# Patient Record
Sex: MALE | Race: BLACK OR AFRICAN AMERICAN | NOT HISPANIC OR LATINO | Employment: UNEMPLOYED | URBAN - METROPOLITAN AREA
[De-identification: names, ages, dates, MRNs, and addresses within clinical notes are randomized per-mention and may not be internally consistent; named-entity substitution may affect disease eponyms.]

---

## 2023-09-08 ENCOUNTER — HOSPITAL ENCOUNTER (INPATIENT)
Facility: HOSPITAL | Age: 63
LOS: 3 days | Discharge: NURSING, REHAB OR LONG-TERM CARE | DRG: 351 | End: 2023-09-14
Attending: EMERGENCY MEDICINE | Admitting: STUDENT IN AN ORGANIZED HEALTH CARE EDUCATION/TRAINING PROGRAM
Payer: COMMERCIAL

## 2023-09-08 DIAGNOSIS — N17.9 AKI (ACUTE KIDNEY INJURY) (HCC): Primary | ICD-10-CM

## 2023-09-08 DIAGNOSIS — I10 PRIMARY HYPERTENSION: ICD-10-CM

## 2023-09-08 DIAGNOSIS — E87.6 HYPOKALEMIA: ICD-10-CM

## 2023-09-08 DIAGNOSIS — M10.9 GOUT FLARE: ICD-10-CM

## 2023-09-08 LAB
ALBUMIN SERPL BCP-MCNC: 4.5 G/DL (ref 3.5–5)
ALP SERPL-CCNC: 61 U/L (ref 34–104)
ALT SERPL W P-5'-P-CCNC: 9 U/L (ref 7–52)
ANION GAP SERPL CALCULATED.3IONS-SCNC: 11 MMOL/L
AST SERPL W P-5'-P-CCNC: 12 U/L (ref 13–39)
BASOPHILS # BLD AUTO: 0.05 THOUSANDS/ÂΜL (ref 0–0.1)
BASOPHILS NFR BLD AUTO: 0 % (ref 0–1)
BILIRUB SERPL-MCNC: 0.76 MG/DL (ref 0.2–1)
BUN SERPL-MCNC: 23 MG/DL (ref 5–25)
CALCIUM SERPL-MCNC: 9.7 MG/DL (ref 8.4–10.2)
CHLORIDE SERPL-SCNC: 97 MMOL/L (ref 96–108)
CO2 SERPL-SCNC: 27 MMOL/L (ref 21–32)
CREAT SERPL-MCNC: 1.89 MG/DL (ref 0.6–1.3)
EOSINOPHIL # BLD AUTO: 0.05 THOUSAND/ÂΜL (ref 0–0.61)
EOSINOPHIL NFR BLD AUTO: 0 % (ref 0–6)
ERYTHROCYTE [DISTWIDTH] IN BLOOD BY AUTOMATED COUNT: 14.3 % (ref 11.6–15.1)
GFR SERPL CREATININE-BSD FRML MDRD: 37 ML/MIN/1.73SQ M
GLUCOSE SERPL-MCNC: 112 MG/DL (ref 65–140)
HCT VFR BLD AUTO: 42 % (ref 36.5–49.3)
HGB BLD-MCNC: 14.3 G/DL (ref 12–17)
IMM GRANULOCYTES # BLD AUTO: 0.03 THOUSAND/UL (ref 0–0.2)
IMM GRANULOCYTES NFR BLD AUTO: 0 % (ref 0–2)
LYMPHOCYTES # BLD AUTO: 0.73 THOUSANDS/ÂΜL (ref 0.6–4.47)
LYMPHOCYTES NFR BLD AUTO: 6 % (ref 14–44)
MCH RBC QN AUTO: 28.9 PG (ref 26.8–34.3)
MCHC RBC AUTO-ENTMCNC: 34 G/DL (ref 31.4–37.4)
MCV RBC AUTO: 85 FL (ref 82–98)
MONOCYTES # BLD AUTO: 1.21 THOUSAND/ÂΜL (ref 0.17–1.22)
MONOCYTES NFR BLD AUTO: 10 % (ref 4–12)
NEUTROPHILS # BLD AUTO: 10.05 THOUSANDS/ÂΜL (ref 1.85–7.62)
NEUTS SEG NFR BLD AUTO: 84 % (ref 43–75)
NRBC BLD AUTO-RTO: 0 /100 WBCS
PLATELET # BLD AUTO: 280 THOUSANDS/UL (ref 149–390)
PMV BLD AUTO: 10 FL (ref 8.9–12.7)
POTASSIUM SERPL-SCNC: 2.8 MMOL/L (ref 3.5–5.3)
PROT SERPL-MCNC: 8.5 G/DL (ref 6.4–8.4)
RBC # BLD AUTO: 4.95 MILLION/UL (ref 3.88–5.62)
SODIUM SERPL-SCNC: 135 MMOL/L (ref 135–147)
WBC # BLD AUTO: 12.12 THOUSAND/UL (ref 4.31–10.16)

## 2023-09-08 PROCEDURE — 96375 TX/PRO/DX INJ NEW DRUG ADDON: CPT

## 2023-09-08 PROCEDURE — 99283 EMERGENCY DEPT VISIT LOW MDM: CPT

## 2023-09-08 PROCEDURE — 80053 COMPREHEN METABOLIC PANEL: CPT | Performed by: EMERGENCY MEDICINE

## 2023-09-08 PROCEDURE — 99285 EMERGENCY DEPT VISIT HI MDM: CPT | Performed by: EMERGENCY MEDICINE

## 2023-09-08 PROCEDURE — 36415 COLL VENOUS BLD VENIPUNCTURE: CPT | Performed by: EMERGENCY MEDICINE

## 2023-09-08 PROCEDURE — 85025 COMPLETE CBC W/AUTO DIFF WBC: CPT | Performed by: EMERGENCY MEDICINE

## 2023-09-08 RX ORDER — POTASSIUM CHLORIDE 20 MEQ/1
40 TABLET, EXTENDED RELEASE ORAL ONCE
Status: COMPLETED | OUTPATIENT
Start: 2023-09-08 | End: 2023-09-09

## 2023-09-08 RX ORDER — PREDNISONE 20 MG/1
60 TABLET ORAL ONCE
Status: COMPLETED | OUTPATIENT
Start: 2023-09-08 | End: 2023-09-08

## 2023-09-08 RX ORDER — HYDROMORPHONE HCL IN WATER/PF 6 MG/30 ML
0.2 PATIENT CONTROLLED ANALGESIA SYRINGE INTRAVENOUS ONCE
Status: COMPLETED | OUTPATIENT
Start: 2023-09-08 | End: 2023-09-08

## 2023-09-08 RX ADMIN — HYDROMORPHONE HYDROCHLORIDE 0.2 MG: 0.2 INJECTION, SOLUTION INTRAMUSCULAR; INTRAVENOUS; SUBCUTANEOUS at 22:42

## 2023-09-08 RX ADMIN — PREDNISONE 60 MG: 20 TABLET ORAL at 22:27

## 2023-09-09 PROBLEM — E87.6 HYPOKALEMIA: Status: ACTIVE | Noted: 2023-09-09

## 2023-09-09 PROBLEM — N18.9 CKD (CHRONIC KIDNEY DISEASE): Status: ACTIVE | Noted: 2023-09-09

## 2023-09-09 PROBLEM — Z86.73 H/O: CVA (CEREBROVASCULAR ACCIDENT): Status: ACTIVE | Noted: 2023-09-09

## 2023-09-09 PROBLEM — E78.5 HYPERLIPIDEMIA: Status: ACTIVE | Noted: 2023-09-09

## 2023-09-09 PROBLEM — M10.9 GOUT FLARE: Status: ACTIVE | Noted: 2023-09-09

## 2023-09-09 PROBLEM — I10 HYPERTENSION: Status: ACTIVE | Noted: 2023-09-09

## 2023-09-09 LAB
ANION GAP SERPL CALCULATED.3IONS-SCNC: 8 MMOL/L
BUN SERPL-MCNC: 22 MG/DL (ref 5–25)
CALCIUM SERPL-MCNC: 9.4 MG/DL (ref 8.4–10.2)
CHLORIDE SERPL-SCNC: 99 MMOL/L (ref 96–108)
CO2 SERPL-SCNC: 27 MMOL/L (ref 21–32)
CREAT SERPL-MCNC: 1.74 MG/DL (ref 0.6–1.3)
ERYTHROCYTE [DISTWIDTH] IN BLOOD BY AUTOMATED COUNT: 14.3 % (ref 11.6–15.1)
GFR SERPL CREATININE-BSD FRML MDRD: 41 ML/MIN/1.73SQ M
GLUCOSE P FAST SERPL-MCNC: 151 MG/DL (ref 65–99)
GLUCOSE SERPL-MCNC: 151 MG/DL (ref 65–140)
HCT VFR BLD AUTO: 40.3 % (ref 36.5–49.3)
HGB BLD-MCNC: 14.1 G/DL (ref 12–17)
MAGNESIUM SERPL-MCNC: 1.9 MG/DL (ref 1.9–2.7)
MCH RBC QN AUTO: 29.7 PG (ref 26.8–34.3)
MCHC RBC AUTO-ENTMCNC: 35 G/DL (ref 31.4–37.4)
MCV RBC AUTO: 85 FL (ref 82–98)
PLATELET # BLD AUTO: 245 THOUSANDS/UL (ref 149–390)
PMV BLD AUTO: 9.7 FL (ref 8.9–12.7)
POTASSIUM SERPL-SCNC: 3.6 MMOL/L (ref 3.5–5.3)
RBC # BLD AUTO: 4.74 MILLION/UL (ref 3.88–5.62)
SODIUM SERPL-SCNC: 134 MMOL/L (ref 135–147)
WBC # BLD AUTO: 11.66 THOUSAND/UL (ref 4.31–10.16)

## 2023-09-09 PROCEDURE — 85027 COMPLETE CBC AUTOMATED: CPT | Performed by: INTERNAL MEDICINE

## 2023-09-09 PROCEDURE — 96365 THER/PROPH/DIAG IV INF INIT: CPT

## 2023-09-09 PROCEDURE — 97163 PT EVAL HIGH COMPLEX 45 MIN: CPT

## 2023-09-09 PROCEDURE — 36415 COLL VENOUS BLD VENIPUNCTURE: CPT | Performed by: INTERNAL MEDICINE

## 2023-09-09 PROCEDURE — 99223 1ST HOSP IP/OBS HIGH 75: CPT | Performed by: STUDENT IN AN ORGANIZED HEALTH CARE EDUCATION/TRAINING PROGRAM

## 2023-09-09 PROCEDURE — 97167 OT EVAL HIGH COMPLEX 60 MIN: CPT

## 2023-09-09 PROCEDURE — 80048 BASIC METABOLIC PNL TOTAL CA: CPT | Performed by: INTERNAL MEDICINE

## 2023-09-09 PROCEDURE — 83735 ASSAY OF MAGNESIUM: CPT | Performed by: INTERNAL MEDICINE

## 2023-09-09 RX ORDER — MAGNESIUM HYDROXIDE/ALUMINUM HYDROXICE/SIMETHICONE 120; 1200; 1200 MG/30ML; MG/30ML; MG/30ML
30 SUSPENSION ORAL EVERY 4 HOURS PRN
Status: DISCONTINUED | OUTPATIENT
Start: 2023-09-09 | End: 2023-09-14 | Stop reason: HOSPADM

## 2023-09-09 RX ORDER — ATORVASTATIN CALCIUM 40 MG/1
1 TABLET, FILM COATED ORAL EVERY EVENING
COMMUNITY
Start: 2023-08-25 | End: 2023-11-23

## 2023-09-09 RX ORDER — ASPIRIN 81 MG/1
81 TABLET, CHEWABLE ORAL
COMMUNITY

## 2023-09-09 RX ORDER — AMLODIPINE BESYLATE 10 MG/1
10 TABLET ORAL DAILY
COMMUNITY
Start: 2023-05-16 | End: 2023-11-23

## 2023-09-09 RX ORDER — AMLODIPINE BESYLATE 10 MG/1
10 TABLET ORAL DAILY
Status: DISCONTINUED | OUTPATIENT
Start: 2023-09-09 | End: 2023-09-14 | Stop reason: HOSPADM

## 2023-09-09 RX ORDER — METOPROLOL SUCCINATE 100 MG/1
100 TABLET, EXTENDED RELEASE ORAL DAILY
Status: ON HOLD | COMMUNITY
Start: 2023-05-16 | End: 2023-09-14 | Stop reason: SDUPTHER

## 2023-09-09 RX ORDER — ATORVASTATIN CALCIUM 40 MG/1
40 TABLET, FILM COATED ORAL
Status: DISCONTINUED | OUTPATIENT
Start: 2023-09-09 | End: 2023-09-14 | Stop reason: HOSPADM

## 2023-09-09 RX ORDER — METOPROLOL SUCCINATE 100 MG/1
100 TABLET, EXTENDED RELEASE ORAL DAILY
Status: DISCONTINUED | OUTPATIENT
Start: 2023-09-09 | End: 2023-09-13

## 2023-09-09 RX ORDER — ACETAMINOPHEN 325 MG/1
975 TABLET ORAL EVERY 8 HOURS PRN
Status: DISCONTINUED | OUTPATIENT
Start: 2023-09-09 | End: 2023-09-14 | Stop reason: HOSPADM

## 2023-09-09 RX ORDER — LIDOCAINE 50 MG/G
1 PATCH TOPICAL DAILY
Status: DISCONTINUED | OUTPATIENT
Start: 2023-09-09 | End: 2023-09-14 | Stop reason: HOSPADM

## 2023-09-09 RX ORDER — SODIUM CHLORIDE 9 MG/ML
100 INJECTION, SOLUTION INTRAVENOUS CONTINUOUS
Status: DISCONTINUED | OUTPATIENT
Start: 2023-09-09 | End: 2023-09-10

## 2023-09-09 RX ORDER — PANTOPRAZOLE SODIUM 40 MG/1
40 TABLET, DELAYED RELEASE ORAL
Status: DISCONTINUED | OUTPATIENT
Start: 2023-09-09 | End: 2023-09-14 | Stop reason: HOSPADM

## 2023-09-09 RX ORDER — HEPARIN SODIUM 5000 [USP'U]/ML
5000 INJECTION, SOLUTION INTRAVENOUS; SUBCUTANEOUS EVERY 8 HOURS SCHEDULED
Status: DISCONTINUED | OUTPATIENT
Start: 2023-09-09 | End: 2023-09-14 | Stop reason: HOSPADM

## 2023-09-09 RX ORDER — POTASSIUM CHLORIDE 14.9 MG/ML
20 INJECTION INTRAVENOUS ONCE
Status: COMPLETED | OUTPATIENT
Start: 2023-09-09 | End: 2023-09-09

## 2023-09-09 RX ORDER — PREDNISONE 10 MG/1
10 TABLET ORAL DAILY
Status: DISCONTINUED | OUTPATIENT
Start: 2023-09-13 | End: 2023-09-14 | Stop reason: HOSPADM

## 2023-09-09 RX ORDER — OMEPRAZOLE 20 MG/1
20 CAPSULE, DELAYED RELEASE ORAL
COMMUNITY

## 2023-09-09 RX ORDER — PREDNISONE 20 MG/1
20 TABLET ORAL DAILY
Status: COMPLETED | OUTPATIENT
Start: 2023-09-09 | End: 2023-09-12

## 2023-09-09 RX ORDER — HYDROCHLOROTHIAZIDE 25 MG/1
25 TABLET ORAL DAILY
COMMUNITY
Start: 2023-08-25 | End: 2023-09-14

## 2023-09-09 RX ADMIN — HEPARIN SODIUM 5000 UNITS: 5000 INJECTION INTRAVENOUS; SUBCUTANEOUS at 13:28

## 2023-09-09 RX ADMIN — HEPARIN SODIUM 5000 UNITS: 5000 INJECTION INTRAVENOUS; SUBCUTANEOUS at 22:39

## 2023-09-09 RX ADMIN — SODIUM CHLORIDE 100 ML/HR: 0.9 INJECTION, SOLUTION INTRAVENOUS at 13:28

## 2023-09-09 RX ADMIN — SODIUM CHLORIDE 100 ML/HR: 0.9 INJECTION, SOLUTION INTRAVENOUS at 22:44

## 2023-09-09 RX ADMIN — PREDNISONE 20 MG: 20 TABLET ORAL at 09:02

## 2023-09-09 RX ADMIN — ALUMINUM HYDROXIDE, MAGNESIUM HYDROXIDE, AND DIMETHICONE 30 ML: 200; 20; 200 SUSPENSION ORAL at 22:57

## 2023-09-09 RX ADMIN — AMLODIPINE BESYLATE 10 MG: 10 TABLET ORAL at 09:02

## 2023-09-09 RX ADMIN — ALUMINUM HYDROXIDE, MAGNESIUM HYDROXIDE, AND DIMETHICONE 30 ML: 200; 20; 200 SUSPENSION ORAL at 15:49

## 2023-09-09 RX ADMIN — ATORVASTATIN CALCIUM 40 MG: 40 TABLET, FILM COATED ORAL at 15:49

## 2023-09-09 RX ADMIN — LIDOCAINE 5% 1 PATCH: 700 PATCH TOPICAL at 13:28

## 2023-09-09 RX ADMIN — SODIUM CHLORIDE 125 ML/HR: 0.9 INJECTION, SOLUTION INTRAVENOUS at 01:22

## 2023-09-09 RX ADMIN — PANTOPRAZOLE SODIUM 40 MG: 40 TABLET, DELAYED RELEASE ORAL at 05:11

## 2023-09-09 RX ADMIN — ASPIRIN 81 MG: 81 TABLET, COATED ORAL at 09:02

## 2023-09-09 RX ADMIN — POTASSIUM CHLORIDE 40 MEQ: 1500 TABLET, EXTENDED RELEASE ORAL at 00:03

## 2023-09-09 RX ADMIN — METOPROLOL SUCCINATE 100 MG: 100 TABLET, EXTENDED RELEASE ORAL at 09:02

## 2023-09-09 RX ADMIN — SODIUM CHLORIDE, SODIUM LACTATE, POTASSIUM CHLORIDE, AND CALCIUM CHLORIDE 1000 ML: .6; .31; .03; .02 INJECTION, SOLUTION INTRAVENOUS at 00:04

## 2023-09-09 RX ADMIN — POTASSIUM CHLORIDE 20 MEQ: 14.9 INJECTION, SOLUTION INTRAVENOUS at 01:22

## 2023-09-09 RX ADMIN — ACETAMINOPHEN 975 MG: 325 TABLET, FILM COATED ORAL at 02:12

## 2023-09-09 NOTE — ASSESSMENT & PLAN NOTE
· Potassium 2.8 on admission  · Supplementation with 40mEq oral and 20mEq IV potassium  · Recheck with AM labs  · Monitor on telemetry

## 2023-09-09 NOTE — OCCUPATIONAL THERAPY NOTE
Occupational Therapy Evaluation      Claudean Hood    9/9/2023    Patient Active Problem List   Diagnosis    Hypertension    CKD (chronic kidney disease)    Hypokalemia    Gout flare    Hyperlipidemia    H/O: CVA (cerebrovascular accident)       Past Medical History:   Diagnosis Date    High cholesterol     Hypertension     Renal disorder     Stroke ([address])           09/09/23 1232   OT Last Visit   OT Visit Date 09/09/23   Note Type   Note type Evaluation   Additional Comments Pt agreeable to OT eval. Upon arrival pt supine in bed with HOB elevated. Pain Assessment   Pain Assessment Tool 0-10   Pain Score 3  (0/10 at rest; 3/10 c mobility)   Pain Location/Orientation Orientation: Right;Location: Knee   Pain Onset/Description Onset: Ongoing;Frequency: Constant/Continuous   Hospital Pain Intervention(s) Ambulation/increased activity;Repositioned;Medication (See MAR)   Restrictions/Precautions   Weight Bearing Precautions Per Order No   Other Precautions Multiple lines;Telemetry; Fall Risk;Pain   Home Living   Type of Home Apartment  (2nd floor apt)   Home Layout One level;Performs ADLs on one level; Able to live on main level with bedroom/bathroom;Stairs to enter with rails  ([address])   Bathroom Shower/Tub Tub/shower unit   Bathroom Toilet Standard   Bathroom Equipment   (no DME reported)   [address]  (no AD used at baseline- "only use it when I am in pain")   Prior Function   Level of [place] Independent with ADLs; Independent with functional mobility; Needs assistance with IADLS   Lives With Dana Blanchard Help From Family   IADLs Family/Friend/Other provides transportation; Independent with medication management; Independent with meal prep   Falls in the last 6 months 0   Vocational Retired   Lifestyle   Autonomy Patient reporting being independent with ADLs, ambulatory with no AD, and lives with son   Reciprocal Relationships Son   Service to Others Retired   ADL Eating Assistance 6  Modified independent   Grooming Assistance 6  Modified Independent   UB Bathing Assistance 4  Minimal Assistance   LB Bathing Assistance 3  Moderate Assistance   UB Dressing Assistance 4  Minimal Assistance   LB Dressing Assistance 3  Moderate Assistance   Toileting Assistance  3  Moderate Assistance   Additional Comments ADL levels based on functional performance during OT eval.   Bed Mobility   Supine to Sit 5  Supervision   Additional items Assist x 1;HOB elevated; Bedrails; Increased time required;Verbal cues   Sit to Supine   (DNT: pt seated at EOB at end of session)   Additional Comments Pt denied lightheaded/dizziness with transitional movements   Transfers   Sit to Stand 4  Minimal assistance   Additional items Assist x 2; Increased time required;Verbal cues   Stand to Sit 4  Minimal assistance   Additional items Assist x 2; Increased time required;Verbal cues   Functional Mobility   Functional Mobility 4  Minimal assistance  (Assist of 2)   Additional Comments Pt ambulated side-steps towards HOB. Pt unsteady and limited by pain. Additional items Rolling walker   Balance   Static Sitting Fair +   Dynamic Sitting Fair -   Static Standing Poor +   Dynamic Standing Poor   Activity Tolerance   Activity Tolerance Patient limited by pain   Medical Staff Made Aware Pt seen as a co-eval with PT due to the patient's co-morbidities and clinically unstable presentation indicated by chart review. RUE Assessment   RUE Assessment WFL  (full AROM, grossly 4-/5 MMT)   LUE Assessment   LUE Assessment X  (limited d/t L hemiplegia 2/2 hx of CVA)   Hand Function   Gross Motor Coordination Impaired  (LUE)   Fine Motor Coordination Impaired  (LUE)   Sensation   Light Touch No apparent deficits   Vision-Basic Assessment   Current Vision Wears glasses all the time   Cognition   Overall Cognitive Status [facility]   Arousal/Participation Alert; Responsive; Cooperative   Attention Within functional limits   Orientation Level Oriented X4   Memory Within functional limits   Following Commands Follows all commands and directions without difficulty   Assessment   Limitation Decreased ADL status; Decreased UE strength;Decreased endurance;Decreased UE ROM; Decreased self-care trans;Decreased fine motor control;Decreased high-level ADLs   Prognosis Good   Assessment Patient is a 66 y.o. male seen for OT evaluation s/p admit to [facility]  on 9/8/2023 w/Hypokalemia. Commorbidities affecting patient's functional performance at time of assessment include: HTN, CKD, gout flare, and hx of CVA. Orders placed for OT evaluation and treatment and up and OOB as tolerated . Performed at least two patient identifiers during session including name and wristband. Prior to admission, Patient reporting being independent with ADLs, ambulatory with no AD, and lives with son. Personal factors affecting patient at time of initial evaluation include: steps to enter, difficulty performing ADLs and difficulty performing IADLs. Upon evaluation, patient requires minimal  assist for UB ADLs, moderate assist for LB ADLs, transfers and functional ambulation in room and bathroom with minimal  assist of 2, with the use of Rolling Walker. Patient is oriented x 4. Occupational performance is affected by the following deficits: limited active ROM, decreased muscle strength, impaired gross motor coordination, impaired fine motor coordination, dynamic sit/ stand balance deficit with poor standing tolerance time for self care and functional mobility, decreased activity tolerance, increased pain and delayed righting and equilibrium reactions. Patient to benefit from continued Occupational Therapy treatment while in the hospital to address deficits as defined above and maximize level of functional independence with ADLs and functional mobility.  Occupational Performance areas to address include: grooming , bathing/ shower, dressing, toilet hygiene, transfer to all surfaces, functional ambulation, medication routine/ management, IADLS: Household maintenance, IADLs: safety procedures and IADLs: meal prep/ clean up. From OT standpoint, recommendation at time of d/c would be Post acute rehabilitation services. Goals   Patient Goals to go to rehab   Plan   Treatment Interventions ADL retraining;Functional transfer training;UE strengthening/ROM; Endurance training;Patient/family training;Neuromuscular reeducation; Fine motor coordination activities; Compensatory technique education; Activityengagement   Goal Expiration Date 09/24/23   OT Treatment Day 0   OT Frequency 3-5x/wk   Recommendation   OT Discharge Recommendation Post acute rehabilitation services   AM-PAC Daily Activity Inpatient   Lower Body Dressing 2   Bathing 2   Toileting 2   Upper Body Dressing 3   Grooming 3   Eating 4   Daily Activity Raw Score 16   Daily Activity Standardized Score (Calc for Raw Score >=11) 35.96   AM-PAC Applied Cognition Inpatient   Following a Speech/Presentation 4   Understanding Ordinary Conversation 4   Taking Medications 3   Remembering Where Things Are Placed or Put Away 3   Remembering List of 4-5 Errands 3   Taking Care of Complicated Tasks 3   Applied Cognition Raw Score 20   Applied Cognition Standardized Score 41.76   Modified Ashby Scale   Modified [place]by Scale 4   End of Consult   Patient Position at End of Consult All needs within reach; Seated edge of bed   Nurse Communication Nurse aware of consult     GOALS:    *ADL transfers with (I) for inc'd independence with ADLs/purposeful tasks    *UB ADL with (I) for inc'd independence with self cares    *LB ADL with (S) using AE prn for inc'd independence with self cares    *Toileting with (S) for clothing management and hygiene for return to PLOF with personal care    *Increase stand tolerance x 5  m for inc'd tolerance with standing purposeful tasks    *Participate in 10m UE therex to increase overall stamina/activity tolerance for purposeful tasks    *Bed mobility- (I) for inc'd independence to manage own comfort and initiate EOB & OOB purposeful tasks    *Patient will verbalize 3 safety awareness/ principles to prevent falls in the home setting. *Patient will increase OOB/sitting tolerance to 2-4 hours per day to increase participation in self-care and leisure tasks with no s/s of exertion.      Reji Rodriguez, OTD, OTR/L

## 2023-09-09 NOTE — H&P
[address]  H&P  Name: Unique Dale 66 y.o. male I MRN: [id number]  Unit/Bed#: ED 29 I Date of Admission: 9/8/2023   Date of Service: 9/9/2023 I Hospital Day: 0      Assessment/Plan   * Hypokalemia  Assessment & Plan  · Potassium 2.8 on admission  · Supplementation with 40mEq oral and 20mEq IV potassium  · Recheck with AM labs  · Monitor on telemetry    CKD (chronic kidney disease)  Assessment & Plan  Lab Results   Component Value Date    EGFR 37 09/08/2023    CREATININE 1.89 (H) 09/08/2023     · Creatinine 1.89 on admission  · Unclear renal function baseline. ED requesting admission for LEFTY; however, when reviewing chart it appears creatinine has ranged from 1.3-2.1 over the last year. Patient was encouraged to follow with a Nephrologist, but has not yet done so. Strongly encouraged patient to see specialist after discharge. · Will start on IVF overnight  · Monitor I&O  · Recheck AM BMP    Gout flare  Assessment & Plan  · H/o of frequent gout flares  · Presenting with swollen right ankle. Reports having gout in this area in the past  · Will start on oral prednisone taper as prescribed at previous hospitalization (20-20-10-10)   · Follows with Ortho outpatient regularly    H/O: CVA (cerebrovascular accident)  Assessment & Plan  · Continue asa and statin    Hypertension  Assessment & Plan  · BP stable on admission  · Continue home amlodipine and metoprolol  · Monitor vitals per routine       VTE Pharmacologic Prophylaxis: VTE Score: 3 Moderate Risk (Score 3-4) - Pharmacological DVT Prophylaxis Ordered: heparin. Code Status: Level 1 - Full Code   Discussion with family: Update in the AM    Anticipated Length of Stay: Patient will be admitted on an observation basis with an anticipated length of stay of less than 2 midnights secondary to Hypokalemia, LEFTY.       Chief Complaint: Gout    History of Present Illness:  Unique Dale is a 66 y.o. male with a PMH of hypertension, history of CVA, CKD. Patient presents to the ED for evaluation of gout flare. Patient has a history of multiple gout flares in the past in which she follows with Ortho as an outpatient. On initial labs patient found to have hypokalemia and questionable kidney injury. Patient will require medical admission for electrolyte supplementation and renal function monitoring. All patient questions answered to the best of my ability. Review of Systems:  Review of Systems   Constitutional: Negative for chills and fever. HENT: Negative for ear pain and sore throat. Eyes: Negative for pain and visual disturbance. Respiratory: Negative for cough and shortness of breath. Cardiovascular: Negative for chest pain and palpitations. Gastrointestinal: Negative for abdominal pain and vomiting. Genitourinary: Negative for dysuria and hematuria. Musculoskeletal: Negative for arthralgias and back pain. Gout flair   Skin: Negative for color change and rash. Neurological: Negative for seizures and syncope. All other systems reviewed and are negative. Past Medical and Surgical History:   Past Medical History:   Diagnosis Date   • High cholesterol    • Hypertension    • Renal disorder    • Stroke [facility])        History reviewed. No pertinent surgical history. Meds/Allergies:  Prior to Admission medications    Not on File     I have reviewed home medications using recent Epic encounter. Allergies: No Known Allergies    Social History:  Marital Status:    Occupation: NA  Patient Pre-hospital Living Situation: Home  Patient Pre-hospital Level of Mobility: walks  Patient Pre-hospital Diet Restrictions: None  Substance Use History:   Social History     Substance and Sexual Activity   Alcohol Use Never     Social History     Tobacco Use   Smoking Status Never   Smokeless Tobacco Never     Social History     Substance and Sexual Activity   Drug Use Never       Family History:  History reviewed.  No pertinent family history. Physical Exam:     Vitals:   Blood Pressure: 142/91 (09/08/23 2204)  Pulse: 89 (09/08/23 2204)  Temperature: 97.9 °F (36.6 °C) (09/08/23 2204)  Temp Source: Temporal (09/08/23 2204)  Respirations: 18 (09/08/23 2204)  SpO2: 96 % (09/08/23 2204)    Physical Exam  Vitals and nursing note reviewed. Constitutional:       General: He is not in acute distress. Appearance: He is well-developed. HENT:      Head: Normocephalic and atraumatic. Eyes:      Conjunctiva/sclera: Conjunctivae normal.   Cardiovascular:      Rate and Rhythm: Normal rate and regular rhythm. Heart sounds: No murmur heard. Pulmonary:      Effort: Pulmonary effort is normal. No respiratory distress. Breath sounds: Normal breath sounds. Abdominal:      Palpations: Abdomen is soft. Tenderness: There is no abdominal tenderness. Musculoskeletal:         General: No swelling. Cervical back: Neck supple. Comments: Swollen right ankle. Mildly tender to the touch. Skin:     General: Skin is warm and dry. Capillary Refill: Capillary refill takes less than 2 seconds. Neurological:      Mental Status: He is alert and oriented to person, place, and time.    Psychiatric:         Mood and Affect: Mood normal.          Additional Data:     Lab Results:  Results from last 7 days   Lab Units 09/08/23  2233   WBC Thousand/uL 12.12*   HEMOGLOBIN g/dL 14.3   HEMATOCRIT % 42.0   PLATELETS Thousands/uL 280   NEUTROS PCT % 84*   LYMPHS PCT % 6*   MONOS PCT % 10   EOS PCT % 0     Results from last 7 days   Lab Units 09/08/23  2233   SODIUM mmol/L 135   POTASSIUM mmol/L 2.8*   CHLORIDE mmol/L 97   CO2 mmol/L 27   BUN mg/dL 23   CREATININE mg/dL 1.89*   ANION GAP mmol/L 11   CALCIUM mg/dL 9.7   ALBUMIN g/dL 4.5   TOTAL BILIRUBIN mg/dL 0.76   ALK PHOS U/L 61   ALT U/L 9   AST U/L 12*   GLUCOSE RANDOM mg/dL 112                       Lines/Drains:  Invasive Devices     Peripheral Intravenous Line  Duration Peripheral IV 09/08/23 Right Antecubital <1 day                    Imaging: No pertinent imaging reviewed. No orders to display       EKG and Other Studies Reviewed on Admission:   · EKG: No EKG obtained. ** Please Note: This note has been constructed using a voice recognition system.  **

## 2023-09-09 NOTE — ASSESSMENT & PLAN NOTE
· H/o of frequent gout flares  · Presenting with swollen right ankle.  Reports having gout in this area in the past  · Will start on oral prednisone taper as prescribed at previous hospitalization (20-20-10-10)   · Follows with Ortho outpatient regularly

## 2023-09-09 NOTE — PLAN OF CARE
Problem: MOBILITY - ADULT  Goal: Maintain or return to baseline ADL function  Description: INTERVENTIONS:  -  Assess patient's ability to carry out ADLs; assess patient's baseline for ADL function and identify physical deficits which impact ability to perform ADLs (bathing, care of mouth/teeth, toileting, grooming, dressing, etc.)  - Assess/evaluate cause of self-care deficits   - Assess range of motion  - Assess patient's mobility; develop plan if impaired  - Assess patient's need for assistive devices and provide as appropriate  - Encourage maximum independence but intervene and supervise when necessary  - Involve family in performance of ADLs  - Assess for home care needs following discharge   - Consider OT consult to assist with ADL evaluation and planning for discharge  - Provide patient education as appropriate  9/9/2023 1756 by Medardo Medina RN  Outcome: Progressing  9/9/2023 1756 by Medardo Medina RN  Outcome: Progressing  Goal: Maintains/Returns to pre admission functional level  Description: INTERVENTIONS:  - Perform BMAT or MOVE assessment daily.   - Set and communicate daily mobility goal to care team and patient/family/caregiver. - Collaborate with rehabilitation services on mobility goals if consulted  - Perform Range of Motion  times a day. - Reposition patient every  hours.   - Dangle patient  times a day  - Stand patient  times a day  - Ambulate patient  times a day  - Out of bed to chair  times a day   - Out of bed for meals  times a day  - Out of bed for toileting  - Record patient progress and toleration of activity level   9/9/2023 1756 by Medardo Medina RN  Outcome: Progressing  9/9/2023 1756 by Medardo Medina RN  Outcome: Progressing     Problem: PAIN - ADULT  Goal: Verbalizes/displays adequate comfort level or baseline comfort level  Description: Interventions:  - Encourage patient to monitor pain and request assistance  - Assess pain using appropriate pain scale  - Administer analgesics based on type and severity of pain and evaluate response  - Implement non-pharmacological measures as appropriate and evaluate response  - Consider cultural and social influences on pain and pain management  - Notify physician/advanced practitioner if interventions unsuccessful or patient reports new pain  Outcome: Progressing     Problem: INFECTION - ADULT  Goal: Absence or prevention of progression during hospitalization  Description: INTERVENTIONS:  - Assess and monitor for signs and symptoms of infection  - Monitor lab/diagnostic results  - Monitor all insertion sites, i.e. indwelling lines, tubes, and drains  - Monitor endotracheal if appropriate and nasal secretions for changes in amount and color  - [place] appropriate cooling/warming therapies per order  - Administer medications as ordered  - Instruct and encourage patient and family to use good hand hygiene technique  - Identify and instruct in appropriate isolation precautions for identified infection/condition  Outcome: Progressing  Goal: Absence of fever/infection during neutropenic period  Description: INTERVENTIONS:  - Monitor WBC    Outcome: Progressing     Problem: SAFETY ADULT  Goal: Maintain or return to baseline ADL function  Description: INTERVENTIONS:  -  Assess patient's ability to carry out ADLs; assess patient's baseline for ADL function and identify physical deficits which impact ability to perform ADLs (bathing, care of mouth/teeth, toileting, grooming, dressing, etc.)  - Assess/evaluate cause of self-care deficits   - Assess range of motion  - Assess patient's mobility; develop plan if impaired  - Assess patient's need for assistive devices and provide as appropriate  - Encourage maximum independence but intervene and supervise when necessary  - Involve family in performance of ADLs  - Assess for home care needs following discharge   - Consider OT consult to assist with ADL evaluation and planning for discharge  - Provide patient education as appropriate  9/9/2023 1756 by Bradley Marks RN  Outcome: Progressing  9/9/2023 1756 by Bradley Marks RN  Outcome: Progressing  Goal: Maintains/Returns to pre admission functional level  Description: INTERVENTIONS:  - Perform BMAT or MOVE assessment daily.   - Set and communicate daily mobility goal to care team and patient/family/caregiver. - Collaborate with rehabilitation services on mobility goals if consulted  - Perform Range of Motion  times a day. - Reposition patient every  hours.   - Dangle patient  times a day  - Stand patient  times a day  - Ambulate patient  times a day  - Out of bed to chair  times a day   - Out of bed for meals  times a day  - Out of bed for toileting  - Record patient progress and toleration of activity level   9/9/2023 1756 by Bradley Marks RN  Outcome: Progressing  9/9/2023 1756 by Bradley Marks RN  Outcome: Progressing  Goal: Patient will remain free of falls  Description: INTERVENTIONS:  - Educate patient/family on patient safety including physical limitations  - Instruct patient to call for assistance with activity   - Consult OT/PT to assist with strengthening/mobility   - Keep Call bell within reach  - Keep bed low and locked with side rails adjusted as appropriate  - Keep care items and personal belongings within reach  - Initiate and maintain comfort rounds  - Make Fall Risk Sign visible to staff  - Offer Toileting every  Hours, in advance of need  - Initiate/Maintain alarm  - Obtain necessary fall risk management equipment:  - Apply yellow socks and bracelet for high fall risk patients  - Consider moving patient to room near nurses station  Outcome: Progressing     Problem: DISCHARGE PLANNING  Goal: Discharge to home or other facility with appropriate resources  Description: INTERVENTIONS:  - Identify barriers to discharge w/patient and caregiver  - Arrange for needed discharge resources and transportation as appropriate  - Identify discharge learning needs (meds, wound care, etc.)  - Arrange for interpretive services to assist at discharge as needed  - Refer to Case Management Department for coordinating discharge planning if the patient needs post-hospital services based on physician/advanced practitioner order or complex needs related to functional status, cognitive ability, or social support system  Outcome: Progressing     Problem: Knowledge Deficit  Goal: Patient/family/caregiver demonstrates understanding of disease process, treatment plan, medications, and discharge instructions  Description: Complete learning assessment and assess knowledge base.   Interventions:  - Provide teaching at level of understanding  - Provide teaching via preferred learning methods  Outcome: Progressing

## 2023-09-09 NOTE — ED PROVIDER NOTES
Pt Name: Brock Dueñas  MRN: [id number]  [address] 1960  Age/Sex: 66 y.o. male  Date of evaluation: 9/8/2023  PCP: No primary care provider on file. CHIEF COMPLAINT    Chief Complaint   Patient presents with   • Gout Pain     Pt arrives via hospital wheelchair with a c/o a gout flare up to his right leg and foot since this morning. HPI    66 y.o. male presenting with right ankle pain. Patient states that he was in his normal state of health and began having pain redness and swelling to his right ankle today. This pain is dull, burning, severe, in the right ankle, radiating to the foot as well as partially up the leg, worse with pressing on the area or with light touch or with bearing any weight and better at rest.  Patient states this is similar to a prior episode of gout that he has had in the past.  He states the last time he was seen for gout he had abnormal blood work as well and was admitted to the hospital, is requesting repeat blood work at this time. Patient notes difficulty walking due to the pain at this time. He denies fever, trauma, chest pain, shortness of breath, numbness, weakness, other symptoms. HPI      Past Medical and Surgical History    Past Medical History:   Diagnosis Date   • High cholesterol    • Hypertension    • Renal disorder    • Stroke [facility])        History reviewed. No pertinent surgical history. History reviewed. No pertinent family history. Social History     Tobacco Use   • Smoking status: Never   • Smokeless tobacco: Never   Vaping Use   • Vaping Use: Never used   Substance Use Topics   • Alcohol use: Never   • Drug use: Never           Allergies    No Known Allergies    Home Medications    Prior to Admission medications    Not on File           Review of Systems    Review of Systems   Constitutional: Negative for appetite change, chills and diaphoresis. HENT: Negative for drooling, facial swelling, trouble swallowing and voice change. Respiratory: Negative for apnea, shortness of breath and wheezing. Cardiovascular: Negative for chest pain and leg swelling. Gastrointestinal: Negative for abdominal distention, abdominal pain, diarrhea, nausea and vomiting. Genitourinary: Negative for dysuria and urgency. Musculoskeletal: Positive for arthralgias, gait problem and joint swelling. Negative for back pain and neck pain. Skin: Negative for color change, rash and wound. Neurological: Negative for seizures, speech difficulty, weakness and headaches. Psychiatric/Behavioral: Negative for agitation, behavioral problems and dysphoric mood. The patient is not nervous/anxious. All other systems reviewed and negative. Physical Exam      ED Triage Vitals   Temperature Pulse Respirations Blood Pressure SpO2   09/08/23 2204 09/08/23 2204 09/08/23 2204 09/08/23 2204 09/08/23 2204   97.9 °F (36.6 °C) 89 18 142/91 96 %      Temp Source Heart Rate Source Patient Position - Orthostatic VS BP Location FiO2 (%)   09/08/23 2204 09/08/23 2204 09/08/23 2204 09/08/23 2204 --   Temporal Monitor Sitting Left arm       Pain Score       09/08/23 2242       8               Physical Exam  Vitals and nursing note reviewed. Constitutional:       General: He is not in acute distress. Appearance: He is well-developed. He is not ill-appearing, toxic-appearing or diaphoretic. HENT:      Head: Normocephalic and atraumatic. Right Ear: External ear normal.      Left Ear: External ear normal.      Nose: Nose normal. No congestion or rhinorrhea. Mouth/Throat:      Mouth: Mucous membranes are moist.      Pharynx: Oropharynx is clear. No oropharyngeal exudate or posterior oropharyngeal erythema. Eyes:      Conjunctiva/sclera: Conjunctivae normal.      Pupils: Pupils are equal, round, and reactive to light. Neck:      Trachea: No tracheal deviation. Cardiovascular:      Rate and Rhythm: Normal rate and regular rhythm.       Pulses: Normal pulses. Heart sounds: Normal heart sounds. No murmur heard. Pulmonary:      Effort: Pulmonary effort is normal. No respiratory distress. Breath sounds: Normal breath sounds. No stridor. No wheezing or rales. Abdominal:      General: There is no distension. Palpations: Abdomen is soft. Tenderness: There is no abdominal tenderness. There is no guarding or rebound. Musculoskeletal:         General: Swelling and tenderness present. No deformity. Normal range of motion. Cervical back: Normal range of motion and neck supple. Comments: Swelling tenderness and erythema of the right ankle, pain with light touch or ranging the ankle. Strength and station pulse and cap refill intact distal.  No pain with axial loading. Right lower leg and knee normal in appearance without erythema or tenderness, no tenderness overlying the deep venous system. Skin:     General: Skin is warm and dry. Capillary Refill: Capillary refill takes less than 2 seconds. Neurological:      Mental Status: He is alert and oriented to person, place, and time. Psychiatric:         Behavior: Behavior normal.         Thought Content:  Thought content normal.         Judgment: Judgment normal.              Diagnostic Results      Labs:    Results Reviewed     Procedure Component Value Units Date/Time    Basic metabolic panel [[id number]]     Lab Status: No result Specimen: Blood     Magnesium [[id number]]     Lab Status: No result Specimen: Blood     CBC (With Platelets) [[id number]]     Lab Status: No result Specimen: Blood     Comprehensive metabolic panel [[id number]]  (Abnormal) Collected: 09/08/23 2233    Lab Status: Final result Specimen: Blood from Arm, Right Updated: 09/08/23 2306     Sodium 135 mmol/L      Potassium 2.8 mmol/L      Chloride 97 mmol/L      CO2 27 mmol/L      ANION GAP 11 mmol/L      BUN 23 mg/dL      Creatinine 1.89 mg/dL      Glucose 112 mg/dL      Calcium 9.7 mg/dL      AST 12 U/L      ALT 9 U/L      Alkaline Phosphatase 61 U/L      Total Protein 8.5 g/dL      Albumin 4.5 g/dL      Total Bilirubin 0.76 mg/dL      eGFR 37 ml/min/1.73sq m     Narrative:      National Kidney Disease Foundation guidelines for Chronic Kidney Disease (CKD):   •  Stage 1 with normal or high GFR (GFR > 90 mL/min/1.73 square meters)  •  Stage 2 Mild CKD (GFR = 60-89 mL/min/1.73 square meters)  •  Stage 3A Moderate CKD (GFR = 45-59 mL/min/1.73 square meters)  •  Stage 3B Moderate CKD (GFR = 30-44 mL/min/1.73 square meters)  •  Stage 4 Severe CKD (GFR = 15-29 mL/min/1.73 square meters)  •  Stage 5 End Stage CKD (GFR <15 mL/min/1.73 square meters)  Note: GFR calculation is accurate only with a steady state creatinine    CBC and differential [[id number]]  (Abnormal) Collected: 09/08/23 2233    Lab Status: Final result Specimen: Blood from Arm, Right Updated: 09/08/23 2254     WBC 12.12 Thousand/uL      RBC 4.95 Million/uL      Hemoglobin 14.3 g/dL      Hematocrit 42.0 %      MCV 85 fL      MCH 28.9 pg      MCHC 34.0 g/dL      RDW 14.3 %      MPV 10.0 fL      Platelets 734 Thousands/uL      nRBC 0 /100 WBCs      Neutrophils Relative 84 %      Immat GRANS % 0 %      Lymphocytes Relative 6 %      Monocytes Relative 10 %      Eosinophils Relative 0 %      Basophils Relative 0 %      Neutrophils Absolute 10.05 Thousands/µL      Immature Grans Absolute 0.03 Thousand/uL      Lymphocytes Absolute 0.73 Thousands/µL      Monocytes Absolute 1.21 Thousand/µL      Eosinophils Absolute 0.05 Thousand/µL      Basophils Absolute 0.05 Thousands/µL           All labs reviewed and utilized in the medical decision making process    Radiology:    No orders to display       All radiology studies independently viewed by me and interpreted by the radiologist.    Procedure    Procedures        ED Course of Care and Re-Assessments      Pain controlled with hydromorphone, also started on prednisone for treatment of gout after discussion of risks and benefits as well as other alternative agents. Based on history of renal disease, prednisone felt to have best risk-benefit profile. Reviewed EMR, patient's recent baseline creatinine 1.35, elevated 1.89 today, also hypokalemic. Admitted to internal medicine on observation basis. Medications   sodium chloride 0.9 % infusion (100 mL/hr Intravenous Rate/Dose Change 9/9/23 0123)   heparin (porcine) subcutaneous injection 5,000 Units (5,000 Units Subcutaneous Not Given 9/9/23 0122)   aspirin (ECOTRIN LOW STRENGTH) EC tablet 81 mg (has no administration in time range)   atorvastatin (LIPITOR) tablet 40 mg (has no administration in time range)   pantoprazole (PROTONIX) EC tablet 40 mg (has no administration in time range)   amLODIPine (NORVASC) tablet 10 mg (has no administration in time range)   metoprolol succinate (TOPROL-XL) 24 hr tablet 100 mg (has no administration in time range)   predniSONE tablet 20 mg (has no administration in time range)     Followed by   predniSONE tablet 10 mg (has no administration in time range)   acetaminophen (TYLENOL) tablet 975 mg (975 mg Oral Given 9/9/23 2240)   Diclofenac Sodium (VOLTAREN) 1 % topical gel 2 g (has no administration in time range)   predniSONE tablet 60 mg (60 mg Oral Given 9/8/23 2227)   HYDROmorphone HCl (DILAUDID) injection 0.2 mg (0.2 mg Intravenous Given 9/8/23 2242)   potassium chloride (K-DUR,KLOR-CON) CR tablet 40 mEq (40 mEq Oral Given 9/9/23 0003)   lactated ringers bolus 1,000 mL (0 mL Intravenous Stopped 9/9/23 0122)   potassium chloride 20 mEq IVPB (premix) (0 mEq Intravenous Stopped 9/9/23 0342)           FINAL IMPRESSION    Final diagnoses:   Gout flare   LEFTY (acute kidney injury) ([address])   Hypokalemia         DISPOSITION/PLAN    Presentation as above with right ankle pain felt most consistent with gout flare in the setting of acute on chronic kidney failure as well as hypokalemia. Vital signs reassuring, examination as above.   At this time, do not suspect unstable fracture dislocation, compartment syndrome, septic arthritis, critical neurovascular disruption, cellulitis, NSTI, other acute life-threatening alternate pathology. Treated for gout, repletion of electrolytes and resuscitation begun in emergency department, admitted to internal medicine, hemodynamically stable and comfortable at that time. Time reflects when diagnosis was documented in both MDM as applicable and the Disposition within this note     Time User Action Codes Description Comment    9/9/2023 12:22 AM Aleda Bosworth T Add [M10.9] Gout flare     9/9/2023 12:22 AM Aleda Bosworth T Add [N17.9] LEFTY (acute kidney injury) ([address])     9/9/2023 12:22 AM Aleda Bosworth T Add [E87.6] Hypokalemia     9/9/2023 12:22 AM Aleda Bosworth T Modify [M10.9] Gout flare     9/9/2023 12:22 AM Aleda Bosworth T Modify [N17.9] LEFTY (acute kidney injury) [facility])       ED Disposition     ED Disposition   Admit    Condition   Stable    Date/Time   Sat Sep 9, 2023 12:22 AM    Comment   Case was discussed with MAYO and the patient's admission status was agreed to be Admission Status: observation status to the service of Dr. Katlyn Freire . Follow-up Information    None           PATIENT REFERRED TO:    No follow-up provider specified. DISCHARGE MEDICATIONS:    Patient's Medications    No medications on file       No discharge procedures on file. Krystyna Chi MD    Portions of the record may have been created with voice recognition software. Occasional wrong word or "sound alike" substitutions may have occurred due to the inherent limitations of voice recognition software.   Please read the chart carefully and recognize, using context, where substitutions have occurred     Krystyna Chi MD  09/09/23 0402

## 2023-09-09 NOTE — PHYSICAL THERAPY NOTE
PT Evaluation (13min)  (12:32-12:45)    Past Medical History:   Diagnosis Date    High cholesterol     Hypertension     Renal disorder     Stroke [facility])          09/09/23 1245   PT Last Visit   PT Visit Date 09/09/23   Note Type   Note type Evaluation   Pain Assessment   Pain Assessment Tool 0-10   Pain Score 3   Pain Location/Orientation Orientation: Right;Location: Knee   Hospital Pain Intervention(s) Ambulation/increased activity;Repositioned   Restrictions/Precautions   Weight Bearing Precautions Per Order No   Braces or Orthoses Other (Comment)  (L LE AFO; pt dons for ambulation)   Other Precautions Chair Alarm; Bed Alarm;Multiple lines;Telemetry; Fall Risk;Pain   Home Living   Type of Home Apartment  (2nd floor)   Home Layout One level;Performs ADLs on one level; Able to live on main level with bedroom/bathroom;Stairs to enter with rails  ([address])   Bathroom Shower/Tub Tub/shower unit   [address]   Prior Function   Level of [place] Independent with ADLs; Independent with functional mobility; Needs assistance with IADLS  (ambulates c RW "when I'm in pain")   Lives With Betzy Dealyle Help From Family   IADLs Family/Friend/Other provides transportation; Independent with medication management; Independent with meal prep   Falls in the last 6 months 0   Vocational Retired   General   Additional Pertinent History pt presents to [facility] c gout. PT consulted for mobility + d/c planning.    Family/Caregiver Present No   Cognition   Orientation Level Oriented X4   Subjective   Subjective "I need help to sit up and eat my lunch"   RUE Assessment   RUE Assessment WFL   LUE Assessment   LUE Assessment X  (weakness s/p old CVA)   RLE Assessment   RLE Assessment WFL  (4-/5)   LLE Assessment   LLE Assessment WFL  (hip + knee 3-/5; ankle 2-/5)   Vision-Basic Assessment   Current Vision Wears glasses all the time   Coordination   Sensation WFL   Bed Mobility   Supine to Sit 5 Supervision   Additional items HOB elevated; Increased time required;Verbal cues   Transfers   Sit to Stand 4  Minimal assistance   Additional items Assist x 2;Verbal cues; Increased time required   Stand to Sit 4  Minimal assistance   Additional items Assist x 2;Verbal cues; Increased time required   Ambulation/Elevation   Gait pattern Antalgic;Narrow CRESCENCIO; Forward Flexion;Decreased foot clearance; Improper Weight shift;L Hemiparesis; Short stride; Excessively slow   Gait Assistance 4  Minimal assist   Additional items Assist x 2;Verbal cues   Assistive Device Other (Comment)  (pt held onto back of chair for support)   Distance 3' laterally at bedside  (limited 2* pain)   Stair Management Assistance Not tested   Balance   Static Sitting Fair +   Dynamic Sitting Fair   Static Standing Poor +   Dynamic Standing Poor   Ambulatory Poor   Endurance Deficit   Endurance Deficit Yes   Activity Tolerance   Activity Tolerance Patient limited by fatigue;Patient limited by pain   Medical Staff Made Aware OT-Jeane (co-eval 2* pt's co-morbidities + requiring (A)x2 to complete mobility tasks). Nurse Made Aware RN aware   Assessment   Prognosis Good   Problem List Decreased strength;Decreased range of motion;Decreased endurance; Impaired balance;Decreased mobility;Pain   Assessment pt is a 65y/o m who presents to [facility] c gout. PMH significant for hyperlipidemia, HTN, CVA, + renal disorder. at baseline, pt mod (I) c functional mobility tasks c RW prn. resides c family in 2nd floor apt [address]. currently requires min (A)x2 to complete OOB mobility tasks 2* significant deficits in strength, ROM, balance, gait quality, pain, + activity tolerance noted in PT exam above. Barthel Index 50/100, Modified [place] 4. able to initiate ambulation at bedside 3' c RW c min verbal cues for technique c UE support on back of chair. OOB mobility limited 2* pain. would benefit from skilled PT to maximize functional mobility + improve quality of life.  AM-PAC raw score 13 indicating pt would benefit from inpt skilled PT, however please refer to PT d/c recommendation for safe d/c planning. PT eval of high complexity 2* unstable med status c pt requiring ongoing medical management 2* gout. pt c multiple co-morbidities + mobility deficits noted above including old CVA c residual L hemiparesis. resides in 2nd floor apt [address]. Barriers to Discharge Inaccessible home environment   Goals   Patient Goals "to get stronger". STG Expiration Date 09/19/23   Short Term Goal #1 1. increase strength 1/2 grade to improve overall functional mobility, 2. perform bed mobility mod (I) to decrease caregiver burden, 3. perform transfers mod (I) to safely perform ADLs, 4. ambulate 50' c RW c (S) to safely navigate home environment, 5. negotiate 15 steps c (S) to safely enter home   Plan   Treatment/Interventions Functional transfer training;Elevations;LE strengthening/ROM; Therapeutic exercise; Endurance training;Patient/family training;Equipment eval/education; Bed mobility;Gait training;Spoke to nursing;OT   PT Frequency 3-5x/wk   Recommendation   PT Discharge Recommendation Post acute rehabilitation services   AM-PAC Basic Mobility Inpatient   Turning in Flat Bed Without Bedrails 3   Lying on Back to Sitting on Edge of Flat Bed Without Bedrails 3   Moving Bed to Chair 2   Standing Up From Chair Using Arms 2   Walk in Room 2   Climb 3-5 Stairs With Railing 1   Basic Mobility Inpatient Raw Score 13   Basic Mobility Standardized Score 33.99   Highest Level Of Mobility   -[facility] Goal 4: Move to chair/commode   -[facility] Achieved 4: Move to chair/commode   Modified [place] Scale   Modified [place] Scale 4   Barthel Index   Feeding 10   Bathing 0   Grooming Score 5   Dressing Score 5   Bladder Score 10   Bowels Score 10   Toilet Use Score 5   Transfers (Bed/Chair) Score 5   Mobility (Level Surface) Score 0   Stairs Score 0   Barthel Index Score 50   End of Consult   Patient Position at End of Consult Seated edge of bed; All needs within reach     Salvatore Swan DPT

## 2023-09-09 NOTE — PLAN OF CARE
Problem: PHYSICAL THERAPY ADULT  Goal: Performs mobility at highest level of function for planned discharge setting. See evaluation for individualized goals. Description: Treatment/Interventions: Functional transfer training, Elevations, LE strengthening/ROM, Therapeutic exercise, Endurance training, Patient/family training, Equipment eval/education, Bed mobility, Gait training, Spoke to nursing, OT          See flowsheet documentation for full assessment, interventions and recommendations. Note: Prognosis: Good  Problem List: Decreased strength, Decreased range of motion, Decreased endurance, Impaired balance, Decreased mobility, Pain  Assessment: pt is a 65y/o m who presents to [facility] c gout. PMH significant for hyperlipidemia, HTN, CVA, + renal disorder. at baseline, pt mod (I) c functional mobility tasks c RW prn. resides c family in 2nd floor apt [address]. currently requires min (A)x2 to complete OOB mobility tasks 2* significant deficits in strength, ROM, balance, gait quality, pain, + activity tolerance noted in PT exam above. Barthel Index 50/100, Modified [place] 4. able to initiate ambulation at bedside 3' c RW c min verbal cues for technique c UE support on back of chair. OOB mobility limited 2* pain. would benefit from skilled PT to maximize functional mobility + improve quality of life. AM-PAC raw score 13 indicating pt would benefit from inpt skilled PT, however please refer to PT d/c recommendation for safe d/c planning. PT eval of high complexity 2* unstable med status c pt requiring ongoing medical management 2* gout. pt c multiple co-morbidities + mobility deficits noted above including old CVA c residual L hemiparesis. resides in 2nd floor apt [address]. Barriers to Discharge: Inaccessible home environment     PT Discharge Recommendation: Post acute rehabilitation services    See flowsheet documentation for full assessment.

## 2023-09-09 NOTE — PLAN OF CARE
Problem: OCCUPATIONAL THERAPY ADULT  Goal: Performs self-care activities at highest level of function for planned discharge setting. See evaluation for individualized goals. Description: Treatment Interventions: ADL retraining, Functional transfer training, UE strengthening/ROM, Endurance training, Patient/family training, Neuromuscular reeducation, Fine motor coordination activities, Compensatory technique education, Activityengagement          See flowsheet documentation for full assessment, interventions and recommendations. Note: Limitation: Decreased ADL status, Decreased UE strength, Decreased endurance, Decreased UE ROM, Decreased self-care trans, Decreased fine motor control, Decreased high-level ADLs  Prognosis: Good  Assessment: Patient is a 66 y.o. male seen for OT evaluation s/p admit to [address] [place] [address]  on 9/8/2023 w/Hypokalemia. Commorbidities affecting patient's functional performance at time of assessment include: HTN, CKD, gout flare, and hx of CVA. Orders placed for OT evaluation and treatment and up and OOB as tolerated . Performed at least two patient identifiers during session including name and wristband. Prior to admission, Patient reporting being independent with ADLs, ambulatory with no AD, and lives with son. Personal factors affecting patient at time of initial evaluation include: steps to enter, difficulty performing ADLs and difficulty performing IADLs. Upon evaluation, patient requires minimal  assist for UB ADLs, moderate assist for LB ADLs, transfers and functional ambulation in room and bathroom with minimal  assist of 2, with the use of Rolling Walker. Patient is oriented x 4.   Occupational performance is affected by the following deficits: limited active ROM, decreased muscle strength, impaired gross motor coordination, impaired fine motor coordination, dynamic sit/ stand balance deficit with poor standing tolerance time for self care and functional mobility, decreased activity tolerance, increased pain and delayed righting and equilibrium reactions. Patient to benefit from continued Occupational Therapy treatment while in the hospital to address deficits as defined above and maximize level of functional independence with ADLs and functional mobility. Occupational Performance areas to address include: grooming , bathing/ shower, dressing, toilet hygiene, transfer to all surfaces, functional ambulation, medication routine/ management, IADLS: Household maintenance, IADLs: safety procedures and IADLs: meal prep/ clean up. From OT standpoint, recommendation at time of d/c would be Post acute rehabilitation services.      OT Discharge Recommendation: Post acute rehabilitation services        Dior Suarez OTD, OTR/L

## 2023-09-09 NOTE — ASSESSMENT & PLAN NOTE
Lab Results   Component Value Date    EGFR 37 09/08/2023    CREATININE 1.89 (H) 09/08/2023     · Creatinine 1.89 on admission  · Unclear renal function baseline. ED requesting admission for LEFTY; however, when reviewing chart it appears creatinine has ranged from 1.3-2.1 over the last year. Patient was encouraged to follow with a Nephrologist, but has not yet done so. Strongly encouraged patient to see specialist after discharge.   · Will start on IVF overnight  · Monitor I&O  · Recheck AM BMP

## 2023-09-10 LAB
ANION GAP SERPL CALCULATED.3IONS-SCNC: 10 MMOL/L
BASOPHILS # BLD AUTO: 0.02 THOUSANDS/ÂΜL (ref 0–0.1)
BASOPHILS NFR BLD AUTO: 0 % (ref 0–1)
BUN SERPL-MCNC: 29 MG/DL (ref 5–25)
CALCIUM SERPL-MCNC: 9.2 MG/DL (ref 8.4–10.2)
CHLORIDE SERPL-SCNC: 103 MMOL/L (ref 96–108)
CO2 SERPL-SCNC: 25 MMOL/L (ref 21–32)
CREAT SERPL-MCNC: 1.71 MG/DL (ref 0.6–1.3)
EOSINOPHIL # BLD AUTO: 0 THOUSAND/ÂΜL (ref 0–0.61)
EOSINOPHIL NFR BLD AUTO: 0 % (ref 0–6)
ERYTHROCYTE [DISTWIDTH] IN BLOOD BY AUTOMATED COUNT: 14.5 % (ref 11.6–15.1)
GFR SERPL CREATININE-BSD FRML MDRD: 42 ML/MIN/1.73SQ M
GLUCOSE P FAST SERPL-MCNC: 108 MG/DL (ref 65–99)
GLUCOSE SERPL-MCNC: 108 MG/DL (ref 65–140)
HCT VFR BLD AUTO: 41 % (ref 36.5–49.3)
HGB BLD-MCNC: 13.9 G/DL (ref 12–17)
IMM GRANULOCYTES # BLD AUTO: 0.07 THOUSAND/UL (ref 0–0.2)
IMM GRANULOCYTES NFR BLD AUTO: 1 % (ref 0–2)
LYMPHOCYTES # BLD AUTO: 1.02 THOUSANDS/ÂΜL (ref 0.6–4.47)
LYMPHOCYTES NFR BLD AUTO: 7 % (ref 14–44)
MCH RBC QN AUTO: 29.1 PG (ref 26.8–34.3)
MCHC RBC AUTO-ENTMCNC: 33.9 G/DL (ref 31.4–37.4)
MCV RBC AUTO: 86 FL (ref 82–98)
MONOCYTES # BLD AUTO: 1.51 THOUSAND/ÂΜL (ref 0.17–1.22)
MONOCYTES NFR BLD AUTO: 10 % (ref 4–12)
NEUTROPHILS # BLD AUTO: 12.26 THOUSANDS/ÂΜL (ref 1.85–7.62)
NEUTS SEG NFR BLD AUTO: 82 % (ref 43–75)
NRBC BLD AUTO-RTO: 0 /100 WBCS
PLATELET # BLD AUTO: 283 THOUSANDS/UL (ref 149–390)
PMV BLD AUTO: 10.2 FL (ref 8.9–12.7)
POTASSIUM SERPL-SCNC: 3.5 MMOL/L (ref 3.5–5.3)
RBC # BLD AUTO: 4.78 MILLION/UL (ref 3.88–5.62)
SODIUM SERPL-SCNC: 138 MMOL/L (ref 135–147)
WBC # BLD AUTO: 14.88 THOUSAND/UL (ref 4.31–10.16)

## 2023-09-10 PROCEDURE — 99232 SBSQ HOSP IP/OBS MODERATE 35: CPT | Performed by: STUDENT IN AN ORGANIZED HEALTH CARE EDUCATION/TRAINING PROGRAM

## 2023-09-10 PROCEDURE — 85025 COMPLETE CBC W/AUTO DIFF WBC: CPT | Performed by: STUDENT IN AN ORGANIZED HEALTH CARE EDUCATION/TRAINING PROGRAM

## 2023-09-10 PROCEDURE — 80048 BASIC METABOLIC PNL TOTAL CA: CPT | Performed by: STUDENT IN AN ORGANIZED HEALTH CARE EDUCATION/TRAINING PROGRAM

## 2023-09-10 RX ADMIN — HEPARIN SODIUM 5000 UNITS: 5000 INJECTION INTRAVENOUS; SUBCUTANEOUS at 14:59

## 2023-09-10 RX ADMIN — HEPARIN SODIUM 5000 UNITS: 5000 INJECTION INTRAVENOUS; SUBCUTANEOUS at 21:27

## 2023-09-10 RX ADMIN — LIDOCAINE 5% 1 PATCH: 700 PATCH TOPICAL at 08:12

## 2023-09-10 RX ADMIN — PREDNISONE 20 MG: 20 TABLET ORAL at 08:12

## 2023-09-10 RX ADMIN — HEPARIN SODIUM 5000 UNITS: 5000 INJECTION INTRAVENOUS; SUBCUTANEOUS at 07:02

## 2023-09-10 RX ADMIN — AMLODIPINE BESYLATE 10 MG: 10 TABLET ORAL at 08:12

## 2023-09-10 RX ADMIN — PANTOPRAZOLE SODIUM 40 MG: 40 TABLET, DELAYED RELEASE ORAL at 07:02

## 2023-09-10 RX ADMIN — ASPIRIN 81 MG: 81 TABLET, COATED ORAL at 08:12

## 2023-09-10 RX ADMIN — METOPROLOL SUCCINATE 100 MG: 100 TABLET, EXTENDED RELEASE ORAL at 08:12

## 2023-09-10 RX ADMIN — ALUMINUM HYDROXIDE, MAGNESIUM HYDROXIDE, AND DIMETHICONE 30 ML: 200; 20; 200 SUSPENSION ORAL at 16:27

## 2023-09-10 RX ADMIN — ATORVASTATIN CALCIUM 40 MG: 40 TABLET, FILM COATED ORAL at 16:26

## 2023-09-10 NOTE — ASSESSMENT & PLAN NOTE
· H/o of frequent gout flares  · Presenting with swollen right ankle. Reports having gout in this area in the past  · Will start on oral prednisone taper as prescribed at previous hospitalization (20-20-10-10)   · Patient has a history of previous stroke with left-sided residual deficits.   Now with right ankle and knee pain he is having issues ambulating  · PT evaluated and recommended subacute rehab  · Was given SNF last  · Will evaluate patient tomorrow and if he is still having trouble ambulating plan for SNF discharge on 9/11

## 2023-09-10 NOTE — PLAN OF CARE
Problem: MOBILITY - ADULT  Goal: Maintain or return to baseline ADL function  Description: INTERVENTIONS:  -  Assess patient's ability to carry out ADLs; assess patient's baseline for ADL function and identify physical deficits which impact ability to perform ADLs (bathing, care of mouth/teeth, toileting, grooming, dressing, etc.)  - Assess/evaluate cause of self-care deficits   - Assess range of motion  - Assess patient's mobility; develop plan if impaired  - Assess patient's need for assistive devices and provide as appropriate  - Encourage maximum independence but intervene and supervise when necessary  - Involve family in performance of ADLs  - Assess for home care needs following discharge   - Consider OT consult to assist with ADL evaluation and planning for discharge  - Provide patient education as appropriate  9/10/2023 0910 by Catalino Sim RN  Outcome: Progressing  9/10/2023 0910 by Catalino Sim RN  Outcome: Progressing  Goal: Maintains/Returns to pre admission functional level  Description: INTERVENTIONS:  - Perform BMAT or MOVE assessment daily.   - Set and communicate daily mobility goal to care team and patient/family/caregiver. - Collaborate with rehabilitation services on mobility goals if consulted  - Perform Range of Motion 3 times a day. - Reposition patient every 3 hours.   - Dangle patient 3 times a day  - Stand patient 3 times a day  - Ambulate patient 3 times a day  - Out of bed to chair 3 times a day   - Out of bed for meals 3 times a day  - Out of bed for toileting  - Record patient progress and toleration of activity level   9/10/2023 0910 by Catalino Sim RN  Outcome: Progressing  9/10/2023 0910 by Catalino Sim RN  Outcome: Progressing     Problem: PAIN - ADULT  Goal: Verbalizes/displays adequate comfort level or baseline comfort level  Description: Interventions:  - Encourage patient to monitor pain and request assistance  - Assess pain using appropriate pain scale  - Administer analgesics based on type and severity of pain and evaluate response  - Implement non-pharmacological measures as appropriate and evaluate response  - Consider cultural and social influences on pain and pain management  - Notify physician/advanced practitioner if interventions unsuccessful or patient reports new pain  9/10/2023 0910 by Aristides Young RN  Outcome: Progressing  9/10/2023 0910 by Aristides Young RN  Outcome: Progressing     Problem: INFECTION - ADULT  Goal: Absence or prevention of progression during hospitalization  Description: INTERVENTIONS:  - Assess and monitor for signs and symptoms of infection  - Monitor lab/diagnostic results  - Monitor all insertion sites, i.e. indwelling lines, tubes, and drains  - Monitor endotracheal if appropriate and nasal secretions for changes in amount and color  - [place] appropriate cooling/warming therapies per order  - Administer medications as ordered  - Instruct and encourage patient and family to use good hand hygiene technique  - Identify and instruct in appropriate isolation precautions for identified infection/condition  9/10/2023 0910 by Aristides Young RN  Outcome: Progressing  9/10/2023 0910 by Aristides Young RN  Outcome: Progressing  Goal: Absence of fever/infection during neutropenic period  Description: INTERVENTIONS:  - Monitor WBC    9/10/2023 0910 by Aristides Young RN  Outcome: Progressing  9/10/2023 0910 by Aristides Young RN  Outcome: Progressing     Problem: SAFETY ADULT  Goal: Maintain or return to baseline ADL function  Description: INTERVENTIONS:  -  Assess patient's ability to carry out ADLs; assess patient's baseline for ADL function and identify physical deficits which impact ability to perform ADLs (bathing, care of mouth/teeth, toileting, grooming, dressing, etc.)  - Assess/evaluate cause of self-care deficits   - Assess range of motion  - Assess patient's mobility; develop plan if impaired  - Assess patient's need for assistive devices and provide as appropriate  - Encourage maximum independence but intervene and supervise when necessary  - Involve family in performance of ADLs  - Assess for home care needs following discharge   - Consider OT consult to assist with ADL evaluation and planning for discharge  - Provide patient education as appropriate  9/10/2023 0910 by Yesenia Pitt RN  Outcome: Progressing  9/10/2023 0910 by Yesenia Pitt RN  Outcome: Progressing  Goal: Maintains/Returns to pre admission functional level  Description: INTERVENTIONS:  - Perform BMAT or MOVE assessment daily.   - Set and communicate daily mobility goal to care team and patient/family/caregiver. - Collaborate with rehabilitation services on mobility goals if consulted  - Perform Range of Motion 3 times a day. - Reposition patient every 3 hours.   - Dangle patient 3 times a day  - Stand patient 3 times a day  - Ambulate patient 3 times a day  - Out of bed to chair 3 times a day   - Out of bed for meals 3 times a day  - Out of bed for toileting  - Record patient progress and toleration of activity level   9/10/2023 0910 by Yesenia Pitt RN  Outcome: Progressing  9/10/2023 0910 by Yesenia Pitt RN  Outcome: Progressing  Goal: Patient will remain free of falls  Description: INTERVENTIONS:  - Educate patient/family on patient safety including physical limitations  - Instruct patient to call for assistance with activity   - Consult OT/PT to assist with strengthening/mobility   - Keep Call bell within reach  - Keep bed low and locked with side rails adjusted as appropriate  - Keep care items and personal belongings within reach  - Initiate and maintain comfort rounds  - Make Fall Risk Sign visible to staff  - Offer Toileting every 2 Hours, in advance of need  - Initiate/Maintain bed alarm  - Obtain necessary fall risk management equipment: shahida alangela  - Apply yellow socks and bracelet for high fall risk patients  - Consider moving patient to room near nurses station  9/10/2023 0910 by Paulie Ford RN  Outcome: Progressing  9/10/2023 0910 by Paulie Ford RN  Outcome: Progressing     Problem: DISCHARGE PLANNING  Goal: Discharge to home or other facility with appropriate resources  Description: INTERVENTIONS:  - Identify barriers to discharge w/patient and caregiver  - Arrange for needed discharge resources and transportation as appropriate  - Identify discharge learning needs (meds, wound care, etc.)  - Arrange for interpretive services to assist at discharge as needed  - Refer to Case Management Department for coordinating discharge planning if the patient needs post-hospital services based on physician/advanced practitioner order or complex needs related to functional status, cognitive ability, or social support system  9/10/2023 0910 by Paulie Ford RN  Outcome: Progressing  9/10/2023 0910 by Paulie Ford RN  Outcome: Progressing     Problem: Knowledge Deficit  Goal: Patient/family/caregiver demonstrates understanding of disease process, treatment plan, medications, and discharge instructions  Description: Complete learning assessment and assess knowledge base.   Interventions:  - Provide teaching at level of understanding  - Provide teaching via preferred learning methods  9/10/2023 0910 by Paulie Ford RN  Outcome: Progressing  9/10/2023 0910 by Paulie Ford RN  Outcome: Progressing

## 2023-09-10 NOTE — PROGRESS NOTES
[address]  Progress Note  Name: Bernice Troy  MRN: [id number]  Unit/Bed#: -01 I Date of Admission: 9/8/2023   Date of Service: 9/10/2023 I Hospital Day: 0    Assessment/Plan   * Gout flare  Assessment & Plan  · H/o of frequent gout flares  · Presenting with swollen right ankle. Reports having gout in this area in the past  · Will start on oral prednisone taper as prescribed at previous hospitalization (20-20-10-10)   · Patient has a history of previous stroke with left-sided residual deficits. Now with right ankle and knee pain he is having issues ambulating  · PT evaluated and recommended subacute rehab  · Was given SNF last  · Will evaluate patient tomorrow and if he is still having trouble ambulating plan for SNF discharge on 9/11    CKD (chronic kidney disease)  Assessment & Plan  Lab Results   Component Value Date    EGFR 42 09/10/2023    EGFR 41 09/09/2023    EGFR 37 09/08/2023    CREATININE 1.71 (H) 09/10/2023    CREATININE 1.74 (H) 09/09/2023    CREATININE 1.89 (H) 09/08/2023     · Creatinine 1.8>1.7 this is likely his baseline   · Unclear renal function baseline. His most recent outpatient creatinine was 1.8 last month  · Monitor I&O  · Recheck AM BMP    H/O: CVA (cerebrovascular accident)  Assessment & Plan  · Continue asa and statin  · Residual left-sided deficits    Hypokalemia  Assessment & Plan  · 3.5  · Recheck in a.m. Hypertension  Assessment & Plan  · BP stable on admission  · Continue home amlodipine and metoprolol  · Monitor vitals per routine  · Blood pressures are well controlled           VTE Pharmacologic Prophylaxis: VTE Score: 3 Moderate Risk (Score 3-4) - Pharmacological DVT Prophylaxis Ordered: heparin. Patient Centered Rounds: I performed bedside rounds with nursing staff today. Discussions with Specialists or Other Care Team Provider: none    Education and Discussions with Family / Patient: Patient declined call to .      Total Time Spent on Date of Encounter in care of patient: 35 minutes This time was spent on one or more of the following: performing physical exam; counseling and coordination of care; obtaining or reviewing history; documenting in the medical record; reviewing/ordering tests, medications or procedures; communicating with other healthcare professionals and discussing with patient's family/caregivers. Current Length of Stay: 0 day(s)  Current Patient Status: Observation   Certification Statement: The patient will continue to require additional inpatient hospital stay due to snf placement  Discharge Plan: Anticipate discharge tomorrow to rehab facility. Code Status: Level 1 - Full Code    Subjective:   Still cannot walk due to pain from gout. No chest pain, sob, fever chills. Objective:     Vitals:   Temp (24hrs), Av.1 °F (36.7 °C), Min:97.7 °F (36.5 °C), Max:98.4 °F (36.9 °C)    Temp:  [97.7 °F (36.5 °C)-98.4 °F (36.9 °C)] 97.7 °F (36.5 °C)  HR:  [61-63] 63  Resp:  [16-18] 16  BP: (125-142)/(89-94) 125/90  SpO2:  [97 %] 97 %  Body mass index is 28.51 kg/m². Input and Output Summary (last 24 hours):      Intake/Output Summary (Last 24 hours) at 9/10/2023 1427  Last data filed at 9/10/2023 0901  Gross per 24 hour   Intake 2600 ml   Output 1000 ml   Net 1600 ml       Physical Exam:   Physical Exam   General: No acute distress   HEENT: Normal conjunctiva, EOMI  Heart: Regular rate and rhythm, no murmurs  Lungs: Clear lungs, nonlabored respirations, no wheezing  Abdomen: Nontender, nondistended  Extremities: No increased swelling or warmth of right knee and right ankle joint  Neuro: Alert and oriented x3, residual left-sided weakness  Psych: Cooperative/calm      Additional Data:     Labs:  Results from last 7 days   Lab Units 09/10/23  0453   WBC Thousand/uL 14.88*   HEMOGLOBIN g/dL 13.9   HEMATOCRIT % 41.0   PLATELETS Thousands/uL 283   NEUTROS PCT % 82*   LYMPHS PCT % 7*   MONOS PCT % 10   EOS PCT % 0 Results from last 7 days   Lab Units 09/10/23  0453 09/09/23  0512 09/08/23  2233   SODIUM mmol/L 138   < > 135   POTASSIUM mmol/L 3.5   < > 2.8*   CHLORIDE mmol/L 103   < > 97   CO2 mmol/L 25   < > 27   BUN mg/dL 29*   < > 23   CREATININE mg/dL 1.71*   < > 1.89*   ANION GAP mmol/L 10   < > 11   CALCIUM mg/dL 9.2   < > 9.7   ALBUMIN g/dL  --   --  4.5   TOTAL BILIRUBIN mg/dL  --   --  0.76   ALK PHOS U/L  --   --  61   ALT U/L  --   --  9   AST U/L  --   --  12*   GLUCOSE RANDOM mg/dL 108   < > 112    < > = values in this interval not displayed. Lines/Drains:  Invasive Devices     Peripheral Intravenous Line  Duration           Peripheral IV 09/09/23 Right;Ventral (anterior) Forearm 1 day                      Imaging: Reviewed radiology reports from this admission including: chest xray    Recent Cultures (last 7 days):         Last 24 Hours Medication List:   Current Facility-Administered Medications   Medication Dose Route Frequency Provider Last Rate   • acetaminophen  975 mg Oral Q8H PRN Oneyda Brambila PA-C     • aluminum-magnesium hydroxide-simethicone  30 mL Oral Q4H PRN Danelle Mohan MD     • amLODIPine  10 mg Oral Daily Lakeisha Wick PA-C     • aspirin  81 mg Oral Daily Lakeisha Wick PA-C     • atorvastatin  40 mg Oral Daily With [employer]     • Diclofenac Sodium  2 g Topical 4x Daily PRN Oneyda Brambila PA-C     • heparin (porcine)  5,000 Units Subcutaneous Q8H [address] Lakeisha Wick PA-C     • lidocaine  1 patch Topical Daily Danelle Mohan MD     • metoprolol succinate  100 mg Oral Daily Lakeisha Wick PA-C     • pantoprazole  40 mg Oral Early Morning Lakeisha Wick PA-C     • predniSONE  20 mg Oral Daily Lakeisha Wick PA-C      Followed by   • [START ON 9/13/2023] predniSONE  10 mg Oral Daily Brenda Mathur PA-C          Today, Patient Was Seen By: Danelle Mohan MD    **Please Note: This note may have been constructed using a voice recognition system. **

## 2023-09-10 NOTE — UTILIZATION REVIEW
Initial Clinical Review    Admission: Date/Time/Statement:   Admission Orders (From admission, onward)     Ordered        09/09/23 0054  Place in Observation  Once                      Orders Placed This Encounter   Procedures   • Place in Observation     Standing Status:   Standing     Number of Occurrences:   1     Order Specific Question:   Level of Care     Answer:   Med Surg [16]     ED Arrival Information     Expected   -    Arrival   9/8/2023 21:58    Acuity   Urgent            Means of arrival   Wheelchair    Escorted by   Family Member    Service   Hospitalist    Admission type   Emergency            Arrival complaint   Right Ankle & Leg Pain           Chief Complaint   Patient presents with   • Gout Pain     Pt arrives via hospital wheelchair with a c/o a gout flare up to his right leg and foot since this morning. Initial Presentation: 66 y.o. male to ED presents for evaluation of gout flare; swollen right ankle. History of multiple gout flares in the past in which she follows with Ortho as an outpatient. On initial labs patient found to have hypokalemia and questionable kidney injury. PMH for HTN, CVA, CKD. Admit Observation level of care for Hypokalemia. K 2.8, replete with 40mEq oral and 20mEq IV potassium. Recheck with AM labs. Creat 1.89, unclear baseline, however, it appears creatinine has ranged from 1.3-2.1 over the last year. Start IVFs. Recheck BMP in am. Start po prednisone taper. 9/10  Progress notes; Pt still with issues ambulating plan for SNF.     ED Triage Vitals   Temperature Pulse Respirations Blood Pressure SpO2   09/08/23 2204 09/08/23 2204 09/08/23 2204 09/08/23 2204 09/08/23 2204   97.9 °F (36.6 °C) 89 18 142/91 96 %      Temp Source Heart Rate Source Patient Position - Orthostatic VS BP Location FiO2 (%)   09/08/23 2204 09/08/23 2204 09/08/23 2204 09/08/23 2204 --   Temporal Monitor Sitting Left arm       Pain Score       09/08/23 2242       8          Wt Readings from Last 1 Encounters:   09/09/23 82.6 kg (182 lb)     Additional Vital Signs:   09/10/23 07:05:57 97.7 °F (36.5 °C) 63 16 125/90 102 97 % -- --   09/09/23 22:49:26 98.4 °F (36.9 °C) -- 18 129/89 102 -- -- --   09/09/23 2030 -- -- -- -- -- 97 % None (Room air) --   09/09/23 15:21:48 98.3 °F (36.8 °C) 61 17 142/94 110 97 % -- --   09/09/23 13:18:46 97.6 °F (36.4 °C) 72 16 155/95 115 95 % -- --   09/09/23 1000 -- 63 16 123/79 97 96 % -- --   09/09/23 0902 -- 64 -- 139/81 -- -- --      Pertinent Labs/Diagnostic Test Results:   No orders to display         Results from last 7 days   Lab Units 09/10/23  0453 09/09/23  0512 09/08/23  2233   WBC Thousand/uL 14.88* 11.66* 12.12*   HEMOGLOBIN g/dL 13.9 14.1 14.3   HEMATOCRIT % 41.0 40.3 42.0   PLATELETS Thousands/uL 283 245 280   NEUTROS ABS Thousands/µL 12.26*  --  10.05*         Results from last 7 days   Lab Units 09/10/23  0453 09/09/23  0512 09/08/23  2233   SODIUM mmol/L 138 134* 135   POTASSIUM mmol/L 3.5 3.6 2.8*   CHLORIDE mmol/L 103 99 97   CO2 mmol/L 25 27 27   ANION GAP mmol/L 10 8 11   BUN mg/dL 29* 22 23   CREATININE mg/dL 1.71* 1.74* 1.89*   EGFR ml/min/1.73sq m 42 41 37   CALCIUM mg/dL 9.2 9.4 9.7   MAGNESIUM mg/dL  --  1.9  --      Results from last 7 days   Lab Units 09/08/23  2233   AST U/L 12*   ALT U/L 9   ALK PHOS U/L 61   TOTAL PROTEIN g/dL 8.5*   ALBUMIN g/dL 4.5   TOTAL BILIRUBIN mg/dL 0.76         Results from last 7 days   Lab Units 09/10/23  0453 09/09/23  0512 09/08/23  2233   GLUCOSE RANDOM mg/dL 108 151* 112       ED Treatment:   Medication Administration from 09/08/2023 2158 to 09/09/2023 1312       Date/Time Order Dose Route Action     09/08/2023 2227 EDT predniSONE tablet 60 mg 60 mg Oral Given     09/08/2023 2242 EDT HYDROmorphone HCl (DILAUDID) injection 0.2 mg 0.2 mg Intravenous Given     09/09/2023 0003 EDT potassium chloride (K-DUR,KLOR-CON) CR tablet 40 mEq 40 mEq Oral Given     09/09/2023 0004 EDT lactated ringers bolus 1,000 mL 1,000 mL Intravenous New Bag     09/09/2023 0122 EDT potassium chloride 20 mEq IVPB (premix) 20 mEq Intravenous New Bag     09/09/2023 0123 EDT sodium chloride 0.9 % infusion 100 mL/hr Intravenous Rate/Dose Change     09/09/2023 0122 EDT sodium chloride 0.9 % infusion 125 mL/hr Intravenous New Bag     09/09/2023 0902 EDT aspirin (ECOTRIN LOW STRENGTH) EC tablet 81 mg 81 mg Oral Given     09/09/2023 0511 EDT pantoprazole (PROTONIX) EC tablet 40 mg 40 mg Oral Given     09/09/2023 0902 EDT amLODIPine (NORVASC) tablet 10 mg 10 mg Oral Given     09/09/2023 0902 EDT metoprolol succinate (TOPROL-XL) 24 hr tablet 100 mg 100 mg Oral Given     09/09/2023 0902 EDT predniSONE tablet 20 mg 20 mg Oral Given     09/09/2023 0212 EDT acetaminophen (TYLENOL) tablet 975 mg 975 mg Oral Given        Past Medical History:   Diagnosis Date   • High cholesterol    • Hypertension    • Renal disorder    • Stroke [facility])      Present on Admission:  **None**      Admitting Diagnosis: Hypokalemia [E87.6]  Gout [M10.9]  Gout flare [M10.9]  LEFTY (acute kidney injury) ([address]) [N17.9]  Age/Sex: 66 y.o. male     Admission Orders:  Scheduled Medications:  amLODIPine, 10 mg, Oral, Daily  aspirin, 81 mg, Oral, Daily  atorvastatin, 40 mg, Oral, Daily With Dinner  heparin (porcine), 5,000 Units, Subcutaneous, Q8H TRAVIS  lidocaine, 1 patch, Topical, Daily  metoprolol succinate, 100 mg, Oral, Daily  pantoprazole, 40 mg, Oral, Early Morning  predniSONE, 20 mg, Oral, Daily   Followed by  Iesha Barney ON 9/13/2023] predniSONE, 10 mg, Oral, Daily      Continuous IV Infusions:  sodium chloride, 100 mL/hr, Intravenous, Continuous      PRN Meds:  acetaminophen, 975 mg, Oral, Q8H PRN  aluminum-magnesium hydroxide-simethicone, 30 mL, Oral, Q4H PRN  Diclofenac Sodium, 2 g, Topical, 4x Daily PRN        None    Network Utilization Review Department  ATTENTION: Please call with any questions or concerns to [phone] and carefully listen to the prompts so that you are directed to the right person. All voicemails are confidential.  Stuart Tejeda all requests for admission clinical reviews, approved or denied determinations and any other requests to dedicated fax number below belonging to the campus where the patient is receiving treatment.  List of dedicated fax numbers for the Facilities:  [place] DENIALS (Administrative/Medical Necessity) [phone] [facility] [phone]   [address] [place] [address] [phone]   [facility] [address] [facility] [phone]   [address] [place] [address] [phone]   [address] [address] [phone]

## 2023-09-10 NOTE — ASSESSMENT & PLAN NOTE
Lab Results   Component Value Date    EGFR 42 09/10/2023    EGFR 41 09/09/2023    EGFR 37 09/08/2023    CREATININE 1.71 (H) 09/10/2023    CREATININE 1.74 (H) 09/09/2023    CREATININE 1.89 (H) 09/08/2023     · Creatinine 1.8>1.7 this is likely his baseline   · Unclear renal function baseline.   His most recent outpatient creatinine was 1.8 last month  · Monitor I&O  · Recheck AM BMP

## 2023-09-10 NOTE — ASSESSMENT & PLAN NOTE
· BP stable on admission  · Continue home amlodipine and metoprolol  · Monitor vitals per routine  · Blood pressures are well controlled

## 2023-09-10 NOTE — PLAN OF CARE
Problem: MOBILITY - ADULT  Goal: Maintain or return to baseline ADL function  Description: INTERVENTIONS:  -  Assess patient's ability to carry out ADLs; assess patient's baseline for ADL function and identify physical deficits which impact ability to perform ADLs (bathing, care of mouth/teeth, toileting, grooming, dressing, etc.)  - Assess/evaluate cause of self-care deficits   - Assess range of motion  - Assess patient's mobility; develop plan if impaired  - Assess patient's need for assistive devices and provide as appropriate  - Encourage maximum independence but intervene and supervise when necessary  - Involve family in performance of ADLs  - Assess for home care needs following discharge   - Consider OT consult to assist with ADL evaluation and planning for discharge  - Provide patient education as appropriate  Outcome: Progressing     Problem: PAIN - ADULT  Goal: Verbalizes/displays adequate comfort level or baseline comfort level  Description: Interventions:  - Encourage patient to monitor pain and request assistance  - Assess pain using appropriate pain scale  - Administer analgesics based on type and severity of pain and evaluate response  - Implement non-pharmacological measures as appropriate and evaluate response  - Consider cultural and social influences on pain and pain management  - Notify physician/advanced practitioner if interventions unsuccessful or patient reports new pain  Outcome: Progressing     Problem: INFECTION - ADULT  Goal: Absence or prevention of progression during hospitalization  Description: INTERVENTIONS:  - Assess and monitor for signs and symptoms of infection  - Monitor lab/diagnostic results  - Monitor all insertion sites, i.e. indwelling lines, tubes, and drains  - Monitor endotracheal if appropriate and nasal secretions for changes in amount and color  - [place] appropriate cooling/warming therapies per order  - Administer medications as ordered  - Instruct and encourage patient and family to use good hand hygiene technique  - Identify and instruct in appropriate isolation precautions for identified infection/condition  Outcome: Progressing     Problem: SAFETY ADULT  Goal: Patient will remain free of falls  Description: INTERVENTIONS:  -  Assess patient's ability to carry out ADLs; assess patient's baseline for ADL function and identify physical deficits which impact ability to perform ADLs (bathing, care of mouth/teeth, toileting, grooming, dressing, etc.)  - Assess/evaluate cause of self-care deficits   - Assess range of motion  - Assess patient's mobility; develop plan if impaired  - Assess patient's need for assistive devices and provide as appropriate  - Encourage maximum independence but intervene and supervise when necessary  - Involve family in performance of ADLs  - Assess for home care needs following discharge   - Consider OT consult to assist with ADL evaluation and planning for discharge  - Provide patient education as appropriate  Outcome: Progressing

## 2023-09-10 NOTE — CASE MANAGEMENT
Case Management Assessment & Discharge Planning Note    Patient name Hung Scottr  Location /-92 MRN [id number]  : 1960 Date 9/10/2023       Current Admission Date: 2023  Current Admission Diagnosis:Hypokalemia   Patient Active Problem List    Diagnosis Date Noted   • Hypertension 2023   • CKD (chronic kidney disease) 2023   • Hypokalemia 2023   • Gout flare 2023   • Hyperlipidemia 2023   • H/O: CVA (cerebrovascular accident) 2023      LOS (days): 0  Geometric Mean LOS (GMLOS) (days):   Days to GMLOS:     OBJECTIVE:              Current admission status: Observation       Preferred Pharmacy:   UNKNOWN - FOLLOW UP PRIOR TO DISCHARGE TO E-PRESCRIBE  No address on file      Primary Care Provider: No primary care provider on file. Primary Insurance: Alonso EDEN  Secondary Insurance:     ASSESSMENT:  Active Health Care Proxies    There are no active Health Care Proxies on file. Advance Directives  Does patient have a [address] [place] [address] Avenue?: No  Was patient offered paperwork?: Yes (declined)  Does patient currently have a Health Care decision maker?: Yes, please see Health Care Proxy section  Does patient have Advance Directives?: No  Was patient offered paperwork?: Yes (declined)  Primary Contact: jonh [place]    Obs Notice Signed:  (not on workqueue)    Readmission Root Cause  30 Day Readmission: No    Patient Information  Admitted from[de-identified] Home  Mental Status: Alert  During Assessment patient was accompanied by: Not accompanied during assessment  Assessment information provided by[de-identified] Patient  Primary Caregiver: Self  Support Systems: Kristan Molina of Residence: [address] [place] [address] do you live in?: [address] [place] [address] entry access options.  Select all that apply.: Stairs  Number of steps to enter home.: One Flight  Do the steps have railings?: Yes  Type of Current Residence: Apartment  Floor Level: 2  Upon entering residence, is there a bedroom on the main floor (no further steps)?: Yes  Upon entering residence, is there a bathroom on the main floor (no further steps)?: Yes  In the last 12 months, was there a time when you were not able to pay the mortgage or rent on time?: No  In the last 12 months, how many places have you lived?: 2 (Pt has only lived with his son for 4 months.   Prior to that he was living in [place] until he became homeless)  In the last 12 months, was there a time when you did not have a steady place to sleep or slept in a shelter (including now)?: No (Pt moved in with his son when he became homeless)  Homeless/housing insecurity resource given?: Refused  Living Arrangements: Lives w/ Son  Is patient a ?: No    Activities of Daily Living Prior to Admission  Functional Status: Independent  Completes ADLs independently?: Yes  Ambulates independently?: Yes  Does patient use assisted devices?: Yes  Assisted Devices (DME) used: [employer] Marking  Does patient currently own DME?: Yes  What DME does the patient currently own?: Darroll Marking  Does patient have a history of Outpatient Therapy (PT/OT)?: Yes  Does the patient have a history of Short-Term Rehab?: No  Does patient have a history of HHC?: No  Does patient currently have [facility]?: No         Patient Information Continued  Income Source: Unemployed  Does patient have prescription coverage?: Yes  Within the past 12 months, you worried that your food would run out before you got the money to buy more.: Never true  Food insecurity resource given?: No  Does patient receive dialysis treatments?: No  Does patient have a history of substance abuse?: No  Does patient have a history of Mental Health Diagnosis?: No    PHQ 2/9 Screening   Reviewed PHQ 2/9 Depression Screening Score?: No    Means of Transportation  Means of Transport to Appts[de-identified] Family transport  In the past 12 months, has lack of transportation kept you from medical appointments or from getting medications?: No  In the past 12 months, has lack of transportation kept you from meetings, work, or from getting things needed for daily living?: No  Was application for public transport provided?: No        DISCHARGE DETAILS:    Discharge planning discussed with[de-identified] patient  Freedom of Choice: Yes  Comments - Freedom of Choice: CM discussed PT's recommendation of STR and pt is agreeable. He has no preference for a facility.   Will put in referrals and review acceptances with pt  CM contacted family/caregiver?: No- see comments (pt will update his son himself)  Were Treatment Team discharge recommendations reviewed with patient/caregiver?: Yes  Did patient/caregiver verbalize understanding of patient care needs?: Yes  Were patient/caregiver advised of the risks associated with not following Treatment Team discharge recommendations?: Yes    Contacts  Patient Contacts: Blaine Gonzalez  Relationship to Patient[de-identified] 1 [facility]          Is the patient interested in [address] at discharge?: No    DME Referral Provided  Referral made for DME?: No    Other Referral/Resources/Interventions Provided:  Interventions: Short Term Rehab  Referral Comments: STR pended    Would you like to participate in our [address] service program?  : No - Declined    Treatment Team Recommendation: Short Term Rehab  Discharge Destination Plan[de-identified] Short Term Rehab  Transport at Discharge : Wheelchair Lori

## 2023-09-11 ENCOUNTER — APPOINTMENT (OUTPATIENT)
Dept: VASCULAR ULTRASOUND | Facility: HOSPITAL | Age: 63
DRG: 351 | End: 2023-09-11
Payer: COMMERCIAL

## 2023-09-11 LAB
ANION GAP SERPL CALCULATED.3IONS-SCNC: 6 MMOL/L
BASOPHILS # BLD AUTO: 0.03 THOUSANDS/ÂΜL (ref 0–0.1)
BASOPHILS NFR BLD AUTO: 0 % (ref 0–1)
BUN SERPL-MCNC: 26 MG/DL (ref 5–25)
CALCIUM SERPL-MCNC: 8.9 MG/DL (ref 8.4–10.2)
CHLORIDE SERPL-SCNC: 104 MMOL/L (ref 96–108)
CO2 SERPL-SCNC: 29 MMOL/L (ref 21–32)
CREAT SERPL-MCNC: 1.57 MG/DL (ref 0.6–1.3)
EOSINOPHIL # BLD AUTO: 0.01 THOUSAND/ÂΜL (ref 0–0.61)
EOSINOPHIL NFR BLD AUTO: 0 % (ref 0–6)
ERYTHROCYTE [DISTWIDTH] IN BLOOD BY AUTOMATED COUNT: 14.6 % (ref 11.6–15.1)
GFR SERPL CREATININE-BSD FRML MDRD: 46 ML/MIN/1.73SQ M
GLUCOSE P FAST SERPL-MCNC: 97 MG/DL (ref 65–99)
GLUCOSE SERPL-MCNC: 97 MG/DL (ref 65–140)
HCT VFR BLD AUTO: 40.6 % (ref 36.5–49.3)
HGB BLD-MCNC: 13.8 G/DL (ref 12–17)
IMM GRANULOCYTES # BLD AUTO: 0.03 THOUSAND/UL (ref 0–0.2)
IMM GRANULOCYTES NFR BLD AUTO: 0 % (ref 0–2)
LYMPHOCYTES # BLD AUTO: 1.32 THOUSANDS/ÂΜL (ref 0.6–4.47)
LYMPHOCYTES NFR BLD AUTO: 13 % (ref 14–44)
MAGNESIUM SERPL-MCNC: 2 MG/DL (ref 1.9–2.7)
MCH RBC QN AUTO: 29.4 PG (ref 26.8–34.3)
MCHC RBC AUTO-ENTMCNC: 34 G/DL (ref 31.4–37.4)
MCV RBC AUTO: 86 FL (ref 82–98)
MONOCYTES # BLD AUTO: 0.92 THOUSAND/ÂΜL (ref 0.17–1.22)
MONOCYTES NFR BLD AUTO: 9 % (ref 4–12)
NEUTROPHILS # BLD AUTO: 7.54 THOUSANDS/ÂΜL (ref 1.85–7.62)
NEUTS SEG NFR BLD AUTO: 78 % (ref 43–75)
NRBC BLD AUTO-RTO: 0 /100 WBCS
PLATELET # BLD AUTO: 270 THOUSANDS/UL (ref 149–390)
PMV BLD AUTO: 10.1 FL (ref 8.9–12.7)
POTASSIUM SERPL-SCNC: 3.3 MMOL/L (ref 3.5–5.3)
RBC # BLD AUTO: 4.7 MILLION/UL (ref 3.88–5.62)
SODIUM SERPL-SCNC: 139 MMOL/L (ref 135–147)
WBC # BLD AUTO: 9.85 THOUSAND/UL (ref 4.31–10.16)

## 2023-09-11 PROCEDURE — 97116 GAIT TRAINING THERAPY: CPT

## 2023-09-11 PROCEDURE — 80048 BASIC METABOLIC PNL TOTAL CA: CPT | Performed by: STUDENT IN AN ORGANIZED HEALTH CARE EDUCATION/TRAINING PROGRAM

## 2023-09-11 PROCEDURE — 85025 COMPLETE CBC W/AUTO DIFF WBC: CPT | Performed by: STUDENT IN AN ORGANIZED HEALTH CARE EDUCATION/TRAINING PROGRAM

## 2023-09-11 PROCEDURE — 83735 ASSAY OF MAGNESIUM: CPT | Performed by: STUDENT IN AN ORGANIZED HEALTH CARE EDUCATION/TRAINING PROGRAM

## 2023-09-11 PROCEDURE — 93971 EXTREMITY STUDY: CPT

## 2023-09-11 PROCEDURE — 99232 SBSQ HOSP IP/OBS MODERATE 35: CPT | Performed by: STUDENT IN AN ORGANIZED HEALTH CARE EDUCATION/TRAINING PROGRAM

## 2023-09-11 PROCEDURE — 93971 EXTREMITY STUDY: CPT | Performed by: SURGERY

## 2023-09-11 PROCEDURE — 97530 THERAPEUTIC ACTIVITIES: CPT

## 2023-09-11 RX ORDER — POTASSIUM CHLORIDE 20 MEQ/1
40 TABLET, EXTENDED RELEASE ORAL ONCE
Status: COMPLETED | OUTPATIENT
Start: 2023-09-11 | End: 2023-09-11

## 2023-09-11 RX ADMIN — ASPIRIN 81 MG: 81 TABLET, COATED ORAL at 08:49

## 2023-09-11 RX ADMIN — HEPARIN SODIUM 5000 UNITS: 5000 INJECTION INTRAVENOUS; SUBCUTANEOUS at 14:20

## 2023-09-11 RX ADMIN — AMLODIPINE BESYLATE 10 MG: 10 TABLET ORAL at 08:49

## 2023-09-11 RX ADMIN — PREDNISONE 20 MG: 20 TABLET ORAL at 08:49

## 2023-09-11 RX ADMIN — ATORVASTATIN CALCIUM 40 MG: 40 TABLET, FILM COATED ORAL at 16:55

## 2023-09-11 RX ADMIN — ACETAMINOPHEN 975 MG: 325 TABLET, FILM COATED ORAL at 05:54

## 2023-09-11 RX ADMIN — POTASSIUM CHLORIDE 40 MEQ: 1500 TABLET, EXTENDED RELEASE ORAL at 08:50

## 2023-09-11 RX ADMIN — METOPROLOL SUCCINATE 100 MG: 100 TABLET, EXTENDED RELEASE ORAL at 08:49

## 2023-09-11 RX ADMIN — LIDOCAINE 5% 1 PATCH: 700 PATCH TOPICAL at 08:50

## 2023-09-11 RX ADMIN — PANTOPRAZOLE SODIUM 40 MG: 40 TABLET, DELAYED RELEASE ORAL at 05:49

## 2023-09-11 RX ADMIN — HEPARIN SODIUM 5000 UNITS: 5000 INJECTION INTRAVENOUS; SUBCUTANEOUS at 05:49

## 2023-09-11 RX ADMIN — HEPARIN SODIUM 5000 UNITS: 5000 INJECTION INTRAVENOUS; SUBCUTANEOUS at 22:18

## 2023-09-11 NOTE — PHYSICAL THERAPY NOTE
PHYSICAL THERAPY TREATMENT  NAME:  Daquan Martínez  DATE: 09/11/23    AGE:   66 y.o. Mrn:   [id number]  ADMIT DX:  Hypokalemia [E87.6]  Gout [M10.9]  Gout flare [M10.9]  LEFTY (acute kidney injury) ([address]) [N17.9]  Problem List:   Patient Active Problem List   Diagnosis    Hypertension    CKD (chronic kidney disease)    Hypokalemia    Gout flare    Hyperlipidemia    H/O: CVA (cerebrovascular accident)       Past Medical History  Past Medical History:   Diagnosis Date    High cholesterol     Hypertension     Renal disorder     Stroke [facility])        Past Surgical History  History reviewed. No pertinent surgical history. Length Of Stay: 0  Performed at least 2 patient identifiers during session: Name and Birthday       09/11/23 0528   PT Last Visit   PT Visit Date 09/11/23   Note Type   Note Type Treatment   Pain Assessment   Pain Assessment Tool 0-10   Pain Score 4   Pain Location/Orientation Orientation: Right;Location: Leg  ("from the knee down")   Pain Onset/Description Onset: Ongoing;Frequency: Constant/Continuous  (worse with WB)   Patient's Stated Pain Goal No pain   Hospital Pain Intervention(s) Repositioned   Restrictions/Precautions   Weight Bearing Precautions Per Order No   Braces or Orthoses   (L AFO during ambulation - donned prior to mobility. PT able to don/doff Ivan)   Other Precautions Chair Alarm; Bed Alarm; Fall Risk;Pain   General   Chart Reviewed Yes   Response to Previous Treatment Patient with no complaints from previous session. Family/Caregiver Present No   Cognition   Overall Cognitive Status WFL   Arousal/Participation Alert; Responsive; Cooperative   Attention Within functional limits   Orientation Level Oriented X4   Memory Within functional limits   Following Commands Follows all commands and directions without difficulty   Comments Pt agreeable to PT session   Subjective   Subjective "it hurts the most when I go to put weight on it"   Bed Mobility   Supine to Sit 5  Supervision   Additional items HOB elevated; Increased time required;Verbal cues; Bedrails   Additional Comments BP at start of session 138/80; pt without complaints of lightheadedness, SOB, chest pain, dizziness   Transfers   Sit to Stand 4  Minimal assistance   Additional items Assist x 1; Increased time required;Verbal cues   Stand to Sit 4  Minimal assistance   Additional items Assist x 1; Increased time required;Verbal cues   Stand pivot 4  Minimal assistance   Additional items Assist x 1; Increased time required;Verbal cues   Additional Comments RW used during transfers   Ambulation/Elevation   Gait pattern Antalgic;Decreased R stance;Decreased foot clearance; Step to;Short stride; Foward flexed;Decreased heel strike   Gait Assistance 4  Minimal assist   Additional items Assist x 1;Verbal cues   Assistive Device Rolling walker   Distance 18'   Stair Management Assistance Not tested   Ambulation/Elevation Additional Comments Pt electing to complete ambulation with RLE forefoot weight bearing in presence of pain; step to pattern with decreased stance time on RLE   Balance   Static Sitting Fair +   Dynamic Sitting Fair   Static Standing Fair -   Dynamic Standing Poor +   Ambulatory Poor +   Endurance Deficit   Endurance Deficit Yes   Endurance Deficit Description decreased activity tolerance   Activity Tolerance   Activity Tolerance Patient limited by pain   Nurse Made Aware GRACIE Underwood   Exercises   Hip Flexion 5 reps;Bilateral;AROM; Sitting;Right   Knee AROM Long Arc Quad 5 reps;Bilateral;AROM; Sitting   Ankle Pumps 5 reps;Bilateral;AROM; Sitting;Right   Assessment   Prognosis Good   Problem List Decreased strength;Decreased endurance; Impaired balance;Decreased mobility;Pain;Decreased safety awareness   Assessment Pt seen for PT treatment session this date, consisting of ther act focused on transfer training and gt training on level surfaces to improve pt safety in household environment.  Since previous session, pt has made good progress in terms of progression to assist of one for mobility and progressed to forward ambulation with rolling walker. This date, patient greeted supine in bed and agreeable to PT session reporting 4/10 pain at rest and 6/10 pain during standing/ambulation with pain at R knee. Pt completed sup > sit supervision and donned L AFO Ivan sitting EOB. Pt progressed to STS from EOB to RW Candice and ambulated 20' with RW demonstrating antalgic pattern with self selected forefoot weight bearing to RLE and decreased stance time on R in presence of pain. Patient demonstrates impaired range of motion at R knee as well as decreased muscle endurance limited by pain. Pertinent barriers during this session include pain. Current goals and POC remain appropriate, pt continues to have rehab potential and is making good progress towards STGs. Pt prognosis for achieving goals is good, pending pt progress with hospitalization/medical status improvements, and indicated by orientation, ability to follow directions, motivation, previous response to intervention and responsive to cues/strategies. Pt limited d/t the presence of intractable pain, fear of pain provocation and avoidance behaviors. PT recommends post acute rehabilitation services upon discharge. Pt continues to be functioning below baseline level, and remains limited 2* factors listed above. PT will continue to see pt during current hospitalization in order to address the deficits listed above and provide interventions consistent w/ POC in effort to achieve STGs. Barriers to Discharge Inaccessible home environment;Decreased caregiver support   Barriers to Discharge Comments 15 PATRIZIA. Pt reports he lives with his son; however, there are times when his son is not home   Goals   Patient Goals to have less pain and go to rehab   STG Expiration Date 09/19/23   PT Treatment Day 1   Plan   Treatment/Interventions Functional transfer training; Therapeutic exercise; Endurance training;Patient/family training;Bed mobility;Gait training;Spoke to nursing;Continued evaluation   Progress Progressing toward goals   PT Frequency 3-5x/wk   Recommendation   PT Discharge Recommendation Post acute rehabilitation services   Equipment Recommended [facility] Recommended Wheeled walker   AM-PAC Basic Mobility Inpatient   Turning in Flat Bed Without Bedrails 3   Lying on Back to Sitting on Edge of Flat Bed Without Bedrails 3   Moving Bed to Chair 3   Standing Up From Chair Using Arms 3   Walk in Room 3   Climb 3-5 Stairs With Railing 1   Basic Mobility Inpatient Raw Score 16   Basic Mobility Standardized Score 38.32   Highest Level Of Mobility   -HLM Goal 5: Stand one or more mins   -HLM Achieved 6: Walk 10 steps or more   Education   Education Provided Mobility training;Assistive device   Patient Reinforcement needed   End of Consult   Patient Position at End of Consult All needs within reach; Seated edge of bed       Time In: 1338  Time Out: 1401  Total Treatment Minutes: [address] [place], [place]

## 2023-09-11 NOTE — ASSESSMENT & PLAN NOTE
· H/o of frequent gout flares  · Presenting with swollen right ankle. Reports having gout in this area in the past  · Will start on oral prednisone taper as prescribed at previous hospitalization (20-20-10-10)   · Patient has a history of previous stroke with left-sided residual deficits.   Now with right ankle and knee pain he is having issues ambulating  · PT evaluated and recommended subacute rehab  · Reported some R calf pain also -- negative doppler for DVT 9/11   · Still having trouble ambulating after his gout flare-planning for SNF underway

## 2023-09-11 NOTE — PROGRESS NOTES
[address]  Progress Note  Name: Jalen York  MRN: [id number]  Unit/Bed#: -01 I Date of Admission: 9/8/2023   Date of Service: 9/11/2023 I Hospital Day: 0    Assessment/Plan   * Gout flare  Assessment & Plan  · H/o of frequent gout flares  · Presenting with swollen right ankle. Reports having gout in this area in the past  · Will start on oral prednisone taper as prescribed at previous hospitalization (20-20-10-10)   · Patient has a history of previous stroke with left-sided residual deficits. Now with right ankle and knee pain he is having issues ambulating  · PT evaluated and recommended subacute rehab  · Reported some R calf pain also -- negative doppler for DVT 9/11   · Still having trouble ambulating after his gout flare-planning for SNF underway    CKD (chronic kidney disease)  Assessment & Plan  Lab Results   Component Value Date    EGFR 46 09/11/2023    EGFR 42 09/10/2023    EGFR 41 09/09/2023    CREATININE 1.57 (H) 09/11/2023    CREATININE 1.71 (H) 09/10/2023    CREATININE 1.74 (H) 09/09/2023     · Creatinine 1.8>1.7 this is likely his baseline   · Unclear renal function baseline. His most recent outpatient creatinine was 1.8 last month  · Monitor I&O  · Recheck AM BMP    H/O: CVA (cerebrovascular accident)  Assessment & Plan  · Continue asa and statin  · Residual left-sided deficits    Hypokalemia  Assessment & Plan  · 3.3  · Klor 40     Hypertension  Assessment & Plan  · BP stable on admission  · Continue home amlodipine and metoprolol  · Monitor vitals per routine  · Blood pressures are well controlled               VTE Pharmacologic Prophylaxis: VTE Score: 3 Moderate Risk (Score 3-4) - Pharmacological DVT Prophylaxis Ordered: heparin. Patient Centered Rounds: I performed bedside rounds with nursing staff today.   Discussions with Specialists or Other Care Team Provider: none    Education and Discussions with Family / Patient: Patient declined call to contact person. Total Time Spent on Date of Encounter in care of patient: 35 minutes This time was spent on one or more of the following: performing physical exam; counseling and coordination of care; obtaining or reviewing history; documenting in the medical record; reviewing/ordering tests, medications or procedures; communicating with other healthcare professionals and discussing with patient's family/caregivers. Current Length of Stay: 0 day(s)  Current Patient Status: Inpatient   Certification Statement: The patient will continue to require additional inpatient hospital stay due to SNF placement  Discharge Plan: Anticipate discharge tomorrow to rehab facility. Code Status: Level 1 - Full Code    Subjective:   Having worse RLE calf pain today. Still cannot walk due to pain. Objective:     Vitals:   Temp (24hrs), Av.4 °F (36.9 °C), Min:98.4 °F (36.9 °C), Max:98.4 °F (36.9 °C)    Temp:  [98.4 °F (36.9 °C)] 98.4 °F (36.9 °C)  HR:  [51-57] 57  Resp:  [16-17] 17  BP: (123-138)/(80-82) 138/80  SpO2:  [98 %] 98 %  Body mass index is 28.51 kg/m². Input and Output Summary (last 24 hours):      Intake/Output Summary (Last 24 hours) at 2023 1740  Last data filed at 2023 0801  Gross per 24 hour   Intake 600 ml   Output 1275 ml   Net -675 ml       Physical Exam:   Physical Exam   General:Does not appear in any acute distress  HEENT: Normal conjunctiva, EOMI  Heart: Regular rate and rhythm, no murmurs  Lungs: Clear lungs, nonlabored respirations, no wheezing  Abdomen: Nontender, nondistended  Extremities: No increased warmth on R knee or ankle joint compared to L side  Neuro: Alert and oriented x3, Residual L sided deficits   Psych: Cooperative/calm      Additional Data:     Labs:  Results from last 7 days   Lab Units 23  0454   WBC Thousand/uL 9.85   HEMOGLOBIN g/dL 13.8   HEMATOCRIT % 40.6   PLATELETS Thousands/uL 270   NEUTROS PCT % 78*   LYMPHS PCT % 13*   MONOS PCT % 9   EOS PCT % 0 Results from last 7 days   Lab Units 09/11/23  0454 09/09/23  0512 09/08/23  2233   SODIUM mmol/L 139   < > 135   POTASSIUM mmol/L 3.3*   < > 2.8*   CHLORIDE mmol/L 104   < > 97   CO2 mmol/L 29   < > 27   BUN mg/dL 26*   < > 23   CREATININE mg/dL 1.57*   < > 1.89*   ANION GAP mmol/L 6   < > 11   CALCIUM mg/dL 8.9   < > 9.7   ALBUMIN g/dL  --   --  4.5   TOTAL BILIRUBIN mg/dL  --   --  0.76   ALK PHOS U/L  --   --  61   ALT U/L  --   --  9   AST U/L  --   --  12*   GLUCOSE RANDOM mg/dL 97   < > 112    < > = values in this interval not displayed. Lines/Drains:  Invasive Devices     Peripheral Intravenous Line  Duration           Peripheral IV 09/09/23 Right;Ventral (anterior) Forearm 2 days                      Imaging: Reviewed radiology reports from this admission including: none and No pertinent imaging reviewed.     Recent Cultures (last 7 days):         Last 24 Hours Medication List:   Current Facility-Administered Medications   Medication Dose Route Frequency Provider Last Rate   • acetaminophen  975 mg Oral Q8H PRN Oneyda Brambila PA-C     • aluminum-magnesium hydroxide-simethicone  30 mL Oral Q4H PRN Danelle Mohan MD     • amLODIPine  10 mg Oral Daily Lakeisha Wick PA-C     • aspirin  81 mg Oral Daily Lakeisha Wick PA-C     • atorvastatin  40 mg Oral Daily With [employer]     • Diclofenac Sodium  2 g Topical 4x Daily PRN Oneyda Brambila PA-C     • heparin (porcine)  5,000 Units Subcutaneous Q8H [address] Lakeisha Wick PA-C     • lidocaine  1 patch Topical Daily Danelel Mohan MD     • metoprolol succinate  100 mg Oral Daily Lakeisha Wick PA-C     • pantoprazole  40 mg Oral Early Morning Lakeisha Wick PA-C     • predniSONE  20 mg Oral Daily Lakeisha Wick PA-C      Followed by   • [START ON 9/13/2023] predniSONE  10 mg Oral Daily Brenda Mathur PA-C          Today, Patient Was Seen By: Danelle Mohan MD    **Please Note: This note may have been constructed using a voice recognition system. **

## 2023-09-11 NOTE — PLAN OF CARE
Problem: PHYSICAL THERAPY ADULT  Goal: Performs mobility at highest level of function for planned discharge setting. See evaluation for individualized goals. Description: Treatment/Interventions: Functional transfer training, Therapeutic exercise, Endurance training, Patient/family training, Bed mobility, Gait training, Spoke to nursing, Continued evaluation  Equipment Recommended: Maryann West       See flowsheet documentation for full assessment, interventions and recommendations. Outcome: Progressing  Note: Prognosis: Good  Problem List: Decreased strength, Decreased endurance, Impaired balance, Decreased mobility, Pain, Decreased safety awareness  Assessment: Pt seen for PT treatment session this date, consisting of ther act focused on transfer training and gt training on level surfaces to improve pt safety in household environment. Since previous session, pt has made good progress in terms of progression to assist of one for mobility and progressed to forward ambulation with rolling walker. This date, patient greeted supine in bed and agreeable to PT session reporting 4/10 pain at rest and 6/10 pain during standing/ambulation with pain at R knee. Pt completed sup > sit supervision and donned L AFO Ivan sitting EOB. Pt progressed to STS from EOB to RW Candice and ambulated 20' with RW demonstrating antalgic pattern with self selected forefoot weight bearing to RLE and decreased stance time on R in presence of pain. Patient demonstrates impaired range of motion at R knee as well as decreased muscle endurance limited by pain. Pertinent barriers during this session include pain. Current goals and POC remain appropriate, pt continues to have rehab potential and is making good progress towards STGs.  Pt prognosis for achieving goals is good, pending pt progress with hospitalization/medical status improvements, and indicated by orientation, ability to follow directions, motivation, previous response to intervention and responsive to cues/strategies. Pt limited d/t the presence of intractable pain, fear of pain provocation and avoidance behaviors. PT recommends post acute rehabilitation services upon discharge. Pt continues to be functioning below baseline level, and remains limited 2* factors listed above. PT will continue to see pt during current hospitalization in order to address the deficits listed above and provide interventions consistent w/ POC in effort to achieve STGs. Barriers to Discharge: Inaccessible home environment, Decreased caregiver support  Barriers to Discharge Comments: 15 PATRIZIA. Pt reports he lives with his son; however, there are times when his son is not home  PT Discharge Recommendation: Post acute rehabilitation services    See flowsheet documentation for full assessment.       Kelley Beverly; PT, DPT

## 2023-09-11 NOTE — PLAN OF CARE
Problem: MOBILITY - ADULT  Goal: Maintain or return to baseline ADL function  Description: INTERVENTIONS:  -  Assess patient's ability to carry out ADLs; assess patient's baseline for ADL function and identify physical deficits which impact ability to perform ADLs (bathing, care of mouth/teeth, toileting, grooming, dressing, etc.)  - Assess/evaluate cause of self-care deficits   - Assess range of motion  - Assess patient's mobility; develop plan if impaired  - Assess patient's need for assistive devices and provide as appropriate  - Encourage maximum independence but intervene and supervise when necessary  - Involve family in performance of ADLs  - Assess for home care needs following discharge   - Consider OT consult to assist with ADL evaluation and planning for discharge  - Provide patient education as appropriate  Outcome: Progressing     Problem: PAIN - ADULT  Goal: Verbalizes/displays adequate comfort level or baseline comfort level  Description: Interventions:  - Encourage patient to monitor pain and request assistance  - Assess pain using appropriate pain scale  - Administer analgesics based on type and severity of pain and evaluate response  - Implement non-pharmacological measures as appropriate and evaluate response  - Consider cultural and social influences on pain and pain management  - Notify physician/advanced practitioner if interventions unsuccessful or patient reports new pain  Outcome: Progressing     Problem: INFECTION - ADULT  Goal: Absence or prevention of progression during hospitalization  Description: INTERVENTIONS:  - Assess and monitor for signs and symptoms of infection  - Monitor lab/diagnostic results  - Monitor all insertion sites, i.e. indwelling lines, tubes, and drains  - Monitor endotracheal if appropriate and nasal secretions for changes in amount and color  - [place] appropriate cooling/warming therapies per order  - Administer medications as ordered  - Instruct and encourage patient and family to use good hand hygiene technique  - Identify and instruct in appropriate isolation precautions for identified infection/condition  Outcome: Progressing

## 2023-09-11 NOTE — ASSESSMENT & PLAN NOTE
Lab Results   Component Value Date    EGFR 46 09/11/2023    EGFR 42 09/10/2023    EGFR 41 09/09/2023    CREATININE 1.57 (H) 09/11/2023    CREATININE 1.71 (H) 09/10/2023    CREATININE 1.74 (H) 09/09/2023     · Creatinine 1.8>1.7 this is likely his baseline   · Unclear renal function baseline.   His most recent outpatient creatinine was 1.8 last month  · Monitor I&O  · Recheck AM BMP

## 2023-09-12 LAB
ANION GAP SERPL CALCULATED.3IONS-SCNC: 5 MMOL/L
BASOPHILS # BLD AUTO: 0.02 THOUSANDS/ÂΜL (ref 0–0.1)
BASOPHILS NFR BLD AUTO: 0 % (ref 0–1)
BUN SERPL-MCNC: 26 MG/DL (ref 5–25)
CALCIUM SERPL-MCNC: 9.3 MG/DL (ref 8.4–10.2)
CHLORIDE SERPL-SCNC: 104 MMOL/L (ref 96–108)
CO2 SERPL-SCNC: 30 MMOL/L (ref 21–32)
CREAT SERPL-MCNC: 1.65 MG/DL (ref 0.6–1.3)
EOSINOPHIL # BLD AUTO: 0.02 THOUSAND/ÂΜL (ref 0–0.61)
EOSINOPHIL NFR BLD AUTO: 0 % (ref 0–6)
ERYTHROCYTE [DISTWIDTH] IN BLOOD BY AUTOMATED COUNT: 14.4 % (ref 11.6–15.1)
GFR SERPL CREATININE-BSD FRML MDRD: 44 ML/MIN/1.73SQ M
GLUCOSE SERPL-MCNC: 95 MG/DL (ref 65–140)
HCT VFR BLD AUTO: 43.9 % (ref 36.5–49.3)
HGB BLD-MCNC: 15 G/DL (ref 12–17)
IMM GRANULOCYTES # BLD AUTO: 0.04 THOUSAND/UL (ref 0–0.2)
IMM GRANULOCYTES NFR BLD AUTO: 1 % (ref 0–2)
LYMPHOCYTES # BLD AUTO: 1.45 THOUSANDS/ÂΜL (ref 0.6–4.47)
LYMPHOCYTES NFR BLD AUTO: 16 % (ref 14–44)
MAGNESIUM SERPL-MCNC: 2 MG/DL (ref 1.9–2.7)
MCH RBC QN AUTO: 29.5 PG (ref 26.8–34.3)
MCHC RBC AUTO-ENTMCNC: 34.2 G/DL (ref 31.4–37.4)
MCV RBC AUTO: 86 FL (ref 82–98)
MONOCYTES # BLD AUTO: 0.89 THOUSAND/ÂΜL (ref 0.17–1.22)
MONOCYTES NFR BLD AUTO: 10 % (ref 4–12)
NEUTROPHILS # BLD AUTO: 6.4 THOUSANDS/ÂΜL (ref 1.85–7.62)
NEUTS SEG NFR BLD AUTO: 73 % (ref 43–75)
NRBC BLD AUTO-RTO: 0 /100 WBCS
PLATELET # BLD AUTO: 280 THOUSANDS/UL (ref 149–390)
PMV BLD AUTO: 10.1 FL (ref 8.9–12.7)
POTASSIUM SERPL-SCNC: 3.6 MMOL/L (ref 3.5–5.3)
RBC # BLD AUTO: 5.09 MILLION/UL (ref 3.88–5.62)
SODIUM SERPL-SCNC: 139 MMOL/L (ref 135–147)
WBC # BLD AUTO: 8.82 THOUSAND/UL (ref 4.31–10.16)

## 2023-09-12 PROCEDURE — 99233 SBSQ HOSP IP/OBS HIGH 50: CPT | Performed by: INTERNAL MEDICINE

## 2023-09-12 PROCEDURE — 80048 BASIC METABOLIC PNL TOTAL CA: CPT | Performed by: STUDENT IN AN ORGANIZED HEALTH CARE EDUCATION/TRAINING PROGRAM

## 2023-09-12 PROCEDURE — 83735 ASSAY OF MAGNESIUM: CPT | Performed by: STUDENT IN AN ORGANIZED HEALTH CARE EDUCATION/TRAINING PROGRAM

## 2023-09-12 PROCEDURE — 97530 THERAPEUTIC ACTIVITIES: CPT

## 2023-09-12 PROCEDURE — 97535 SELF CARE MNGMENT TRAINING: CPT

## 2023-09-12 PROCEDURE — 85025 COMPLETE CBC W/AUTO DIFF WBC: CPT | Performed by: STUDENT IN AN ORGANIZED HEALTH CARE EDUCATION/TRAINING PROGRAM

## 2023-09-12 RX ORDER — METHOCARBAMOL 500 MG/1
500 TABLET, FILM COATED ORAL EVERY 8 HOURS SCHEDULED
Status: COMPLETED | OUTPATIENT
Start: 2023-09-12 | End: 2023-09-14

## 2023-09-12 RX ADMIN — PANTOPRAZOLE SODIUM 40 MG: 40 TABLET, DELAYED RELEASE ORAL at 06:40

## 2023-09-12 RX ADMIN — ACETAMINOPHEN 975 MG: 325 TABLET, FILM COATED ORAL at 08:36

## 2023-09-12 RX ADMIN — LIDOCAINE 5% 1 PATCH: 700 PATCH TOPICAL at 08:22

## 2023-09-12 RX ADMIN — AMLODIPINE BESYLATE 10 MG: 10 TABLET ORAL at 08:22

## 2023-09-12 RX ADMIN — ASPIRIN 81 MG: 81 TABLET, COATED ORAL at 08:22

## 2023-09-12 RX ADMIN — METHOCARBAMOL 500 MG: 500 TABLET ORAL at 22:08

## 2023-09-12 RX ADMIN — ATORVASTATIN CALCIUM 40 MG: 40 TABLET, FILM COATED ORAL at 19:04

## 2023-09-12 RX ADMIN — METHOCARBAMOL 500 MG: 500 TABLET ORAL at 14:28

## 2023-09-12 RX ADMIN — HEPARIN SODIUM 5000 UNITS: 5000 INJECTION INTRAVENOUS; SUBCUTANEOUS at 06:40

## 2023-09-12 RX ADMIN — HEPARIN SODIUM 5000 UNITS: 5000 INJECTION INTRAVENOUS; SUBCUTANEOUS at 14:28

## 2023-09-12 RX ADMIN — PREDNISONE 20 MG: 20 TABLET ORAL at 08:23

## 2023-09-12 RX ADMIN — METOPROLOL SUCCINATE 100 MG: 100 TABLET, EXTENDED RELEASE ORAL at 08:22

## 2023-09-12 RX ADMIN — HEPARIN SODIUM 5000 UNITS: 5000 INJECTION INTRAVENOUS; SUBCUTANEOUS at 22:08

## 2023-09-12 NOTE — OCCUPATIONAL THERAPY NOTE
Occupational Therapy Treatment Note        Patient Name: Jessica DUBOSE'O Date: 9/12/2023 09/12/23 1131   OT Last Visit   OT Visit Date 09/12/23   Note Type   Note Type Treatment for insurance authorization   Pain Assessment   Pain Assessment Tool 0-10   Pain Score 4   Pain Location/Orientation Orientation: Right;Location: Knee   Hospital Pain Intervention(s) Ambulation/increased activity; Emotional support;Repositioned   Restrictions/Precautions   Weight Bearing Precautions Per Order No   Braces or Orthoses Other (Comment)  (Left AFO- donned prior to mobility)   Other Precautions Fall Risk;Pain;Limb alert   ADL   Where Assessed Sitting at sink   Eating Assistance 5  Supervision/Setup   Eating Deficit Setup;Supervision/safety; Increased time to complete   Grooming Assistance 5  Supervision/Setup  (standing at sink)   Grooming Deficit Setup;Steadying   UB Bathing Assistance 4  Minimal Assistance   UB Bathing Deficit Setup; Increased time to complete   LB Bathing Assistance 3  Moderate Assistance   LB Bathing Deficit Setup;Supervision/safety; Increased time to complete   UB Dressing Assistance 4  Minimal Assistance   UB Dressing Deficit Setup; Increased time to complete   LB Dressing Assistance 2  Maximal Assistance   LB Dressing Deficit Setup; Increased time to complete;Tie shoes; Don/doff R sock; Don/doff L sock; Don/doff R shoe;Don/doff L shoe   Toileting Assistance  4  Minimal Assistance   Toileting Deficit Increased time to complete   Functional Standing Tolerance   Time ~ 2-3 minutes with unilateral UE support   Activity standing at sink using LUE for support   Bed Mobility   Supine to Sit 5  Supervision   Additional items Assist x 1;HOB elevated; Increased time required;Verbal cues   Transfers   Sit to Stand 4  Minimal assistance   Additional items Assist x 1; Increased time required;Verbal cues   Stand to Sit 4  Minimal assistance   Additional items Assist x 1; Increased time required;Verbal cues Toilet transfer 4  Minimal assistance   Additional items Assist x 1; Increased time required;Verbal cues;Standard toilet  (grab bar use)   Functional Mobility   Functional Mobility 4  Minimal assistance   Additional Comments assist of 1 with RW/ AFO in place   Additional items Rolling walker   Toilet Transfers   Toilet Transfer From Bed   Toilet Transfer Type To and from   Toilet Transfer to Standard toilet   Toilet Transfer Technique Ambulating   Toilet Transfers Minimal assistance   Neuromuscular Education   Weight Bearing Technique Yes   LUE Weight Bearing Extended arm standing   Response to Weight Bearing Technique maintained UE support during grooming and tasks   Coordination   Fine Motor demosntarated ability to tie pants using left hand pincer . Cognition   Overall Cognitive Status WFL   Arousal/Participation Alert; Responsive; Cooperative   Attention Within functional limits   Orientation Level Oriented X4   Memory Within functional limits   Following Commands Follows all commands and directions without difficulty   Activity Tolerance   Activity Tolerance Patient tolerated treatment well   Assessment   Assessment Patient participated in Skilled OT session this date with interventions consisting of ADL re training with the use of correct body mechnaics, Energy Conservation techniques, Work simplification skills , deep breathing technique, safety awareness and fall prevention techniques, one handed dressing technique,  therapeutic activities to: increase activity tolerance, increase standing tolerance time with unilateral UE support to complete sink level ADLs, increase cardiovascular endurance , elicit righting and equilibrium reactions for improved postural control and alignment during transitional movements, increase dynamic sit/ stand balance during functional activity  and increase OOB/ sitting tolerance .  Patient agreeable to OT treatment session, upon arrival patient was found supine in bed, alert, responsive  and in no apparent distress. In comparison to previous session, patient with improvements in functional use of LUE, requires min assist for UB ADLs, max assist for LB ADLs, please refer to flow sheet for detailed performance. Patient requiring verbal cues for safety, verbal cues for correct technique and verbal cues for pacing thru activity steps. Patient continues to be functioning below baseline level, occupational performance remains limited secondary to factors listed above and increased risk for falls and injury. From OT standpoint, recommendation at time of d/c would be Short Term Rehab. Patient to benefit from continued Occupational Therapy treatment while in the hospital to address deficits as defined above and maximize level of functional independence with ADLs and functional mobility. Plan   Treatment Interventions ADL retraining;Functional transfer training;UE strengthening/ROM; Endurance training;Patient/family training;Equipment evaluation/education; Compensatory technique education;Continued evaluation; Energy conservation; Activityengagement   Goal Expiration Date 09/24/23   OT Treatment Day 1   OT Frequency 3-5x/wk   AM-PAC Daily Activity Inpatient   Lower Body Dressing 2   Bathing 2   Toileting 2   Upper Body Dressing 3   Grooming 3   Eating 4   Daily Activity Raw Score 16   Daily Activity Standardized Score (Calc for Raw Score >=11) 35.96   AM-PAC Applied Cognition Inpatient   Following a Speech/Presentation 4   Understanding Ordinary Conversation 4   Taking Medications 4   Remembering Where Things Are Placed or Put Away 4   Remembering List of 4-5 Errands 4   Taking Care of Complicated Tasks 3   Applied Cognition Raw Score 23   Applied Cognition Standardized Score 53.08   Barthel Index   Feeding 10   Bathing 0   Grooming Score 0   Dressing Score 5   Bladder Score 10   Bowels Score 10   Toilet Use Score 5   Transfers (Bed/Chair) Score 10   Mobility (Level Surface) Score 0   Stairs Score 0   Barthel Index Score 50

## 2023-09-12 NOTE — PLAN OF CARE
Problem: MOBILITY - ADULT  Goal: Maintain or return to baseline ADL function  Description: INTERVENTIONS:  -  Assess patient's ability to carry out ADLs; assess patient's baseline for ADL function and identify physical deficits which impact ability to perform ADLs (bathing, care of mouth/teeth, toileting, grooming, dressing, etc.)  - Assess/evaluate cause of self-care deficits   - Assess range of motion  - Assess patient's mobility; develop plan if impaired  - Assess patient's need for assistive devices and provide as appropriate  - Encourage maximum independence but intervene and supervise when necessary  - Involve family in performance of ADLs  - Assess for home care needs following discharge   - Consider OT consult to assist with ADL evaluation and planning for discharge  - Provide patient education as appropriate  Outcome: Progressing     Problem: PAIN - ADULT  Goal: Verbalizes/displays adequate comfort level or baseline comfort level  Description: Interventions:  - Encourage patient to monitor pain and request assistance  - Assess pain using appropriate pain scale  - Administer analgesics based on type and severity of pain and evaluate response  - Implement non-pharmacological measures as appropriate and evaluate response  - Consider cultural and social influences on pain and pain management  - Notify physician/advanced practitioner if interventions unsuccessful or patient reports new pain  Outcome: Progressing     Problem: INFECTION - ADULT  Goal: Absence or prevention of progression during hospitalization  Description: INTERVENTIONS:  - Assess and monitor for signs and symptoms of infection  - Monitor lab/diagnostic results  - Monitor all insertion sites, i.e. indwelling lines, tubes, and drains  - Monitor endotracheal if appropriate and nasal secretions for changes in amount and color  - [place] appropriate cooling/warming therapies per order  - Administer medications as ordered  - Instruct and encourage patient and family to use good hand hygiene technique  - Identify and instruct in appropriate isolation precautions for identified infection/condition  Outcome: Progressing

## 2023-09-12 NOTE — ASSESSMENT & PLAN NOTE
· H/o of frequent gout flares  · Presenting with swollen right ankle. Reports having gout in this area in the past  · Will start on oral prednisone taper as prescribed at previous hospitalization  · Patient has a history of previous stroke with left-sided residual deficits.   Now with right ankle and knee pain he is having issues ambulating  · PT evaluated and recommended subacute rehab  · Reported some R calf pain also -- negative doppler for DVT 9/11   · Still having trouble ambulating after his gout flare-planning for SNF underway, CM on board

## 2023-09-12 NOTE — UTILIZATION REVIEW
OBSERVATION  [email or web address] CONVERTED TO INPATIENT 9/11/23 @1600 DUE TO GOUT FLARE, CONTINUED PAIN, DIFFICULTY AMBULATING  09/11/23 1600  Inpatient Admission  Once        Transfer Service: Hospitalist   Question Answer Comment   Level of Care Med Surg    Estimated length of stay More than 2 Midnights    Certification I certify that inpatient services are medically necessary for this patient for a duration of greater than two midnights. See H&P and MD Progress Notes for additional information about the patient's course of treatment. 09/11/23 160       Continued Stay Review    Date: 9/11                          Current Patient Class: OBS to INpatient  Current Level of Care: Med/surg    HPI:63 y.o. male initially admitted on 9/11     Assessment/Plan:   Right ankle, knee pain, difficulty ambulating. Right calf pain. Doppler neg for DVT. PT recommending post acute rehab. Renal function appears to be baseline. 9/12 UPdate: Still having trouble ambulating. Plan for REhab placement. Case management working on safe discharge plan. Vital Signs:   te/Time Temp Pulse Resp BP MAP (mmHg) SpO2 O2 Device   09/12/23 08:21:56 -- 62 -- -- -- 96 % --   09/12/23 07:18:31 97.9 °F (36.6 °C) 48 Abnormal  19 148/91 110 96 % --   09/11/23 2112 -- -- -- -- -- 96 % None (Room air)   09/11/23 2110 -- -- -- -- -- -- None (Room air)   09/11/23 13:43:28 -- -- -- 138/80 99 -- --   09/11/23 0845 -- -- -- -- -- -- None (Room a       Pertinent Labs/Diagnostic Results:   9/11 VAS lower limb venous duplex:   Impression:     RIGHT LOWER LIMB  No evidence of acute or chronic deep vein thrombosis . No evidence of superficial thrombophlebitis noted. Doppler evaluation shows a normal response to augmentation maneuvers. Popliteal, posterior tibial and anterior tibial arterial Doppler waveform's are  triphasic. There is a well defined hypoechoic non-vascularized cystic-type structure noted  in the popliteal fossa.      LEFT LOWER LIMB LIMITED  Evaluation shows no evidence of thrombus in the common femoral vein. Doppler evaluation shows a normal response to augmentation maneuvers.       Results from last 7 days   Lab Units 09/12/23  0444 09/11/23  0454 09/10/23  0453 09/09/23  0512 09/08/23  2233   WBC Thousand/uL 8.82 9.85 14.88* 11.66* 12.12*   HEMOGLOBIN g/dL 15.0 13.8 13.9 14.1 14.3   HEMATOCRIT % 43.9 40.6 41.0 40.3 42.0   PLATELETS Thousands/uL 280 270 283 245 280   NEUTROS ABS Thousands/µL 6.40 7.54 12.26*  --  10.05*         Results from last 7 days   Lab Units 09/12/23  0444 09/11/23  0454 09/10/23  0453 09/09/23  0512 09/08/23  2233   SODIUM mmol/L 139 139 138 134* 135   POTASSIUM mmol/L 3.6 3.3* 3.5 3.6 2.8*   CHLORIDE mmol/L 104 104 103 99 97   CO2 mmol/L 30 29 25 27 27   ANION GAP mmol/L 5 6 10 8 11   BUN mg/dL 26* 26* 29* 22 23   CREATININE mg/dL 1.65* 1.57* 1.71* 1.74* 1.89*   EGFR ml/min/1.73sq m 44 46 42 41 37   CALCIUM mg/dL 9.3 8.9 9.2 9.4 9.7   MAGNESIUM mg/dL 2.0 2.0  --  1.9  --      Results from last 7 days   Lab Units 09/08/23 2233   AST U/L 12*   ALT U/L 9   ALK PHOS U/L 61   TOTAL PROTEIN g/dL 8.5*   ALBUMIN g/dL 4.5   TOTAL BILIRUBIN mg/dL 0.76         Results from last 7 days   Lab Units 09/12/23  0444 09/11/23  0454 09/10/23  0453 09/09/23  0512 09/08/23  2233   GLUCOSE RANDOM mg/dL 95 97 108 151* 112             Medications:   Scheduled Medications:  amLODIPine, 10 mg, Oral, Daily  aspirin, 81 mg, Oral, Daily  atorvastatin, 40 mg, Oral, Daily With Dinner  heparin (porcine), 5,000 Units, Subcutaneous, Q8H TRAVIS  lidocaine, 1 patch, Topical, Daily  metoprolol succinate, 100 mg, Oral, Daily  pantoprazole, 40 mg, Oral, Early Morning  [START ON 9/13/2023] predniSONE, 10 mg, Oral, Daily      Continuous IV Infusions:     PRN Meds:  acetaminophen, 975 mg, Oral, Q8H PRN  aluminum-magnesium hydroxide-simethicone, 30 mL, Oral, Q4H PRN  Diclofenac Sodium, 2 g, Topical, 4x Daily PRN        Discharge Plan: TBD    Network Utilization Review Department  ATTENTION: Please call with any questions or concerns to [phone] and carefully listen to the prompts so that you are directed to the right person. All voicemails are confidential.  [facility] all requests for admission clinical reviews, approved or denied determinations and any other requests to dedicated fax number below belonging to the campus where the patient is receiving treatment.  List of dedicated fax numbers for the Facilities:  [place] DENIALS (Administrative/Medical Necessity) [phone] 230[address] [facility], [place] (Maternity/NICU/Pediatrics) [phone]   [address] [place] [address] [phone]   [facility] [address] [facility] [phone]   [address] [facility] [address] [facility] [address] [place] [address] [id number] [place] [address] [phone]   [address] [facility] [address] [facility]  [address] [phone]

## 2023-09-12 NOTE — PROGRESS NOTES
[address]  Progress Note  Name: Isha Vidal  MRN: [id number]  Unit/Bed#: -01 I Date of Admission: 9/8/2023   Date of Service: 9/12/2023 I Hospital Day: 1    Assessment/Plan   * Gout flare  Assessment & Plan  · H/o of frequent gout flares  · Presenting with swollen right ankle. Reports having gout in this area in the past  · Will start on oral prednisone taper as prescribed at previous hospitalization  · Patient has a history of previous stroke with left-sided residual deficits. Now with right ankle and knee pain he is having issues ambulating  · PT evaluated and recommended subacute rehab  · Reported some R calf pain also -- negative doppler for DVT 9/11   · Still having trouble ambulating after his gout flare-planning for SNF underway, CM on board    H/O: CVA (cerebrovascular accident)  Assessment & Plan  · Continue asa and statin  · Residual left-sided deficits    CKD (chronic kidney disease)  Assessment & Plan  Lab Results   Component Value Date    EGFR 44 09/12/2023    EGFR 46 09/11/2023    EGFR 42 09/10/2023    CREATININE 1.65 (H) 09/12/2023    CREATININE 1.57 (H) 09/11/2023    CREATININE 1.71 (H) 09/10/2023     · Creatinine 1.8>1.7 this is likely his baseline   · Unclear renal function baseline. His most recent outpatient creatinine was 1.8 last month  · Monitor I&O  · Recheck AM BMP    Primary hypertension  Assessment & Plan  · BP stable on admission  · Continue home amlodipine and metoprolol  · Monitor vitals per routine  · Blood pressures are well controlled            VTE Pharmacologic Prophylaxis: VTE Score: 3 Moderate Risk (Score 3-4) - Pharmacological DVT Prophylaxis Ordered: heparin. Patient Centered Rounds: I performed bedside rounds with nursing staff today. Discussions with Specialists or Other Care Team Provider: Case management    Education and Discussions with Family / Patient: Discussed with the patient.      Total Time Spent on Date of Encounter in care of patient: 50 mins. This time was spent on one or more of the following: performing physical exam; counseling and coordination of care; obtaining or reviewing history; documenting in the medical record; reviewing/ordering tests, medications or procedures; communicating with other healthcare professionals and discussing with patient's family/caregivers. Current Length of Stay: 1 day(s)  Current Patient Status: Inpatient   Certification Statement: The patient will continue to require additional inpatient hospital stay due to Pending placement  Discharge Plan: Anticipate discharge in 24-48 hrs to rehab facility. Code Status: Level 1 - Full Code    Subjective:   Reports still with knee pain, also still with difficulty ambulating. Seen and examined this morning, no other complaints endorsed. No other significant events reported overnight per staff. Objective:     Vitals:   Temp (24hrs), Av.9 °F (36.6 °C), Min:97.9 °F (36.6 °C), Max:97.9 °F (36.6 °C)    Temp:  [97.9 °F (36.6 °C)] 97.9 °F (36.6 °C)  HR:  [48-62] 62  Resp:  [19] 19  BP: (138-148)/(80-91) 148/91  SpO2:  [96 %] 96 %  Body mass index is 28.51 kg/m². Input and Output Summary (last 24 hours): Intake/Output Summary (Last 24 hours) at 2023 1138  Last data filed at 2023 0836  Gross per 24 hour   Intake 80 ml   Output 1700 ml   Net -1620 ml       Physical Exam:   Physical Exam  Vitals and nursing note reviewed. Constitutional:       General: He is not in acute distress. Appearance: He is well-developed. He is not toxic-appearing. HENT:      Head: Normocephalic and atraumatic. Nose: Nose normal.      Mouth/Throat:      Mouth: Mucous membranes are moist.      Pharynx: Oropharynx is clear. Eyes:      Pupils: Pupils are equal, round, and reactive to light. Cardiovascular:      Rate and Rhythm: Normal rate. Pulses: Normal pulses. Pulmonary:      Effort: Pulmonary effort is normal. No respiratory distress. Breath sounds: Normal breath sounds. No wheezing. Abdominal:      Palpations: Abdomen is soft. Tenderness: There is no guarding. Musculoskeletal:         General: No swelling or tenderness. Cervical back: Normal range of motion. Comments: Limited ROM R knee due to pain   Skin:     General: Skin is warm and dry. Capillary Refill: Capillary refill takes less than 2 seconds. Neurological:      Mental Status: He is alert and oriented to person, place, and time. Mental status is at baseline. Psychiatric:         Mood and Affect: Mood normal.         Thought Content: Thought content normal.         Additional Data:     Labs:  Results from last 7 days   Lab Units 09/12/23  0444   WBC Thousand/uL 8.82   HEMOGLOBIN g/dL 15.0   HEMATOCRIT % 43.9   PLATELETS Thousands/uL 280   NEUTROS PCT % 73   LYMPHS PCT % 16   MONOS PCT % 10   EOS PCT % 0     Results from last 7 days   Lab Units 09/12/23  0444 09/09/23  0512 09/08/23  2233   SODIUM mmol/L 139   < > 135   POTASSIUM mmol/L 3.6   < > 2.8*   CHLORIDE mmol/L 104   < > 97   CO2 mmol/L 30   < > 27   BUN mg/dL 26*   < > 23   CREATININE mg/dL 1.65*   < > 1.89*   ANION GAP mmol/L 5   < > 11   CALCIUM mg/dL 9.3   < > 9.7   ALBUMIN g/dL  --   --  4.5   TOTAL BILIRUBIN mg/dL  --   --  0.76   ALK PHOS U/L  --   --  61   ALT U/L  --   --  9   AST U/L  --   --  12*   GLUCOSE RANDOM mg/dL 95   < > 112    < > = values in this interval not displayed.                        Lines/Drains:  Invasive Devices     Peripheral Intravenous Line  Duration           Peripheral IV 09/09/23 Right;Ventral (anterior) Forearm 2 days                      Imaging: Reviewed radiology reports from this admission including: ultrasound(s)    Recent Cultures (last 7 days):         Last 24 Hours Medication List:   Current Facility-Administered Medications   Medication Dose Route Frequency Provider Last Rate   • acetaminophen  975 mg Oral Q8H PRN David Monterroso PA-C     • aluminum-magnesium hydroxide-simethicone  30 mL Oral Q4H PRN Patricio Lyons MD     • amLODIPine  10 mg Oral Daily Lakeisha Wick PA-C     • aspirin  81 mg Oral Daily Lakeisha Wick PA-C     • atorvastatin  40 mg Oral Daily With [place]i [employer]ashion[employer]oJohnny Campbell PA-C     • Diclofenac Sodium  2 g Topical 4x Daily PRN Jimi Robertson PA-C     • heparin (porcine)  5,000 Units Subcutaneous Q8H [address] Lakeisha Wick PA-C     • lidocaine  1 patch Topical Daily Patricio Lyons MD     • methocarbamol  500 mg Oral Q8H [facility] Socrates Lynn MD     • metoprolol succinate  100 mg Oral Daily Lakeisha Wick PA-C     • pantoprazole  40 mg Oral Early Morning Lakeisha Wick PA-C     • [START ON 9/13/2023] predniSONE  10 mg Oral Daily Isak Lerner PA-C          Today, Patient Was Seen By: Marry Peterson MD    **Please Note: This note may have been constructed using a voice recognition system. **

## 2023-09-12 NOTE — ASSESSMENT & PLAN NOTE
Lab Results   Component Value Date    EGFR 44 09/12/2023    EGFR 46 09/11/2023    EGFR 42 09/10/2023    CREATININE 1.65 (H) 09/12/2023    CREATININE 1.57 (H) 09/11/2023    CREATININE 1.71 (H) 09/10/2023     · Creatinine 1.8>1.7 this is likely his baseline   · Unclear renal function baseline.   His most recent outpatient creatinine was 1.8 last month  · Monitor I&O  · Recheck AM BMP

## 2023-09-12 NOTE — PLAN OF CARE
Problem: OCCUPATIONAL THERAPY ADULT  Goal: Performs self-care activities at highest level of function for planned discharge setting. See evaluation for individualized goals. Description: Treatment Interventions: ADL retraining, Functional transfer training, UE strengthening/ROM, Endurance training, Patient/family training, Equipment evaluation/education, Compensatory technique education, Continued evaluation, Energy conservation, Activityengagement          See flowsheet documentation for full assessment, interventions and recommendations. Note: Limitation: Decreased ADL status, Decreased UE strength, Decreased endurance, Decreased UE ROM, Decreased self-care trans, Decreased fine motor control, Decreased high-level ADLs  Prognosis: Good  Assessment: Patient participated in Skilled OT session this date with interventions consisting of ADL re training with the use of correct body mechnaics, Energy Conservation techniques, Work simplification skills , deep breathing technique, safety awareness and fall prevention techniques, one handed dressing technique,  therapeutic activities to: increase activity tolerance, increase standing tolerance time with unilateral UE support to complete sink level ADLs, increase cardiovascular endurance , elicit righting and equilibrium reactions for improved postural control and alignment during transitional movements, increase dynamic sit/ stand balance during functional activity  and increase OOB/ sitting tolerance . Patient agreeable to OT treatment session, upon arrival patient was found supine in bed, alert, responsive  and in no apparent distress. In comparison to previous session, patient with improvements in functional use of LUE, requires min assist for UB ADLs, max assist for LB ADLs, please refer to flow sheet for detailed performance. Patient requiring verbal cues for safety, verbal cues for correct technique and verbal cues for pacing thru activity steps.  Patient continues to be functioning below baseline level, occupational performance remains limited secondary to factors listed above and increased risk for falls and injury. From OT standpoint, recommendation at time of d/c would be Short Term Rehab. Patient to benefit from continued Occupational Therapy treatment while in the hospital to address deficits as defined above and maximize level of functional independence with ADLs and functional mobility.      OT Discharge Recommendation: Post acute rehabilitation services

## 2023-09-13 PROCEDURE — 99233 SBSQ HOSP IP/OBS HIGH 50: CPT | Performed by: INTERNAL MEDICINE

## 2023-09-13 PROCEDURE — 97110 THERAPEUTIC EXERCISES: CPT

## 2023-09-13 PROCEDURE — 97116 GAIT TRAINING THERAPY: CPT

## 2023-09-13 RX ORDER — METOPROLOL SUCCINATE 50 MG/1
50 TABLET, EXTENDED RELEASE ORAL DAILY
Status: DISCONTINUED | OUTPATIENT
Start: 2023-09-14 | End: 2023-09-14 | Stop reason: HOSPADM

## 2023-09-13 RX ADMIN — ASPIRIN 81 MG: 81 TABLET, COATED ORAL at 08:26

## 2023-09-13 RX ADMIN — PREDNISONE 10 MG: 10 TABLET ORAL at 08:26

## 2023-09-13 RX ADMIN — PANTOPRAZOLE SODIUM 40 MG: 40 TABLET, DELAYED RELEASE ORAL at 06:01

## 2023-09-13 RX ADMIN — METHOCARBAMOL 500 MG: 500 TABLET ORAL at 06:01

## 2023-09-13 RX ADMIN — HEPARIN SODIUM 5000 UNITS: 5000 INJECTION INTRAVENOUS; SUBCUTANEOUS at 06:02

## 2023-09-13 RX ADMIN — METHOCARBAMOL 500 MG: 500 TABLET ORAL at 13:53

## 2023-09-13 RX ADMIN — HEPARIN SODIUM 5000 UNITS: 5000 INJECTION INTRAVENOUS; SUBCUTANEOUS at 13:53

## 2023-09-13 RX ADMIN — METHOCARBAMOL 500 MG: 500 TABLET ORAL at 21:24

## 2023-09-13 RX ADMIN — ATORVASTATIN CALCIUM 40 MG: 40 TABLET, FILM COATED ORAL at 16:17

## 2023-09-13 RX ADMIN — HEPARIN SODIUM 5000 UNITS: 5000 INJECTION INTRAVENOUS; SUBCUTANEOUS at 21:24

## 2023-09-13 RX ADMIN — METOPROLOL SUCCINATE 100 MG: 100 TABLET, EXTENDED RELEASE ORAL at 08:26

## 2023-09-13 RX ADMIN — AMLODIPINE BESYLATE 10 MG: 10 TABLET ORAL at 08:26

## 2023-09-13 NOTE — PHYSICAL THERAPY NOTE
Physical Therapy Treatment    Patient's Name: Aminah Willard    Admitting Diagnosis  Hypokalemia [E87.6]  Gout [M10.9]  Gout flare [M10.9]  LEFTY (acute kidney injury) ([address]) [N17.9]    Problem List  Patient Active Problem List   Diagnosis    Primary hypertension    CKD (chronic kidney disease)    Hypokalemia    Gout flare    Hyperlipidemia    H/O: CVA (cerebrovascular accident)       Past Medical History  Past Medical History:   Diagnosis Date    High cholesterol     Hypertension     Renal disorder     Stroke [facility])        Past Surgical History  History reviewed. No pertinent surgical history. 09/13/23 1237   PT Last Visit   PT Visit Date 09/13/23   Note Type   Note Type Treatment   Pain Assessment   Pain Assessment Tool 0-10   Pain Score 2   Restrictions/Precautions   Weight Bearing Precautions Per Order No   Braces or Orthoses Other (Comment)  (L LE AFO; pt dons for ambulation)   Other Precautions Fall Risk;Pain;Telemetry;Multiple lines   General   Chart Reviewed Yes   Response to Previous Treatment Patient with no complaints from previous session. Family/Caregiver Present No   Cognition   Overall Cognitive Status WFL   Arousal/Participation Alert   Attention Within functional limits   Orientation Level Oriented X4   Following Commands Follows all commands and directions without difficulty   Subjective   Subjective It is feeling better today in my knee. About a 2/10   Bed Mobility   Supine to Sit 5  Supervision   Additional items Assist x 1;HOB elevated; Bedrails; Increased time required;Verbal cues   Transfers   Sit to Stand 4  Minimal assistance   Additional items Assist x 1; Increased time required;Verbal cues   Stand to Sit 4  Minimal assistance   Additional items Assist x 1; Increased time required;Verbal cues   Additional Comments RW utilized during transfers and ambulation. Pt. with ability to walk increased distances but does report his goal is to return to no AD and improve his walking.  He does have antalgic gait pattern with decreased SLS noted. Seated rest break. Ambulation/Elevation   Gait pattern Antalgic;Narrow CRESCENCIO; Decreased foot clearance; Forward Flexion;Decreased heel strike; Improper Weight shift;Decreased R stance; Excessively slow  (LAFO d/t foot drop)   Gait Assistance 4  Minimal assist   Additional items Assist x 1;Verbal cues; Tactile cues   Assistive Device Rolling walker   Distance 60', 60'   Ambulation/Elevation Additional Comments Improved WB   Balance   Static Sitting Fair +   Dynamic Sitting Fair   Static Standing Fair -   Dynamic Standing Poor +   Ambulatory Poor +   Endurance Deficit   Endurance Deficit Yes   Endurance Deficit Description decreased activity tolerance   Activity Tolerance   Activity Tolerance Patient limited by pain   Nurse Made Aware RN ok tos ee   Exercises   Hip Flexion Sitting;15 reps;Bilateral  (L left)   Knee AROM Long Arc Quad Sitting;15 reps  (10 L)   Ankle Pumps Sitting;15 reps  (R LE heel raise)   Assessment   Prognosis Good   Problem List Decreased strength;Decreased range of motion;Decreased endurance; Impaired balance;Decreased mobility;Pain   Assessment Pt seen for PT treatment session this date with interventions consisting of gait training w/ emphasis on improving pt's ability to ambulate level surfaces x 60', 60 with min A provided by therapist with RW, Therapeutic exercise consisting of: AROM 15 reps R LE, 10 reps LLE B LE in sitting position and therapeutic activity consisting of training: supine<>sit transfers and sit<>stand transfers. Pt agreeable to PT treatment session upon arrival, pt found supine in bed w/ HOB elevated, in no apparent distress. In comparison to previous session, pt with improvements in pain level, activity tolerance, functional mobility with decreased assist. Post session: pt returned BTB, all needs in reach and RN notified of session findings/recommendations.  Continue to recommend post acute rehabilitation services at time of d/c in order to maximize pt's functional independence and safety w/ mobility. Pt continues to be functioning below baseline level, and remains limited 2* factors listed above and including strength, balance, mobility deficits, gait deficits reduced activity tolerance. PT will continue to see pt during current hospitalization in order to address the deficits listed above and provide interventions consistent w/ POC in effort to achieve STGs. Barriers to Discharge Inaccessible home environment   Barriers to Discharge Comments 15 PATRIZIA. Pt reports he lives with his son; however, there are times when his son is not home   Goals   Patient Goals to have less pain   STG Expiration Date 09/19/23   Short Term Goal #1 1. increase strength 1/2 grade to improve overall functional mobility, 2. perform bed mobility mod (I) to decrease caregiver burden, 3. perform transfers mod (I) to safely perform ADLs, 4. ambulate 50' c RW c (S) to safely navigate home environment, 5. negotiate 15 steps c (S) to safely enter home   PT Treatment Day 2   Plan   Treatment/Interventions Functional transfer training;Elevations;LE strengthening/ROM; Therapeutic exercise; Endurance training;Bed mobility;Gait training;Equipment eval/education;Patient/family training;Spoke to nursing;OT   Progress Progressing toward goals   PT Frequency 3-5x/wk   Recommendation   PT Discharge Recommendation Post acute rehabilitation services   Equipment Recommended [facility] Recommended Wheeled walker   AM-PAC Basic Mobility Inpatient   Turning in Flat Bed Without Bedrails 4   Lying on Back to Sitting on Edge of Flat Bed Without Bedrails 4   Moving Bed to Chair 3   Standing Up From Chair Using Arms 3   Walk in Room 3   Climb 3-5 Stairs With Railing 2   Basic Mobility Inpatient Raw Score 19   Basic Mobility Standardized Score 42.48   Highest Level Of Mobility   JH-HLM Goal 6: Walk 10 steps or more   JH-HLM Achieved 7: Walk 25 feet or more   Education   Education Provided Mobility training;Assistive device   Patient Reinforcement needed   End of Consult   Patient Position at End of Consult All needs within reach         Zoila Man, PT

## 2023-09-13 NOTE — ASSESSMENT & PLAN NOTE
Lab Results   Component Value Date    EGFR 44 09/12/2023    EGFR 46 09/11/2023    EGFR 42 09/10/2023    CREATININE 1.65 (H) 09/12/2023    CREATININE 1.57 (H) 09/11/2023    CREATININE 1.71 (H) 09/10/2023     · Creatinine 1.8>1.7 this is likely his baseline   · Unclear renal function baseline.   His most recent outpatient creatinine was 1.8 last month  · Monitor I&O  · Monitor BMP

## 2023-09-13 NOTE — PROGRESS NOTES
[address]  Progress Note  Name: Gregory Maxwell  MRN: [id number]  Unit/Bed#: -01 I Date of Admission: 9/8/2023   Date of Service: 9/13/2023 I Hospital Day: 2    Assessment/Plan   * Gout flare  Assessment & Plan  · H/o of frequent gout flares  · Presenting with swollen right ankle. Reports having gout in this area in the past  · Will start on oral prednisone taper as prescribed at previous hospitalization  · Patient has a history of previous stroke with left-sided residual deficits. Now with right ankle and knee pain he is having issues ambulating  · PT evaluated and recommended subacute rehab  · Reported some R calf pain also -- negative doppler for DVT 9/11   · Still having trouble ambulating after his gout flare-planning for SNF underway, CM on board    H/O: CVA (cerebrovascular accident)  Assessment & Plan  · Continue asa and statin  · Residual left-sided deficits    CKD (chronic kidney disease)  Assessment & Plan  Lab Results   Component Value Date    EGFR 44 09/12/2023    EGFR 46 09/11/2023    EGFR 42 09/10/2023    CREATININE 1.65 (H) 09/12/2023    CREATININE 1.57 (H) 09/11/2023    CREATININE 1.71 (H) 09/10/2023     · Creatinine 1.8>1.7 this is likely his baseline   · Unclear renal function baseline. His most recent outpatient creatinine was 1.8 last month  · Monitor I&O  · Monitor BMP    Primary hypertension  Assessment & Plan  · BP stable on admission  · Continue home amlodipine and metoprolol, holding HCTZ  · Monitor vitals per routine  · Blood pressures are well controlled            VTE Pharmacologic Prophylaxis: VTE Score: 3 Moderate Risk (Score 3-4) - Pharmacological DVT Prophylaxis Ordered: heparin. Patient Centered Rounds: I performed bedside rounds with nursing staff today. Discussions with Specialists or Other Care Team Provider: case management    Education and Discussions with Family / Patient: discussed with the patient.      Total Time Spent on Date of Encounter in care of patient: 45 mins. This time was spent on one or more of the following: performing physical exam; counseling and coordination of care; obtaining or reviewing history; documenting in the medical record; reviewing/ordering tests, medications or procedures; communicating with other healthcare professionals and discussing with patient's family/caregivers. Current Length of Stay: 2 day(s)  Current Patient Status: Inpatient   Certification Statement: The patient will continue to require additional inpatient hospital stay due to pending placement  Discharge Plan: Anticipate discharge in 24-48 hrs to rehab facility. Code Status: Level 1 - Full Code    Subjective:   Patient seen and examined today. No significant events reported overnight. No complaints of significant pain from staff last night. Objective:     Vitals:   Temp (24hrs), Av.8 °F (36.6 °C), Min:97.5 °F (36.4 °C), Max:98.1 °F (36.7 °C)    Temp:  [97.5 °F (36.4 °C)-98.1 °F (36.7 °C)] 97.8 °F (36.6 °C)  HR:  [49-63] 59  Resp:  [16] 16  BP: (109-138)/(70-91) 134/90  SpO2:  [95 %-96 %] 95 %  Body mass index is 28.51 kg/m². Input and Output Summary (last 24 hours): Intake/Output Summary (Last 24 hours) at 2023 0931  Last data filed at 2023 5155  Gross per 24 hour   Intake 680 ml   Output 600 ml   Net 80 ml       Physical Exam:   Physical Exam  Vitals and nursing note reviewed. Constitutional:       General: He is not in acute distress. Appearance: He is well-developed. He is not toxic-appearing. HENT:      Head: Normocephalic and atraumatic. Nose: Nose normal.      Mouth/Throat:      Mouth: Mucous membranes are moist.      Pharynx: Oropharynx is clear. Eyes:      Pupils: Pupils are equal, round, and reactive to light. Cardiovascular:      Rate and Rhythm: Normal rate. Pulses: Normal pulses. Pulmonary:      Effort: Pulmonary effort is normal. No respiratory distress.       Breath sounds: Normal breath sounds. No wheezing. Abdominal:      Palpations: Abdomen is soft. Tenderness: There is no guarding. Musculoskeletal:         General: No swelling or tenderness. Cervical back: Normal range of motion. Skin:     General: Skin is warm and dry. Capillary Refill: Capillary refill takes less than 2 seconds. Neurological:      Mental Status: He is alert and oriented to person, place, and time. Mental status is at baseline. Psychiatric:         Mood and Affect: Mood normal.         Thought Content: Thought content normal.         Additional Data:     Labs:  Results from last 7 days   Lab Units 09/12/23  0444   WBC Thousand/uL 8.82   HEMOGLOBIN g/dL 15.0   HEMATOCRIT % 43.9   PLATELETS Thousands/uL 280   NEUTROS PCT % 73   LYMPHS PCT % 16   MONOS PCT % 10   EOS PCT % 0     Results from last 7 days   Lab Units 09/12/23  0444 09/09/23  0512 09/08/23  2233   SODIUM mmol/L 139   < > 135   POTASSIUM mmol/L 3.6   < > 2.8*   CHLORIDE mmol/L 104   < > 97   CO2 mmol/L 30   < > 27   BUN mg/dL 26*   < > 23   CREATININE mg/dL 1.65*   < > 1.89*   ANION GAP mmol/L 5   < > 11   CALCIUM mg/dL 9.3   < > 9.7   ALBUMIN g/dL  --   --  4.5   TOTAL BILIRUBIN mg/dL  --   --  0.76   ALK PHOS U/L  --   --  61   ALT U/L  --   --  9   AST U/L  --   --  12*   GLUCOSE RANDOM mg/dL 95   < > 112    < > = values in this interval not displayed. Lines/Drains:  Invasive Devices     Peripheral Intravenous Line  Duration           Peripheral IV 09/13/23 Dorsal (posterior); Right Hand <1 day                      Imaging: No pertinent imaging reviewed.     Recent Cultures (last 7 days):         Last 24 Hours Medication List:   Current Facility-Administered Medications   Medication Dose Route Frequency Provider Last Rate   • acetaminophen  975 mg Oral Q8H PRN Maikel Herron PA-C     • aluminum-magnesium hydroxide-simethicone  30 mL Oral Q4H PRN Trey Whittington MD     • amLODIPine  10 mg Oral Daily Kinjal Ansari PA-C • aspirin  81 mg Oral Daily Lakeisha Wick PA-C     • atorvastatin  40 mg Oral Daily With [employer] MARTINE Campbell     • Diclofenac Sodium  2 g Topical 4x Daily PRN Estefany Lux PA-C     • heparin (porcine)  5,000 Units Subcutaneous Q8H [address] Lakeisha Wick PA-C     • lidocaine  1 patch Topical Daily Danni Rodriguez MD     • methocarbamol  500 mg Oral [facility] Bryce Morales MD     • metoprolol succinate  100 mg Oral Daily Lakeisha Wick PA-C     • pantoprazole  40 mg Oral Early Morning Lakeisha Wick PA-C     • predniSONE  10 mg Oral Daily Nanci Pichardo PA-C          Today, Patient Was Seen By: Bryce Morales MD    **Please Note: This note may have been constructed using a voice recognition system. **

## 2023-09-13 NOTE — CASE MANAGEMENT
[facility] received request for authorization from Care Manager.   Authorization request for: SNF  Facility Name: [address] [place] [address] [facility]  NPI: [id number]  Facility MD:  Dr. Dutton Dotter: [id number]  Authorization initiated by contacting insurance: Olga Wilson Via: Kaila Ortiz  Pending Reference #: [id number]   Clinicals submitted via: Kaila Ortiz

## 2023-09-13 NOTE — PLAN OF CARE
Problem: PHYSICAL THERAPY ADULT  Goal: Performs mobility at highest level of function for planned discharge setting. See evaluation for individualized goals. Description: Treatment/Interventions: Functional transfer training, Elevations, LE strengthening/ROM, Therapeutic exercise, Endurance training, Bed mobility, Gait training, Equipment eval/education, Patient/family training, Spoke to nursing, OT  Equipment Recommended: Avelina Ortiz       See flowsheet documentation for full assessment, interventions and recommendations. Note: Prognosis: Good  Problem List: Decreased strength, Decreased range of motion, Decreased endurance, Impaired balance, Decreased mobility, Pain  Assessment: Pt seen for PT treatment session this date with interventions consisting of gait training w/ emphasis on improving pt's ability to ambulate level surfaces x 60', 60 with min A provided by therapist with RW, Therapeutic exercise consisting of: AROM 15 reps R LE, 10 reps LLE B LE in sitting position and therapeutic activity consisting of training: supine<>sit transfers and sit<>stand transfers. Pt agreeable to PT treatment session upon arrival, pt found supine in bed w/ HOB elevated, in no apparent distress. In comparison to previous session, pt with improvements in pain level, activity tolerance, functional mobility with decreased assist. Post session: pt returned BTB, all needs in reach and RN notified of session findings/recommendations. Continue to recommend post acute rehabilitation services at time of d/c in order to maximize pt's functional independence and safety w/ mobility. Pt continues to be functioning below baseline level, and remains limited 2* factors listed above and including strength, balance, mobility deficits, gait deficits reduced activity tolerance.  PT will continue to see pt during current hospitalization in order to address the deficits listed above and provide interventions consistent w/ POC in effort to achieve STGs.  Barriers to Discharge: Inaccessible home environment  Barriers to Discharge Comments: 15 PATRIZIA. Pt reports he lives with his son; however, there are times when his son is not home  PT Discharge Recommendation: Post acute rehabilitation services    See flowsheet documentation for full assessment.

## 2023-09-13 NOTE — ASSESSMENT & PLAN NOTE
· BP stable on admission  · Continue home amlodipine and metoprolol, holding HCTZ  · Monitor vitals per routine  · Blood pressures are well controlled

## 2023-09-13 NOTE — PLAN OF CARE
Problem: MOBILITY - ADULT  Goal: Maintain or return to baseline ADL function  Description: INTERVENTIONS:  -  Assess patient's ability to carry out ADLs; assess patient's baseline for ADL function and identify physical deficits which impact ability to perform ADLs (bathing, care of mouth/teeth, toileting, grooming, dressing, etc.)  - Assess/evaluate cause of self-care deficits   - Assess range of motion  - Assess patient's mobility; develop plan if impaired  - Assess patient's need for assistive devices and provide as appropriate  - Encourage maximum independence but intervene and supervise when necessary  - Involve family in performance of ADLs  - Assess for home care needs following discharge   - Consider OT consult to assist with ADL evaluation and planning for discharge  - Provide patient education as appropriate  Outcome: Progressing  Goal: Maintains/Returns to pre admission functional level  Description: INTERVENTIONS:  - Perform BMAT or MOVE assessment daily.   - Set and communicate daily mobility goal to care team and patient/family/caregiver. - Collaborate with rehabilitation services on mobility goals if consulted  - Perform Range of Motion 3 times a day. - Reposition patient every 2 hours.   - Dangle patient 3 times a day  - Stand patient 3 times a day  - Ambulate patient 3 times a day  - Out of bed to chair 3 times a day   - Out of bed for meals 3 times a day  - Out of bed for toileting  - Record patient progress and toleration of activity level   Outcome: Progressing     Problem: PAIN - ADULT  Goal: Verbalizes/displays adequate comfort level or baseline comfort level  Description: Interventions:  - Encourage patient to monitor pain and request assistance  - Assess pain using appropriate pain scale  - Administer analgesics based on type and severity of pain and evaluate response  - Implement non-pharmacological measures as appropriate and evaluate response  - Consider cultural and social influences on pain and pain management  - Notify physician/advanced practitioner if interventions unsuccessful or patient reports new pain  Outcome: Progressing     Problem: INFECTION - ADULT  Goal: Absence or prevention of progression during hospitalization  Description: INTERVENTIONS:  - Assess and monitor for signs and symptoms of infection  - Monitor lab/diagnostic results  - Monitor all insertion sites, i.e. indwelling lines, tubes, and drains  - Monitor endotracheal if appropriate and nasal secretions for changes in amount and color  - [place] appropriate cooling/warming therapies per order  - Administer medications as ordered  - Instruct and encourage patient and family to use good hand hygiene technique  - Identify and instruct in appropriate isolation precautions for identified infection/condition  Outcome: Progressing  Goal: Absence of fever/infection during neutropenic period  Description: INTERVENTIONS:  - Monitor WBC    Outcome: Progressing     Problem: SAFETY ADULT  Goal: Maintain or return to baseline ADL function  Description: INTERVENTIONS:  -  Assess patient's ability to carry out ADLs; assess patient's baseline for ADL function and identify physical deficits which impact ability to perform ADLs (bathing, care of mouth/teeth, toileting, grooming, dressing, etc.)  - Assess/evaluate cause of self-care deficits   - Assess range of motion  - Assess patient's mobility; develop plan if impaired  - Assess patient's need for assistive devices and provide as appropriate  - Encourage maximum independence but intervene and supervise when necessary  - Involve family in performance of ADLs  - Assess for home care needs following discharge   - Consider OT consult to assist with ADL evaluation and planning for discharge  - Provide patient education as appropriate  Outcome: Progressing  Goal: Maintains/Returns to pre admission functional level  Description: INTERVENTIONS:  - Perform BMAT or MOVE assessment daily.   - Set and communicate daily mobility goal to care team and patient/family/caregiver. - Collaborate with rehabilitation services on mobility goals if consulted  - Perform Range of Motion 3 times a day. - Reposition patient every 2 hours.   - Dangle patient 3 times a day  - Stand patient 3 times a day  - Ambulate patient 3 times a day  - Out of bed to chair 3 times a day   - Out of bed for meals 3 times a day  - Out of bed for toileting  - Record patient progress and toleration of activity level   Outcome: Progressing  Goal: Patient will remain free of falls  Description: INTERVENTIONS:  - Educate patient/family on patient safety including physical limitations  - Instruct patient to call for assistance with activity   - Consult OT/PT to assist with strengthening/mobility   - Keep Call bell within reach  - Keep bed low and locked with side rails adjusted as appropriate  - Keep care items and personal belongings within reach  - Initiate and maintain comfort rounds  - Make Fall Risk Sign visible to staff  - Offer Toileting every 2 Hours, in advance of need  - Initiate/Maintain bed alarm  - Obtain necessary fall risk management equipment  - Apply yellow socks and bracelet for high fall risk patients  - Consider moving patient to room near nurses station  Outcome: Progressing     Problem: DISCHARGE PLANNING  Goal: Discharge to home or other facility with appropriate resources  Description: INTERVENTIONS:  - Identify barriers to discharge w/patient and caregiver  - Arrange for needed discharge resources and transportation as appropriate  - Identify discharge learning needs (meds, wound care, etc.)  - Arrange for interpretive services to assist at discharge as needed  - Refer to Case Management Department for coordinating discharge planning if the patient needs post-hospital services based on physician/advanced practitioner order or complex needs related to functional status, cognitive ability, or social support system  Outcome: Progressing     Problem: Knowledge Deficit  Goal: Patient/family/caregiver demonstrates understanding of disease process, treatment plan, medications, and discharge instructions  Description: Complete learning assessment and assess knowledge base.   Interventions:  - Provide teaching at level of understanding  - Provide teaching via preferred learning methods  Outcome: Progressing

## 2023-09-14 VITALS
SYSTOLIC BLOOD PRESSURE: 145 MMHG | OXYGEN SATURATION: 92 % | RESPIRATION RATE: 16 BRPM | WEIGHT: 182 LBS | HEIGHT: 67 IN | DIASTOLIC BLOOD PRESSURE: 94 MMHG | HEART RATE: 61 BPM | BODY MASS INDEX: 28.56 KG/M2 | TEMPERATURE: 97.5 F

## 2023-09-14 PROCEDURE — 99239 HOSP IP/OBS DSCHRG MGMT >30: CPT | Performed by: INTERNAL MEDICINE

## 2023-09-14 RX ORDER — PREDNISONE 10 MG/1
10 TABLET ORAL DAILY
Qty: 2 TABLET | Refills: 0
Start: 2023-09-15 | End: 2023-09-17

## 2023-09-14 RX ORDER — METHOCARBAMOL 500 MG/1
500 TABLET, FILM COATED ORAL EVERY 8 HOURS SCHEDULED
Status: DISCONTINUED | OUTPATIENT
Start: 2023-09-14 | End: 2023-09-14 | Stop reason: HOSPADM

## 2023-09-14 RX ORDER — LIDOCAINE 50 MG/G
1 PATCH TOPICAL DAILY
Refills: 0
Start: 2023-09-15

## 2023-09-14 RX ORDER — ACETAMINOPHEN 325 MG/1
650 TABLET ORAL EVERY 6 HOURS PRN
Refills: 0
Start: 2023-09-14

## 2023-09-14 RX ORDER — OXYCODONE HYDROCHLORIDE 5 MG/1
5 TABLET ORAL EVERY 6 HOURS PRN
Qty: 10 TABLET | Refills: 0 | Status: SHIPPED | OUTPATIENT
Start: 2023-09-14

## 2023-09-14 RX ORDER — METHOCARBAMOL 500 MG/1
500 TABLET, FILM COATED ORAL EVERY 6 HOURS PRN
Refills: 0
Start: 2023-09-14

## 2023-09-14 RX ORDER — METOPROLOL SUCCINATE 100 MG/1
50 TABLET, EXTENDED RELEASE ORAL DAILY
Qty: 30 TABLET | Refills: 0
Start: 2023-09-14 | End: 2024-03-23

## 2023-09-14 RX ADMIN — PANTOPRAZOLE SODIUM 40 MG: 40 TABLET, DELAYED RELEASE ORAL at 05:46

## 2023-09-14 RX ADMIN — PREDNISONE 10 MG: 10 TABLET ORAL at 08:31

## 2023-09-14 RX ADMIN — ASPIRIN 81 MG: 81 TABLET, COATED ORAL at 08:31

## 2023-09-14 RX ADMIN — METOPROLOL SUCCINATE 50 MG: 50 TABLET, EXTENDED RELEASE ORAL at 08:31

## 2023-09-14 RX ADMIN — AMLODIPINE BESYLATE 10 MG: 10 TABLET ORAL at 08:30

## 2023-09-14 RX ADMIN — ACETAMINOPHEN 975 MG: 325 TABLET, FILM COATED ORAL at 08:35

## 2023-09-14 RX ADMIN — METHOCARBAMOL 500 MG: 500 TABLET ORAL at 13:59

## 2023-09-14 RX ADMIN — METHOCARBAMOL 500 MG: 500 TABLET ORAL at 05:46

## 2023-09-14 RX ADMIN — HEPARIN SODIUM 5000 UNITS: 5000 INJECTION INTRAVENOUS; SUBCUTANEOUS at 05:46

## 2023-09-14 NOTE — NURSING NOTE
This RN attempted to call for report to rehab facility twice, once being told to call again. AVS and prescriptions faxed to facility. IV taken out and hospital equipment removed.

## 2023-09-14 NOTE — PLAN OF CARE
Problem: MOBILITY - ADULT  Goal: Maintain or return to baseline ADL function  Description: INTERVENTIONS:  -  Assess patient's ability to carry out ADLs; assess patient's baseline for ADL function and identify physical deficits which impact ability to perform ADLs (bathing, care of mouth/teeth, toileting, grooming, dressing, etc.)  - Assess/evaluate cause of self-care deficits   - Assess range of motion  - Assess patient's mobility; develop plan if impaired  - Assess patient's need for assistive devices and provide as appropriate  - Encourage maximum independence but intervene and supervise when necessary  - Involve family in performance of ADLs  - Assess for home care needs following discharge   - Consider OT consult to assist with ADL evaluation and planning for discharge  - Provide patient education as appropriate  Outcome: Progressing  Goal: Maintains/Returns to pre admission functional level  Description: INTERVENTIONS:  - Perform BMAT or MOVE assessment daily.   - Set and communicate daily mobility goal to care team and patient/family/caregiver. - Collaborate with rehabilitation services on mobility goals if consulted  - Perform Range of Motion 3 times a day. - Reposition patient every 2 hours.   - Dangle patient 3 times a day  - Stand patient 3 times a day  - Ambulate patient 3 times a day  - Out of bed to chair 3 times a day   - Out of bed for meals 3 times a day  - Out of bed for toileting  - Record patient progress and toleration of activity level   Outcome: Progressing     Problem: PAIN - ADULT  Goal: Verbalizes/displays adequate comfort level or baseline comfort level  Description: Interventions:  - Encourage patient to monitor pain and request assistance  - Assess pain using appropriate pain scale  - Administer analgesics based on type and severity of pain and evaluate response  - Implement non-pharmacological measures as appropriate and evaluate response  - Consider cultural and social influences on pain and pain management  - Notify physician/advanced practitioner if interventions unsuccessful or patient reports new pain  Outcome: Progressing     Problem: INFECTION - ADULT  Goal: Absence or prevention of progression during hospitalization  Description: INTERVENTIONS:  - Assess and monitor for signs and symptoms of infection  - Monitor lab/diagnostic results  - Monitor all insertion sites, i.e. indwelling lines, tubes, and drains  - Monitor endotracheal if appropriate and nasal secretions for changes in amount and color  - [place] appropriate cooling/warming therapies per order  - Administer medications as ordered  - Instruct and encourage patient and family to use good hand hygiene technique  - Identify and instruct in appropriate isolation precautions for identified infection/condition  Outcome: Progressing  Goal: Absence of fever/infection during neutropenic period  Description: INTERVENTIONS:  - Monitor WBC    Outcome: Progressing     Problem: SAFETY ADULT  Goal: Maintain or return to baseline ADL function  Description: INTERVENTIONS:  -  Assess patient's ability to carry out ADLs; assess patient's baseline for ADL function and identify physical deficits which impact ability to perform ADLs (bathing, care of mouth/teeth, toileting, grooming, dressing, etc.)  - Assess/evaluate cause of self-care deficits   - Assess range of motion  - Assess patient's mobility; develop plan if impaired  - Assess patient's need for assistive devices and provide as appropriate  - Encourage maximum independence but intervene and supervise when necessary  - Involve family in performance of ADLs  - Assess for home care needs following discharge   - Consider OT consult to assist with ADL evaluation and planning for discharge  - Provide patient education as appropriate  Outcome: Progressing  Goal: Maintains/Returns to pre admission functional level  Description: INTERVENTIONS:  - Perform BMAT or MOVE assessment daily.   - Set and communicate daily mobility goal to care team and patient/family/caregiver. - Collaborate with rehabilitation services on mobility goals if consulted  - Perform Range of Motion 3 times a day. - Reposition patient every 2 hours.   - Dangle patient 3 times a day  - Stand patient 3 times a day  - Ambulate patient 3 times a day  - Out of bed to chair 3 times a day   - Out of bed for meals 3 times a day  - Out of bed for toileting  - Record patient progress and toleration of activity level   Outcome: Progressing  Goal: Patient will remain free of falls  Description: INTERVENTIONS:  - Educate patient/family on patient safety including physical limitations  - Instruct patient to call for assistance with activity   - Consult OT/PT to assist with strengthening/mobility   - Keep Call bell within reach  - Keep bed low and locked with side rails adjusted as appropriate  - Keep care items and personal belongings within reach  - Initiate and maintain comfort rounds  - Make Fall Risk Sign visible to staff  - Offer Toileting every 2 Hours, in advance of need  - Initiate/Maintain bed alarm  - Obtain necessary fall risk management equipment  - Apply yellow socks and bracelet for high fall risk patients  - Consider moving patient to room near nurses station  Outcome: Progressing     Problem: DISCHARGE PLANNING  Goal: Discharge to home or other facility with appropriate resources  Description: INTERVENTIONS:  - Identify barriers to discharge w/patient and caregiver  - Arrange for needed discharge resources and transportation as appropriate  - Identify discharge learning needs (meds, wound care, etc.)  - Arrange for interpretive services to assist at discharge as needed  - Refer to Case Management Department for coordinating discharge planning if the patient needs post-hospital services based on physician/advanced practitioner order or complex needs related to functional status, cognitive ability, or social support system  Outcome: Progressing     Problem: Knowledge Deficit  Goal: Patient/family/caregiver demonstrates understanding of disease process, treatment plan, medications, and discharge instructions  Description: Complete learning assessment and assess knowledge base.   Interventions:  - Provide teaching at level of understanding  - Provide teaching via preferred learning methods  Outcome: Progressing

## 2023-09-14 NOTE — PLAN OF CARE
Problem: MOBILITY - ADULT  Goal: Maintain or return to baseline ADL function  Description: INTERVENTIONS:  -  Assess patient's ability to carry out ADLs; assess patient's baseline for ADL function and identify physical deficits which impact ability to perform ADLs (bathing, care of mouth/teeth, toileting, grooming, dressing, etc.)  - Assess/evaluate cause of self-care deficits   - Assess range of motion  - Assess patient's mobility; develop plan if impaired  - Assess patient's need for assistive devices and provide as appropriate  - Encourage maximum independence but intervene and supervise when necessary  - Involve family in performance of ADLs  - Assess for home care needs following discharge   - Consider OT consult to assist with ADL evaluation and planning for discharge  - Provide patient education as appropriate  Outcome: Progressing  Goal: Maintains/Returns to pre admission functional level  Description: INTERVENTIONS:  - Perform BMAT or MOVE assessment daily.   - Set and communicate daily mobility goal to care team and patient/family/caregiver. - Collaborate with rehabilitation services on mobility goals if consulted  - Perform Range of Motion 2 times a day. - Reposition patient every 2 hours.   - Dangle patient 2 times a day  - Stand patient 2 times a day  - Ambulate patient 2 times a day  - Out of bed to chair 2 times a day   - Out of bed for meals 2 times a day  - Out of bed for toileting  - Record patient progress and toleration of activity level   Outcome: Progressing     Problem: PAIN - ADULT  Goal: Verbalizes/displays adequate comfort level or baseline comfort level  Description: Interventions:  - Encourage patient to monitor pain and request assistance  - Assess pain using appropriate pain scale  - Administer analgesics based on type and severity of pain and evaluate response  - Implement non-pharmacological measures as appropriate and evaluate response  - Consider cultural and social influences on pain and pain management  - Notify physician/advanced practitioner if interventions unsuccessful or patient reports new pain  Outcome: Progressing     Problem: INFECTION - ADULT  Goal: Absence or prevention of progression during hospitalization  Description: INTERVENTIONS:  - Assess and monitor for signs and symptoms of infection  - Monitor lab/diagnostic results  - Monitor all insertion sites, i.e. indwelling lines, tubes, and drains  - Monitor endotracheal if appropriate and nasal secretions for changes in amount and color  - [place] appropriate cooling/warming therapies per order  - Administer medications as ordered  - Instruct and encourage patient and family to use good hand hygiene technique  - Identify and instruct in appropriate isolation precautions for identified infection/condition  Outcome: Progressing  Goal: Absence of fever/infection during neutropenic period  Description: INTERVENTIONS:  - Monitor WBC    Outcome: Progressing     Problem: SAFETY ADULT  Goal: Maintain or return to baseline ADL function  Description: INTERVENTIONS:  -  Assess patient's ability to carry out ADLs; assess patient's baseline for ADL function and identify physical deficits which impact ability to perform ADLs (bathing, care of mouth/teeth, toileting, grooming, dressing, etc.)  - Assess/evaluate cause of self-care deficits   - Assess range of motion  - Assess patient's mobility; develop plan if impaired  - Assess patient's need for assistive devices and provide as appropriate  - Encourage maximum independence but intervene and supervise when necessary  - Involve family in performance of ADLs  - Assess for home care needs following discharge   - Consider OT consult to assist with ADL evaluation and planning for discharge  - Provide patient education as appropriate  Outcome: Progressing  Goal: Maintains/Returns to pre admission functional level  Description: INTERVENTIONS:  - Perform BMAT or MOVE assessment daily.   - Set and communicate daily mobility goal to care team and patient/family/caregiver. - Collaborate with rehabilitation services on mobility goals if consulted  - Perform Range of Motion 2 times a day. - Reposition patient every 2 hours.   - Dangle patient 2 times a day  - Stand patient 2 times a day  - Ambulate patient 2 times a day  - Out of bed to chair 2 times a day   - Out of bed for meals 2 times a day  - Out of bed for toileting  - Record patient progress and toleration of activity level   Outcome: Progressing  Goal: Patient will remain free of falls  Description: INTERVENTIONS:  - Educate patient/family on patient safety including physical limitations  - Instruct patient to call for assistance with activity   - Consult OT/PT to assist with strengthening/mobility   - Keep Call bell within reach  - Keep bed low and locked with side rails adjusted as appropriate  - Keep care items and personal belongings within reach  - Initiate and maintain comfort rounds  - Make Fall Risk Sign visible to staff  - Offer Toileting every 2 Hours, in advance of need  - Initiate/Maintain bed alarm  - Obtain necessary fall risk management equipment  - Apply yellow socks and bracelet for high fall risk patients  - Consider moving patient to room near nurses station  Outcome: Progressing     Problem: DISCHARGE PLANNING  Goal: Discharge to home or other facility with appropriate resources  Description: INTERVENTIONS:  - Identify barriers to discharge w/patient and caregiver  - Arrange for needed discharge resources and transportation as appropriate  - Identify discharge learning needs (meds, wound care, etc.)  - Arrange for interpretive services to assist at discharge as needed  - Refer to Case Management Department for coordinating discharge planning if the patient needs post-hospital services based on physician/advanced practitioner order or complex needs related to functional status, cognitive ability, or social support system  Outcome: Progressing     Problem: Knowledge Deficit  Goal: Patient/family/caregiver demonstrates understanding of disease process, treatment plan, medications, and discharge instructions  Description: Complete learning assessment and assess knowledge base.   Interventions:  - Provide teaching at level of understanding  - Provide teaching via preferred learning methods  Outcome: Progressing

## 2023-09-14 NOTE — CASE MANAGEMENT
Case Management Discharge Planning Note    Patient name Sonia Carreon  Location /-18 MRN [id number]  : 1960 Date 2023       Current Admission Date: 2023  Current Admission Diagnosis:Gout flare   Patient Active Problem List    Diagnosis Date Noted   • Primary hypertension 2023   • CKD (chronic kidney disease) 2023   • Hypokalemia 2023   • Gout flare 2023   • Hyperlipidemia 2023   • H/O: CVA (cerebrovascular accident) 2023      LOS (days): 3  Geometric Mean LOS (GMLOS) (days):   Days to GMLOS:     OBJECTIVE:  Risk of Unplanned Readmission Score: 10.6         Current admission status: Inpatient   Preferred Pharmacy:   [address] [place] [address] Avenue [facility]CR[facility]  No address on file      Primary Care Provider: No primary care provider on file. Primary Insurance: Alonso EDEN  Secondary Insurance:     DISCHARGE DETAILS:  Pt is being discharge today [facility]. Auth was obtained and provided to facility. [facility] transportation arranged for a 3pm. SLIM, bedside RN made aware.

## 2023-09-14 NOTE — CASE MANAGEMENT
[facility] has received approved authorization from insurance: [employer] in by Kierra Reilly P#  [phone]  Authorization received for: [facility]  Facility:  @ [address] [place] [address] Drive #: [id number]  Start of Care: 9/14  Next Review Date: 9/20  Continued Stay Care Coordinator:  Mariella Reilly P#: [phone]  Submit next review to: [phone]   Care Manager notified: Darin Olivera

## 2023-09-14 NOTE — CASE MANAGEMENT
Case Management Discharge Planning Note    Patient name Lydia Lester  Location /-39 MRN [id number]  : 1960 Date 2023       Current Admission Date: 2023  Current Admission Diagnosis:Gout flare   Patient Active Problem List    Diagnosis Date Noted   • Primary hypertension 2023   • CKD (chronic kidney disease) 2023   • Hypokalemia 2023   • Gout flare 2023   • Hyperlipidemia 2023   • H/O: CVA (cerebrovascular accident) 2023      LOS (days): 3  Geometric Mean LOS (GMLOS) (days):   Days to GMLOS:     OBJECTIVE:  Risk of Unplanned Readmission Score: 10.46         Current admission status: Inpatient   Preferred Pharmacy:   [address] [place] [address] TO E-PRESCRIBE  No address on file      Primary Care Provider: No primary care provider on file.     Primary Insurance: Alonso EDEN  Secondary Insurance:     DISCHARGE DETAILS:                                                                                                               Facility Insurance Auth Number: [id number]

## 2023-09-14 NOTE — PROGRESS NOTES
Pt reporting pain and swelling in right knee. This RN notifies slight swelling comparing to left knee. SLIM made aware.

## 2023-09-15 NOTE — DISCHARGE SUMMARY
[address]  Discharge- Del Carolina 1960, 66 y.o. male MRN: [id number]  Unit/Bed#: -01 Encounter: [id number]  Primary Care Provider: No primary care provider on file. Date and time admitted to hospital: 9/8/2023 10:07 PM    * Gout flare  Assessment & Plan  · H/o of frequent gout flares  · Presenting with swollen right ankle. Reports having gout in this area in the past  · Will start on oral prednisone taper as prescribed at previous hospitalization  · Patient has a history of previous stroke with left-sided residual deficits. Now with right ankle and knee pain he is having issues ambulating  · PT evaluated and recommended subacute rehab  · Reported some R calf pain also -- negative doppler for DVT 9/11   · Stable for discharge to rehab    H/O: CVA (cerebrovascular accident)  Assessment & Plan  · Continue asa and statin  · Residual left-sided deficits    CKD (chronic kidney disease)  Assessment & Plan  Lab Results   Component Value Date    EGFR 44 09/12/2023    EGFR 46 09/11/2023    EGFR 42 09/10/2023    CREATININE 1.65 (H) 09/12/2023    CREATININE 1.57 (H) 09/11/2023    CREATININE 1.71 (H) 09/10/2023     · Creatinine 1.8>1.7 this is likely his baseline   · Unclear renal function baseline. His most recent outpatient creatinine was 1.8 last month  · Monitor I&O  · Monitor BMP    Primary hypertension  Assessment & Plan  · BP stable on admission  · Continue home amlodipine and metoprolol, holding HCTZ  · Monitor vitals per routine  · Blood pressures are well controlled      Medical Problems     Resolved Problems  Date Reviewed: 9/15/2023   None       Discharging Physician / Practitioner: Fatimah Tolentino MD  PCP: No primary care provider on file.   Admission Date:   Admission Orders (From admission, onward)     Ordered        09/11/23 1600  Inpatient Admission  Once            09/09/23 0054  Place in Observation  Once                      Discharge Date: 09/14/23    Consultations During Hospital Stay:  · NA    Procedures Performed:   · NA      Incidental Findings:   · NA       Test Results Pending at Discharge (will require follow up):   · NA     Outpatient Tests Requested:  · Follow up with PCP for further outpatient testing, recommend CBC and BMP    Complications:  NA    Reason for Admission: gout flare    Hospital Course: Domitila Penaloza is a 66 y.o. male patient who originally presented to the hospital on 9/8/2023 due to gout flare. He was started on steroid course. Given pain of RLE, dopplers were also obtained which was negative for DVT. Patient was also managed with multi modal pain regimen. Patient was seen and evaluated by PT/OT with recommendation of acute rehab. Course was also significant for episodes of bradycardia, for which patient's metoprolol was adjusted. BP remained stable. Stable for discharge to rehab today. Please see above list of diagnoses and related plan for additional information. Condition at Discharge: stable    Discharge Day Visit / Exam:   Subjective:  Still with intermittent right knee pain. No other complaints endorsed at time of evaluation. Vitals: Blood Pressure: 145/94 (09/14/23 1632)  Pulse: 61 (09/14/23 1632)  Temperature: 97.5 °F (36.4 °C) (09/14/23 1632)  Temp Source: Temporal (09/08/23 2204)  Respirations: 16 (09/14/23 1632)  Height: 5' 7" (170.2 cm) (09/09/23 0129)  Weight - Scale: 82.6 kg (182 lb) (09/09/23 0129)  SpO2: 92 % (09/14/23 1632)  Exam:   Physical Exam  Vitals and nursing note reviewed. Constitutional:       General: He is not in acute distress. Appearance: He is well-developed. He is not toxic-appearing. HENT:      Head: Normocephalic and atraumatic. Nose: Nose normal.      Mouth/Throat:      Mouth: Mucous membranes are moist.      Pharynx: Oropharynx is clear. Eyes:      Pupils: Pupils are equal, round, and reactive to light. Cardiovascular:      Rate and Rhythm: Normal rate. Pulses: Normal pulses. Pulmonary:      Effort: Pulmonary effort is normal. No respiratory distress. Breath sounds: Normal breath sounds. No wheezing. Abdominal:      Palpations: Abdomen is soft. Tenderness: There is no abdominal tenderness. There is no guarding. Musculoskeletal:         General: No swelling or tenderness. Cervical back: Normal range of motion. Skin:     General: Skin is warm and dry. Capillary Refill: Capillary refill takes less than 2 seconds. Neurological:      Mental Status: He is alert and oriented to person, place, and time. Mental status is at baseline. Psychiatric:         Mood and Affect: Mood normal.         Thought Content: Thought content normal.         Discussion with Family: discussed with the patient. Discharge instructions/Information to patient and family:   See after visit summary for information provided to patient and family. Provisions for Follow-Up Care:  See after visit summary for information related to follow-up care and any pertinent home health orders. Disposition:   Acute Rehab at [facility] Readmission: NA     Discharge Statement:  I spent 40 minutes discharging the patient. This time was spent on the day of discharge. I had direct contact with the patient on the day of discharge. Greater than 50% of the total time was spent examining patient, answering all patient questions, arranging and discussing plan of care with patient as well as directly providing post-discharge instructions. Additional time then spent on discharge activities. Discharge Medications:  See after visit summary for reconciled discharge medications provided to patient and/or family.       **Please Note: This note may have been constructed using a voice recognition system**

## 2023-09-15 NOTE — ASSESSMENT & PLAN NOTE
· H/o of frequent gout flares  · Presenting with swollen right ankle. Reports having gout in this area in the past  · Will start on oral prednisone taper as prescribed at previous hospitalization  · Patient has a history of previous stroke with left-sided residual deficits.   Now with right ankle and knee pain he is having issues ambulating  · PT evaluated and recommended subacute rehab  · Reported some R calf pain also -- negative doppler for DVT 9/11   · Stable for discharge to rehab

## 2023-09-19 NOTE — UTILIZATION REVIEW
NOTIFICATION OF ADMISSION DISCHARGE   This is a Notification of Discharge from [address] [place] [address]. Please be advised that this patient has been discharge from our facility. Below you will find the admission and discharge date and time including the patient’s disposition. UTILIZATION REVIEW CONTACT:  Mariya Schneider  Utilization   Network Utilization Review Department  Phone: [phone] x carefully listen to the prompts. All voicemails are confidential.  Email: [email or web address]     ADMISSION INFORMATION  PRESENTATION DATE: 9/8/2023 10:07 PM  OBERVATION ADMISSION DATE: 9/9/23  INPATIENT ADMISSION DATE: 9/11/23  4:00 PM   DISCHARGE DATE: 9/14/2023  6:16 PM   DISPOSITION:Non [facility] SNF/TCU/SNU    IMPORTANT INFORMATION:  Send all requests for admission clinical reviews, approved or denied determinations and any other requests to dedicated fax number below belonging to the campus where the patient is receiving treatment.  List of dedicated fax numbers:  [place] DENIALS (Administrative/Medical Necessity) [phone] [address] [facility] (Maternity/NICU/Pediatrics) [phone]   [facility] [phone]   [facility] [phone] [address] [facility] [phone]   [address] [place] [address] [phone]   [facility] [phone]   [address] [place] [address] [address] [facility] [phone]   [address] [place] [address] [phone]   [address] [facility] [phone]   [address] [place] [address] [phone]

## 2023-10-20 ENCOUNTER — TELEPHONE (OUTPATIENT)
Age: 63
End: 2023-10-20

## 2023-10-20 ENCOUNTER — APPOINTMENT (OUTPATIENT)
Dept: RADIOLOGY | Facility: HOSPITAL | Age: 63
End: 2023-10-20
Payer: COMMERCIAL

## 2023-10-20 ENCOUNTER — OFFICE VISIT (OUTPATIENT)
Dept: OBGYN CLINIC | Facility: CLINIC | Age: 63
End: 2023-10-20
Payer: COMMERCIAL

## 2023-10-20 ENCOUNTER — APPOINTMENT (OUTPATIENT)
Dept: RADIOLOGY | Facility: CLINIC | Age: 63
End: 2023-10-20
Payer: COMMERCIAL

## 2023-10-20 VITALS
SYSTOLIC BLOOD PRESSURE: 131 MMHG | HEART RATE: 69 BPM | HEIGHT: 67 IN | DIASTOLIC BLOOD PRESSURE: 88 MMHG | BODY MASS INDEX: 28.51 KG/M2

## 2023-10-20 DIAGNOSIS — M25.561 ACUTE PAIN OF RIGHT KNEE: ICD-10-CM

## 2023-10-20 DIAGNOSIS — I82.4Z2 ACUTE DEEP VEIN THROMBOSIS (DVT) OF DISTAL END OF LEFT LOWER EXTREMITY (HCC): ICD-10-CM

## 2023-10-20 DIAGNOSIS — M10.062 ACUTE IDIOPATHIC GOUT OF LEFT KNEE: ICD-10-CM

## 2023-10-20 DIAGNOSIS — M10.062 ACUTE IDIOPATHIC GOUT OF LEFT KNEE: Primary | ICD-10-CM

## 2023-10-20 PROCEDURE — 99213 OFFICE O/P EST LOW 20 MIN: CPT | Performed by: ORTHOPAEDIC SURGERY

## 2023-10-20 PROCEDURE — 73562 X-RAY EXAM OF KNEE 3: CPT

## 2023-10-20 PROCEDURE — 73564 X-RAY EXAM KNEE 4 OR MORE: CPT

## 2023-10-20 NOTE — TELEPHONE ENCOUNTER
Caller: Navjot Ely/Asha    Doctor: Poncho Stahl    Reason for call: Returned Toya's call.  Doppler was b/l legs    Call back#: [phone]

## 2023-10-20 NOTE — PROGRESS NOTES
Ortho Sports Medicine New Patient Visit     Assesment:   66 y.o. male with suspected left knee gout attack versus mild knee osteoarthritis    Plan:    Given the patient's acute onset calf pain and swelling over the past week and since his negative RLE U/S Doppler on 9/11/2023, I recommended a STAT U/S Doppler to assess for acute DVT of the left lower extremity. Per the nursing facility, this can be completed at the facility. We discussed that if there is a blood clot present, he will be placed on a blood thinner and he can plan to follow up with his PCP for appropriate management. Regarding the patient's left knee pain, he has a minimal joint effusion and no signs that are concerning for septic joint at this time. We discussed that he does have very mild arthritic changes in the knee and a history of gout such that it is difficult to discern what the exact cause of his pain is as he does report symptomatic improvement today. I recommended the patient follow up with his PCP or a Rheumatologist regarding appropriate management of gout given his history of multiple attacks over the past 2 years. We discussed that the patient can be weight bearing as tolerated and perform activities as tolerated, using pain as a guide. I also recommended the patient continue his efforts with PT for his knees at the rehab facility. Regarding his low back pain, we discussed that we typically recommend formal PT for initial treatment of this as symptoms often improve following PT. The patient can also use Tylenol, ibuprofen, and ice or heat as needed for pain. He can also use these for his knee pain as well. Update as of 4:30 pm 10/20/2023: the patient's rehab facility was contacted throughout the day and states they most recently completed a bilateral lower extremity U/S Doppler on 10/6/2023, which was negative for DVT.  The patient's symptoms have been continuously monitored and remain unchanged since 10/6 to today, such that they do not feel a repeat study is necessary at this time. The facility states that they have the capacity to perform additional testing if needed. We will plan to follow up with the patient next week to further evaluate his left knee or he can plan to follow up as needed. In the meantime, the patient can continue Tylenol, ibuprofen, and ice/heat as needed for pain. I also recommend he continue PT at the facility for his knee. Chief Complaint   Patient presents with    Right Knee - Pain       History of Present Illness: The patient is a 66 y.o. male with PMH significant for stroke in 2014 associated with left-sided residual deficits, presenting for initial evaluation of diffuse left knee pain for nearly 1 week. The patient notes pain has improved over the past 3-4 days such that he is able to tolerate direct palpation and has improved motion with less pain today. Bri Darnell states that pain initially began in his right knee last week, then improved and started in his left knee. He notes swelling, but reports this has also gradually improved. Additionally, the patient does note acute swelling and pain in the calf. This was not present when the patient had a U/S Doppler to assess for acute right lower extremity DVT on 9/11/23 which was negative. The patient denies new injury or trauma, redness or warmth of the joint, instability, locking, and numbness/tingling. He also denies current chest pain, shortness of breath, or dyspnea. The patient notes a history of 8 gouty attacks over the past 2 years, occurring in his bilateral knees and ankle joints. He states these attacks are typically treated with Prednisone and he is not on any preventative medications at baseline. He does have an established PCP, but has not discussed further management with her. The patient denies ever undergoing an aspiration that confirmed crystals in the affected joint, but did have an elevated uric acid in June 2023.  Bri Darnell reports being placed in a rehab facility following his hospital admission at [address] [facility] on 9/8/2023 given his history of stroke and gout flares. The patient did have a He reports being transported and spending a lot of time in a wheelchair since being at the rehab facility, and using a walker to assist with ambulation. He has used a walker since his stroke given his associated drop foot. Of note, the patient does also have acute low back pain not associated with sciatica. He has been working with PT for this at the rehab facility. The patient is not using any pain medications currently, but is applying heat as needed. Knee Surgical History:  None    Past Medical, Social and Family History:  Past Medical History:   Diagnosis Date    High cholesterol     Hypertension     Renal disorder     Stroke [facility])      History reviewed. No pertinent surgical history.   No Known Allergies  Current Outpatient Medications on File Prior to Visit   Medication Sig Dispense Refill    acetaminophen (TYLENOL) 325 mg tablet Take 2 tablets (650 mg total) by mouth every 6 (six) hours as needed for mild pain, headaches or fever  0    amLODIPine (NORVASC) 10 mg tablet Take 10 mg by mouth daily      aspirin 81 mg chewable tablet Chew 81 mg      atorvastatin (LIPITOR) 40 mg tablet Take 1 tablet by mouth every evening      Diclofenac Sodium (VOLTAREN) 1 % Apply 2 g topically 4 (four) times a day as needed (arthralgias)  0    lidocaine (LIDODERM) 5 % Apply 1 patch topically over 12 hours daily Remove & Discard patch within 12 hours or as directed by MD Do not start before September 15, 2023.  0    methocarbamol (ROBAXIN) 500 mg tablet Take 1 tablet (500 mg total) by mouth every 6 (six) hours as needed for muscle spasms for up to 6 doses  0    metoprolol succinate (TOPROL-XL) 100 mg 24 hr tablet Take 0.5 tablets (50 mg total) by mouth daily 30 tablet 0    omeprazole (PriLOSEC) 20 mg delayed release capsule Take 20 mg by mouth      oxyCODONE (Roxicodone) 5 immediate release tablet Take 1 tablet (5 mg total) by mouth every 6 (six) hours as needed for moderate pain or severe pain Max Daily Amount: 20 mg 10 tablet 0     No current facility-administered medications on file prior to visit. Social History     Socioeconomic History    Marital status:      Spouse name: Not on file    Number of children: Not on file    Years of education: Not on file    Highest education level: Not on file   Occupational History    Not on file   Tobacco Use    Smoking status: Never    Smokeless tobacco: Never   Vaping Use    Vaping Use: Never used   Substance and Sexual Activity    Alcohol use: Never    Drug use: Never    Sexual activity: Not on file   Other Topics Concern    Not on file   Social History Narrative    Not on file     Social Determinants of Health     Financial Resource Strain: Not on file   Food Insecurity: Unknown (9/10/2023)    Hunger Vital Sign     Worried About Running Out of Food in the Last Year: Never true     Ran Out of Food in the Last Year: Not on file   Transportation Needs: No Transportation Needs (9/10/2023)    PRAPARE - Transportation     Lack of Transportation (Medical): No     Lack of Transportation (Non-Medical): No   Physical Activity: Not on file   Stress: Not on file   Social Connections: Not on file   Intimate Partner Violence: Not on file   Housing Stability: Low Risk  (9/10/2023)    Housing Stability Vital Sign     Unable to Pay for Housing in the Last Year: No     Number of Places Lived in the Last Year: 2     Unstable Housing in the Last Year: No         I have reviewed the past medical, surgical, social and family history, medications and allergies as documented in the EMR. Review of systems: ROS is negative other than that noted in the HPI. Constitutional: Negative for fatigue and fever. HENT: Negative for sore throat. Respiratory: Negative for shortness of breath. Cardiovascular: Negative for chest pain. Gastrointestinal: Negative for abdominal pain. Endocrine: Negative for cold intolerance and heat intolerance. Genitourinary: Negative for flank pain. Musculoskeletal: Negative for back pain. Skin: Negative for rash. Allergic/Immunologic: Negative for immunocompromised state. Neurological: Negative for dizziness. Psychiatric/Behavioral: Negative for agitation. Physical Exam:    Blood pressure 131/88, pulse 69, height 5' 7" (1.702 m). General/Constitutional: NAD, well developed, well nourished  HENT: Normocephalic, atraumatic  CV: Intact distal pulses, regular rate  Resp: No respiratory distress or labored breathing  Abdomen: soft, nondistended   Lymphatic: No lymphadenopathy palpated  Neuro: Alert and Oriented x 3, no focal deficits  Psych: Normal mood, normal affect  Skin: Warm, dry, no rashes, no erythema      Knee Exam:   No significant skin lesions or deformity  No erythema, warmth, or tenderness  Minimal joint effusion  Diffuse non-pitting LLE edema extending from the calf to the ankle  Range of motion from 0 to 90. No micro motion tenderness  Mild medial and lateral joint line tenderness   Moderate calf tenderness  Knee is stable to varus stress, valgus stress, Lachman, and posterior drawer. Neurovascularly intact distally    Knee Imaging    X-rays of the bilateral knees reviewed and interpreted today. These show no acute osseous abnormalities bilaterally. Right medial compartment degenerative changes. No significant degenerative changes of the left knee. The patella is well-centered on the trochlea bilaterally.

## 2023-10-20 NOTE — TELEPHONE ENCOUNTER
Called and spoke with pt facility and they are leaving a msg to [address] [place] [address] asking if the doppler on 10/6/23 was left or right leg

## 2023-10-20 NOTE — TELEPHONE ENCOUNTER
Called and spoke with Shira Peraza the nurse. .. Nurse states todays visit in [facility] looks like a DVT treatment. evaluation is appt should have been for the bakers cyst and knee pain and the DVT has been addressed by the facility per pt nurse    Doppler: Pt nurse from facility states a new doppler was performed 10/6/23 and leg swelling has improves from 10/6/23 and nurse stated swelling is getting better daily. Nurse sent over doppler results today. Pt nurse would like to cancel today appt at [address] [facility] for doppler if he would still need it , they can do it in house. Bakers cyst. Pt is having knee pain and has at it for 3 weeks, he goes to physical therapy and is unable to participate due to knee pain, he was then sent to in house pain management who readjusted pain medication that still not affective with his knee pain. Xray and dopplers were completed and nothing is helping. Per Dominique the nurse. Pt nurse from facility would like David Brunson or Dr Elan Velasco to give her a call to go over patient information. Nurse will be faxing labs, xrays, pain management consult notes for Dr Elan Velasco to review since some of this info is missing from the chart because facility does not upload or use EPIC and fax all this info.  Fax will be going to [address] office    Pt is in patient at [facility] and all contact for pt should go to facility number below and ask to speak to Shira Peraza    [phone] is the unit manager and to ask for her regarding this pt care

## 2023-10-20 NOTE — TELEPHONE ENCOUNTER
Caller: Gokul Ely    Doctor: Nikkie Manuel    Reason for call: Vaughnerasmo Michelle called stating the facility did a doppler test on 10/6 for a DVT which was negative. She is stating the facility can do the duplex test at the facility.   She did send the report  They asking why the pt needs another one done and has the baker's cyst been evaluated on his right knee    Call back#: [phone]

## 2023-10-20 NOTE — TELEPHONE ENCOUNTER
Called and spoke with Dave Daniels the nurse from the facility, and relayed Dr Yazmin Ceballos and Dave Daniels made me aware the doppler was negative on with 10/6/23 but his D-Dimer was elevated on 10/6/23, can the facility fun a doppler in house? Number for [facility] office was given to Dave Daniels to call directly if she has further questions or concerns she would like to address.      Thank you

## 2023-10-23 NOTE — TELEPHONE ENCOUNTER
Called and spoke with Leopoldo Fickle as Feliz Montano was busy, I asked if she could ask Feliz Montano is she spoke with [place] and are going to go ahead with the doppler or wait at this time. Leopoldo Fickle will give me a call back once she gets an answer.

## 2023-10-23 NOTE — TELEPHONE ENCOUNTER
Caller: Jann     Doctor: Terry Davenport     Reason for call: Call regarding the doppler , they will do the doppler there     Call back#: [phone]

## 2023-10-23 NOTE — TELEPHONE ENCOUNTER
Called and spoke with J Carlos Johnson, pt nurse and let her know I had spoke back and forth with Yip Moreno Friday, who is not here today, regarding a doppler that was ordered for new onset calf pain and swelling per pt statement, however pt received a dopple 10/6/23 and a new one was ordered Friday. Started to relay BLANQUITA Gonzalez when nurse stated "So what would you want me to do about it, what would you like me to do." I stated per our physician recommendations you can disregard the ultrasound if the left lower leg swelling has been unchanged since 10/6/23 per facility discretion. The nurse then stated she would talk to [place] the NP who has been following this pt when she comes in to discuss the doppler. Nurse then proceeded to hang up before reading the rest of pt msg.

## 2023-11-08 ENCOUNTER — TELEPHONE (OUTPATIENT)
Age: 63
End: 2023-11-08

## 2023-11-08 NOTE — TELEPHONE ENCOUNTER
Nursing home called to verify if patient has ever had a colonoscopy with us. Advised we dont have any report at this time. thakk you

## 2023-11-09 ENCOUNTER — APPOINTMENT (OUTPATIENT)
Dept: RADIOLOGY | Facility: CLINIC | Age: 63
End: 2023-11-09
Payer: COMMERCIAL

## 2023-11-09 ENCOUNTER — OFFICE VISIT (OUTPATIENT)
Dept: OBGYN CLINIC | Facility: CLINIC | Age: 63
End: 2023-11-09
Payer: COMMERCIAL

## 2023-11-09 VITALS — HEART RATE: 80 BPM | SYSTOLIC BLOOD PRESSURE: 130 MMHG | DIASTOLIC BLOOD PRESSURE: 80 MMHG

## 2023-11-09 DIAGNOSIS — M25.511 CHRONIC RIGHT SHOULDER PAIN: ICD-10-CM

## 2023-11-09 DIAGNOSIS — M19.011 OSTEOARTHRITIS OF GLENOHUMERAL JOINT, RIGHT: ICD-10-CM

## 2023-11-09 DIAGNOSIS — M24.811 INTERNAL DERANGEMENT OF RIGHT SHOULDER: Primary | ICD-10-CM

## 2023-11-09 DIAGNOSIS — G89.29 CHRONIC RIGHT SHOULDER PAIN: ICD-10-CM

## 2023-11-09 PROCEDURE — 73030 X-RAY EXAM OF SHOULDER: CPT

## 2023-11-09 PROCEDURE — 99214 OFFICE O/P EST MOD 30 MIN: CPT | Performed by: ORTHOPAEDIC SURGERY

## 2023-11-09 NOTE — PROGRESS NOTES
Assessment/Plan:  1. Internal derangement of right shoulder  XR shoulder 2+ vw right    MRI shoulder right wo contrast      2. Osteoarthritis of glenohumeral joint, right            Sia Lee has right-sided shoulder pain and likely has a chronic rotator cuff injury. He has a high riding humeral head on x-ray suggestive of chronic supraspinatus tear. He has moderate degenerative changes in the shoulder at the glenohumeral and AC joint. I discussed with him that with his failed improvement with conservative measures that we could obtain an MRI of the right shoulder to assess for chronic rotator cuff tear or new rotator cuff injury. He does have some weakness on examination today. I also discussed possibility of ultrasound-guided glenohumeral injection if necessary in the future. He will follow-up with me after his MRI of the shoulder is complete. Subjective: Misty Fierro is a 66 y.o. male who presents to the office for evaluation for right-sided shoulder pain. He states he has a long history of right shoulder issues and 20 to 30 years ago he had a rotator cuff tear in the right shoulder that was not repaired surgically. He thinks it was a partial tear at that time. He states the shoulder is always giving him issues and recently the pain has significantly increased. He had a recent stroke causing left-sided paralysis and weakness. He is currently in a care center. He states that he has been doing physical therapy and he recently exacerbated his right shoulder pain trying to  an object in throat. He had severe discomfort and weakness in the right shoulder and was referred to see us today. He states he is continue with physical therapy for the last 2 months and this has not helped with his pain and discomfort in the right shoulder. He has trouble reaching up overhead or lifting any objects. He denies any numbness or tingling down the arm.       Review of Systems   Constitutional:  Negative for chills, fever and unexpected weight change. HENT:  Negative for hearing loss, nosebleeds and sore throat. Eyes:  Negative for pain, redness and visual disturbance. Respiratory:  Negative for cough, shortness of breath and wheezing. Cardiovascular:  Negative for chest pain, palpitations and leg swelling. Gastrointestinal:  Negative for abdominal pain, nausea and vomiting. Endocrine: Negative for polyphagia and polyuria. Genitourinary:  Negative for dysuria and hematuria. Musculoskeletal:         See HPI   Skin:  Negative for rash and wound. Neurological:  Negative for dizziness, numbness and headaches. Psychiatric/Behavioral:  Negative for decreased concentration and suicidal ideas. The patient is not nervous/anxious. Past Medical History:   Diagnosis Date    High cholesterol     Hypertension     Renal disorder     Stroke [facility])        History reviewed. No pertinent surgical history. History reviewed. No pertinent family history.     Social History     Occupational History    Not on file   Tobacco Use    Smoking status: Never    Smokeless tobacco: Never   Vaping Use    Vaping Use: Never used   Substance and Sexual Activity    Alcohol use: Never    Drug use: Never    Sexual activity: Not on file         Current Outpatient Medications:     acetaminophen (TYLENOL) 325 mg tablet, Take 2 tablets (650 mg total) by mouth every 6 (six) hours as needed for mild pain, headaches or fever, Disp: , Rfl: 0    amLODIPine (NORVASC) 10 mg tablet, Take 10 mg by mouth daily, Disp: , Rfl:     aspirin 81 mg chewable tablet, Chew 81 mg, Disp: , Rfl:     atorvastatin (LIPITOR) 40 mg tablet, Take 1 tablet by mouth every evening, Disp: , Rfl:     Diclofenac Sodium (VOLTAREN) 1 %, Apply 2 g topically 4 (four) times a day as needed (arthralgias), Disp: , Rfl: 0    lidocaine (LIDODERM) 5 %, Apply 1 patch topically over 12 hours daily Remove & Discard patch within 12 hours or as directed by MD Do not start before September 15, 2023., Disp: , Rfl: 0    methocarbamol (ROBAXIN) 500 mg tablet, Take 1 tablet (500 mg total) by mouth every 6 (six) hours as needed for muscle spasms for up to 6 doses, Disp: , Rfl: 0    metoprolol succinate (TOPROL-XL) 100 mg 24 hr tablet, Take 0.5 tablets (50 mg total) by mouth daily, Disp: 30 tablet, Rfl: 0    omeprazole (PriLOSEC) 20 mg delayed release capsule, Take 20 mg by mouth, Disp: , Rfl:     oxyCODONE (Roxicodone) 5 immediate release tablet, Take 1 tablet (5 mg total) by mouth every 6 (six) hours as needed for moderate pain or severe pain Max Daily Amount: 20 mg, Disp: 10 tablet, Rfl: 0    No Known Allergies    Objective:  Vitals:    11/09/23 1013   BP: 130/80   Pulse: 80     Pain Score:   5      Right Shoulder Exam     Tenderness   Right shoulder tenderness location: Tenderness palpation over anterior and posterior glenohumeral joint as well as supraspinatus region. Range of Motion   Active abduction:  150 abnormal   Passive abduction:  normal   Extension:  normal   External rotation:  normal   Forward flexion:  150 abnormal   Internal rotation 0 degrees:  Lumbar abnormal     Muscle Strength   Abduction: 4/5   Internal rotation: 5/5   External rotation: 5/5   Supraspinatus: 4/5   Subscapularis: 5/5   Biceps: 5/5     Tests   Murillo test: positive  Impingement: positive  Drop arm: negative    Other   Erythema: absent  Sensation: normal  Pulse: present            Physical Exam  Vitals reviewed. Constitutional:       Appearance: He is well-developed. HENT:      Head: Normocephalic and atraumatic. Eyes:      Conjunctiva/sclera: Conjunctivae normal.      Pupils: Pupils are equal, round, and reactive to light. Cardiovascular:      Rate and Rhythm: Normal rate. Pulses: Normal pulses. Pulmonary:      Effort: Pulmonary effort is normal. No respiratory distress. Musculoskeletal:      Cervical back: Normal range of motion and neck supple.       Comments: As noted in HPI Skin:     General: Skin is warm and dry. Neurological:      General: No focal deficit present. Mental Status: He is alert and oriented to person, place, and time. Psychiatric:         Mood and Affect: Mood normal.         Behavior: Behavior normal.         I have personally reviewed pertinent films in PACS and my interpretation is as follows:  X-rays of the right shoulder demonstrate mild to moderate glenohumeral osteoarthritis. High riding humeral head suggestive of chronic rotator cuff arthropathy. This document was created using speech voice recognition software. Grammatical errors, random word insertions, pronoun errors, and incomplete sentences are an occasional consequence of this system due to software limitations, ambient noise, and hardware issues. Any formal questions or concerns about content, text, or information contained within the body of this dictation should be directly addressed to the provider for clarification.

## 2023-12-04 ENCOUNTER — HOSPITAL ENCOUNTER (OUTPATIENT)
Dept: RADIOLOGY | Facility: HOSPITAL | Age: 63
Discharge: HOME | End: 2023-12-04
Payer: COMMERCIAL

## 2023-12-04 DIAGNOSIS — R79.89 POSITIVE D DIMER: ICD-10-CM

## 2023-12-04 DIAGNOSIS — R79.1 ABNORMAL INR: ICD-10-CM

## 2023-12-04 PROCEDURE — 78582 LUNG VENTILAT&PERFUS IMAGING: CPT

## 2023-12-04 PROCEDURE — A9540 TC99M MAA: HCPCS

## 2023-12-04 PROCEDURE — A9539 TC99M PENTETATE: HCPCS

## 2023-12-04 PROCEDURE — 71046 X-RAY EXAM CHEST 2 VIEWS: CPT

## 2023-12-05 ENCOUNTER — HOSPITAL ENCOUNTER (OUTPATIENT)
Dept: RADIOLOGY | Facility: HOSPITAL | Age: 63
Discharge: HOME | End: 2023-12-05
Attending: ORTHOPAEDIC SURGERY
Payer: COMMERCIAL

## 2023-12-05 DIAGNOSIS — M24.811 INTERNAL DERANGEMENT OF RIGHT SHOULDER: ICD-10-CM

## 2023-12-05 PROCEDURE — G1004 CDSM NDSC: HCPCS

## 2023-12-05 PROCEDURE — 73221 MRI JOINT UPR EXTREM W/O DYE: CPT

## 2023-12-11 ENCOUNTER — TELEPHONE (OUTPATIENT)
Age: 63
End: 2023-12-11

## 2023-12-11 NOTE — TELEPHONE ENCOUNTER
Caller: [facility] [place]    Doctor: Janet Charles     Reason for call: Patient has appt 12/12 but tested positive for Covid 12/10. They are asking if the appt can be virtual or needs to be rescheduled.  Please advise    Call back#: [phone]

## 2023-12-11 NOTE — TELEPHONE ENCOUNTER
[facility] staff and advised, When he is feeling better he will get scheduled with Dr. Daphnie Bauer or Dr. Milton Argueta

## 2024-01-05 ENCOUNTER — OFFICE VISIT (OUTPATIENT)
Dept: OBGYN CLINIC | Facility: CLINIC | Age: 64
End: 2024-01-05
Payer: COMMERCIAL

## 2024-01-05 VITALS
HEART RATE: 72 BPM | SYSTOLIC BLOOD PRESSURE: 118 MMHG | DIASTOLIC BLOOD PRESSURE: 80 MMHG | WEIGHT: 189 LBS | HEIGHT: 67 IN | BODY MASS INDEX: 29.66 KG/M2

## 2024-01-05 DIAGNOSIS — M25.461 EFFUSION OF RIGHT KNEE: ICD-10-CM

## 2024-01-05 DIAGNOSIS — G89.29 CHRONIC PAIN OF RIGHT KNEE: ICD-10-CM

## 2024-01-05 DIAGNOSIS — M25.561 CHRONIC PAIN OF RIGHT KNEE: ICD-10-CM

## 2024-01-05 DIAGNOSIS — M17.11 PRIMARY OSTEOARTHRITIS OF RIGHT KNEE: Primary | ICD-10-CM

## 2024-01-05 PROCEDURE — 99213 OFFICE O/P EST LOW 20 MIN: CPT | Performed by: ORTHOPAEDIC SURGERY

## 2024-01-05 PROCEDURE — 20610 DRAIN/INJ JOINT/BURSA W/O US: CPT | Performed by: ORTHOPAEDIC SURGERY

## 2024-01-05 RX ORDER — TRIAMCINOLONE ACETONIDE 40 MG/ML
40 INJECTION, SUSPENSION INTRA-ARTICULAR; INTRAMUSCULAR
Status: COMPLETED | OUTPATIENT
Start: 2024-01-05 | End: 2024-01-05

## 2024-01-05 RX ORDER — BUPIVACAINE HYDROCHLORIDE 2.5 MG/ML
4 INJECTION, SOLUTION INFILTRATION; PERINEURAL
Status: COMPLETED | OUTPATIENT
Start: 2024-01-05 | End: 2024-01-05

## 2024-01-05 RX ADMIN — BUPIVACAINE HYDROCHLORIDE 4 ML: 2.5 INJECTION, SOLUTION INFILTRATION; PERINEURAL at 07:30

## 2024-01-05 RX ADMIN — TRIAMCINOLONE ACETONIDE 40 MG: 40 INJECTION, SUSPENSION INTRA-ARTICULAR; INTRAMUSCULAR at 07:30

## 2024-01-05 NOTE — PROGRESS NOTES
"Assessment/Plan:  1. Primary osteoarthritis of right knee    2. Chronic pain of right knee    3. Effusion of right knee      Orders Placed This Encounter   Procedures    Large joint arthrocentesis: R knee    Body fluid culture and Gram stain    Synovial fluid, crystal    Lyme disease, PCR    Synovial fluid white cell count w/ diff       Demetrio is 63 year-old male who presents for evaluation of the right knee. After obtaining a thorough history and orthopedic exam, I believe his symptoms are consistent with osteoarthritis of the right knee. I offered him an aspiration and injection of the right knee, which he was amenable to. He tolerated the procedure well, as documented below.  I was not able to aspirate any significant fluid so I did not send it for testing today.  I did administer a steroid injection which will hopefully provide relief of his pain.  He does state that he has continued gout flares.  I recommend he follow-up with his PCP or rheumatologist for his gout flare-ups. I also recommend he attend physical therapy at the facility he is at to help with his strength and mobility of the lower extremities. He may follow-up as needed for the right knee.    Large joint arthrocentesis: R knee  Universal Protocol:  Consent: Verbal consent obtained.  Risks and benefits: risks, benefits and alternatives were discussed  Consent given by: patient  Time out: Immediately prior to procedure a \"time out\" was called to verify the correct patient, procedure, equipment, support staff and site/side marked as required.  Patient understanding: patient states understanding of the procedure being performed  Site marked: the operative site was marked  Patient identity confirmed: verbally with patient  Supporting Documentation  Indications: pain and joint swelling   Procedure Details  Location: knee - R knee  Preparation: Patient was prepped and draped in the usual sterile fashion  Needle size: 22 G  Medications administered: 4 mL " bupivacaine 0.25 %; 40 mg triamcinolone acetonide 40 mg/mL    Aspirate amount (ml): scant.  Patient tolerance: patient tolerated the procedure well with no immediate complications  Dressing:  Sterile dressing applied        Return if symptoms worsen or fail to improve.      Subjective   Chief Complaint:   Chief Complaint   Patient presents with    Right Knee - Pain       Demetrio Villalobos is a 63 y.o. male who presents for follow up for right knee pain. He presents to the office in a wheelchair due to his knee pain and inability to bear weight. He reports his pain has been intermittent recently. He states the pain has been manageable for the last 3 days and started to flare up again last night. He indicates his pain is located medially. He describes the pain as dull and achy. He rates his pain 4/10 at rest and a 7/10 when he tries to bear weight on it. He feels the knee is slightly swollen, and it is preventing him from completely extending the knee.    Review of Systems  ROS:    See HPI for musculoskeletal review.   All other systems reviewed are negative     History:  Past Medical History:   Diagnosis Date    High cholesterol     Hypertension     Renal disorder     Stroke (HCC)      History reviewed. No pertinent surgical history.  Social History   Social History     Substance and Sexual Activity   Alcohol Use Never     Social History     Substance and Sexual Activity   Drug Use Never     Social History     Tobacco Use   Smoking Status Never   Smokeless Tobacco Never     Family History: History reviewed. No pertinent family history.    Meds/Allergies   Current Outpatient Medications   Medication Instructions    acetaminophen (TYLENOL) 650 mg, Oral, Every 6 hours PRN    aspirin 81 mg, Oral    Diclofenac Sodium (VOLTAREN) 2 g, Topical, 4 times daily PRN    lidocaine (LIDODERM) 5 % 1 patch, Topical, Daily, Remove & Discard patch within 12 hours or as directed by MD    methocarbamol (ROBAXIN) 500 mg, Oral, Every 6 hours  "PRN    metoprolol succinate (TOPROL-XL) 50 mg, Oral, Daily    omeprazole (PRILOSEC) 20 mg, Oral    oxyCODONE (ROXICODONE) 5 mg, Oral, Every 6 hours PRN       No Known Allergies       Objective     /80   Pulse 72   Ht 5' 7\" (1.702 m) Comment: verbal  Wt 85.7 kg (189 lb) Comment: verbal  BMI 29.60 kg/m²      PE:  AAOx 3  Well nourished   no audible wheezing  no abdominal distension  LE compartments soft, skin intact    Knee Exam:   No significant skin lesions or deformity  + effusion  Range of motion from 20° short of full extension to 100°  Medial joint line tenderness, anterior tenderness   Knee is stable to varus stress, valgus stress, Lachman, and posterior drawer.    Neurovascularly intact distally      Imaging Studies:     X-rays of the right knee obtained on 10/20/2023 were reviewed and interpreted. These show mild osteoarthritis of the medial compartment of the right knee.      Scribe Attestation      I,:  Valerie Troncoso am acting as a scribe while in the presence of the attending physician.:       I,:  Ye Avila MD personally performed the services described in this documentation    as scribed in my presence.:                 "

## 2024-01-10 ENCOUNTER — OFFICE VISIT (OUTPATIENT)
Dept: OBGYN CLINIC | Facility: CLINIC | Age: 64
End: 2024-01-10
Payer: COMMERCIAL

## 2024-01-10 VITALS
DIASTOLIC BLOOD PRESSURE: 89 MMHG | BODY MASS INDEX: 29.66 KG/M2 | SYSTOLIC BLOOD PRESSURE: 127 MMHG | HEIGHT: 67 IN | HEART RATE: 68 BPM | WEIGHT: 189 LBS

## 2024-01-10 DIAGNOSIS — M75.121 COMPLETE TEAR OF RIGHT ROTATOR CUFF, UNSPECIFIED WHETHER TRAUMATIC: Primary | ICD-10-CM

## 2024-01-10 DIAGNOSIS — S46.211A RUPTURE OF RIGHT PROXIMAL BICEPS TENDON, INITIAL ENCOUNTER: ICD-10-CM

## 2024-01-10 PROCEDURE — 20610 DRAIN/INJ JOINT/BURSA W/O US: CPT | Performed by: ORTHOPAEDIC SURGERY

## 2024-01-10 PROCEDURE — 99214 OFFICE O/P EST MOD 30 MIN: CPT | Performed by: ORTHOPAEDIC SURGERY

## 2024-01-10 RX ORDER — LIDOCAINE HYDROCHLORIDE 10 MG/ML
4 INJECTION, SOLUTION INFILTRATION; PERINEURAL
Status: COMPLETED | OUTPATIENT
Start: 2024-01-10 | End: 2024-01-10

## 2024-01-10 RX ORDER — TRIAMCINOLONE ACETONIDE 40 MG/ML
40 INJECTION, SUSPENSION INTRA-ARTICULAR; INTRAMUSCULAR
Status: COMPLETED | OUTPATIENT
Start: 2024-01-10 | End: 2024-01-10

## 2024-01-10 RX ADMIN — LIDOCAINE HYDROCHLORIDE 4 ML: 10 INJECTION, SOLUTION INFILTRATION; PERINEURAL at 11:30

## 2024-01-10 RX ADMIN — TRIAMCINOLONE ACETONIDE 40 MG: 40 INJECTION, SUSPENSION INTRA-ARTICULAR; INTRAMUSCULAR at 11:30
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
## 2024-01-18 ENCOUNTER — TELEPHONE (OUTPATIENT)
Age: 64
End: 2024-01-18
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
## 2024-04-10 ENCOUNTER — OFFICE VISIT (OUTPATIENT)
Dept: OBGYN CLINIC | Facility: CLINIC | Age: 64
End: 2024-04-10
Payer: COMMERCIAL

## 2024-04-10 VITALS
WEIGHT: 189 LBS | DIASTOLIC BLOOD PRESSURE: 93 MMHG | HEIGHT: 67 IN | BODY MASS INDEX: 29.66 KG/M2 | SYSTOLIC BLOOD PRESSURE: 143 MMHG | HEART RATE: 72 BPM

## 2024-04-10 DIAGNOSIS — G89.29 CHRONIC RIGHT SHOULDER PAIN: ICD-10-CM

## 2024-04-10 DIAGNOSIS — M25.511 CHRONIC RIGHT SHOULDER PAIN: ICD-10-CM

## 2024-04-10 DIAGNOSIS — M75.121 COMPLETE TEAR OF RIGHT ROTATOR CUFF, UNSPECIFIED WHETHER TRAUMATIC: Primary | ICD-10-CM

## 2024-04-10 PROCEDURE — 99213 OFFICE O/P EST LOW 20 MIN: CPT | Performed by: ORTHOPAEDIC SURGERY

## 2024-04-10 NOTE — PROGRESS NOTES
Assessment/Plan:  1. Complete tear of right rotator cuff, unspecified whether traumatic        2. Chronic right shoulder pain  Diclofenac Sodium (VOLTAREN) 1 %        The patient has chronic retracted RTC tear and ongoing pain and weakness about the shoulder. We again discussed possible reverse total shoulder arthroplasty versus arthroscopy and debridement/SCR/rotator cuff repair, however he has only done 2 weeks of PT so far and thus I would advise a full 6-8 weeks of PT to see if we can improve his function and pain prior to considering surgical intervention. I also prescribed him topical voltaren and tylenol. He will FU in 2 months for repeat evaluation.     Subjective:   Demetrio Villalobos is a 64 y.o. male who presents today for follow-up of his right shoulder. I have been treating him conservatively for rotator cuff arthropathy of the shoulder. His steroid injection given last visit seemed to make the shoulder worse. He did about 2 weeks of PT which did not seem to help. He notes ongoing pain about the shoulder diffusely as well as weakness of the shoulder. He denies any paresthesias of the upper extremity. He presents today in a wheelchair as he can not walk/stand for prolonged periods of time since stoke that left some residual left lower extremity weakness.      Review of Systems   Constitutional: Negative.  Negative for chills and fever.   HENT: Negative.  Negative for ear pain and sore throat.    Eyes: Negative.  Negative for pain and redness.   Respiratory: Negative.  Negative for shortness of breath and wheezing.    Cardiovascular:  Negative for chest pain and palpitations.   Gastrointestinal: Negative.  Negative for abdominal pain and blood in stool.   Endocrine: Negative.  Negative for polydipsia and polyuria.   Genitourinary: Negative.  Negative for difficulty urinating and dysuria.   Musculoskeletal:         As noted in HPI   Skin: Negative.  Negative for pallor and rash.   Neurological: Negative.   Negative for dizziness and numbness.   Hematological: Negative.  Negative for adenopathy. Does not bruise/bleed easily.   Psychiatric/Behavioral: Negative.  Negative for confusion and suicidal ideas.          Past Medical History:   Diagnosis Date   • High cholesterol    • Hypertension    • Renal disorder    • Stroke (HCC)        History reviewed. No pertinent surgical history.    History reviewed. No pertinent family history.    Social History     Occupational History   • Not on file   Tobacco Use   • Smoking status: Never   • Smokeless tobacco: Never   Vaping Use   • Vaping status: Never Used   Substance and Sexual Activity   • Alcohol use: Never   • Drug use: Never   • Sexual activity: Not on file         Current Outpatient Medications:   •  acetaminophen (TYLENOL) 325 mg tablet, Take 2 tablets (650 mg total) by mouth every 6 (six) hours as needed for mild pain, headaches or fever, Disp: , Rfl: 0  •  aspirin 81 mg chewable tablet, Chew 81 mg, Disp: , Rfl:   •  Diclofenac Sodium (VOLTAREN) 1 %, Apply 2 g topically 4 (four) times a day as needed (arthralgias), Disp: , Rfl:   •  lidocaine (LIDODERM) 5 %, Apply 1 patch topically over 12 hours daily Remove & Discard patch within 12 hours or as directed by MD Do not start before September 15, 2023., Disp: , Rfl: 0  •  methocarbamol (ROBAXIN) 500 mg tablet, Take 1 tablet (500 mg total) by mouth every 6 (six) hours as needed for muscle spasms for up to 6 doses, Disp: , Rfl: 0  •  omeprazole (PriLOSEC) 20 mg delayed release capsule, Take 20 mg by mouth, Disp: , Rfl:   •  metoprolol succinate (TOPROL-XL) 100 mg 24 hr tablet, Take 0.5 tablets (50 mg total) by mouth daily, Disp: 30 tablet, Rfl: 0  •  oxyCODONE (Roxicodone) 5 immediate release tablet, Take 1 tablet (5 mg total) by mouth every 6 (six) hours as needed for moderate pain or severe pain Max Daily Amount: 20 mg (Patient not taking: Reported on 1/10/2024), Disp: 10 tablet, Rfl: 0    No Known  Allergies    Objective:  Vitals:    04/10/24 0953   BP: 143/93   Pulse: 72     Pain Score:   8      Right Shoulder Exam     Tenderness   The patient is experiencing no tenderness.    Range of Motion   External rotation:  60   Forward flexion:  90 (170 passive)     Muscle Strength   Right shoulder normal muscle strength: 4+/5 strength internal rotation.  Abduction: 4/5   External rotation: 4/5     Tests   Drop arm: positive    Other   Erythema: absent  Sensation: normal  Pulse: present            Physical Exam  Constitutional:       General: He is not in acute distress.     Appearance: He is well-developed.   HENT:      Head: Normocephalic and atraumatic.   Eyes:      General: No scleral icterus.     Conjunctiva/sclera: Conjunctivae normal.   Neck:      Vascular: No JVD.   Cardiovascular:      Rate and Rhythm: Normal rate.   Pulmonary:      Effort: Pulmonary effort is normal. No respiratory distress.   Musculoskeletal:      Comments: As per HPI   Skin:     General: Skin is warm.   Neurological:      Mental Status: He is alert and oriented to person, place, and time.      Coordination: Coordination normal.             This document was created using speech voice recognition software.   Grammatical errors, random word insertions, pronoun errors, and incomplete sentences are an occasional consequence of this system due to software limitations, ambient noise, and hardware issues.   Any formal questions or concerns about content, text, or information contained within the body of this dictation should be directly addressed to the provider for clarification.

## 2024-06-11 ENCOUNTER — OFFICE VISIT (OUTPATIENT)
Dept: OBGYN CLINIC | Facility: CLINIC | Age: 64
End: 2024-06-11
Payer: COMMERCIAL

## 2024-06-11 VITALS
HEIGHT: 67 IN | DIASTOLIC BLOOD PRESSURE: 89 MMHG | WEIGHT: 189 LBS | HEART RATE: 76 BPM | SYSTOLIC BLOOD PRESSURE: 137 MMHG | BODY MASS INDEX: 29.66 KG/M2

## 2024-06-11 DIAGNOSIS — M75.121 COMPLETE TEAR OF RIGHT ROTATOR CUFF, UNSPECIFIED WHETHER TRAUMATIC: Primary | ICD-10-CM

## 2024-06-11 PROCEDURE — 99214 OFFICE O/P EST MOD 30 MIN: CPT | Performed by: ORTHOPAEDIC SURGERY

## 2024-06-11 NOTE — PROGRESS NOTES
Assessment/Plan:  1. Complete tear of right rotator cuff, unspecified whether traumatic  Case request operating room: REPAIR ROTATOR CUFF  ARTHROSCOPIC    Ambulatory referral to Internal Medicine    Basic metabolic panel    CBC and Platelet    HEMOGLOBIN A1C W/ EAG ESTIMATION    EKG 12 lead    Basic metabolic panel    CBC and Platelet    HEMOGLOBIN A1C W/ EAG ESTIMATION    Case request operating room: REPAIR ROTATOR CUFF  ARTHROSCOPIC    Sling        Demetrio is a 64 year old male who presents for FU of his retracted RTC tears. He has failed conservative treatment including PT and injections and has ongoing pain and weakness about the shoulder. We discussed continued conservative treatment with more PT, activity modification, and OTC medications. We also discussed possible surgical intervention. The patient would like to proceed with surgery as he would like to regain more function with this arm. We had a lengthy discussion regarding arthroscopic RTC repair vs reverse total shoulder arthroplasty. Given his other medical issues, his age, and still having minimal atrophy of supraspinatus muscle, I think it is best to attempt RTC rather than a shoulder replacement at this point.  We discussed high risk of complication with shoulder replacement.  We discussed his good chance of supraspinatus repair which would help him with overhead activity.  We discussed that there may be lower chance of subscapularis repair however he does have good strength with internal rotation today on exam.  We also discussed he does have some arthritis in his shoulder which may cause him some pain however I think he is more symptomatic from his rotator cuff tear than the arthritis.  The patient was agreeable to this. We discussed the procedure and post op recovery at length. He is scheduled to see his nephrologist soon and will also need PCP clearance, labs, and ECG pre-operatively. He was fit for a post op sling today which he will bring the  day of surgery.       Given that the patient has failed conservative treatment and continues to have severe pain and/or dysfunction that limits their activities of daily living, they would like to proceed with operative intervention.  We discussed risks, benefits and alternatives to surgery today in the clinic. We discussed with the patient the risks of no treatment, non-operative treatment, and operative treatment. The risks of operative intervention were discussed and include but are not limited to: Infection, bleeding, stiffness, loss of range of motion, blood clot, failure of surgery, fracture, delayed union, nonunion, malunion, risk of potential future arthritis, continued problems with swelling, injury to surrounding structures/nerve/artery/vein, recurrence of cysts, failure of hardware, retained hardware and/or foreign body, symptomatic hardware, and continued instability, pain, dysfunction, or disability despite repair.         Rotator Cuff Repair and Biceps Tenodesis:  Risk of failure of repair or retear, biceps failure with kalee deformity, cramping, weakness, Arthrofibrosis and loss of motion needing further treatment, Continued pain/CPRS      Subjective:   Demetrio Villalobos is a 64 y.o. male who presents today for follow-up of his right shoulder. He had prior MRI which showed full thickness retracted tears of supraspinatus and subscapularis tendons. He has been doing PT at his long term care facility, but unfortunately notes no improvement. Prior corticosteroid injection also did not help him. He continues to complain of pain diffusely about the shoulder as well as limitations in ROM and weakness of the shoulder. He notes good sensation of the right upper extremity.       Review of Systems   Constitutional: Negative.  Negative for chills and fever.   HENT: Negative.  Negative for ear pain and sore throat.    Eyes: Negative.  Negative for pain and redness.   Respiratory: Negative.  Negative for shortness  of breath and wheezing.    Cardiovascular:  Negative for chest pain and palpitations.   Gastrointestinal: Negative.  Negative for abdominal pain and blood in stool.   Endocrine: Negative.  Negative for polydipsia and polyuria.   Genitourinary: Negative.  Negative for difficulty urinating and dysuria.   Musculoskeletal:         As noted in HPI   Skin: Negative.  Negative for pallor and rash.   Neurological: Negative.  Negative for dizziness and numbness.   Hematological: Negative.  Negative for adenopathy. Does not bruise/bleed easily.   Psychiatric/Behavioral: Negative.  Negative for confusion and suicidal ideas.          Past Medical History:   Diagnosis Date   • High cholesterol    • Hypertension    • Renal disorder    • Stroke (HCC)        No past surgical history on file.    No family history on file.    Social History     Occupational History   • Not on file   Tobacco Use   • Smoking status: Never   • Smokeless tobacco: Never   Vaping Use   • Vaping status: Never Used   Substance and Sexual Activity   • Alcohol use: Never   • Drug use: Never   • Sexual activity: Not on file         Current Outpatient Medications:   •  acetaminophen (TYLENOL) 325 mg tablet, Take 2 tablets (650 mg total) by mouth every 6 (six) hours as needed for mild pain, headaches or fever, Disp: , Rfl: 0  •  aspirin 81 mg chewable tablet, Chew 81 mg, Disp: , Rfl:   •  Diclofenac Sodium (VOLTAREN) 1 %, Apply 2 g topically 4 (four) times a day as needed (arthralgias), Disp: , Rfl:   •  lidocaine (LIDODERM) 5 %, Apply 1 patch topically over 12 hours daily Remove & Discard patch within 12 hours or as directed by MD Do not start before September 15, 2023., Disp: , Rfl: 0  •  methocarbamol (ROBAXIN) 500 mg tablet, Take 1 tablet (500 mg total) by mouth every 6 (six) hours as needed for muscle spasms for up to 6 doses, Disp: , Rfl: 0  •  metoprolol succinate (TOPROL-XL) 100 mg 24 hr tablet, Take 0.5 tablets (50 mg total) by mouth daily, Disp: 30  tablet, Rfl: 0  •  omeprazole (PriLOSEC) 20 mg delayed release capsule, Take 20 mg by mouth, Disp: , Rfl:   •  oxyCODONE (Roxicodone) 5 immediate release tablet, Take 1 tablet (5 mg total) by mouth every 6 (six) hours as needed for moderate pain or severe pain Max Daily Amount: 20 mg (Patient not taking: Reported on 1/10/2024), Disp: 10 tablet, Rfl: 0    No Known Allergies    Objective:  Vitals:    06/11/24 0924   BP: 137/89   Pulse: 76     Pain Score:   8      Ortho Exam  Right Shoulder Exam      Tenderness   The patient is experiencing no tenderness.     Range of Motion   External rotation:  60   Forward flexion:  90 (160 passive)      Muscle Strength   Right shoulder muscle strength:   4+/5 strength internal rotation.  Abduction: 3+/5   External rotation: 4/5      Tests   Drop arm: positive     Other   Erythema: absent  Sensation: normal  Pulse: present    Physical Exam  Constitutional:       General: He is not in acute distress.     Appearance: He is well-developed.   HENT:      Head: Normocephalic and atraumatic.   Eyes:      General: No scleral icterus.     Conjunctiva/sclera: Conjunctivae normal.   Neck:      Vascular: No JVD.   Cardiovascular:      Rate and Rhythm: Normal rate.   Pulmonary:      Effort: Pulmonary effort is normal. No respiratory distress.   Musculoskeletal:      Comments: As per HPI   Skin:     General: Skin is warm.   Neurological:      Mental Status: He is alert and oriented to person, place, and time.      Coordination: Coordination normal.       Previous right shoulder MRI from 12/5/2023 shows a supraspinatus tear with no muscle atrophy, subscapularis tear in the superior bundle with retraction and moderate atrophy.  Long head of the biceps tear, mild osteoarthritis and degenerative type fraying of the labrum.      This document was created using speech voice recognition software.   Grammatical errors, random word insertions, pronoun errors, and incomplete sentences are an occasional  consequence of this system due to software limitations, ambient noise, and hardware issues.   Any formal questions or concerns about content, text, or information contained within the body of this dictation should be directly addressed to the provider for clarification.

## 2024-06-25 RX ORDER — POTASSIUM CHLORIDE 750 MG/1
1 TABLET, FILM COATED, EXTENDED RELEASE ORAL 3 TIMES WEEKLY
COMMUNITY
Start: 2023-12-01

## 2024-06-25 RX ORDER — ALLOPURINOL 100 MG/1
1 TABLET ORAL EVERY 24 HOURS
COMMUNITY
Start: 2023-12-29

## 2024-06-25 RX ORDER — VITAMIN B COMPLEX/FOLIC ACID 0.4 MG
1 TABLET ORAL WEEKLY
COMMUNITY
Start: 2024-02-15

## 2024-06-26 ENCOUNTER — OFFICE VISIT (OUTPATIENT)
Dept: CARDIOLOGY CLINIC | Facility: CLINIC | Age: 64
End: 2024-06-26
Payer: COMMERCIAL

## 2024-06-26 VITALS
DIASTOLIC BLOOD PRESSURE: 90 MMHG | BODY MASS INDEX: 29.6 KG/M2 | OXYGEN SATURATION: 95 % | HEART RATE: 61 BPM | SYSTOLIC BLOOD PRESSURE: 112 MMHG | HEIGHT: 67 IN

## 2024-06-26 DIAGNOSIS — I10 PRIMARY HYPERTENSION: ICD-10-CM

## 2024-06-26 DIAGNOSIS — Z01.810 PRE-OPERATIVE CARDIOVASCULAR EXAMINATION: Primary | ICD-10-CM

## 2024-06-26 DIAGNOSIS — E78.5 HYPERLIPIDEMIA, UNSPECIFIED HYPERLIPIDEMIA TYPE: ICD-10-CM

## 2024-06-26 PROCEDURE — 93000 ELECTROCARDIOGRAM COMPLETE: CPT | Performed by: INTERNAL MEDICINE

## 2024-06-26 PROCEDURE — 99244 OFF/OP CNSLTJ NEW/EST MOD 40: CPT | Performed by: INTERNAL MEDICINE

## 2024-06-26 RX ORDER — ATORVASTATIN CALCIUM 40 MG/1
40 TABLET, FILM COATED ORAL DAILY
COMMUNITY

## 2024-06-26 RX ORDER — AMLODIPINE BESYLATE 10 MG/1
10 TABLET ORAL DAILY
COMMUNITY

## 2024-06-26 RX ORDER — ESOMEPRAZOLE MAGNESIUM 40 MG/1
40 CAPSULE, DELAYED RELEASE ORAL DAILY
COMMUNITY

## 2024-06-26 NOTE — PROGRESS NOTES
" Subjective:     Demetrio Villalobos is a 64 y.o. male  who presents to the office today for a preoperative consultation at the request of surgeon Dr. Randle who plans on performing rotator cuff repair on July 15.   Planned anesthesia is IV sedation. The patient has no known anesthesia issues. Most recent surgery was Appendectomy.   He is limited in mobility due to back pain.  Denies any chest pain or shortness of breath.  He had a previous CVA he awoke with weakness and speech abnormality.  Workup at that time was negative.  He was previously living in [place].  Now residing at a nursing home.      The following portions of the patient's history were reviewed and updated as appropriate: allergies, current medications, past family history, past medical history, past social history, past surgical history, and problem list.    Review of Systems  Review of Systems   Constitutional:  Positive for fatigue.   Respiratory:  Negative for shortness of breath.    Cardiovascular:  Negative for chest pain, palpitations and leg swelling.   Musculoskeletal:  Positive for arthralgias, back pain and myalgias.   Neurological:  Positive for speech difficulty and weakness.   All other systems reviewed and are negative.         Objective:      Physical Exam  /90 (BP Location: Right arm, Patient Position: Sitting, Cuff Size: Large)   Pulse 61   Ht 5' 7\" (1.702 m)   SpO2 95%   BMI 29.60 kg/m²    Physical Exam   Constitutional: He appears healthy. No distress.   Eyes: Pupils are equal, round, and reactive to light. Conjunctivae are normal.   Neck: No JVD present.   Cardiovascular: Normal rate, regular rhythm and normal heart sounds. Exam reveals no gallop and no friction rub.   No murmur heard.  Pulmonary/Chest: Effort normal and breath sounds normal. He has no wheezes. He has no rales.   Musculoskeletal:         General: No tenderness, deformity or edema.      Cervical back: Normal range of motion and neck supple.   Neurological: " "He is alert and oriented to person, place, and time.   Skin: Skin is warm and dry.        Cardiographics  ECG: normal sinus rhythm, no blocks or conduction defects, no ischemic changes  Echocardiogram: not done       Lab Review   Lab Results   Component Value Date    K 3.6 09/12/2023     09/12/2023    CO2 30 09/12/2023    BUN 26 (H) 09/12/2023    CREATININE 1.65 (H) 09/12/2023    CALCIUM 9.3 09/12/2023     Lab Results   Component Value Date    WBC 8.82 09/12/2023    HGB 15.0 09/12/2023    HCT 43.9 09/12/2023    MCV 86 09/12/2023     09/12/2023     No results found for: \"CHOL\", \"TRIG\", \"HDL\", \"LDLDIRECT\"       Assessment:     1. Pre-operative cardiovascular examination    2. Primary hypertension    3. Hyperlipidemia, unspecified hyperlipidemia type           64 y.o. male  with planned surgery as above.    Known risk factors for perioperative complications: None        Cardiac Risk Estimation: per the Revised Cardiac Risk Index, the patient has a score of {0 placing him at low risk of major cardiac event (3.9%),  and may proceed to OR as planned.  No further cardiac workup is indicated at this time.           Plan:      1. Preoperative workup as follows echocardiography to exclude impaired ventricular function.  He has limited functional capacity.  2. Change in medication regimen before surgery: none, continue medication regimen including morning of surgery, with sip of water.  3. Prophylaxis for cardiac events with perioperative beta-blockers: Patient is currently on beta blocker therapy, which he should continue on morning of procedure.  4. Invasive hemodynamic monitoring perioperatively: at the discretion of anesthesiologist.  5. Deep vein thrombosis prophylaxis postoperatively:regimen to be chosen by surgical team.  6. Other measures: Follow up as needed.    "

## 2024-07-10 ENCOUNTER — TELEPHONE (OUTPATIENT)
Age: 64
End: 2024-07-10

## 2024-07-10 NOTE — TELEPHONE ENCOUNTER
Received a call from Ruth at [facility]  at [facility].    Patient was scheduled for an echo tomorrow 7/11 and Ruth at the facility was informed that the echo has been denied.    The echo was ordered by Dr Roblero at an office visit for cardiac risk assessment prior to rotator cuff surgery scheduled for 7/15.     Ruth asked if patient will be able to proceed with the surgery without the echo being done.

## 2024-07-11 ENCOUNTER — OFFICE VISIT (OUTPATIENT)
Dept: NEPHROLOGY | Facility: CLINIC | Age: 64
End: 2024-07-11

## 2024-07-11 VITALS — SYSTOLIC BLOOD PRESSURE: 120 MMHG | DIASTOLIC BLOOD PRESSURE: 80 MMHG | HEIGHT: 67 IN | BODY MASS INDEX: 34.46 KG/M2

## 2024-07-11 DIAGNOSIS — N18.30 BENIGN HYPERTENSION WITH CHRONIC KIDNEY DISEASE, STAGE III (HCC): ICD-10-CM

## 2024-07-11 DIAGNOSIS — I12.9 BENIGN HYPERTENSION WITH CHRONIC KIDNEY DISEASE, STAGE III (HCC): ICD-10-CM

## 2024-07-11 DIAGNOSIS — E87.3 METABOLIC ALKALOSIS: ICD-10-CM

## 2024-07-11 DIAGNOSIS — E66.09 CLASS 1 OBESITY DUE TO EXCESS CALORIES WITH SERIOUS COMORBIDITY AND BODY MASS INDEX (BMI) OF 34.0 TO 34.9 IN ADULT: ICD-10-CM

## 2024-07-11 DIAGNOSIS — N18.31 STAGE 3A CHRONIC KIDNEY DISEASE (HCC): Primary | ICD-10-CM

## 2024-07-11 PROCEDURE — PBNCHG PB NO CHARGE PLACEHOLDER: Performed by: STUDENT IN AN ORGANIZED HEALTH CARE EDUCATION/TRAINING PROGRAM

## 2024-07-11 RX ORDER — METOPROLOL SUCCINATE 50 MG/1
50 TABLET, EXTENDED RELEASE ORAL
COMMUNITY

## 2024-07-11 RX ORDER — POLYETHYLENE GLYCOL 3350 17 G/17G
17 POWDER, FOR SOLUTION ORAL DAILY PRN
COMMUNITY

## 2024-07-11 NOTE — PROGRESS NOTES
NEPHROLOGY OUTPATIENT CONSULTATION   Demetrio Villalobos 64 y.o. male MRN: [id number]  Date: 7/11/2024  Reason for consultation: No chief complaint on file.      ASSESSMENT AND PLAN:  64 y.o. man with PMH of HTN, CKD (baseline creatinine 1.7-1.9 ) , gout,  CVA, hyperlipidemia who presents for initial consultation for CKD     PLAN:    #CKD G3a  Baseline creatinine: 1.7 to 1.9 mg/dL.  2020  Current creatinine: 1.65 mg/dL, baseline   Etiology:  likely secondary to nephrosclerosis, evidence of hyperglycemia    Plan: Will check UA, BMP   kidney bladder ultrasound ordered  No indication of urgent dialysis at this time  Avoid nephrotoxic agents such as NSAIDs    #Volume/hypertension:  Volume:Euvolemic on exam  Blood pressure: Normotensive, /80, goal less than 140/90  Low sodium diet   amlodipine 10 mg   Metoprolol 50 mg  Advised to maintain a good BP control to prevent progression of CKD     #Acid base disorder  serum HCO3 30mmol/L  metabolic alkalosis likely secondary to poor fluid intake     #CKD-MBD  Calcium 9.3mg/dL  At goal     #Secondary Hyperparathyroidism   Will check iPTH  guidelines levels KDIGO 2017      #Anemia:  Current hemoglobin:15mg/dL  At goal       #Obesity   BMI 34.36  Recommend weight loss , heathy diet  Lifestyle modification        HISTORY OF PRESENT ILLNESS:  Demetrio Villalobos is a 64 y.o. male with medical issues of HTN, CKD (baseline creatinine 1.7-1.9 ) , gout,  CVA, hyperlipidemia who presents for initial consultation for CKD     REVIEW OF SYSTEMS:    More than 10 point review of systems were obtained and discussed in length with the patient. Complete review of systems were negative / unremarkable except mentioned in the HPI section.    Review of Systems - Psychological ROS: negative  Ophthalmic ROS: negative  ENT ROS: negative  Hematological and Lymphatic ROS: negative  Endocrine ROS: negative  Respiratory ROS: no cough, shortness of breath, or wheezing  Cardiovascular ROS: no chest pain or  dyspnea on exertion  Gastrointestinal ROS: no abdominal pain, change in bowel habits, or black or bloody stools  Genito-Urinary ROS: no dysuria, trouble voiding, or hematuria  Musculoskeletal ROS: negative  Neurological ROS: no TIA or stroke symptoms  Dermatological ROS: negative     PHYSICAL EXAM:  There were no vitals filed for this visit.  There is no height or weight on file to calculate BMI.    Physical Exam  General:  no acute distress at this time  Skin:  No acute rash  Eyes:  No scleral icterus and noninjected  ENT:  mucous membranes moist  Neck:  no carotid bruits  Chest:  Clear to auscultation percussion, good respiratory effort, no use of accessory respiratory muscles  CVS:  Regular rate and rhythm without rub   Abdomen:  soft and nontender   Extremities: no significant lower extremity edema  Neuro:  No gross focality  Psych:  Alert , cooperative       PAST MEDICAL HISTORY:  Past Medical History:   Diagnosis Date    Chronic kidney disease     Gout     hospitalized    High cholesterol     Hypertension     Renal disorder     Stroke (HCC) 2014       PAST SURGICAL HISTORY:  Past Surgical History:   Procedure Laterality Date    APPENDECTOMY         ALLERGIES:  No Known Allergies    SOCIAL HISTORY:  Social History     Substance and Sexual Activity   Alcohol Use Never     Social History     Substance and Sexual Activity   Drug Use Never     Social History     Tobacco Use   Smoking Status Former    Types: Cigarettes    Passive exposure: Never   Smokeless Tobacco Never   Tobacco Comments    Smoker 30 yrs       FAMILY HISTORY:  No family history on file.    MEDICATIONS:    Current Outpatient Medications:     acetaminophen (TYLENOL) 325 mg tablet, Take 2 tablets (650 mg total) by mouth every 6 (six) hours as needed for mild pain, headaches or fever, Disp: , Rfl: 0    allopurinol (ZYLOPRIM) 100 mg tablet, Take 1 tablet by mouth every 24 hours, Disp: , Rfl:     amLODIPine (NORVASC) 10 mg tablet, Take 10 mg by mouth  daily, Disp: , Rfl:     aspirin 81 mg chewable tablet, Chew 81 mg, Disp: , Rfl:     atorvastatin (LIPITOR) 40 mg tablet, Take 40 mg by mouth daily, Disp: , Rfl:     Diclofenac Sodium (VOLTAREN) 1 %, Apply 2 g topically 4 (four) times a day as needed (arthralgias), Disp: , Rfl:     esomeprazole (NexIUM) 40 MG capsule, Take 40 mg by mouth in the morning, Disp: , Rfl:     lidocaine (LIDODERM) 5 %, Apply 1 patch topically over 12 hours daily Remove & Discard patch within 12 hours or as directed by MD Do not start before September 15, 2023., Disp: , Rfl: 0    methocarbamol (ROBAXIN) 500 mg tablet, Take 1 tablet (500 mg total) by mouth every 6 (six) hours as needed for muscle spasms for up to 6 doses, Disp: , Rfl: 0    Multiple Vitamins-Minerals (Multi-Vitamin Monocaps) TABS, Take 1 tablet by mouth once a week, Disp: , Rfl:     omeprazole (PriLOSEC) 20 mg delayed release capsule, Take 20 mg by mouth, Disp: , Rfl:     oxyCODONE (Roxicodone) 5 immediate release tablet, Take 1 tablet (5 mg total) by mouth every 6 (six) hours as needed for moderate pain or severe pain Max Daily Amount: 20 mg, Disp: 10 tablet, Rfl: 0    potassium chloride (Klor-Con) 10 mEq tablet, Take 1 tablet by mouth 3 (three) times a week, Disp: , Rfl:     Lab Results:   Results for orders placed or performed during the hospital encounter of 09/08/23   CBC and differential   Result Value Ref Range    WBC 12.12 (H) 4.31 - 10.16 Thousand/uL    RBC 4.95 3.88 - 5.62 Million/uL    Hemoglobin 14.3 12.0 - 17.0 g/dL    Hematocrit 42.0 36.5 - 49.3 %    MCV 85 82 - 98 fL    MCH 28.9 26.8 - 34.3 pg    MCHC 34.0 31.4 - 37.4 g/dL    RDW 14.3 11.6 - 15.1 %    MPV 10.0 8.9 - 12.7 fL    Platelets 280 149 - 390 Thousands/uL    nRBC 0 /100 WBCs    Segmented % 84 (H) 43 - 75 %    Immature Grans % 0 0 - 2 %    Lymphocytes % 6 (L) 14 - 44 %    Monocytes % 10 4 - 12 %    Eosinophils Relative 0 0 - 6 %    Basophils Relative 0 0 - 1 %    Absolute Neutrophils 10.05 (H) 1.85 - 7.62  Thousands/µL    Absolute Immature Grans 0.03 0.00 - 0.20 Thousand/uL    Absolute Lymphocytes 0.73 0.60 - 4.47 Thousands/µL    Absolute Monocytes 1.21 0.17 - 1.22 Thousand/µL    Eosinophils Absolute 0.05 0.00 - 0.61 Thousand/µL    Basophils Absolute 0.05 0.00 - 0.10 Thousands/µL   Comprehensive metabolic panel   Result Value Ref Range    Sodium 135 135 - 147 mmol/L    Potassium 2.8 (L) 3.5 - 5.3 mmol/L    Chloride 97 96 - 108 mmol/L    CO2 27 21 - 32 mmol/L    ANION GAP 11 mmol/L    BUN 23 5 - 25 mg/dL    Creatinine 1.89 (H) 0.60 - 1.30 mg/dL    Glucose 112 65 - 140 mg/dL    Calcium 9.7 8.4 - 10.2 mg/dL    AST 12 (L) 13 - 39 U/L    ALT 9 7 - 52 U/L    Alkaline Phosphatase 61 34 - 104 U/L    Total Protein 8.5 (H) 6.4 - 8.4 g/dL    Albumin 4.5 3.5 - 5.0 g/dL    Total Bilirubin 0.76 0.20 - 1.00 mg/dL    eGFR 37 ml/min/1.73sq m   Basic metabolic panel   Result Value Ref Range    Sodium 134 (L) 135 - 147 mmol/L    Potassium 3.6 3.5 - 5.3 mmol/L    Chloride 99 96 - 108 mmol/L    CO2 27 21 - 32 mmol/L    ANION GAP 8 mmol/L    BUN 22 5 - 25 mg/dL    Creatinine 1.74 (H) 0.60 - 1.30 mg/dL    Glucose 151 (H) 65 - 140 mg/dL    Glucose, Fasting 151 (H) 65 - 99 mg/dL    Calcium 9.4 8.4 - 10.2 mg/dL    eGFR 41 ml/min/1.73sq m   Magnesium   Result Value Ref Range    Magnesium 1.9 1.9 - 2.7 mg/dL   CBC (With Platelets)   Result Value Ref Range    WBC 11.66 (H) 4.31 - 10.16 Thousand/uL    RBC 4.74 3.88 - 5.62 Million/uL    Hemoglobin 14.1 12.0 - 17.0 g/dL    Hematocrit 40.3 36.5 - 49.3 %    MCV 85 82 - 98 fL    MCH 29.7 26.8 - 34.3 pg    MCHC 35.0 31.4 - 37.4 g/dL    RDW 14.3 11.6 - 15.1 %    Platelets 245 149 - 390 Thousands/uL    MPV 9.7 8.9 - 12.7 fL   Basic metabolic panel   Result Value Ref Range    Sodium 138 135 - 147 mmol/L    Potassium 3.5 3.5 - 5.3 mmol/L    Chloride 103 96 - 108 mmol/L    CO2 25 21 - 32 mmol/L    ANION GAP 10 mmol/L    BUN 29 (H) 5 - 25 mg/dL    Creatinine 1.71 (H) 0.60 - 1.30 mg/dL    Glucose 108 65 - 140  mg/dL    Glucose, Fasting 108 (H) 65 - 99 mg/dL    Calcium 9.2 8.4 - 10.2 mg/dL    eGFR 42 ml/min/1.73sq m   CBC and differential   Result Value Ref Range    WBC 14.88 (H) 4.31 - 10.16 Thousand/uL    RBC 4.78 3.88 - 5.62 Million/uL    Hemoglobin 13.9 12.0 - 17.0 g/dL    Hematocrit 41.0 36.5 - 49.3 %    MCV 86 82 - 98 fL    MCH 29.1 26.8 - 34.3 pg    MCHC 33.9 31.4 - 37.4 g/dL    RDW 14.5 11.6 - 15.1 %    MPV 10.2 8.9 - 12.7 fL    Platelets 283 149 - 390 Thousands/uL    nRBC 0 /100 WBCs    Segmented % 82 (H) 43 - 75 %    Immature Grans % 1 0 - 2 %    Lymphocytes % 7 (L) 14 - 44 %    Monocytes % 10 4 - 12 %    Eosinophils Relative 0 0 - 6 %    Basophils Relative 0 0 - 1 %    Absolute Neutrophils 12.26 (H) 1.85 - 7.62 Thousands/µL    Absolute Immature Grans 0.07 0.00 - 0.20 Thousand/uL    Absolute Lymphocytes 1.02 0.60 - 4.47 Thousands/µL    Absolute Monocytes 1.51 (H) 0.17 - 1.22 Thousand/µL    Eosinophils Absolute 0.00 0.00 - 0.61 Thousand/µL    Basophils Absolute 0.02 0.00 - 0.10 Thousands/µL   CBC and differential   Result Value Ref Range    WBC 9.85 4.31 - 10.16 Thousand/uL    RBC 4.70 3.88 - 5.62 Million/uL    Hemoglobin 13.8 12.0 - 17.0 g/dL    Hematocrit 40.6 36.5 - 49.3 %    MCV 86 82 - 98 fL    MCH 29.4 26.8 - 34.3 pg    MCHC 34.0 31.4 - 37.4 g/dL    RDW 14.6 11.6 - 15.1 %    MPV 10.1 8.9 - 12.7 fL    Platelets 270 149 - 390 Thousands/uL    nRBC 0 /100 WBCs    Segmented % 78 (H) 43 - 75 %    Immature Grans % 0 0 - 2 %    Lymphocytes % 13 (L) 14 - 44 %    Monocytes % 9 4 - 12 %    Eosinophils Relative 0 0 - 6 %    Basophils Relative 0 0 - 1 %    Absolute Neutrophils 7.54 1.85 - 7.62 Thousands/µL    Absolute Immature Grans 0.03 0.00 - 0.20 Thousand/uL    Absolute Lymphocytes 1.32 0.60 - 4.47 Thousands/µL    Absolute Monocytes 0.92 0.17 - 1.22 Thousand/µL    Eosinophils Absolute 0.01 0.00 - 0.61 Thousand/µL    Basophils Absolute 0.03 0.00 - 0.10 Thousands/µL   Basic metabolic panel   Result Value Ref Range     "Sodium 139 135 - 147 mmol/L    Potassium 3.3 (L) 3.5 - 5.3 mmol/L    Chloride 104 96 - 108 mmol/L    CO2 29 21 - 32 mmol/L    ANION GAP 6 mmol/L    BUN 26 (H) 5 - 25 mg/dL    Creatinine 1.57 (H) 0.60 - 1.30 mg/dL    Glucose 97 65 - 140 mg/dL    Glucose, Fasting 97 65 - 99 mg/dL    Calcium 8.9 8.4 - 10.2 mg/dL    eGFR 46 ml/min/1.73sq m   Magnesium   Result Value Ref Range    Magnesium 2.0 1.9 - 2.7 mg/dL   Basic metabolic panel   Result Value Ref Range    Sodium 139 135 - 147 mmol/L    Potassium 3.6 3.5 - 5.3 mmol/L    Chloride 104 96 - 108 mmol/L    CO2 30 21 - 32 mmol/L    ANION GAP 5 mmol/L    BUN 26 (H) 5 - 25 mg/dL    Creatinine 1.65 (H) 0.60 - 1.30 mg/dL    Glucose 95 65 - 140 mg/dL    Calcium 9.3 8.4 - 10.2 mg/dL    eGFR 44 ml/min/1.73sq m   CBC and differential   Result Value Ref Range    WBC 8.82 4.31 - 10.16 Thousand/uL    RBC 5.09 3.88 - 5.62 Million/uL    Hemoglobin 15.0 12.0 - 17.0 g/dL    Hematocrit 43.9 36.5 - 49.3 %    MCV 86 82 - 98 fL    MCH 29.5 26.8 - 34.3 pg    MCHC 34.2 31.4 - 37.4 g/dL    RDW 14.4 11.6 - 15.1 %    MPV 10.1 8.9 - 12.7 fL    Platelets 280 149 - 390 Thousands/uL    nRBC 0 /100 WBCs    Segmented % 73 43 - 75 %    Immature Grans % 1 0 - 2 %    Lymphocytes % 16 14 - 44 %    Monocytes % 10 4 - 12 %    Eosinophils Relative 0 0 - 6 %    Basophils Relative 0 0 - 1 %    Absolute Neutrophils 6.40 1.85 - 7.62 Thousands/µL    Absolute Immature Grans 0.04 0.00 - 0.20 Thousand/uL    Absolute Lymphocytes 1.45 0.60 - 4.47 Thousands/µL    Absolute Monocytes 0.89 0.17 - 1.22 Thousand/µL    Eosinophils Absolute 0.02 0.00 - 0.61 Thousand/µL    Basophils Absolute 0.02 0.00 - 0.10 Thousands/µL   Magnesium   Result Value Ref Range    Magnesium 2.0 1.9 - 2.7 mg/dL       Portions of the record may have been created with voice recognition software. Occasional wrong word or \"sound a like\" substitutions may have occurred due to the inherent limitations of voice recognition software. Read the chart " carefully and recognize, using context, where substitutions have occurred.

## 2024-07-11 NOTE — PATIENT INSTRUCTIONS
Thank you for coming to your visit today. As we discussed you kidney function is abnormal at least since 2022, your creatinine is 1.6 mg/dL.  Your electrolytes are normal. Please follow the recommendations below       Recommend low sodium (salt) food    Avoid nonsteroidal anti-inflammatory drugs such as Naprosyn, ibuprofen, Aleve, Advil, Celebrex, Meloxicam (Mobic) etc.  You can use Tylenol as needed if you do not have any liver condition to be concerned about    Try to avoid medications such as pantoprazole or  Protonix/Nexium or Esomeprazole)/Prilosec or omeprazole/Prevacid or lansoprazole/AcipHex or Rabeprazole.  If you are able to, use Pepcid as this is safer for your kidneys.    Try to exercise at least 30 minutes 3 days a week to begin with with an ultimate goal of 5 days a week for at least 30 minutes    Try to lose 5-10 lb by your next visit      Next Visit in 4 months with results   If you need to see us earlier we can change the appointment for you      .  Joselyn Reyes Bahamonde, MD  Nephrology Attending       
no

## 2024-07-15 ENCOUNTER — TELEPHONE (OUTPATIENT)
Age: 64
End: 2024-07-15

## 2024-07-15 PROBLEM — E66.09 CLASS 1 OBESITY DUE TO EXCESS CALORIES WITH SERIOUS COMORBIDITY AND BODY MASS INDEX (BMI) OF 34.0 TO 34.9 IN ADULT: Status: ACTIVE | Noted: 2024-07-15

## 2024-07-15 PROBLEM — N18.30 BENIGN HYPERTENSION WITH CHRONIC KIDNEY DISEASE, STAGE III (HCC): Status: ACTIVE | Noted: 2023-09-09

## 2024-07-15 PROBLEM — E66.811 CLASS 1 OBESITY DUE TO EXCESS CALORIES WITH SERIOUS COMORBIDITY AND BODY MASS INDEX (BMI) OF 34.0 TO 34.9 IN ADULT: Status: ACTIVE | Noted: 2024-07-15

## 2024-07-15 PROBLEM — I12.9 BENIGN HYPERTENSION WITH CHRONIC KIDNEY DISEASE, STAGE III (HCC): Status: ACTIVE | Noted: 2023-09-09

## 2024-07-15 PROBLEM — E87.3 METABOLIC ALKALOSIS: Status: ACTIVE | Noted: 2024-07-15

## 2024-07-15 NOTE — TELEPHONE ENCOUNTER
Caller: Patient    Doctor: Jer    Reason for call:     Patient is scheduled for right shoulder surgery this morning at 11:00 am, he would like to cancel his surgery.  He is asking for a call back to confirm his cancelation.      Call back#: [phone]

## 2024-07-15 NOTE — TELEPHONE ENCOUNTER
Returned patient call.  Confirmed cancellation, patient does not wish to reschedule at this time.

## 2024-07-16 ENCOUNTER — TELEPHONE (OUTPATIENT)
Age: 64
End: 2024-07-16

## 2024-07-16 NOTE — TELEPHONE ENCOUNTER
Ruth from [facility] at \[facility] [place] [facility]\"" asks if the 7/11 office note can please be faxed to them at [phone].

## 2024-07-26 ENCOUNTER — OFFICE VISIT (OUTPATIENT)
Dept: VASCULAR SURGERY | Facility: CLINIC | Age: 64
End: 2024-07-26
Payer: COMMERCIAL

## 2024-07-26 VITALS
RESPIRATION RATE: 18 BRPM | SYSTOLIC BLOOD PRESSURE: 132 MMHG | BODY MASS INDEX: 34.46 KG/M2 | HEART RATE: 65 BPM | HEIGHT: 67 IN | DIASTOLIC BLOOD PRESSURE: 84 MMHG | OXYGEN SATURATION: 98 %

## 2024-07-26 DIAGNOSIS — E66.09 CLASS 1 OBESITY DUE TO EXCESS CALORIES WITH SERIOUS COMORBIDITY AND BODY MASS INDEX (BMI) OF 34.0 TO 34.9 IN ADULT: ICD-10-CM

## 2024-07-26 DIAGNOSIS — E78.2 MODERATE MIXED HYPERLIPIDEMIA NOT REQUIRING STATIN THERAPY: ICD-10-CM

## 2024-07-26 DIAGNOSIS — N18.30 BENIGN HYPERTENSION WITH CHRONIC KIDNEY DISEASE, STAGE III (HCC): ICD-10-CM

## 2024-07-26 DIAGNOSIS — Z86.73 H/O: CVA (CEREBROVASCULAR ACCIDENT): ICD-10-CM

## 2024-07-26 DIAGNOSIS — R60.0 BILATERAL LOWER EXTREMITY EDEMA: ICD-10-CM

## 2024-07-26 DIAGNOSIS — I10 PRIMARY HYPERTENSION: ICD-10-CM

## 2024-07-26 DIAGNOSIS — I73.9 PERIPHERAL ARTERIAL DISEASE (HCC): Primary | ICD-10-CM

## 2024-07-26 DIAGNOSIS — I12.9 BENIGN HYPERTENSION WITH CHRONIC KIDNEY DISEASE, STAGE III (HCC): ICD-10-CM

## 2024-07-26 PROCEDURE — 99244 OFF/OP CNSLTJ NEW/EST MOD 40: CPT | Performed by: PHYSICIAN ASSISTANT

## 2024-07-26 NOTE — PATIENT INSTRUCTIONS
Atherosclerosis lower extremities  -PAD by screening lower extremity arterial study at NH  -RF: History of heavy smoking (quit 10 years ago), hypertension, hypercholesterolemia, obesity, functionally limited after CVA    Plan:  -No unstable vascular symptoms; no ischemic rest pain or tissue loss  -Vascular pulse exam reveals a femoral, DP and PT pulses  -Already on aspirin and atorvastatin 40  -Reviewed outside report only vascular study with mild to moderate PAD  -No further vascular testing or intervention is required prior to orthopedic surgery  -Recommend routine duplex surveillance and clinical monitoring  -Plan for [facility] in 1 year with office visit  -Patient education regarding PAD provided; we discussed symptoms concerning for which she should be seen sooner such as foot pain, coldness, discoloration or new/nonhealing wounds        Lower extremity edema  -Lower extremity edema likely due to history of stroke and dependency  -Mild bilateral lower extremity edema left greater than right; no evidence of chronic venous stasis changes  -Avoid legs hanging down and elevate legs periodically during the day, continue with compression stockings, good skin care and monitoring condition
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
## 2024-07-26 NOTE — ASSESSMENT & PLAN NOTE
Atherosclerosis lower extremities  -Mild to moderate PAD without occlusions by outside lower extremity arterial study done at NH  -RF: History of heavy smoking (quit 10 years ago), hypertension, hypercholesterolemia, obesity, functionally limited after CVA    Plan:  -64 yoM with moderate bilateral peripheral arterial disease  -He is asymptomatic, with the caveat that he is also functionally limited.   -There are no unstable vascular symptoms. He has no ischemic rest pain or tissue loss.  -Vascular pulse exam reveals femoral, DP and PT pulses intact  -Already on aspirin and atorvastatin 40  -Reviewed outside vascular study report with mild to moderate PAD outlined below  -No further vascular testing or intervention is required prior to orthopedic surgery  -Discussed the importance of daily preventative footcare and monitoring  -Recommend routine duplex surveillance and clinical monitoring of PAD  -Patient education regarding PAD provided; we discussed symptoms concerning for which he should be seen sooner such as foot pain, coldness, discoloration or new/nonhealing wounds  -Plan for [facility] in 1 year with office visit

## 2024-07-26 NOTE — ASSESSMENT & PLAN NOTE
-Lower extremity edema likely due to history of stroke and dependency  -Mild bilateral lower extremity edema left greater than right; no evidence of chronic venous stasis changes  -Avoid legs hanging down and elevate legs periodically during the day, continue with compression stockings, good skin care and monitoring condition

## 2024-08-06 ENCOUNTER — APPOINTMENT (OUTPATIENT)
Dept: RADIOLOGY | Facility: CLINIC | Age: 64
End: 2024-08-06
Payer: COMMERCIAL

## 2024-08-06 ENCOUNTER — OFFICE VISIT (OUTPATIENT)
Dept: OBGYN CLINIC | Facility: CLINIC | Age: 64
End: 2024-08-06
Payer: COMMERCIAL

## 2024-08-06 VITALS
WEIGHT: 220 LBS | HEART RATE: 64 BPM | HEIGHT: 67 IN | SYSTOLIC BLOOD PRESSURE: 144 MMHG | BODY MASS INDEX: 34.53 KG/M2 | DIASTOLIC BLOOD PRESSURE: 90 MMHG

## 2024-08-06 DIAGNOSIS — M75.121 COMPLETE TEAR OF RIGHT ROTATOR CUFF, UNSPECIFIED WHETHER TRAUMATIC: Primary | ICD-10-CM

## 2024-08-06 DIAGNOSIS — M25.511 CHRONIC RIGHT SHOULDER PAIN: ICD-10-CM

## 2024-08-06 DIAGNOSIS — G89.29 CHRONIC RIGHT SHOULDER PAIN: ICD-10-CM

## 2024-08-06 DIAGNOSIS — M19.011 OSTEOARTHRITIS OF RIGHT GLENOHUMERAL JOINT: ICD-10-CM

## 2024-08-06 PROCEDURE — 73030 X-RAY EXAM OF SHOULDER: CPT

## 2024-08-06 PROCEDURE — 99214 OFFICE O/P EST MOD 30 MIN: CPT | Performed by: ORTHOPAEDIC SURGERY

## 2024-08-06 RX ORDER — CHLORHEXIDINE GLUCONATE ORAL RINSE 1.2 MG/ML
15 SOLUTION DENTAL ONCE
OUTPATIENT
Start: 2024-08-06 | End: 2024-08-06

## 2024-08-06 NOTE — PROGRESS NOTES
Assessment/Plan:  1. Complete tear of right rotator cuff, unspecified whether traumatic  CT shoulder right blue print    Case request operating room: ARTHROPLASTY SHOULDER REVERSE    MRSA culture    Type and screen    MRSA culture    Type and screen    Case request operating room: ARTHROPLASTY SHOULDER REVERSE      2. Osteoarthritis of right glenohumeral joint  CT shoulder right blue print    Case request operating room: ARTHROPLASTY SHOULDER REVERSE    MRSA culture    Type and screen    MRSA culture    Type and screen    Case request operating room: ARTHROPLASTY SHOULDER REVERSE      3. Chronic right shoulder pain  XR shoulder 2+ vw right    CT shoulder right blue print        Scribe Attestation    I,:  Valerie Troncoso am acting as a scribe while in the presence of the attending physician.:       I,:  Sina Randle MD personally performed the services described in this documentation    as scribed in my presence.:           Demetrio is a pleasant 64 y.o. male who presents for follow-up of his right shoulder. After obtaining new X-rays in the office today, his glenohumeral osteoarthritis has worsened.  We again discussed options previously discussed reverse total shoulder arthroplasty versus rotator cuff repair.  Given his new clicking inside the joint which is getting more painful and worse as well as his worsening arthritis on x-ray.  Given this with the subscapularis atrophy, we have discussed options again including continued nonoperative treatment versus operative treatment with arthroscopic surgery versus shoulder replacement.  Although we have previously been worried about the risk of surgery I think now is benefits of having a partial shoulder arthroplasty with a the risk given his worsening arthritis, atrophy on MRI which is now 8 months old and worsening clicking inside of his joint and glenohumeral crepitus.  We discussed that with rotator cuff repair he still have the crepitus in his glenohumeral joint  with increased pain. A CT blueprint of the right shoulder was ordered for pre operative planning.  He will meet with the surgical scheduler today to set up a date for surgery, as well as preoperative testing. He will begin physical therapy 7-10 days following surgery. He will follow-up 2 weeks after surgery. All of his questions and concerns were addressed today.      Given that the patient has failed conservative treatment and continues to have severe pain and/or dysfunction that limits their activities of daily living, they would like to proceed with operative intervention.  We discussed risks, benefits and alternatives to surgery today in the clinic. We discussed with the patient the risks of no treatment, non-operative treatment, and operative treatment. The risks of operative intervention were discussed and include but are not limited to: Infection, bleeding, stiffness, loss of range of motion, blood clot, failure of surgery, fracture, delayed union, nonunion, malunion, risk of potential future arthritis, continued problems with swelling, injury to surrounding structures/nerve/artery/vein, recurrence of cysts, failure of hardware, retained hardware and/or foreign body, symptomatic hardware, and continued instability, pain, dysfunction, or disability despite repair.     Specifically, for     Shoulder Arthroplasty: Infection needing multiple staged surgery for treatment, Revision surgery, failure of rotator cuff repair, loss of motion and strength, activity limitations, nerve palsies, acromial stress fractures, coracoid fractures, glenoid/humeral fractures, periprosthetic fractures         Subjective:   Demetrio Villalobos is a 64 y.o. male who presents for follow-up evaluation of his right shoulder. He was previously scheduled for a right shoulder arthroscopy with rotator cuff repair for 7/15/2024 but decided to cancel the procedure and wanted to think about his options.. Today, he reports the right shoulder  continues to be bothersome and painful. He indicates his right shoulder pain is located anteriorly and laterally on the shoulder. He describes the pain as sharp. He has difficulty raising his arm. He has difficulty sleeping at night due to his pain. He denies taking any over the counter pain medications at this time. He has completed physical therapy for the right shoulder with minimal relief.  He also has worsening clicking inside of his joint.  He had a right subacromial bursa injection on 1/10/2024 with minimal relief. He is interested in rescheduling his surgery.  He was previously cleared by cardiology.      Review of Systems   Constitutional:  Positive for activity change. Negative for chills and fever.   HENT:  Negative for ear pain and sore throat.    Eyes:  Negative for pain and visual disturbance.   Respiratory:  Negative for cough and shortness of breath.    Cardiovascular:  Negative for chest pain and palpitations.   Gastrointestinal:  Negative for abdominal pain and vomiting.   Genitourinary:  Negative for dysuria and hematuria.   Musculoskeletal:  Positive for myalgias. Negative for arthralgias and back pain.   Skin:  Negative for color change and rash.   Neurological:  Negative for seizures and syncope.   All other systems reviewed and are negative.        Past Medical History:   Diagnosis Date   • Chronic kidney disease    • Edema    • GERD (gastroesophageal reflux disease)    • Gout     hospitalized   • High cholesterol    • Hypertension    • PVD (peripheral vascular disease) ([facility])    • Renal disorder    • Rotator cuff tear     right   • Shortness of breath    • Stroke ([facility]) 2014   • Ventral hernia without obstruction or gangrene        Past Surgical History:   Procedure Laterality Date   • APPENDECTOMY         History reviewed. No pertinent family history.    Social History     Occupational History   • Not on file   Tobacco Use   • Smoking status: Former     Types: Cigarettes     Passive exposure:  Never   • Smokeless tobacco: Never   • Tobacco comments:     Smoker 30 yrs   Vaping Use   • Vaping status: Never Used   Substance and Sexual Activity   • Alcohol use: Never   • Drug use: Never   • Sexual activity: Not on file         Current Outpatient Medications:   •  acetaminophen (TYLENOL) 325 mg tablet, Take 2 tablets (650 mg total) by mouth every 6 (six) hours as needed for mild pain, headaches or fever, Disp: , Rfl: 0  •  allopurinol (ZYLOPRIM) 100 mg tablet, Take 1 tablet by mouth every morning, Disp: , Rfl:   •  amLODIPine (NORVASC) 10 mg tablet, Take 10 mg by mouth every morning, Disp: , Rfl:   •  aspirin 81 mg chewable tablet, Chew 81 mg every morning, Disp: , Rfl:   •  atorvastatin (LIPITOR) 40 mg tablet, Take 40 mg by mouth daily with dinner, Disp: , Rfl:   •  esomeprazole (NexIUM) 40 MG capsule, Take 40 mg by mouth in the morning, Disp: , Rfl:   •  metoprolol succinate (TOPROL-XL) 50 mg 24 hr tablet, Take 50 mg by mouth daily with dinner, Disp: , Rfl:   •  polyethylene glycol (MIRALAX) 17 g packet, Take 17 g by mouth daily as needed, Disp: , Rfl:   •  potassium chloride (Klor-Con) 10 mEq tablet, Take 1 tablet by mouth 3 (three) times a week In the AM, Disp: , Rfl:   •  Diclofenac Sodium (VOLTAREN) 1 %, Apply 2 g topically 4 (four) times a day as needed (arthralgias) (Patient not taking: Reported on 8/6/2024), Disp: , Rfl:   •  lidocaine (LIDODERM) 5 %, Apply 1 patch topically over 12 hours daily Remove & Discard patch within 12 hours or as directed by MD Do not start before September 15, 2023. (Patient not taking: Reported on 7/26/2024), Disp: , Rfl: 0  •  methocarbamol (ROBAXIN) 500 mg tablet, Take 500 mg by mouth every 6 (six) hours as needed for muscle spasms (Patient not taking: Reported on 7/26/2024), Disp: , Rfl:   •  Multiple Vitamin (multivitamin) tablet, Take 1 tablet by mouth daily (Patient not taking: Reported on 7/26/2024), Disp: , Rfl:   •  omeprazole (PriLOSEC) 20 mg delayed release  capsule, Take 20 mg by mouth daily (Patient not taking: Reported on 7/26/2024), Disp: , Rfl:     No Known Allergies    Objective:  Vitals:    08/06/24 0958   BP: 144/90   Pulse: 64       Right Shoulder Exam     Tenderness   The patient is experiencing no tenderness.    Range of Motion   External rotation:  60   Forward flexion:  90 (150 passive)     Muscle Strength   Right shoulder normal muscle strength: 4+/5 strength internal rotation.  Internal rotation: 5/5   External rotation: 4/5   Supraspinatus: 3/5     Tests   Drop arm: positive    Other   Erythema: absent  Sensation: normal  Pulse: present            Physical Exam  Vitals and nursing note reviewed.   Constitutional:       Appearance: Normal appearance.   HENT:      Head: Normocephalic and atraumatic.      Right Ear: External ear normal.      Left Ear: External ear normal.      Nose: Nose normal.   Eyes:      General: No scleral icterus.     Extraocular Movements: Extraocular movements intact.      Conjunctiva/sclera: Conjunctivae normal.   Cardiovascular:      Rate and Rhythm: Normal rate.   Pulmonary:      Effort: Pulmonary effort is normal. No respiratory distress.   Musculoskeletal:         General: Tenderness present.      Cervical back: Normal range of motion and neck supple.      Comments: See ortho exam   Skin:     General: Skin is warm and dry.   Neurological:      Mental Status: He is alert and oriented to person, place, and time.   Psychiatric:         Mood and Affect: Mood normal.         Behavior: Behavior normal.         I have personally reviewed pertinent films in PACS and my interpretation is as follows:    X-rays of the right shoulder obtained in the office today demonstrate moderate glenohumeral osteoarthritis. No acute fractures or dislocations.    MRI of the right shoulder obtained on 12/5/2023 demonstrates Complete full-thickness insertional tear of the supraspinatus tendon, which is retracted to the level of the humeral head apex and  extends into the anterior fibers of the infraspinatus tendon. No muscle atrophy. Chronic full-thickness articular-surface insertional tear of the superior bundle of the subscapularis tendon with associated moderate fatty atrophy of the muscle. Non visualized long head biceps tendon, consistent with a complete tear.    This document was created using speech voice recognition software.   Grammatical errors, random word insertions, pronoun errors, and incomplete sentences are an occasional consequence of this system due to software limitations, ambient noise, and hardware issues.   Any formal questions or concerns about content, text, or information contained within the body of this dictation should be directly addressed to the provider for clarification.

## 2024-08-06 NOTE — H&P (VIEW-ONLY)
Assessment/Plan:  1. Complete tear of right rotator cuff, unspecified whether traumatic  CT shoulder right blue print    Case request operating room: ARTHROPLASTY SHOULDER REVERSE    MRSA culture    Type and screen    MRSA culture    Type and screen    Case request operating room: ARTHROPLASTY SHOULDER REVERSE      2. Osteoarthritis of right glenohumeral joint  CT shoulder right blue print    Case request operating room: ARTHROPLASTY SHOULDER REVERSE    MRSA culture    Type and screen    MRSA culture    Type and screen    Case request operating room: ARTHROPLASTY SHOULDER REVERSE      3. Chronic right shoulder pain  XR shoulder 2+ vw right    CT shoulder right blue print        Scribe Attestation    I,:  Valerie Troncoso am acting as a scribe while in the presence of the attending physician.:       I,:  Sina Randle MD personally performed the services described in this documentation    as scribed in my presence.:           Demetrio is a pleasant 64 y.o. male who presents for follow-up of his right shoulder. After obtaining new X-rays in the office today, his glenohumeral osteoarthritis has worsened.  We again discussed options previously discussed reverse total shoulder arthroplasty versus rotator cuff repair.  Given his new clicking inside the joint which is getting more painful and worse as well as his worsening arthritis on x-ray.  Given this with the subscapularis atrophy, we have discussed options again including continued nonoperative treatment versus operative treatment with arthroscopic surgery versus shoulder replacement.  Although we have previously been worried about the risk of surgery I think now is benefits of having a partial shoulder arthroplasty with a the risk given his worsening arthritis, atrophy on MRI which is now 8 months old and worsening clicking inside of his joint and glenohumeral crepitus.  We discussed that with rotator cuff repair he still have the crepitus in his glenohumeral joint  with increased pain. A CT blueprint of the right shoulder was ordered for pre operative planning.  He will meet with the surgical scheduler today to set up a date for surgery, as well as preoperative testing. He will begin physical therapy 7-10 days following surgery. He will follow-up 2 weeks after surgery. All of his questions and concerns were addressed today.      Given that the patient has failed conservative treatment and continues to have severe pain and/or dysfunction that limits their activities of daily living, they would like to proceed with operative intervention.  We discussed risks, benefits and alternatives to surgery today in the clinic. We discussed with the patient the risks of no treatment, non-operative treatment, and operative treatment. The risks of operative intervention were discussed and include but are not limited to: Infection, bleeding, stiffness, loss of range of motion, blood clot, failure of surgery, fracture, delayed union, nonunion, malunion, risk of potential future arthritis, continued problems with swelling, injury to surrounding structures/nerve/artery/vein, recurrence of cysts, failure of hardware, retained hardware and/or foreign body, symptomatic hardware, and continued instability, pain, dysfunction, or disability despite repair.     Specifically, for     Shoulder Arthroplasty: Infection needing multiple staged surgery for treatment, Revision surgery, failure of rotator cuff repair, loss of motion and strength, activity limitations, nerve palsies, acromial stress fractures, coracoid fractures, glenoid/humeral fractures, periprosthetic fractures         Subjective:   Demetrio Villalobos is a 64 y.o. male who presents for follow-up evaluation of his right shoulder. He was previously scheduled for a right shoulder arthroscopy with rotator cuff repair for 7/15/2024 but decided to cancel the procedure and wanted to think about his options.. Today, he reports the right shoulder  continues to be bothersome and painful. He indicates his right shoulder pain is located anteriorly and laterally on the shoulder. He describes the pain as sharp. He has difficulty raising his arm. He has difficulty sleeping at night due to his pain. He denies taking any over the counter pain medications at this time. He has completed physical therapy for the right shoulder with minimal relief.  He also has worsening clicking inside of his joint.  He had a right subacromial bursa injection on 1/10/2024 with minimal relief. He is interested in rescheduling his surgery.  He was previously cleared by cardiology.      Review of Systems   Constitutional:  Positive for activity change. Negative for chills and fever.   HENT:  Negative for ear pain and sore throat.    Eyes:  Negative for pain and visual disturbance.   Respiratory:  Negative for cough and shortness of breath.    Cardiovascular:  Negative for chest pain and palpitations.   Gastrointestinal:  Negative for abdominal pain and vomiting.   Genitourinary:  Negative for dysuria and hematuria.   Musculoskeletal:  Positive for myalgias. Negative for arthralgias and back pain.   Skin:  Negative for color change and rash.   Neurological:  Negative for seizures and syncope.   All other systems reviewed and are negative.        Past Medical History:   Diagnosis Date   • Chronic kidney disease    • Edema    • GERD (gastroesophageal reflux disease)    • Gout     hospitalized   • High cholesterol    • Hypertension    • PVD (peripheral vascular disease) ([facility])    • Renal disorder    • Rotator cuff tear     right   • Shortness of breath    • Stroke ([facility]) 2014   • Ventral hernia without obstruction or gangrene        Past Surgical History:   Procedure Laterality Date   • APPENDECTOMY         History reviewed. No pertinent family history.    Social History     Occupational History   • Not on file   Tobacco Use   • Smoking status: Former     Types: Cigarettes     Passive exposure:  Never   • Smokeless tobacco: Never   • Tobacco comments:     Smoker 30 yrs   Vaping Use   • Vaping status: Never Used   Substance and Sexual Activity   • Alcohol use: Never   • Drug use: Never   • Sexual activity: Not on file         Current Outpatient Medications:   •  acetaminophen (TYLENOL) 325 mg tablet, Take 2 tablets (650 mg total) by mouth every 6 (six) hours as needed for mild pain, headaches or fever, Disp: , Rfl: 0  •  allopurinol (ZYLOPRIM) 100 mg tablet, Take 1 tablet by mouth every morning, Disp: , Rfl:   •  amLODIPine (NORVASC) 10 mg tablet, Take 10 mg by mouth every morning, Disp: , Rfl:   •  aspirin 81 mg chewable tablet, Chew 81 mg every morning, Disp: , Rfl:   •  atorvastatin (LIPITOR) 40 mg tablet, Take 40 mg by mouth daily with dinner, Disp: , Rfl:   •  esomeprazole (NexIUM) 40 MG capsule, Take 40 mg by mouth in the morning, Disp: , Rfl:   •  metoprolol succinate (TOPROL-XL) 50 mg 24 hr tablet, Take 50 mg by mouth daily with dinner, Disp: , Rfl:   •  polyethylene glycol (MIRALAX) 17 g packet, Take 17 g by mouth daily as needed, Disp: , Rfl:   •  potassium chloride (Klor-Con) 10 mEq tablet, Take 1 tablet by mouth 3 (three) times a week In the AM, Disp: , Rfl:   •  Diclofenac Sodium (VOLTAREN) 1 %, Apply 2 g topically 4 (four) times a day as needed (arthralgias) (Patient not taking: Reported on 8/6/2024), Disp: , Rfl:   •  lidocaine (LIDODERM) 5 %, Apply 1 patch topically over 12 hours daily Remove & Discard patch within 12 hours or as directed by MD Do not start before September 15, 2023. (Patient not taking: Reported on 7/26/2024), Disp: , Rfl: 0  •  methocarbamol (ROBAXIN) 500 mg tablet, Take 500 mg by mouth every 6 (six) hours as needed for muscle spasms (Patient not taking: Reported on 7/26/2024), Disp: , Rfl:   •  Multiple Vitamin (multivitamin) tablet, Take 1 tablet by mouth daily (Patient not taking: Reported on 7/26/2024), Disp: , Rfl:   •  omeprazole (PriLOSEC) 20 mg delayed release  capsule, Take 20 mg by mouth daily (Patient not taking: Reported on 7/26/2024), Disp: , Rfl:     No Known Allergies    Objective:  Vitals:    08/06/24 0958   BP: 144/90   Pulse: 64       Right Shoulder Exam     Tenderness   The patient is experiencing no tenderness.    Range of Motion   External rotation:  60   Forward flexion:  90 (150 passive)     Muscle Strength   Right shoulder normal muscle strength: 4+/5 strength internal rotation.  Internal rotation: 5/5   External rotation: 4/5   Supraspinatus: 3/5     Tests   Drop arm: positive    Other   Erythema: absent  Sensation: normal  Pulse: present            Physical Exam  Vitals and nursing note reviewed.   Constitutional:       Appearance: Normal appearance.   HENT:      Head: Normocephalic and atraumatic.      Right Ear: External ear normal.      Left Ear: External ear normal.      Nose: Nose normal.   Eyes:      General: No scleral icterus.     Extraocular Movements: Extraocular movements intact.      Conjunctiva/sclera: Conjunctivae normal.   Cardiovascular:      Rate and Rhythm: Normal rate.   Pulmonary:      Effort: Pulmonary effort is normal. No respiratory distress.   Musculoskeletal:         General: Tenderness present.      Cervical back: Normal range of motion and neck supple.      Comments: See ortho exam   Skin:     General: Skin is warm and dry.   Neurological:      Mental Status: He is alert and oriented to person, place, and time.   Psychiatric:         Mood and Affect: Mood normal.         Behavior: Behavior normal.         I have personally reviewed pertinent films in PACS and my interpretation is as follows:    X-rays of the right shoulder obtained in the office today demonstrate moderate glenohumeral osteoarthritis. No acute fractures or dislocations.    MRI of the right shoulder obtained on 12/5/2023 demonstrates Complete full-thickness insertional tear of the supraspinatus tendon, which is retracted to the level of the humeral head apex and  extends into the anterior fibers of the infraspinatus tendon. No muscle atrophy. Chronic full-thickness articular-surface insertional tear of the superior bundle of the subscapularis tendon with associated moderate fatty atrophy of the muscle. Non visualized long head biceps tendon, consistent with a complete tear.    This document was created using speech voice recognition software.   Grammatical errors, random word insertions, pronoun errors, and incomplete sentences are an occasional consequence of this system due to software limitations, ambient noise, and hardware issues.   Any formal questions or concerns about content, text, or information contained within the body of this dictation should be directly addressed to the provider for clarification.

## 2024-08-13 ENCOUNTER — HOSPITAL ENCOUNTER (OUTPATIENT)
Dept: RADIOLOGY | Facility: HOSPITAL | Age: 64
Discharge: HOME | End: 2024-08-13
Attending: ORTHOPAEDIC SURGERY
Payer: COMMERCIAL

## 2024-08-13 DIAGNOSIS — G89.29 CHRONIC RIGHT SHOULDER PAIN: ICD-10-CM

## 2024-08-13 DIAGNOSIS — M75.121 COMPLETE TEAR OF RIGHT ROTATOR CUFF, UNSPECIFIED WHETHER TRAUMATIC: ICD-10-CM

## 2024-08-13 DIAGNOSIS — M19.011 OSTEOARTHRITIS OF RIGHT GLENOHUMERAL JOINT: ICD-10-CM

## 2024-08-13 DIAGNOSIS — M25.511 CHRONIC RIGHT SHOULDER PAIN: ICD-10-CM

## 2024-08-13 PROCEDURE — 73200 CT UPPER EXTREMITY W/O DYE: CPT

## 2024-08-15 ENCOUNTER — ANESTHESIA EVENT (OUTPATIENT)
Dept: PERIOP | Facility: HOSPITAL | Age: 64
End: 2024-08-15
Payer: COMMERCIAL

## 2024-08-19 NOTE — PRE-PROCEDURE INSTRUCTIONS
Pre-Surgery Instructions:   Medication Instructions    acetaminophen (TYLENOL) 325 mg tablet Hold day of surgery.    allopurinol (ZYLOPRIM) 100 mg tablet Take day of surgery.    amLODIPine (NORVASC) 10 mg tablet Take day of surgery.    aspirin 81 mg chewable tablet Stop taking 5 days prior to surgery.    atorvastatin (LIPITOR) 40 mg tablet Take night before surgery    esomeprazole (NexIUM) 40 MG capsule Take day of surgery.    metoprolol succinate (TOPROL-XL) 50 mg 24 hr tablet Take night before surgery    polyethylene glycol (MIRALAX) 17 g packet Hold day of surgery.    potassium chloride (Klor-Con) 10 mEq tablet Hold day of surgery.    Medication instructions for day surgery reviewed. Please use only a sip of water to take your instructed medications. Avoid all over the counter vitamins, supplements and NSAIDS for one week prior to surgery per anesthesia guidelines. Tylenol is ok to take as needed.     You will receive a call one business day prior to surgery with an arrival time and hospital directions. If your surgery is scheduled on a Monday, the hospital will be calling you on the Friday prior to your surgery. If you have not heard from anyone by 8pm, please call the hospital supervisor through the hospital  at [phone]. ([place] [phone] or [place] [phone]).    Do not eat or drink anything after midnight the night before your surgery, including candy, mints, lifesavers, or chewing gum. Do not drink alcohol 24hrs before your surgery. Try not to smoke at least 24hrs before your surgery.       Follow the pre surgery showering instructions as listed in the “My Surgical Experience Booklet” or otherwise provided by your surgeon's office. Do not use a blade to shave the surgical area 1 week before surgery. It is okay to use a clean electric clippers up to 24 hours before surgery. Do not apply any lotions, creams, including makeup, cologne, deodorant, or perfumes after showering on the day of  your surgery. Do not use dry shampoo, hair spray, hair gel, or any type of hair products.     No contact lenses, eye make-up, or artificial eyelashes. Remove nail polish, including gel polish, and any artificial, gel, or acrylic nails if possible. Remove all jewelry including rings and body piercing jewelry.     Wear causal clothing that is easy to take on and off. Consider your type of surgery.    Keep any valuables, jewelry, piercings at home. Please bring any specially ordered equipment (sling, braces) if indicated.    Arrange for a responsible person to drive you to and from the hospital on the day of your surgery. Please confirm the visitor policy for the day of your procedure when you receive your phone call with an arrival time.     Call the surgeon's office with any new illnesses, exposures, or additional questions prior to surgery.    Please reference your “My Surgical Experience Booklet” for additional information to prepare for your upcoming surgery.

## 2024-08-22 ENCOUNTER — TELEPHONE (OUTPATIENT)
Age: 64
End: 2024-08-22

## 2024-08-22 NOTE — TELEPHONE ENCOUNTER
Caller: Danny     Doctor: Jer    Reason for call: she is calling trying to find out a time for the patient's surgery on Monday 8/26. She has to schedule transportation    Call back#: [phone]

## 2024-08-23 NOTE — TELEPHONE ENCOUNTER
Left message advising that the OR will be calling today between 10 and 2 to provide arrival times for Mondays surgery.

## 2024-08-26 ENCOUNTER — APPOINTMENT (OUTPATIENT)
Dept: RADIOLOGY | Facility: HOSPITAL | Age: 64
End: 2024-08-26
Payer: COMMERCIAL

## 2024-08-26 ENCOUNTER — HOSPITAL ENCOUNTER (OUTPATIENT)
Facility: HOSPITAL | Age: 64
Setting detail: OUTPATIENT SURGERY
Discharge: HOME | End: 2024-08-27
Attending: ORTHOPAEDIC SURGERY | Admitting: ORTHOPAEDIC SURGERY
Payer: COMMERCIAL

## 2024-08-26 ENCOUNTER — ANESTHESIA (OUTPATIENT)
Dept: PERIOP | Facility: HOSPITAL | Age: 64
End: 2024-08-26
Payer: COMMERCIAL

## 2024-08-26 DIAGNOSIS — M75.121 COMPLETE TEAR OF RIGHT ROTATOR CUFF, UNSPECIFIED WHETHER TRAUMATIC: Primary | ICD-10-CM

## 2024-08-26 PROBLEM — J45.909 ASTHMA: Status: ACTIVE | Noted: 2024-08-26

## 2024-08-26 LAB
ABO GROUP BLD: NORMAL
ABO GROUP BLD: NORMAL
BLD GP AB SCN SERPL QL: NEGATIVE
RH BLD: POSITIVE
RH BLD: POSITIVE
SPECIMEN EXPIRATION DATE: NORMAL

## 2024-08-26 PROCEDURE — 87081 CULTURE SCREEN ONLY: CPT | Performed by: ORTHOPAEDIC SURGERY

## 2024-08-26 PROCEDURE — 23472 RECONSTRUCT SHOULDER JOINT: CPT | Performed by: PHYSICIAN ASSISTANT

## 2024-08-26 PROCEDURE — C1776 JOINT DEVICE (IMPLANTABLE): HCPCS | Performed by: ORTHOPAEDIC SURGERY

## 2024-08-26 PROCEDURE — C1713 ANCHOR/SCREW BN/BN,TIS/BN: HCPCS | Performed by: ORTHOPAEDIC SURGERY

## 2024-08-26 PROCEDURE — 86900 BLOOD TYPING SEROLOGIC ABO: CPT | Performed by: ORTHOPAEDIC SURGERY

## 2024-08-26 PROCEDURE — 86901 BLOOD TYPING SEROLOGIC RH(D): CPT | Performed by: ORTHOPAEDIC SURGERY

## 2024-08-26 PROCEDURE — C9290 INJ, BUPIVACAINE LIPOSOME: HCPCS | Performed by: ANESTHESIOLOGY

## 2024-08-26 PROCEDURE — 86850 RBC ANTIBODY SCREEN: CPT | Performed by: ORTHOPAEDIC SURGERY

## 2024-08-26 PROCEDURE — 73020 X-RAY EXAM OF SHOULDER: CPT

## 2024-08-26 PROCEDURE — 87147 CULTURE TYPE IMMUNOLOGIC: CPT | Performed by: ORTHOPAEDIC SURGERY

## 2024-08-26 PROCEDURE — 23472 RECONSTRUCT SHOULDER JOINT: CPT | Performed by: ORTHOPAEDIC SURGERY

## 2024-08-26 DEVICE — AUGMENT BASEPLATE 15DEG 25MM FULL WEDGE: Type: IMPLANTABLE DEVICE | Site: SHOULDER | Status: FUNCTIONAL

## 2024-08-26 DEVICE — IMPLANTABLE DEVICE
Type: IMPLANTABLE DEVICE | Site: SHOULDER | Status: FUNCTIONAL
Brand: TORNIER PERFORM™ HUMERAL SYSTEM

## 2024-08-26 DEVICE — IMPLANTABLE DEVICE: Type: IMPLANTABLE DEVICE | Site: SHOULDER | Status: FUNCTIONAL

## 2024-08-26 DEVICE — SCREW 7MM SM F/PERFRORM RVRS AEQUALIS: Type: IMPLANTABLE DEVICE | Site: SHOULDER | Status: FUNCTIONAL

## 2024-08-26 DEVICE — SCREW PERIPHERAL 5 X 30MM: Type: IMPLANTABLE DEVICE | Site: SHOULDER | Status: FUNCTIONAL

## 2024-08-26 DEVICE — SCREW PERIPHERAL 5 X 26MM: Type: IMPLANTABLE DEVICE | Site: SHOULDER | Status: FUNCTIONAL

## 2024-08-26 DEVICE — SCREW PERIPHERAL 5 X 18MM: Type: IMPLANTABLE DEVICE | Site: SHOULDER | Status: FUNCTIONAL

## 2024-08-26 DEVICE — IMPLANTABLE DEVICE
Type: IMPLANTABLE DEVICE | Site: SHOULDER | Status: FUNCTIONAL
Brand: TORNIER PERFORM® REVERSED GLENOID

## 2024-08-26 RX ORDER — BUPIVACAINE HYDROCHLORIDE 2.5 MG/ML
INJECTION, SOLUTION EPIDURAL; INFILTRATION; INTRACAUDAL
Status: COMPLETED | OUTPATIENT
Start: 2024-08-26 | End: 2024-08-26

## 2024-08-26 RX ORDER — AMLODIPINE BESYLATE 10 MG/1
10 TABLET ORAL EVERY MORNING
Status: DISCONTINUED | OUTPATIENT
Start: 2024-08-27 | End: 2024-08-27 | Stop reason: HOSPADM

## 2024-08-26 RX ORDER — VANCOMYCIN HYDROCHLORIDE 1 G/20ML
INJECTION, POWDER, LYOPHILIZED, FOR SOLUTION INTRAVENOUS AS NEEDED
Status: DISCONTINUED | OUTPATIENT
Start: 2024-08-26 | End: 2024-08-26 | Stop reason: HOSPADM

## 2024-08-26 RX ORDER — ONDANSETRON 2 MG/ML
4 INJECTION INTRAMUSCULAR; INTRAVENOUS EVERY 6 HOURS PRN
Status: DISCONTINUED | OUTPATIENT
Start: 2024-08-26 | End: 2024-08-27 | Stop reason: HOSPADM

## 2024-08-26 RX ORDER — MIDAZOLAM HYDROCHLORIDE 2 MG/2ML
INJECTION, SOLUTION INTRAMUSCULAR; INTRAVENOUS
Status: COMPLETED | OUTPATIENT
Start: 2024-08-26 | End: 2024-08-26

## 2024-08-26 RX ORDER — ASPIRIN 81 MG/1
81 TABLET, CHEWABLE ORAL 2 TIMES DAILY
Status: DISCONTINUED | OUTPATIENT
Start: 2024-08-27 | End: 2024-08-27 | Stop reason: HOSPADM

## 2024-08-26 RX ORDER — FENTANYL CITRATE/PF 50 MCG/ML
50 SYRINGE (ML) INJECTION
Status: DISCONTINUED | OUTPATIENT
Start: 2024-08-26 | End: 2024-08-26 | Stop reason: HOSPADM

## 2024-08-26 RX ORDER — SODIUM CHLORIDE, SODIUM LACTATE, POTASSIUM CHLORIDE, CALCIUM CHLORIDE 600; 310; 30; 20 MG/100ML; MG/100ML; MG/100ML; MG/100ML
100 INJECTION, SOLUTION INTRAVENOUS CONTINUOUS
Status: DISCONTINUED | OUTPATIENT
Start: 2024-08-26 | End: 2024-08-26

## 2024-08-26 RX ORDER — POLYETHYLENE GLYCOL 3350 17 G/17G
17 POWDER, FOR SOLUTION ORAL DAILY PRN
Status: DISCONTINUED | OUTPATIENT
Start: 2024-08-26 | End: 2024-08-27 | Stop reason: HOSPADM

## 2024-08-26 RX ORDER — ACETAMINOPHEN 325 MG/1
650 TABLET ORAL EVERY 6 HOURS PRN
Status: DISCONTINUED | OUTPATIENT
Start: 2024-08-26 | End: 2024-08-27 | Stop reason: HOSPADM

## 2024-08-26 RX ORDER — DOCUSATE SODIUM 100 MG/1
100 CAPSULE, LIQUID FILLED ORAL 2 TIMES DAILY
Status: DISCONTINUED | OUTPATIENT
Start: 2024-08-26 | End: 2024-08-27 | Stop reason: HOSPADM

## 2024-08-26 RX ORDER — ESMOLOL HYDROCHLORIDE 10 MG/ML
INJECTION INTRAVENOUS AS NEEDED
Status: DISCONTINUED | OUTPATIENT
Start: 2024-08-26 | End: 2024-08-26

## 2024-08-26 RX ORDER — ATORVASTATIN CALCIUM 40 MG/1
40 TABLET, FILM COATED ORAL
Status: DISCONTINUED | OUTPATIENT
Start: 2024-08-26 | End: 2024-08-27 | Stop reason: HOSPADM

## 2024-08-26 RX ORDER — PANTOPRAZOLE SODIUM 40 MG/1
40 TABLET, DELAYED RELEASE ORAL
Status: DISCONTINUED | OUTPATIENT
Start: 2024-08-27 | End: 2024-08-27 | Stop reason: HOSPADM

## 2024-08-26 RX ORDER — EPHEDRINE SULFATE 50 MG/ML
INJECTION INTRAVENOUS AS NEEDED
Status: DISCONTINUED | OUTPATIENT
Start: 2024-08-26 | End: 2024-08-26

## 2024-08-26 RX ORDER — MAGNESIUM HYDROXIDE 1200 MG/15ML
LIQUID ORAL AS NEEDED
Status: DISCONTINUED | OUTPATIENT
Start: 2024-08-26 | End: 2024-08-26 | Stop reason: HOSPADM

## 2024-08-26 RX ORDER — POTASSIUM CHLORIDE 750 MG/1
10 TABLET, EXTENDED RELEASE ORAL 3 TIMES WEEKLY
Status: DISCONTINUED | OUTPATIENT
Start: 2024-08-26 | End: 2024-08-27 | Stop reason: HOSPADM

## 2024-08-26 RX ORDER — OXYCODONE HYDROCHLORIDE 5 MG/1
5 TABLET ORAL EVERY 4 HOURS PRN
Status: DISCONTINUED | OUTPATIENT
Start: 2024-08-26 | End: 2024-08-27 | Stop reason: HOSPADM

## 2024-08-26 RX ORDER — ROCURONIUM BROMIDE 10 MG/ML
INJECTION, SOLUTION INTRAVENOUS AS NEEDED
Status: DISCONTINUED | OUTPATIENT
Start: 2024-08-26 | End: 2024-08-26

## 2024-08-26 RX ORDER — CEFAZOLIN SODIUM 2 G/50ML
2000 SOLUTION INTRAVENOUS ONCE
Status: COMPLETED | OUTPATIENT
Start: 2024-08-26 | End: 2024-08-26

## 2024-08-26 RX ORDER — FENTANYL CITRATE 50 UG/ML
INJECTION, SOLUTION INTRAMUSCULAR; INTRAVENOUS
Status: COMPLETED | OUTPATIENT
Start: 2024-08-26 | End: 2024-08-26

## 2024-08-26 RX ORDER — ALBUTEROL SULFATE 0.83 MG/ML
2.5 SOLUTION RESPIRATORY (INHALATION) ONCE
Status: COMPLETED | OUTPATIENT
Start: 2024-08-26 | End: 2024-08-26

## 2024-08-26 RX ORDER — ONDANSETRON 2 MG/ML
INJECTION INTRAMUSCULAR; INTRAVENOUS AS NEEDED
Status: DISCONTINUED | OUTPATIENT
Start: 2024-08-26 | End: 2024-08-26

## 2024-08-26 RX ORDER — SODIUM CHLORIDE, SODIUM LACTATE, POTASSIUM CHLORIDE, CALCIUM CHLORIDE 600; 310; 30; 20 MG/100ML; MG/100ML; MG/100ML; MG/100ML
75 INJECTION, SOLUTION INTRAVENOUS CONTINUOUS
Status: DISPENSED | OUTPATIENT
Start: 2024-08-26 | End: 2024-08-26

## 2024-08-26 RX ORDER — LIDOCAINE HYDROCHLORIDE 10 MG/ML
INJECTION, SOLUTION EPIDURAL; INFILTRATION; INTRACAUDAL; PERINEURAL AS NEEDED
Status: DISCONTINUED | OUTPATIENT
Start: 2024-08-26 | End: 2024-08-26

## 2024-08-26 RX ORDER — TRANEXAMIC ACID 10 MG/ML
1000 INJECTION, SOLUTION INTRAVENOUS ONCE
Status: DISCONTINUED | OUTPATIENT
Start: 2024-08-26 | End: 2024-08-26 | Stop reason: HOSPADM

## 2024-08-26 RX ORDER — DEXAMETHASONE SODIUM PHOSPHATE 10 MG/ML
INJECTION, SOLUTION INTRAMUSCULAR; INTRAVENOUS AS NEEDED
Status: DISCONTINUED | OUTPATIENT
Start: 2024-08-26 | End: 2024-08-26

## 2024-08-26 RX ORDER — PROPOFOL 10 MG/ML
INJECTION, EMULSION INTRAVENOUS AS NEEDED
Status: DISCONTINUED | OUTPATIENT
Start: 2024-08-26 | End: 2024-08-26

## 2024-08-26 RX ORDER — SODIUM CHLORIDE, SODIUM LACTATE, POTASSIUM CHLORIDE, CALCIUM CHLORIDE 600; 310; 30; 20 MG/100ML; MG/100ML; MG/100ML; MG/100ML
100 INJECTION, SOLUTION INTRAVENOUS CONTINUOUS
Status: DISCONTINUED | OUTPATIENT
Start: 2024-08-26 | End: 2024-08-27 | Stop reason: HOSPADM

## 2024-08-26 RX ORDER — METOPROLOL SUCCINATE 50 MG/1
50 TABLET, EXTENDED RELEASE ORAL
Status: DISCONTINUED | OUTPATIENT
Start: 2024-08-26 | End: 2024-08-27 | Stop reason: HOSPADM

## 2024-08-26 RX ORDER — CHLORHEXIDINE GLUCONATE ORAL RINSE 1.2 MG/ML
15 SOLUTION DENTAL ONCE
Status: DISCONTINUED | OUTPATIENT
Start: 2024-08-26 | End: 2024-08-26 | Stop reason: HOSPADM

## 2024-08-26 RX ORDER — ALLOPURINOL 100 MG/1
100 TABLET ORAL EVERY MORNING
Status: DISCONTINUED | OUTPATIENT
Start: 2024-08-27 | End: 2024-08-27 | Stop reason: HOSPADM

## 2024-08-26 RX ORDER — CEFAZOLIN SODIUM 2 G/50ML
2000 SOLUTION INTRAVENOUS EVERY 8 HOURS
Status: COMPLETED | OUTPATIENT
Start: 2024-08-26 | End: 2024-08-27

## 2024-08-26 RX ORDER — ONDANSETRON 2 MG/ML
4 INJECTION INTRAMUSCULAR; INTRAVENOUS ONCE AS NEEDED
Status: DISCONTINUED | OUTPATIENT
Start: 2024-08-26 | End: 2024-08-26 | Stop reason: HOSPADM

## 2024-08-26 RX ADMIN — ROCURONIUM BROMIDE 10 MG: 10 INJECTION, SOLUTION INTRAVENOUS at 10:20

## 2024-08-26 RX ADMIN — BUPIVACAINE 10 ML: 13.3 INJECTION, SUSPENSION, LIPOSOMAL INFILTRATION at 09:04

## 2024-08-26 RX ADMIN — ATORVASTATIN CALCIUM 40 MG: 40 TABLET, FILM COATED ORAL at 16:20

## 2024-08-26 RX ADMIN — SODIUM CHLORIDE, SODIUM LACTATE, POTASSIUM CHLORIDE, AND CALCIUM CHLORIDE 100 ML/HR: .6; .31; .03; .02 INJECTION, SOLUTION INTRAVENOUS at 16:21

## 2024-08-26 RX ADMIN — SODIUM CHLORIDE, SODIUM LACTATE, POTASSIUM CHLORIDE, AND CALCIUM CHLORIDE 75 ML/HR: .6; .31; .03; .02 INJECTION, SOLUTION INTRAVENOUS at 16:21

## 2024-08-26 RX ADMIN — BUPIVACAINE HYDROCHLORIDE 10 ML: 2.5 INJECTION, SOLUTION EPIDURAL; INFILTRATION; INTRACAUDAL at 09:04

## 2024-08-26 RX ADMIN — EPHEDRINE SULFATE 5 MG: 50 INJECTION, SOLUTION INTRAVENOUS at 09:59

## 2024-08-26 RX ADMIN — DEXAMETHASONE SODIUM PHOSPHATE 10 MG: 10 INJECTION, SOLUTION INTRAMUSCULAR; INTRAVENOUS at 09:16

## 2024-08-26 RX ADMIN — EPHEDRINE SULFATE 5 MG: 50 INJECTION, SOLUTION INTRAVENOUS at 09:54

## 2024-08-26 RX ADMIN — CEFAZOLIN SODIUM 2000 MG: 2 SOLUTION INTRAVENOUS at 09:08

## 2024-08-26 RX ADMIN — SODIUM CHLORIDE, SODIUM LACTATE, POTASSIUM CHLORIDE, AND CALCIUM CHLORIDE 100 ML/HR: .6; .31; .03; .02 INJECTION, SOLUTION INTRAVENOUS at 16:20

## 2024-08-26 RX ADMIN — SODIUM CHLORIDE, SODIUM LACTATE, POTASSIUM CHLORIDE, AND CALCIUM CHLORIDE: .6; .31; .03; .02 INJECTION, SOLUTION INTRAVENOUS at 11:41

## 2024-08-26 RX ADMIN — CEFAZOLIN SODIUM 2000 MG: 2 SOLUTION INTRAVENOUS at 16:22

## 2024-08-26 RX ADMIN — ONDANSETRON 4 MG: 2 INJECTION INTRAMUSCULAR; INTRAVENOUS at 09:16

## 2024-08-26 RX ADMIN — DOCUSATE SODIUM 100 MG: 100 CAPSULE, LIQUID FILLED ORAL at 17:49

## 2024-08-26 RX ADMIN — PROPOFOL 200 MG: 10 INJECTION, EMULSION INTRAVENOUS at 09:11

## 2024-08-26 RX ADMIN — FENTANYL CITRATE 50 MCG: 50 INJECTION INTRAMUSCULAR; INTRAVENOUS at 12:03

## 2024-08-26 RX ADMIN — FENTANYL CITRATE 50 MCG: 50 INJECTION, SOLUTION INTRAMUSCULAR; INTRAVENOUS at 09:02

## 2024-08-26 RX ADMIN — MIDAZOLAM 2 MG: 1 INJECTION INTRAMUSCULAR; INTRAVENOUS at 09:02

## 2024-08-26 RX ADMIN — SODIUM CHLORIDE, SODIUM LACTATE, POTASSIUM CHLORIDE, AND CALCIUM CHLORIDE: .6; .31; .03; .02 INJECTION, SOLUTION INTRAVENOUS at 09:06

## 2024-08-26 RX ADMIN — ROCURONIUM BROMIDE 10 MG: 10 INJECTION, SOLUTION INTRAVENOUS at 10:38

## 2024-08-26 RX ADMIN — ESMOLOL HYDROCHLORIDE 20 MG: 10 INJECTION, SOLUTION INTRAVENOUS at 11:27

## 2024-08-26 RX ADMIN — LIDOCAINE HYDROCHLORIDE 50 MG: 10 INJECTION, SOLUTION EPIDURAL; INFILTRATION; INTRACAUDAL; PERINEURAL at 09:11

## 2024-08-26 RX ADMIN — POTASSIUM CHLORIDE 10 MEQ: 750 TABLET, EXTENDED RELEASE ORAL at 16:24

## 2024-08-26 RX ADMIN — ROCURONIUM BROMIDE 10 MG: 10 INJECTION, SOLUTION INTRAVENOUS at 09:54

## 2024-08-26 RX ADMIN — PHENYLEPHRINE HYDROCHLORIDE 5 MCG/MIN: 10 INJECTION INTRAVENOUS at 10:03

## 2024-08-26 RX ADMIN — ALBUTEROL SULFATE 2.5 MG: 2.5 SOLUTION RESPIRATORY (INHALATION) at 08:23

## 2024-08-26 RX ADMIN — ROCURONIUM BROMIDE 50 MG: 10 INJECTION, SOLUTION INTRAVENOUS at 09:12

## 2024-08-26 RX ADMIN — METOPROLOL SUCCINATE 50 MG: 50 TABLET, EXTENDED RELEASE ORAL at 16:20

## 2024-08-26 RX ADMIN — FENTANYL CITRATE 50 MCG: 50 INJECTION INTRAMUSCULAR; INTRAVENOUS at 12:56

## 2024-08-26 RX ADMIN — OXYCODONE HYDROCHLORIDE 5 MG: 5 TABLET ORAL at 16:20

## 2024-08-26 NOTE — ANESTHESIA PREPROCEDURE EVALUATION
Procedure:  ARTHROPLASTY SHOULDER REVERSE (Right: Shoulder)    Relevant Problems   ANESTHESIA (within normal limits)      CARDIO   (+) Hyperlipidemia   (+) Primary hypertension      ENDO (within normal limits)      /RENAL   (+) Benign hypertension with chronic kidney disease, stage III (HCC)      MUSCULOSKELETAL   (+) Gout flare      PULMONARY   (+) Asthma        Physical Exam    Airway    Mallampati score: II  TM Distance: >3 FB  Neck ROM: full     Dental        Cardiovascular  Rhythm: regular, Rate: normal    Pulmonary   Breath sounds clear to auscultation    Other Findings        Anesthesia Plan  ASA Score- 3     Anesthesia Type- general with ASA Monitors.     Patient reason for not using neuraxial anesthesia or peripheral nerve block: interscalene block.    Additional Monitors:     Airway Plan: ETT.           Plan Factors-Exercise tolerance (METS): <4 METS.    Chart reviewed. EKG reviewed.  Existing labs reviewed. Patient summary reviewed.    Patient is not a current smoker.              Induction- intravenous.    Postoperative Plan- Plan for postoperative opioid use. Planned trial extubation        Informed Consent- Anesthetic plan and risks discussed with patient.  I personally reviewed this patient with the CRNA. Discussed and agreed on the Anesthesia Plan with the CRNA..

## 2024-08-26 NOTE — OP NOTE
OPERATIVE REPORT  PATIENT NAME: Demetrio Villalobos    :  1960  MRN: [id number]  Pt Location: [facility] OR ROOM 01    SURGERY DATE: 2024    Surgeons and Role:     * Sina Randle MD - Primary     * Mannie Peters PA-C - Assisting    Preop Diagnosis:  Complete tear of right rotator cuff, unspecified whether traumatic [M75.121]  Osteoarthritis of right glenohumeral joint [M19.011]    Post-Op Diagnosis Codes:     * Complete tear of right rotator cuff, unspecified whether traumatic [M75.121]     * Osteoarthritis of right glenohumeral joint [M19.011]    Procedure(s):  Right - ARTHROPLASTY SHOULDER REVERSE    Specimen(s):  * No specimens in log *    Estimated Blood Loss:   Minimal    Drains:  * No LDAs found *    Anesthesia Type:   General w/ Interscalene Block    Operative Indications:  Complete tear of right rotator cuff, unspecified whether traumatic [M75.121]  Osteoarthritis of right glenohumeral joint [M19.011]               Complications:   None    Procedure and Technique:  Implants:  Tornier 25 full wedge baseplate  36 lateralized glenosphere  Size 2 standard performed stem  0 retentive poly     Operative Indications:  64 y.o. Male with rotator cuff arthropathy of the Right shoulder. We had a long discussion regarding operative vs non-operative treatment options.  We discussed conservative options including physical therapy, steroid injections, activity modification, and anti-inflammatory treatment.  The patient is not interested in conservative treatment options as she feels they have not helped her in the past.  She would like definitive treatment for this.  We did discuss possible reverse total shoulder arthroplasty which the patient would like to proceed with. The risks of operative intervention were discussed and include but are not limited to: Infection, bleeding, stiffness, loss of range of motion, blood clot, failure of surgery, fracture, delayed union, nonunion, malunion, risk of potential  future arthritis, continued problems with swelling, injury to surrounding structures/nerve/artery/vein, recurrence of cysts, failure of hardware, retained hardware and/or foreign body, symptomatic hardware, and continued instability, pain, dysfunction, or disability despite repair.      Specifically, for   Shoulder Arthroplasty: Infection needing multiple staged surgery for treatment, Revision surgery, failure of rotator cuff repair, loss of motion and strength, activity limitations, nerve palsies, acromial stress fractures, coracoid fractures, glenoid/humeral fractures, periprosthetic fractures     Operative Findings:    Rotator Cuff Arthropathy  Supraspinatus and subscapularis upper border tear     Complications:   None     Procedure and Technique:  The patient was met in the preoperative holding area where a site marking was completed with the surgical staff.  The patient was met by the anesthesia team and anesthesia plan was made.  The patient was given a interscalene nerve block preoperatively.  Patient was then brought back to the operating room placed on the operating table.  All bony prominences were well-padded.  General anesthesia was induced without complication.  Patient was then placed in the beachchair positioning making sure again all bony prominences were well-padded and her spine was in neutral alignment.  Appropriate perioperative antibiotics were given.  The upper extremity was prepped and draped in normal sterile fashion.  A final surgical timeout was then performed identifying the correct patient, correct site and correct surgery.  All were in agreement we proceeded.     A standard deltopectoral approach was made.  Incision made through skin and subcutaneous tissue.  Irrisept solution was used to wash the wound after initial incision.  The deltopectoral interval was identified.  The cephalic vein was protected throughout.  Adhesions were bluntly taken off the subacromial space and the deltoid  and axillary nerve were protected with a brown deltoid retractor.  The biceps tendon had been previously torn.  The proximal aspect was identified and cut.  The distal aspect had already retracted.  No biceps tenodesis was performed.  A subscapularis peel was then performed.  Capsular attachments to the inferior humeral head were taken off to help release the humeral head.  Osteophytes inferiorly were removed with a rongeur.  The humerus was then dislocated with and protected on all sides.     The humerus was then prepped.  Intramedullary guide was used and a humeral head cut was then performed 135 degrees.  A size 2 metaphyseal reamer was then used as had good fit.  A 2 broach was then placed with good fit as well.  The broach was left in for preparation of the glenoid.  Using retractors posteriorly and anteriorly the humerus was retracted giving good access to the glenoid.  Capsular attachments to the subscapularis were divided.  The labrum was then removed.  Care was taken inferiorly to protect the axillary nerve.  A Leone was then used to take off any remaining cartilage.  The central pin was then placed in the central portion of the glenoid through the full wedge guide with the wedge placed superiorly to help with his superior tilt..  The wedge reamer was then used the reamed down to bleeding bone..  The glenoid was prepped for a short central post.  The glenoid baseplate was put together in the back table and then impacted into position keeping the wedge superior..  This had excellent bite at the baseplate was in a good position.  3 screws were then placed in the baseplate.  Starting with superior which was nonlocking screws on the wedge portion of the baseplate and then inferior and posterior which were locking screws.  A trial glenosphere was placed and we were happy with the 36 lateralized.  The glenosphere was then placed and tightened into position.  This was checked to make sure it was in correctly and  properly tightened.     The humerus was then brought back out posteriorly and the broach was removed.  The 2 stem standard was then placed in good position with a good fit.  We then trialed with a liner and were happy with the tension.  We then placed our 0 retentive polyethylene liner.  The shoulder was reduced and brought through range of motion.  There was stability in all directions and excellent range of motion.     A tug test was normal at the end of the case.     The subscapularis was repaired with 2 drill holes through the lesser tuberosity with suture tape.  The suture tape was brought through the subscapularis in a Shad-Fred type stitch.  These were sequentially tied with good tension and good movement with range of motion of the shoulder.        Pulse lavage was used throughout the case especially prior to putting in final hardware. Irrisept solution was used to wash the wound.  The wound was then copiously irrigated with both sterile saline and Irrisept solution.  1 g of vancomycin powder was placed in the wound.  The deltopectoral interval was then closed with 0 Vicryl.  The skin and subcutaneous tissue was closed in a layered fashion with 2-0 Vicryl and 3-0 running subcuticular Monocryl suture.  Steri-Strips were placed on the wound.  A silver impregnated wound dressing was applied to the wound.  Sling was then applied.      I was present for the entire procedure., A qualified resident physician was not available., and A physician assistant was required during the procedure for retraction, tissue handling, dissection and suturing.     Patient Disposition:  PACU                 REVERSE SHOULDER ARTHROPLASTY REHABILITATION GUIDELINES  These guidelines do NOT apply to RTSA for proximal humeral fracture - rehabilitation following PHFx will commence 4-6 weeks after surgery as deemed appropriate to protect tuberosity healing  PHASE PRECAUTIONS AND GUIDELINES GOALS EXERCISES CRITERIA TO ADVANCE  EXAMINATION    1  (Post-operative day 1 to post-operative week 3) Sling 24/7 (remove for grooming and home exercise program 3-5x/day)     Avoid combined IR/EXT/ADD (hand behind the back) and IR/ADD (reaching across chest) for dislocation precautions     Pillow behind the upper arm while reclining with sling on      Patient should always be able to see the elbow     Avoid WBing - discuss WBing need with physician and PT     No submersion in water until after 4 weeks     Ice after HEP as needed    Maintain integrity of joint replacement; protect soft tissue healing     ROM for elevation to 130 and ER to 30      Optimize distal UE circulation and muscle activity (elbow, wrist and hand)     Instruct in use of sling for proper fit     Educate regarding signs/symptoms of infection          Active elbow, wrist and hand     Pendulum     Scapular retraction with arms resting in neutral position     Forward elevation in scapular plane to 130 deg max motion (table slides, step backs, supine well arm assisted)     ER in scapular plane to 30 deg (seated or supine)     ROM within precautionary range limits may be active or passive     Pain less than 3/10 with ROM     Healing incision without signs of infection     Clearance by surgeon to advance after 2 week post-operative visit Wound assessment     Swelling assessment of upper extremity     Neurovascular assessment of upper extremity     Sling fit and ability to don/doff properly     Patient reported outcome measure     Pain level     Range of motion for elevation and ER    PHASE PRECAUTIONS AND GUIDELINES GOALS EXERCISES CRITERIA TO ADVANCE  EXAMINATION   2  (Post-operative week 3 to 6) May discontinue sling use at 3 weeks; after 2 weeks can remove the sling at home and just use the sling at night and in community for 3rd week     May use arm for basic activities of daily living (such as feeding, brushing teeth, dressing…)      May submerge in water after 4 weeks     Avoid combined  IR/EXT/ADD (hand behind the back) and IR/ADD (reaching across chest) for dislocation precautions     Avoid acromial or scapular spine pain as increase deltoid loading - decrease load if this occurs    Elevation to 130 deg and ER to 30 deg - passive, active assisted or active     Low (less than 3/10) to no pain     Ability to fire all heads of the deltoid May discontinue , and active elbow and wrist exercises since using the arm in ADLs with sling removed around the home     Continue elevation to 130 and ER to 30, both in scapular plane      Submaximal isometrics (pain-free effort) for all functional heads of deltoid (anterior, posterior, middle).      Active exercise as able:   Supine forward punch     Place in balanced position with circumduction and progressive arcs in sagittal plane     Side-lying abduction to 90      Lateral raise with bent elbow      Prone extension to hip    Elevation in scapular plane to 130; ER in scapular plane to 30      Ability to fire isometrically all heads of the deltoid muscle without pain     Ability to place and hold the arm in balanced position (90 deg elevation in supine) Wound assessment     Neurovascular assessment     Swelling assessment     ROM shoulder elevation and ER(0)     Patient reported outcome measure     Pain level      PHASE PRECAUTIONS AND GUIDELINES GOALS EXERCISES CRITERIA TO ADVANCE  EXAMINATION   3  (Post-operative week 6 to 12) Avoid forceful end-range motion in any direction      Progress active use of the arm in ADLs without being restricted to arm by the side of the body;      No heavy lifting or carrying     Initiate functional IR behind the back gently without forceful overpressure     Avoid acromial or scapular spine pain as increase deltoid loading - decrease load if this occurs     NO UPPER BODY ERGOMETER    Optimize ROM for elevation and ER in scapular plane      Expected PROM: Elevation - 145-160; ER - 40-50 ; functional IR to L1     Recover AROM  to approach as close to PROM available as possible     Establish dynamic stability of the shoulder  Forward elevation in scapular plane active progression: supine to incline to vertical; short to long lever arm     Lateral raise with bent elbow; side-lying abduction     Active ER/IR with arm at side     Scapular retraction with light band resistance     Serratus anterior punches in supine; avoid wall, incline or prone press-ups for serratus anterior     Functional IR with hand slide up back - very gentle and gradual       AROM equals/approaches PROM with good mechanics for elevation     No pain     Higher level demand on shoulder than ADL functions PROM for elevation, ER(0)     AROM for elevation, ER(0) and functional IR     Patient reported outcome measure     Pain                          PHASE PRECAUTIONS AND GUIDELINES GOALS EXERCISES CRITERIA TO ADVANCE  EXAMINATION   4  (Post-operative week 12+) No heavy lifting and no overhead sports     Weight lifting limit 25.lb     No heavy pushing activity     Gradually increase strength      NO UPPER BODY ERGOMETER    Optimize functional use of operative UE to patient specific goals     Gradual increase in deltoid, scapular muscle and rotator cuff strength     Pain-free functional activities Light hand weights for deltoid up to and not to exceed 3 lbs for anterior and posterior with long arm lift against gravity; elbow bent to 90 deg for abduction in scapular plane     Band progression for extension to hip with scapular depression/retraction     Band progression for serratus anterior punches in supine; avoid wall, incline or prone press-ups for serratus anterior     End-range stretching gently without forceful overpressure in all planes (elevation in scapular plane, ER in scapular plane, functional IR) with stretching done for life as part of daily routine     NO UPPER BODY ERGOMETER    Pain-free AROM for shoulder elevation (expect around 135-150 deg)     Functional  strength for all ADLs, work tasks, and hobbies approved by surgeon     [place] with home maintenance program PROM for elevation, ER(0); ER(90)     AROM for elevation, ER(0) and functional IR     Scapulohumeral rhythm/biomechanics of active movement strategies     Strength testing for deltoid, RTC, scapular muscles     Patient reported outcome measure     Pain                     I was present for the entire procedure., A qualified resident physician was not available., and A physician assistant was required during the procedure for retraction, tissue handling, dissection and suturing.    Patient Disposition:  PACU         SIGNATURE: Sina Randle MD  DATE: August 26, 2024  TIME: 11:19 AM

## 2024-08-26 NOTE — ANESTHESIA POSTPROCEDURE EVALUATION
Post-Op Assessment Note    CV Status:  Stable  Pain Score: 0    Pain management: adequate       Mental Status:  Alert   Hydration Status:  Stable   PONV Controlled:  None   Airway Patency:  Patent     Post Op Vitals Reviewed: Yes    No anethesia notable event occurred.    Staff: Anesthesiologist, CRNA               /74 (08/26/24 1135)    Temp (!) 97 °F (36.1 °C) (08/26/24 1135)    Pulse 72 (08/26/24 1135)   Resp 18 (08/26/24 1135)    SpO2 99 % (08/26/24 1135)

## 2024-08-26 NOTE — PLAN OF CARE
Problem: Prexisting or High Potential for Compromised Skin Integrity  Goal: Skin integrity is maintained or improved  Description: INTERVENTIONS:  - Identify patients at risk for skin breakdown  - Assess and monitor skin integrity  - Assess and monitor nutrition and hydration status  - Monitor labs   - Assess for incontinence   - Turn and reposition patient  - Assist with mobility/ambulation  - Relieve pressure over bony prominences  - Avoid friction and shearing  - Provide appropriate hygiene as needed including keeping skin clean and dry  - Evaluate need for skin moisturizer/barrier cream  - Collaborate with interdisciplinary team   - Patient/family teaching  - Consider wound care consult   Outcome: Progressing     Problem: PAIN - ADULT  Goal: Verbalizes/displays adequate comfort level or baseline comfort level  Description: Interventions:  - Encourage patient to monitor pain and request assistance  - Assess pain using appropriate pain scale  - Administer analgesics based on type and severity of pain and evaluate response  - Implement non-pharmacological measures as appropriate and evaluate response  - Consider cultural and social influences on pain and pain management  - Notify physician/advanced practitioner if interventions unsuccessful or patient reports new pain  Outcome: Progressing     Problem: INFECTION - ADULT  Goal: Absence or prevention of progression during hospitalization  Description: INTERVENTIONS:  - Assess and monitor for signs and symptoms of infection  - Monitor lab/diagnostic results  - Monitor all insertion sites, i.e. indwelling lines, tubes, and drains  - Monitor endotracheal if appropriate and nasal secretions for changes in amount and color  - [place] appropriate cooling/warming therapies per order  - Administer medications as ordered  - Instruct and encourage patient and family to use good hand hygiene technique  - Identify and instruct in appropriate isolation precautions for  identified infection/condition  Outcome: Progressing  Goal: Absence of fever/infection during neutropenic period  Description: INTERVENTIONS:  - Monitor WBC    Outcome: Progressing     Problem: SAFETY ADULT  Goal: Patient will remain free of falls  Description: INTERVENTIONS:  - Educate patient/family on patient safety including physical limitations  - Instruct patient to call for assistance with activity   - Consult OT/PT to assist with strengthening/mobility   - Keep Call bell within reach  - Keep bed low and locked with side rails adjusted as appropriate  - Keep care items and personal belongings within reach  - Initiate and maintain comfort rounds  - Make Fall Risk Sign visible to staff  - Offer Toileting every 2 Hours, in advance of need  - Obtain necessary fall risk management equipment: call bell   - Apply yellow socks and bracelet for high fall risk patients  - Consider moving patient to room near nurses station  Outcome: Progressing  Goal: Maintain or return to baseline ADL function  Description: INTERVENTIONS:  -  Assess patient's ability to carry out ADLs; assess patient's baseline for ADL function and identify physical deficits which impact ability to perform ADLs (bathing, care of mouth/teeth, toileting, grooming, dressing, etc.)  - Assess/evaluate cause of self-care deficits   - Assess range of motion  - Assess patient's mobility; develop plan if impaired  - Assess patient's need for assistive devices and provide as appropriate  - Encourage maximum independence but intervene and supervise when necessary  - Involve family in performance of ADLs  - Assess for home care needs following discharge   - Consider OT consult to assist with ADL evaluation and planning for discharge  - Provide patient education as appropriate  Outcome: Progressing  Goal: Maintains/Returns to pre admission functional level  Description: INTERVENTIONS:  - Perform AM-PAC 6 Click Basic Mobility/ Daily Activity assessment daily.  -  Set and communicate daily mobility goal to care team and patient/family/caregiver.   - Collaborate with rehabilitation services on mobility goals if consulted  - Stand patient 3 times a day  - Ambulate patient 3 times a day  - Out of bed to chair 3 times a day   - Out of bed for meals 3 times a day  - Out of bed for toileting  - Record patient progress and toleration of activity level   Outcome: Progressing     Problem: DISCHARGE PLANNING  Goal: Discharge to home or other facility with appropriate resources  Description: INTERVENTIONS:  - Identify barriers to discharge w/patient and caregiver  - Arrange for needed discharge resources and transportation as appropriate  - Identify discharge learning needs (meds, wound care, etc.)  - Arrange for interpretive services to assist at discharge as needed  - Refer to Case Management Department for coordinating discharge planning if the patient needs post-hospital services based on physician/advanced practitioner order or complex needs related to functional status, cognitive ability, or social support system  Outcome: Progressing     Problem: Knowledge Deficit  Goal: Patient/family/caregiver demonstrates understanding of disease process, treatment plan, medications, and discharge instructions  Description: Complete learning assessment and assess knowledge base.  Interventions:  - Provide teaching at level of understanding  - Provide teaching via preferred learning methods  Outcome: Progressing

## 2024-08-26 NOTE — ANESTHESIA PROCEDURE NOTES
Peripheral Block    Patient location during procedure: holding area  Start time: 8/26/2024 9:04 AM  Reason for block: at surgeon's request and post-op pain management  Staffing  Performed by: Ju Landrum MD  Authorized by: Ju Landrum MD    Preanesthetic Checklist  Completed: patient identified, IV checked, site marked, risks and benefits discussed, surgical consent, monitors and equipment checked, pre-op evaluation and timeout performed  Peripheral Block  Prep: ChloraPrep  Patient monitoring: frequent blood pressure checks, continuous pulse oximetry and heart rate  Block type: Interscalene  Laterality: right  Injection technique: single-shot  Procedures: ultrasound guided, Ultrasound guidance required for the procedure to increase accuracy and safety of medication placement and decrease risk of complications.  Ultrasound permanent image saved  bupivacaine (PF) (MARCAINE) 0.25 % injection 20 mL - Perineural   10 mL - 8/26/2024 9:04:00 AM  bupivacaine liposomal (EXPAREL) 1.3 % injection 20 mL - Perineural   10 mL - 8/26/2024 9:04:00 AM  midazolam (VERSED) injection 0.5 mg - Intravenous   2 mg - 8/26/2024 9:02:00 AM  fentanyl citrate (PF) 100 MCG/2ML 50 mcg - Intravenous   50 mcg - 8/26/2024 9:02:00 AM  Needle  Needle type: Stimuplex   Needle gauge: 22 G  Needle length: 2 in  Needle localization: anatomical landmarks and ultrasound guidance  Needle insertion depth: 4.5 cm  Assessment  Injection assessment: incremental injection, frequent aspiration, injected with ease, negative aspiration, negative for heart rate change, no paresthesia on injection, no symptoms of intraneural/intravenous injection and needle tip visualized at all times  Paresthesia pain: none  Post-procedure:  site cleaned  patient tolerated the procedure well with no immediate complications

## 2024-08-27 VITALS
TEMPERATURE: 98.5 F | OXYGEN SATURATION: 95 % | RESPIRATION RATE: 16 BRPM | HEART RATE: 84 BPM | SYSTOLIC BLOOD PRESSURE: 148 MMHG | HEIGHT: 67 IN | BODY MASS INDEX: 33.94 KG/M2 | WEIGHT: 216.27 LBS | DIASTOLIC BLOOD PRESSURE: 97 MMHG

## 2024-08-27 LAB
ANION GAP SERPL CALCULATED.3IONS-SCNC: 9 MMOL/L (ref 4–13)
BUN SERPL-MCNC: 30 MG/DL (ref 5–25)
CALCIUM SERPL-MCNC: 8.3 MG/DL (ref 8.4–10.2)
CHLORIDE SERPL-SCNC: 104 MMOL/L (ref 96–108)
CO2 SERPL-SCNC: 22 MMOL/L (ref 21–32)
CREAT SERPL-MCNC: 2.13 MG/DL (ref 0.6–1.3)
ERYTHROCYTE [DISTWIDTH] IN BLOOD BY AUTOMATED COUNT: 14.1 % (ref 11.6–15.1)
GLUCOSE SERPL-MCNC: 147 MG/DL (ref 65–140)
HCT VFR BLD AUTO: 36.5 % (ref 36.5–46.1)
HGB BLD-MCNC: 12.1 G/DL (ref 12–15.4)
MCH RBC QN AUTO: 29.2 PG (ref 26.8–34.3)
MCHC RBC AUTO-ENTMCNC: 33.2 G/DL (ref 31.4–37.4)
MCV RBC AUTO: 88 FL (ref 82–98)
MRSA NOSE QL CULT: ABNORMAL
MRSA NOSE QL CULT: ABNORMAL
PLATELET # BLD AUTO: 215 THOUSANDS/UL (ref 149–390)
PMV BLD AUTO: 10.9 FL (ref 8.9–12.7)
POTASSIUM SERPL-SCNC: 4.2 MMOL/L (ref 3.5–5.3)
RBC # BLD AUTO: 4.14 MILLION/UL (ref 3.88–5.12)
SODIUM SERPL-SCNC: 135 MMOL/L (ref 135–147)
WBC # BLD AUTO: 10.95 THOUSAND/UL (ref 4.31–10.16)

## 2024-08-27 PROCEDURE — 97535 SELF CARE MNGMENT TRAINING: CPT

## 2024-08-27 PROCEDURE — 80048 BASIC METABOLIC PNL TOTAL CA: CPT | Performed by: PHYSICIAN ASSISTANT

## 2024-08-27 PROCEDURE — 97167 OT EVAL HIGH COMPLEX 60 MIN: CPT

## 2024-08-27 PROCEDURE — 85027 COMPLETE CBC AUTOMATED: CPT | Performed by: PHYSICIAN ASSISTANT

## 2024-08-27 PROCEDURE — 99024 POSTOP FOLLOW-UP VISIT: CPT | Performed by: ORTHOPAEDIC SURGERY

## 2024-08-27 PROCEDURE — 97110 THERAPEUTIC EXERCISES: CPT

## 2024-08-27 PROCEDURE — 97163 PT EVAL HIGH COMPLEX 45 MIN: CPT

## 2024-08-27 RX ORDER — ASPIRIN 81 MG/1
81 TABLET, CHEWABLE ORAL 2 TIMES DAILY
Qty: 56 TABLET | Refills: 0 | Status: SHIPPED | OUTPATIENT
Start: 2024-08-27 | End: 2024-09-24

## 2024-08-27 RX ORDER — OXYCODONE HYDROCHLORIDE 5 MG/1
5 TABLET ORAL EVERY 4 HOURS PRN
Qty: 20 TABLET | Refills: 0 | Status: SHIPPED | OUTPATIENT
Start: 2024-08-27

## 2024-08-27 RX ORDER — ONDANSETRON 4 MG/1
4 TABLET, FILM COATED ORAL EVERY 8 HOURS PRN
Qty: 20 TABLET | Refills: 0 | Status: SHIPPED | OUTPATIENT
Start: 2024-08-27

## 2024-08-27 RX ORDER — ASPIRIN 81 MG/1
81 TABLET, CHEWABLE ORAL 2 TIMES DAILY
Start: 2024-08-27 | End: 2024-08-27

## 2024-08-27 RX ADMIN — ATORVASTATIN CALCIUM 40 MG: 40 TABLET, FILM COATED ORAL at 18:08

## 2024-08-27 RX ADMIN — DOCUSATE SODIUM 100 MG: 100 CAPSULE, LIQUID FILLED ORAL at 08:08

## 2024-08-27 RX ADMIN — PANTOPRAZOLE SODIUM 40 MG: 40 TABLET, DELAYED RELEASE ORAL at 06:23

## 2024-08-27 RX ADMIN — OXYCODONE HYDROCHLORIDE 5 MG: 5 TABLET ORAL at 00:58

## 2024-08-27 RX ADMIN — AMLODIPINE BESYLATE 10 MG: 10 TABLET ORAL at 08:08

## 2024-08-27 RX ADMIN — ACETAMINOPHEN 650 MG: 325 TABLET ORAL at 18:08

## 2024-08-27 RX ADMIN — DOCUSATE SODIUM 100 MG: 100 CAPSULE, LIQUID FILLED ORAL at 18:07

## 2024-08-27 RX ADMIN — SODIUM CHLORIDE, SODIUM LACTATE, POTASSIUM CHLORIDE, AND CALCIUM CHLORIDE 100 ML/HR: .6; .31; .03; .02 INJECTION, SOLUTION INTRAVENOUS at 01:07

## 2024-08-27 RX ADMIN — ALLOPURINOL 100 MG: 100 TABLET ORAL at 08:08

## 2024-08-27 RX ADMIN — ACETAMINOPHEN 650 MG: 325 TABLET ORAL at 09:52

## 2024-08-27 RX ADMIN — OXYCODONE HYDROCHLORIDE 5 MG: 5 TABLET ORAL at 09:48

## 2024-08-27 RX ADMIN — METOPROLOL SUCCINATE 50 MG: 50 TABLET, EXTENDED RELEASE ORAL at 18:08

## 2024-08-27 RX ADMIN — ASPIRIN 81 MG CHEWABLE TABLET 81 MG: 81 TABLET CHEWABLE at 08:08

## 2024-08-27 RX ADMIN — ASPIRIN 81 MG CHEWABLE TABLET 81 MG: 81 TABLET CHEWABLE at 18:08

## 2024-08-27 RX ADMIN — CEFAZOLIN SODIUM 2000 MG: 2 SOLUTION INTRAVENOUS at 00:59

## 2024-08-27 RX ADMIN — ACETAMINOPHEN 650 MG: 325 TABLET ORAL at 03:42

## 2024-08-27 NOTE — PLAN OF CARE
Problem: OCCUPATIONAL THERAPY ADULT  Goal: Performs self-care activities at highest level of function for planned discharge setting.  See evaluation for individualized goals.  Description: Treatment Interventions: ADL retraining, Functional transfer training, UE strengthening/ROM, Endurance training, Patient/family training, Equipment evaluation/education, Fine motor coordination activities, Compensatory technique education, Continued evaluation, Energy conservation, Activityengagement          See flowsheet documentation for full assessment, interventions and recommendations.   Note: Limitation: Decreased ADL status, Decreased UE strength, Decreased UE ROM, Decreased endurance, Decreased fine motor control, Decreased self-care trans, Decreased high-level ADLs, Non-func L UE (impaired pain, sitting tolerance, balance, L UE /LE ROM strength, forward functional reach)     Assessment: Pt is a 65yo male s/p R GUS w/ Dr. Randle on 8/26/24. Pt is NWB R UE in abduction sling. Pt admit from [facility] at [facility] and needs assistance w/ IADLs. Pt reports short distance functional mobility w/ out use of AD to / from bathroom and mod I w/ basic ADLs prior to admission. Pt reports staff assist w/ bathing, IADLs. Pt reports he is able to propel w/c using LE in reverse / backwards. 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 Upon eval, pt alert and generally oriented. Able to follow directions and participate in conversation. Pt reports R hand dominance and demonstrated R distal UE (hand / wrist) AROm WFL. Pt required total A to complete feeding, max A grooming, max A UBD, total A to manage R UE sling, mod A bed mobility, min A SPT to R w/ out use of AD. Pt is completing ADLs below baseline level of I and would benefit from  OT  in acute care. Recommend level II rehab resource intensity when medically stable for discharge from acute care. Will continue to follow     Rehab Resource Intensity Level, OT: II (Moderate Resource Intensity)

## 2024-08-27 NOTE — CASE MANAGEMENT
[facility] received request for authorization from Care Manager.  Authorization request submitted for: [facility]  Facility Name:  CC@[facility] NPI:  [id number]  Facility MD:  Dr. Noguera   NPI: [id number]  Authorization initiated by contacting insurance:  [employer]  Via: [employer] Portal   Clinicals submitted via [employer] attachment   Pending Reference #: [id number]   [id number]    Care Manager notified: Rachel Gomez    Updates to authorization status will be noted in chart. Please reach out to CM for updates on any clinical information.

## 2024-08-27 NOTE — PROGRESS NOTES
Patient was discharged back to Complete Care at [facility] via EMS @ 1830. Discharge instructions were provided. IV & masimo were removed. R arm sling is patent. External condom catheter was removed. All belongings were packed & were sent with the patient. Report was called to oncoming nurse.

## 2024-08-27 NOTE — CASE MANAGEMENT
Case Management Assessment & Discharge Planning Note    Patient name Demetrio Villalobos  Location [address] [place] [address]/[address] [place] [place] [address] MRN [id number]  : 1960 Date 2024       Current Admission Date: 2024  Current Admission Diagnosis:Asthma  Patient Active Problem List    Diagnosis Date Noted Date Diagnosed    Asthma 2024     Bilateral lower extremity edema 2024     Peripheral arterial disease (HCC) 2024     Metabolic alkalosis 07/15/2024     Class 1 obesity due to excess calories with serious comorbidity and body mass index (BMI) of 34.0 to 34.9 in adult 07/15/2024     Complete tear of right rotator cuff, unspecified whether traumatic 2024     Primary hypertension 2023     Benign hypertension with chronic kidney disease, stage III (HCC) 2023     Hypokalemia 2023     Gout flare 2023     Hyperlipidemia 2023     H/O: CVA (cerebrovascular accident) 2023       LOS (days): 0  Geometric Mean LOS (GMLOS) (days):   Days to GMLOS:     OBJECTIVE:              Current admission status: Outpatient Surgery       Preferred Pharmacy:   UNKNOWN - FOLLOW UP PRIOR TO DISCHARGE TO E-PRESCRIBE  No address on file      Primary Care Provider: No primary care provider on file.    Primary Insurance: [employer] [facility]  Secondary Insurance:     ASSESSMENT:  Active Health Care Proxies       Cherise Gonzalez [facility] Health Care Agent - Daughter   Primary Phone: [phone] (Mobile)                 Readmission Root Cause  30 Day Readmission: No    Patient Information  Admitted from:: Facility  Mental Status: Alert  During Assessment patient was accompanied by: Not accompanied during assessment  Assessment information provided by:: Patient  Primary Caregiver: Family  Caregiver's Name:: Cherise Gonzalez (dtr)  Caregiver's Relationship to Patient:: Family Member  Caregiver's Telephone Number:: [phone]  County of Residence: [place]  What city do you live in?:  [place]  Type of Current Residence: Other (Comment) (SNF)  Living Arrangements: Other (Comment) (SNF)    Activities of Daily Living Prior to Admission  Functional Status: Assistance  Completes ADLs independently?: Yes  Ambulates independently?: Yes  Does the patient have a history of Short-Term Rehab?: Yes (CC@[facility])     Patient Information Continued  Income Source: Unemployed  Does patient have prescription coverage?: Yes  Does patient receive dialysis treatments?: No     Means of Transportation  Means of Transport to [facility]:: Family transport      Social Determinants of Health (SDOH)      Flowsheet Row Most Recent Value   Housing Stability    In the last 12 months, was there a time when you were not able to pay the mortgage or rent on time? N   In the past 12 months, how many times have you moved where you were living? 1   At any time in the past 12 months, were you homeless or living in a shelter (including now)? N   Transportation Needs    In the past 12 months, has lack of transportation kept you from medical appointments or from getting medications? no   In the past 12 months, has lack of transportation kept you from meetings, work, or from getting things needed for daily living? No   Food Insecurity    Within the past 12 months, you worried that your food would run out before you got the money to buy more. Never true   Within the past 12 months, the food you bought just didn't last and you didn't have money to get more. Never true   Utilities    In the past 12 months has the electric, gas, oil, or water company threatened to shut off services in your home? No            DISCHARGE DETAILS:    Discharge planning discussed with:: Patient  Freedom of Choice: Yes  Comments - Freedom of Choice: Facility return to [facility]  CM contacted family/caregiver?: No- see comments (Patient refused and stated he will update his dtr via text)  Were Treatment Team discharge recommendations reviewed with patient/caregiver?:  Yes  Did patient/caregiver verbalize understanding of patient care needs?: Yes  Were patient/caregiver advised of the risks associated with not following Treatment Team discharge recommendations?: Yes     Requested Home Health Care         Is the patient interested in C at discharge?: No    DME Referral Provided  Referral made for DME?: No    Other Referral/Resources/Interventions Provided:  Interventions: Short Term Rehab  Referral Comments: RN CM spoke with patient at bedside to introduce self and role and discuss discharge planning. Patient reports he is from [facility] and would like to return back and he is on the waiting list for housing. Patient agreeable fot RN CM to send referral to [facility] for return with STR. Referral sent to [facility] and reserved in aidin.  Auth to be requested once updated therapy notes available.     Treatment Team Recommendation: Short Term Rehab, Facility Return  Discharge Destination Plan:: Facility Return, Short Term Rehab  Transport at Discharge : Wheelchair van

## 2024-08-27 NOTE — DISCHARGE INSTR - AVS FIRST PAGE
POSTOPERATIVE INSTRUCTIONS following SHOULDER SURGERY      MEDICATIONS:  Resume all home medications unless otherwise instructed by your surgeon.  Pain Medication:   Continue Tylenol   Take 5mg Oxycodone as needed every 4-6 hours for severe pain   If you were given a regional anesthetic (nerve block), it is helpful to take your pain medication before the block wear off.    Possible side effects include nausea, constipation, and urinary retention.  If you experience these side effects, please call our office for assistance.  Pain med refills are authorized only during office hours (8am-4pm, Mon-Fri).  Nausea Medication:   Zofran 4mg, take 1 tablet every 6 hours as needed for nausea or vomiting   Fill prescription ONLY if you expericnce severe nausea.  Blood Clot Prevention:   Pump your foot up and down 20 times per hour while you are less mobile.  Ambulate with your crutches at least once every hour   Take Aspirin 81mg Twice daily for 4 weeks, then resume your normal asa dose    WOUND CARE:  Keep the dressing clean and dry.  Light drainage may occur the first 2 days postop.  DO NOT REMOVE THE DRESSINGS until you are seen in our office.  Cover the bandages appropriately while washing to keep them from getting wet.  Please call our office ([phone]) if you experience either of the following:  Sudden increase in swelling, redness, or warmth at the surgical site  Excessive incisional drainage that persists beyond the 3rd day after surgery  Oral temperature greater than 101 degrees, not relieved with Tylenol  Shortness of breath, chest pain, nausea, or any other concerning symptoms  If it is after hours and you cannot reach someone, go to the nearest emergency room    ICE:  You may use Ice to help with pain control   Place ice on the operative site for 20 minutes at a time when you are in pain     SLING:  Wear your sling AT ALL TIMES (including sleep) until your first postoperative office visit.  You may remove the  sling for showering but must keep your arm at your side.    ACTIVITY:   DO NOT lift, carry, push, or pull anything with your operative arm.  Shoulder:  DO NOT actively (on your own) raise your operative arm away from the side of you body or rotate it out away from your body unless instructed by your surgeon or physical therapist.  Place a pillow behind the elbow while lying down.  Sleeping in a more upright position (recliner) may be more comfortable initially.  Elbow, Wrist, and Finger Motion:  You may take your elbow out of the slight carefully to move your elbow, wrist, and fingers. Follow your motion precautions for the shoulder. Replace the sling immediately after.     PHYSICAL THERAPY:  Begin therapy 5 TO 7 DAYS AFTER SURGERY.  You were given a prescription for therapy at your preoperative office visit.  If you do not have physical therapy scheduled yet, please call our office for assistance.    FOLLOW-UP APPOINTMENT:  2 weeks after surgery with:    Patient is requesting a condom catheter upon return to care center    Sina Randle MD    [facility] [address], [address]  [place] [address]    [facility] [id number] [place] [address]      Phone:   [phone]

## 2024-08-27 NOTE — PROGRESS NOTES
"Progress Note - Orthopedics   Demetrio Villalobos 64 y.o. adult MRN: [id number]  Unit/Bed#: [address] [place] [address] Encounter: [id number]      Subjective: Status post right reverse total shoulder arthroplasty performed yesterday. Patient notes minimal pain about the shoulder and good sensation of the upper extremity. He denies any CP, SOB, N/V or dizziness. No acute overnight events.     Vitals: Blood pressure 143/98, pulse 84, temperature 98.5 °F (36.9 °C), temperature source Oral, resp. rate 16, height 5' 7\" (1.702 m), weight 98.1 kg (216 lb 4.3 oz), SpO2 95%.,Body mass index is 33.87 kg/m².      Intake/Output Summary (Last 24 hours) at 8/27/2024 1142  Last data filed at 8/27/2024 0600  Gross per 24 hour   Intake 1416.67 ml   Output 1050 ml   Net 366.67 ml       Invasive Devices       Peripheral Intravenous Line  Duration             Peripheral IV 08/26/24 Dorsal (posterior);Left Hand 1 day              Drain  Duration             External Urinary Catheter 1 day                    Ortho Exam: Alert and oriented X 3 sittin gup comfortably in bed in NAD. RUE: Dressing CDI. Mild swelling shoulder. Sensation intact axillay, median, ulnar, and radial nerve distributions on operative extremity. 5/5 strength EPL, FPL, APB, and first dorsal interosseous of operative extremity. 2+radial pulse.    Lab, Imaging and other studies: I have personally reviewed pertinent lab results.  Post-operative xrays showed hardware intact with no fracture.  CBC:   Lab Results   Component Value Date    WBC 10.95 (H) 08/27/2024    HGB 12.1 08/27/2024    HCT 36.5 08/27/2024    MCV 88 08/27/2024     08/27/2024    RBC 4.14 08/27/2024    MCH 29.2 08/27/2024    MCHC 33.2 08/27/2024    RDW 14.1 08/27/2024    MPV 10.9 08/27/2024    NRBC 0 09/12/2023     CMP:   Lab Results   Component Value Date     08/27/2024    CO2 22 08/27/2024    BUN 30 (H) 08/27/2024    CREATININE 2.13 (H) 08/27/2024    CALCIUM 8.3 (L) 08/27/2024    AST 12 (L) 09/08/2023    ALT 9 " "09/08/2023    ALKPHOS 61 09/08/2023    EGFR 44 09/12/2023     PT/INR: No results found for: \"PT\", \"INR\"                Assessment:  Status post right reverse total shoulder arthroplasty.     Plan:  The patient is doing well and will be discharged back to his care center today. She should continue his sling at all times. Analgesia prn. ASA 81 mg BID for DVT prophylaxis. FU 2 weeks post op with Dr. Faye.       "

## 2024-08-27 NOTE — PHYSICAL THERAPY NOTE
PHYSICAL THERAPY EVALUATION/TREATMENT     08/27/24 1020   PT Last Visit   PT Visit Date 08/27/24   Note Type   Note type Evaluation   Pain Assessment   Pain Assessment Tool 0-10   Pain Score 2  (Right shoulder area)   Restrictions/Precautions   Weight Bearing Precautions Per Order Yes   RUE Weight Bearing Per Order NWB   Braces or Orthoses Other (Comment)  (Right upper extremity abduction sling, left MAFO)   Other Precautions Chair Alarm;Bed Alarm;Fall Risk;Pain;WBS  (Left-sided hemiplegia with old CVA)   Home Living   Type of Home SNF   Home Layout One level   Home Equipment Wheelchair-manual;Other (Comment)  (Patient states having an electric wheelchair on order)   Additional Comments Patient states independence with transfers from bed to chair and chair to toilet prior to admission, able to ambulate approximately 20 feet prior to onset of back pain.  Patient also states being in mostly wheelchair bound   Prior Function   Level of [place] Needs assistance with IADLS   Lives With Facility staff   Receives Help From Personal care attendant   IADLs Family/Friend/Other provides transportation;Family/Friend/Other provides meals;Family/Friend/Other provides medication management   Falls in the last 6 months 0   Comments Patient reports having assist as needed for ADLs but also having adaptive equipment to assist   General   Additional Pertinent History Chart reviewed, patient admitted status post right TSA.  Patient from a long-term care facility prior to admission although with the plan for discharge to a handicapped apartment as 1 becomes available.  Patient is cooperative and pleasant and will benefit from continued physical therapy   Family/Caregiver Present No   Cognition   Overall Cognitive Status WFL   Arousal/Participation Cooperative   Attention Within functional limits   Orientation Level Oriented to person;Oriented to place;Oriented to situation   Following Commands Follows multistep commands with  increased time or repetition   Subjective   Subjective Patient states only mild pain in the right shoulder area at this time   RLE Assessment   RLE Assessment   (Range of motion within functional limits, strength 4+/5)   LLE Assessment   LLE Assessment   (Range of motion within functional limits, strength hip and knee grossly 3 -/2+, left foot drop)   Coordination   Movements are Fluid and Coordinated 0   Coordination and Movement Description Decreased coordination with transfers and gait activity with left-sided hemiaplegia from old CVA   Bed Mobility   Additional Comments Patient sitting out of bed to bedside chair upon arrival by therapist   Transfers   Sit to Stand 4  Minimal assistance   Additional items Assist x 1;Verbal cues   Stand to Sit 4  Minimal assistance   Additional items Assist x 1;Verbal cues   Ambulation/Elevation   Gait Assistance   (Min to mod assist)   Additional items Assist x 1;Verbal cues;Tactile cues   Assistive Device   (None)   Distance 2-3 steps with assist for weight shifting and balance due to new right TSA and left hemiplegia   Balance   Static Sitting Fair +   Dynamic Sitting Fair   Static Standing Fair -   Dynamic Standing Fair -   Ambulatory Fair -   Activity Tolerance   Activity Tolerance Patient limited by fatigue;Patient limited by pain  (Limited by new right shoulder surgery and left hemiplegia)   Nurse Made Aware Yes   Assessment   Prognosis Good   Problem List Decreased strength;Decreased range of motion;Decreased endurance;Impaired balance;Decreased mobility;Decreased coordination;Pain;Orthopedic restrictions   Assessment Patient seen for Physical Therapy evaluation. Patient admitted with <principal problem not specified>.  Comorbidities affecting patient's physical performance include: .  Personal factors affecting patient at time of initial evaluation include: inability to ambulate household distances, inability to navigate community distances, inability to navigate level  surfaces without external assistance, inability to perform dynamic tasks in community, inability to perform physical activity, inability to perform ADLS, and inability to perform IADLS . Prior to admission, patient was requiring assist for functional mobility with WC, requiring assist for ADLS, requiring assist for IADLS, ambulating household distance, a resident of long term care, and having 24 hour care .  Please find objective findings from Physical Therapy assessment regarding body systems outlined above with impairments and limitations including weakness, decreased ROM, impaired balance, decreased endurance, impaired coordination, gait deviations, pain, decreased activity tolerance, decreased functional mobility tolerance, fall risk, and impaired tone.  The Barthel Index was used as a functional outcome tool presenting with a score of Barthel Index Score: 30 today indicating marked limitations of functional mobility and ADLS.  Patient's clinical presentation is currently unstable/unpredictable as seen in patient's presentation of vital sign response, changing level of pain, increased fall risk, new onset of impairment of functional mobility, decreased endurance, and new onset of weakness. Pt would benefit from continued Physical Therapy treatment to address deficits as defined above and maximize level of functional mobility. As demonstrated by objective findings, the assigned level of complexity for this evaluation is high.The patient's -[facility] Basic Mobility Inpatient Short Form Raw Score is 13. A Raw score of less than or equal to 16 suggests the patient may benefit from discharge to post-acute rehabilitation services. Please also refer to the recommendation of the Physical Therapist for safe discharge planning.   Goals   Patient Goals To recover from his right shoulder surgery and prepared to be able to live independently   STG Expiration Date 09/03/24   Short Term Goal #1 Transfers and gait with supervision  for 15 feet   Short Term Goal #2 Supervision stand pivot transfers   LTG Expiration Date 09/10/24   Long Term Goal #1 Reeducation of left lower extremity and strengthening of bilateral to improve strength to 5/5 right and 3+/5 left   Long Term Goal #2 Independent stand pivot transfers   Plan   Treatment/Interventions ADL retraining;Functional transfer training;LE strengthening/ROM;Therapeutic exercise;Endurance training;Patient/family training;Equipment eval/education;Bed mobility;Gait training;Compensatory technique education   PT Frequency Other (Comment)  (5 times per week)   Discharge Recommendation   Rehab Resource Intensity Level, PT II (Moderate Resource Intensity)   AM-PAC Basic Mobility Inpatient   Turning in Flat Bed Without Bedrails 2   Lying on Back to Sitting on Edge of Flat Bed Without Bedrails 2   Moving Bed to Chair 3   Standing Up From Chair Using Arms 3   Walk in Room 2   Climb 3-5 Stairs With Railing 1   Basic Mobility Inpatient Raw Score 13   Basic Mobility Standardized Score 33.99   [facility] Highest Level Of Mobility   [facility] Goal 4: Move to chair/commode   [facility] Achieved 5: Stand (1 or more minutes)   Barthel Index   Feeding 5   Bathing 0   Grooming Score 0   Dressing Score 0   Bladder Score 5   Bowels Score 5   Toilet Use Score 5   Transfers (Bed/Chair) Score 10   Mobility (Level Surface) Score 0   Stairs Score 0   Barthel Index Score 30   Additional Treatment Session   Start Time 1005   End Time 1020   Treatment Assessment s: Patient reports 2/10 pain in right shoulder O: Bilateral lower extremity exercises completed as listed below A: Patient will benefit from continued physical therapy with progression as tolerated and increasing functional mobility with clinical staff as well   Exercises   Hamstring Stretch Sitting;5 reps;PROM;Bilateral   Hip Flexion Sitting;5 reps;Bilateral   Hip Abduction Sitting;5 reps;Bilateral   Knee AROM Long Arc Quad Sitting;5 reps;Bilateral   Ankle Pumps  Sitting;10 reps;Bilateral   Heel Cord Stretch Sitting;5 reps;PROM;Bilateral   Balance training  Sitting and standing balance activity completed with weight shifting, reaching activity and bilateral lower extremity exercise without lumbar support sitting on edge of chair   Licensure   [place] License Number  Nani Link PT [id number]

## 2024-08-27 NOTE — PLAN OF CARE
Problem: Prexisting or High Potential for Compromised Skin Integrity  Goal: Skin integrity is maintained or improved  Description: INTERVENTIONS:  - Identify patients at risk for skin breakdown  - Assess and monitor skin integrity  - Assess and monitor nutrition and hydration status  - Monitor labs   - Assess for incontinence   - Turn and reposition patient  - Assist with mobility/ambulation  - Relieve pressure over bony prominences  - Avoid friction and shearing  - Provide appropriate hygiene as needed including keeping skin clean and dry  - Evaluate need for skin moisturizer/barrier cream  - Collaborate with interdisciplinary team   - Patient/family teaching  - Consider wound care consult   Outcome: Progressing     Problem: PAIN - ADULT  Goal: Verbalizes/displays adequate comfort level or baseline comfort level  Description: Interventions:  - Encourage patient to monitor pain and request assistance  - Assess pain using appropriate pain scale  - Administer analgesics based on type and severity of pain and evaluate response  - Implement non-pharmacological measures as appropriate and evaluate response  - Consider cultural and social influences on pain and pain management  - Notify physician/advanced practitioner if interventions unsuccessful or patient reports new pain  Outcome: Progressing     Problem: DISCHARGE PLANNING  Goal: Discharge to home or other facility with appropriate resources  Description: INTERVENTIONS:  - Identify barriers to discharge w/patient and caregiver  - Arrange for needed discharge resources and transportation as appropriate  - Identify discharge learning needs (meds, wound care, etc.)  - Arrange for interpretive services to assist at discharge as needed  - Refer to Case Management Department for coordinating discharge planning if the patient needs post-hospital services based on physician/advanced practitioner order or complex needs related to functional status, cognitive ability, or  social support system  Outcome: Progressing

## 2024-08-27 NOTE — CASE MANAGEMENT
Case Management Discharge Planning Note    Patient name Demetrio Villalobos  Location [address] [place] [address]/[address] [place] [address] MRN [id number]  : 1960 Date 2024       Current Admission Date: 2024  Current Admission Diagnosis:Complete tear of right rotator cuff, unspecified whether traumatic   Patient Active Problem List    Diagnosis Date Noted Date Diagnosed    Asthma 2024     Bilateral lower extremity edema 2024     Peripheral arterial disease (HCC) 2024     Metabolic alkalosis 07/15/2024     Class 1 obesity due to excess calories with serious comorbidity and body mass index (BMI) of 34.0 to 34.9 in adult 07/15/2024     Complete tear of right rotator cuff, unspecified whether traumatic 2024     Primary hypertension 2023     Benign hypertension with chronic kidney disease, stage III (HCC) 2023     Hypokalemia 2023     Gout flare 2023     Hyperlipidemia 2023     H/O: CVA (cerebrovascular accident) 2023       LOS (days): 0  Geometric Mean LOS (GMLOS) (days):   Days to GMLOS:     OBJECTIVE:            Current admission status: Outpatient Surgery   Preferred Pharmacy:   UNKNOWN - FOLLOW UP PRIOR TO DISCHARGE TO E-PRESCRIBE  No address on file      Primary Care Provider: No primary care provider on file.    Primary Insurance: [employer] [facility]  Secondary Insurance:     DISCHARGE DETAILS:    Other Referral/Resources/Interventions Provided:  Interventions: Transportation  Referral Comments: STEPHEN Discharge Support tasked for paul Bazan at [email or web address] made aware patient medically cleared for discharge. RN CM requested [facility] transport for 1430 via [employer], pending confirmation GRACIE Montoya made aware.     Treatment Team Recommendation: Facility Return, Short Term Rehab  Discharge Destination Plan:: Facility Return, Short Term Rehab  Transport at Discharge : Wheelchair van  Dispatcher Contacted: Yes  Number/Name of Dispatcher: [employer]  Transported by  (Company and Unit #): TBD  ETA of Transport (Date): 08/27/24  ETA of Transport (Time): 1430 (Pending acceptance)

## 2024-08-27 NOTE — OCCUPATIONAL THERAPY NOTE
"    Occupational Therapy Evaluation     Patient Name: Demetrio Villalobos  Today's Date: 8/27/2024  Problem List  Active Problems:    Asthma    Past Medical History  Past Medical History:   Diagnosis Date    Chronic kidney disease     Edema     GERD (gastroesophageal reflux disease)     Gout     hospitalized    High cholesterol     Hypertension     PVD (peripheral vascular disease) ([facility])     Renal disorder     Rotator cuff tear     right    Shortness of breath     Stroke ([facility]) 2014    Ventral hernia without obstruction or gangrene     Walker as ambulation aid      Past Surgical History  Past Surgical History:   Procedure Laterality Date    APPENDECTOMY      GA ARTHROPLASTY GLENOHUMERAL JOINT TOTAL SHOULDER Right 8/26/2024    Procedure: ARTHROPLASTY SHOULDER REVERSE;  Surgeon: Sina Randle MD;  Location: [facility];  Service: Orthopedics        08/27/24 0921   OT Last Visit   OT Visit Date 08/27/24  (Tuesday)   Note Type   Note type Evaluation  (Eval 8553-2617; tx session 9403-8442)   Additional Comments Identified pt by full name and birthdate. Pt prefers \"Will\"   Pain Assessment   Pain Assessment Tool 0-10   Pain Score 2   Pain Location/Orientation Orientation: Right;Location: Arm;Location: Shoulder   Effect of Pain on Daily Activities limits pace and activity tolerance during ADLs, limits ROM   Patient's Stated Pain Goal No pain   Hospital Pain Intervention(s) Cold applied;Repositioned;Ambulation/increased activity;Emotional support  (RN, Dionisio casas post eval)   Restrictions/Precautions   Weight Bearing Precautions Per Order Yes   RUE Weight Bearing Per Order NWB  (s/p R TSA w/ Dr. Randle on 8/27/24)   Braces or Orthoses Sling;Other (Comment)  (R UE abduction sling; L MAFO)   Other Precautions Chair Alarm;Bed Alarm;WBS;Multiple lines;Fall Risk;Pain  (Juan Manuel, room air, condom catheter; premorbid L UE/LE hemiplegia s/p CVA)   Home Living   Type of Home SNF;Other (Comment)  (admit from [facility]) " "  Home Equipment Wheelchair-manual;Grab bars   Additional Comments Pt admit from [facility] and is able to walk short distance to / from bathroom w/ out use of AD or assistance. Able to propel w/c in reverse  / backwards.   Prior Function   Level of [place] Needs assistance with IADLS   Lives With Facility staff   Receives Help From Personal care attendant   IADLs Family/Friend/Other provides transportation;Family/Friend/Other provides meals;Family/Friend/Other provides medication management   Falls in the last 6 months 0   Comments Pt reports mod I for + time w/ ADLs prior to admission. Able to propel w/c short distances and complete toileting w/ out assistance. Staff assist w/ IADLs / medications.   Lifestyle   Autonomy Pt reports mod I for + time w/ basic ADLs, toileting and dressing prior to admission. Pt reports no AD for short distance functional mobility to / from bathroom   Reciprocal Relationships Pt reports supportive staff at [facility].   Service to Others Pt reports not working; pt added that he worked in [employer]   Intrinsic Gratification Enjoys football and is a [employer] fan. 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 Randle on 8/26/24 and is NWB R UE in [facility]. History CVA w/ residual L UE/LE deficits   Family/Caregiver Present No   Additional General Comments Personal and environmental factors supporting performance includes supportive staff / family, access to AD/DME, mod I at baseline w/ basic ADLs; barriers include inability to complete IADLs, pain   Subjective   Subjective \"I need someone to feed me\" (stated upon therapist arrival w/ call bell activated)   ADL   Where Assessed Edge of bed  (vs OOB in chair post eval)   Eating Assistance 2  Maximal Assistance   Eating Deficit Setup;Bringing food to mouth assist;Scoop assist;Other (Comment)  (due to R UE activity restrictions / " precautions s/p R TSA; unable to use L UE functionally to feed self)   Grooming Assistance 2  Maximal Assistance   Grooming Deficit Setup;Increased time to complete  (don / doff glassess due to R UE activity restrictions, premorbid L UE deficits)   UB Bathing Assistance Unable to assess  (anticipate max A based on fxal obs skills, clinical judgement)   LB Bathing Assistance Unable to assess  (anticipate max A based on fxal obs skills, clinical judgement)   UB Dressing Assistance 2  Maximal Assistance   UB Dressing Deficit Steadying;Fasteners;Pull around back;Thread RUE  (upon sitting at EOB to don gown,total A to manage sling)   LB Dressing Assistance Unable to assess  (condom catheter in place, will continue to assess; anticipate max A based on fxal obs skills, clinical judgement)   Toileting Assistance    (condom cathter in place, denied need to void. Anticipate max A based on fxal obs skills, clinical judgement due to R UE precations, activity restrictions)   Additional Comments Educated pt on R UE in sling. Provided w/ handout   Bed Mobility   Supine to Sit 3  Moderate assistance   Additional items Assist x 1;HOB elevated;Increased time required;Verbal cues;LE management  (to pt's L)   Sit to Supine Unable to assess   Additional Comments Pt seated OOB in chair post eval w/ needs met, call bell in reach   Transfers   Sit to Stand 4  Minimal assistance   Additional items Assist x 1;Increased time required;Verbal cues  (from EOB)   Stand to Sit 4  Minimal assistance   Additional items Assist x 1;Increased time required;Verbal cues   Stand pivot 4  Minimal assistance  (min A (CG/ steadying); ling mgmt w/ cues to tech, R UE NWB in sling to pt's R)   Additional items Assist x 1;Increased time required   Functional Mobility   Additional Comments Will continue to assess   Balance   Static Sitting Fair +   Static Standing Fair -   Ambulatory   (Will continue to assess)   Activity Tolerance   Activity Tolerance Patient  limited by fatigue;Patient limited by pain   Medical Staff Made Aware spoke w/ Rachel MITCHELL and PT, Nani   Nurse Made Aware spoke w/ Dionisio BOWMAN Assessment   RUE Assessment X  (R UE NWB in abduction sling s/p R TSA on 8/26/24)   RUE Overall AROM   R Wrist Flexion AROM WFL   R Wrist Extension AROM WFL   R Mass Grasp AROM WFL; able to isolate digits to manage cell phone using 2nd digit w/ phone supported on stand   RUE Strength   RUE Overall Strength Deficits;Due to precautions;Due to pain   LUE Assessment   LUE Assessment X   LUE Strength   LUE Overall Strength Deficits;Other (Comment)  (albe to elevate L shoulder, actively flex AG < 30 degrees)   L Shoulder Flexion 2-/5   Hand Function   Gross Motor Coordination Impaired  (due to RUE activity restrictions)   Fine Motor Coordination Impaired   Hand Function Comments Pt reports R hand dominance, functional use distal R UE in sling.   Sensation   Light Touch No apparent deficits  (distal R UE)   Vision-Basic Assessment   Current Vision Wears glasses all the time   Cognition   Overall Cognitive Status WFL   Arousal/Participation Alert;Cooperative   Attention Attends with cues to redirect   Orientation Level Oriented to person;Oriented to place;Oriented to situation   Memory Decreased recall of precautions;Other (Comment)  (verbalized understanding R UE NWB; Provided w/ handout)   Following Commands Follows multistep commands without difficulty   Comments Alert, generally oriented and able to follow directions during ADLs   Assessment   Limitation Decreased ADL status;Decreased UE strength;Decreased UE ROM;Decreased endurance;Decreased fine motor control;Decreased self-care trans;Decreased high-level ADLs;Non-func L UE  (impaired pain, sitting tolerance, balance, L UE /LE ROM strength, forward functional reach)   Assessment Pt is a 65yo male s/p R  w/ Dr. Randle on 8/26/24. Pt is NWB R UE in abduction sling. [facility] and needs  assistance w/ IADLs. Pt reports short distance functional mobility w/ out use of AD to / from bathroom and mod I w/ basic ADLs prior to admission. Pt reports staff assist w/ bathing, IADLs. Pt reports he is able to propel w/c using LE in reverse / backwards. 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 Upon eval, pt alert and generally oriented. Able to follow directions and participate in conversation. Pt reports R hand dominance and demonstrated R distal UE (hand / wrist) AROm WFL. Pt required total A to complete feeding, max A grooming, max A UBD, total A to manage R UE sling, mod A bed mobility, min A SPT to R w/ out use of AD. Pt is completing ADLs below baseline level of I and would benefit from  OT in acute care. Recommend level II rehab resource intensity when medically stable for discharge from acute care. Will continue to follow   Goals   Patient Goals Pt stated that he would like to be able use his R arm w/ out pain.   Plan   Treatment Interventions ADL retraining;Functional transfer training;UE strengthening/ROM;Endurance training;Patient/family training;Equipment evaluation/education;Fine motor coordination activities;Compensatory technique education;Continued evaluation;Energy conservation;Activityengagement   Goal Expiration Date 09/06/24   OT Treatment Day 0  (Tuesday 8/26/24)   OT Frequency 3-5x/wk   Discharge Recommendation   Rehab Resource Intensity Level, OT II (Moderate Resource Intensity)   AM-PAC Daily Activity Inpatient   Lower Body Dressing 2   Bathing 2   Toileting 2   Upper Body Dressing 2   Grooming 2   Eating 1   Daily Activity Raw Score 11   Daily Activity Standardized Score (Calc for Raw Score >=11) 29.04   AM-PAC Applied Cognition Inpatient   Following a Speech/Presentation 4   Understanding  Ordinary Conversation 4   Taking Medications 3   Remembering Where Things Are Placed or Put Away 4   Remembering List of 4-5 Errands 3   Taking Care of Complicated Tasks 3   Applied Cognition Raw Score 21   Applied Cognition Standardized Score 44.3   Barthel Index   Feeding 5   Bathing 0   Grooming Score 0   Dressing Score 0   Bladder Score 0   Bowels Score 5   Toilet Use Score 5   Transfers (Bed/Chair) Score 10   Mobility (Level Surface) Score 0   Stairs Score 0   Barthel Index Score 25   Additional Treatment Session   Start Time 0910   End Time 0921   Treatment Assessment Pt seen for skilled OT tx session day 1 following eval. Pt agreeable and motivated to particiapte reporting he is hungry and would like to eat his breakfast. Pt verbalized understanding R UE activity / weight bearing restrictions. Pt required total A to feed breakfast due to R UE activity restrictions and residual L UE deficits. Therapist reinforced / educated pt on R UE positioning and ROM at elbow / wrist /hand. Therapist assisted pt w/ placement of cell phone on stand w/ in reach. Pt seated OOB in chair w/ needs met, call bell in reach. Continue to recommend level II rehab resource intensity when medically stable for dishcarge from acute care. Will continue to follow   Additional Treatment Day 1  (Tuesday 8/27/24)   End of Consult   Education Provided Yes   Patient Position at End of Consult Bedside chair;All needs within reach   Nurse Communication Nurse aware of consult   Licensure   [place] License Number  Danita BLANQUITATash ToddSAMANTHA carlosR/L [id number]        The patient's raw score on the AM-PAC Daily Activity Inpatient Short Form is 11. A raw score of less than 19 suggests the patient may benefit from discharge to post-acute rehabilitation services. Please refer to the recommendation of the Occupational Therapist for safe discharge planning.     Pt goals to be met by 9/6/24 to max I w/ ADLs and improve engagement to return to PLOF w/ staff assist to  recover/ use R UE w/ out pain includes:    -Pt will complete functional transfers to bed, chair, and toilet using LRAD, DME as needed w/ mod I    -Pt will consistently engage in functional mobility to / from bathroom w/ mod I for + time using LRAD as needed    -Pt will complete LBD w/ mod A to max I and minimize burden of care    -Pt will complete bed mobility supine <> sit w/ S to max I and minimize burden of care    -Pt will demonstrate good attention and understanding R UE ROM  to promote healing and improve functional use of R UE    -Pt will consistently demonstrate understanding, good recall tech to don / doff R UE sling in order to instruct staff at PLOF    STG to be met in 3-5 days to max I w/ ADLs and improve engagement to return to PLOF includes:    -Pt will demonstrate good attention and understanding R UE activity / ROM restrictions to max I w/ ADLs and promote healing     -Pt will complete bed mobility supine <> sit w/ min A to max I and improve engagement    -Pt will demonstrate improved activity and sitting tolerance OOB In chair for all meals    -Pt will complete feeding / grooming w/ min A in supported sitting after set- up w/ + time    -Pt will demonstrate improved functional standing tolerance for at least 5 minutes using LRAD as needed w/at least fair balance to max I w/ LBD, toileting    -Pt will demonstrate improved AMPAC score to at least 15 to max I w/ ADLs    -Pt will demonstrate good attention and participation in ongoing eval of functional mobility using LRAD    Danita Martinez, SAMANTHAR/L  [id number]  [id number]

## 2024-08-27 NOTE — PLAN OF CARE
Problem: Prexisting or High Potential for Compromised Skin Integrity  Goal: Skin integrity is maintained or improved  Description: INTERVENTIONS:  - Identify patients at risk for skin breakdown  - Assess and monitor skin integrity  - Assess and monitor nutrition and hydration status  - Monitor labs   - Assess for incontinence   - Turn and reposition patient  - Assist with mobility/ambulation  - Relieve pressure over bony prominences  - Avoid friction and shearing  - Provide appropriate hygiene as needed including keeping skin clean and dry  - Evaluate need for skin moisturizer/barrier cream  - Collaborate with interdisciplinary team   - Patient/family teaching  - Consider wound care consult   Outcome: Adequate for Discharge     Problem: PAIN - ADULT  Goal: Verbalizes/displays adequate comfort level or baseline comfort level  Description: Interventions:  - Encourage patient to monitor pain and request assistance  - Assess pain using appropriate pain scale  - Administer analgesics based on type and severity of pain and evaluate response  - Implement non-pharmacological measures as appropriate and evaluate response  - Consider cultural and social influences on pain and pain management  - Notify physician/advanced practitioner if interventions unsuccessful or patient reports new pain  Outcome: Adequate for Discharge     Problem: INFECTION - ADULT  Goal: Absence or prevention of progression during hospitalization  Description: INTERVENTIONS:  - Assess and monitor for signs and symptoms of infection  - Monitor lab/diagnostic results  - Monitor all insertion sites, i.e. indwelling lines, tubes, and drains  - Monitor endotracheal if appropriate and nasal secretions for changes in amount and color  - [place] appropriate cooling/warming therapies per order  - Administer medications as ordered  - Instruct and encourage patient and family to use good hand hygiene technique  - Identify and instruct in appropriate isolation  precautions for identified infection/condition  Outcome: Adequate for Discharge  Goal: Absence of fever/infection during neutropenic period  Description: INTERVENTIONS:  - Monitor WBC    Outcome: Adequate for Discharge     Problem: SAFETY ADULT  Goal: Patient will remain free of falls  Description: INTERVENTIONS:  - Educate patient/family on patient safety including physical limitations  - Instruct patient to call for assistance with activity   - Consult OT/PT to assist with strengthening/mobility   - Keep Call bell within reach  - Keep bed low and locked with side rails adjusted as appropriate  - Keep care items and personal belongings within reach  - Initiate and maintain comfort rounds  - Make Fall Risk Sign visible to staff  - Offer Toileting every 2 Hours, in advance of need  - Initiate/Maintain bed alarm  - Obtain necessary fall risk management equipment: Non skid socks  - Apply yellow socks and bracelet for high fall risk patients  - Consider moving patient to room near nurses station  Outcome: Adequate for Discharge  Goal: Maintain or return to baseline ADL function  Description: INTERVENTIONS:  -  Assess patient's ability to carry out ADLs; assess patient's baseline for ADL function and identify physical deficits which impact ability to perform ADLs (bathing, care of mouth/teeth, toileting, grooming, dressing, etc.)  - Assess/evaluate cause of self-care deficits   - Assess range of motion  - Assess patient's mobility; develop plan if impaired  - Assess patient's need for assistive devices and provide as appropriate  - Encourage maximum independence but intervene and supervise when necessary  - Involve family in performance of ADLs  - Assess for home care needs following discharge   - Consider OT consult to assist with ADL evaluation and planning for discharge  - Provide patient education as appropriate  Outcome: Adequate for Discharge  Goal: Maintains/Returns to pre admission functional level  Description:  INTERVENTIONS:  - Perform AM-PAC 6 Click Basic Mobility/ Daily Activity assessment daily.  - Set and communicate daily mobility goal to care team and patient/family/caregiver.   - Collaborate with rehabilitation services on mobility goals if consulted  - Perform Range of Motion 3 times a day.  - Reposition patient every 2 hours.  - Dangle patient 3 times a day  - Stand patient 3 times a day  - Ambulate patient 3 times a day  - Out of bed to chair 3 times a day   - Out of bed for meals 3 times a day  - Out of bed for toileting  - Record patient progress and toleration of activity level   Outcome: Adequate for Discharge     Problem: DISCHARGE PLANNING  Goal: Discharge to home or other facility with appropriate resources  Description: INTERVENTIONS:  - Identify barriers to discharge w/patient and caregiver  - Arrange for needed discharge resources and transportation as appropriate  - Identify discharge learning needs (meds, wound care, etc.)  - Arrange for interpretive services to assist at discharge as needed  - Refer to Case Management Department for coordinating discharge planning if the patient needs post-hospital services based on physician/advanced practitioner order or complex needs related to functional status, cognitive ability, or social support system  Outcome: Adequate for Discharge     Problem: Knowledge Deficit  Goal: Patient/family/caregiver demonstrates understanding of disease process, treatment plan, medications, and discharge instructions  Description: Complete learning assessment and assess knowledge base.  Interventions:  - Provide teaching at level of understanding  - Provide teaching via preferred learning methods  Outcome: Adequate for Discharge

## 2024-08-28 NOTE — CASE MANAGEMENT
Called [facility] [place] ([phone]) to check status of authorization. Spoke to Susan who stated auth is still pending at this time.  notified: Rachel Gomez

## 2024-08-28 NOTE — CASE MANAGEMENT
Case Management Discharge Planning Note    Patient name Demetrio Villalobos  Location [address] [address]/[address] [place] [address] MRN [id number]  : 1960 Date 2024       Current Admission Date: 2024  Current Admission Diagnosis:Complete tear of right rotator cuff, unspecified whether traumatic   Patient Active Problem List    Diagnosis Date Noted Date Diagnosed    Asthma 2024     Bilateral lower extremity edema 2024     Peripheral arterial disease (HCC) 2024     Metabolic alkalosis 07/15/2024     Class 1 obesity due to excess calories with serious comorbidity and body mass index (BMI) of 34.0 to 34.9 in adult 07/15/2024     Complete tear of right rotator cuff, unspecified whether traumatic 2024     Primary hypertension 2023     Benign hypertension with chronic kidney disease, stage III (HCC) 2023     Hypokalemia 2023     Gout flare 2023     Hyperlipidemia 2023     H/O: CVA (cerebrovascular accident) 2023       LOS (days): 0  Geometric Mean LOS (GMLOS) (days):   Days to GMLOS:     OBJECTIVE:            Current admission status: Outpatient Surgery   Preferred Pharmacy:   [facility] [address] [address]  Phone: [phone] Fax: [phone]    Primary Care Provider: No primary care provider on file.    Primary Insurance: [employer] [facility]  Secondary Insurance:     DISCHARGE DETAILS:                                                                                                               Facility Insurance Auth Number: [id number]

## 2024-08-28 NOTE — CASE MANAGEMENT
[facility] has received APPROVED authorization.  Insurance:   [employer]  Called in by Rep: Sheila ZHAO#  [phone]  Authorization received for: SNF  Facility: [email or web address]   Authorization #: [id number]   Start of Care: 8/27  Next Review Date: 9/3  Continued Stay Care Coordinator: Sheila ZHAO#  [phone]  Submit next review to: [phone]     Care Manager notified: Rachel Gomez    Please reach out to CM for updates on any clinical information.

## 2024-09-04 ENCOUNTER — APPOINTMENT (OUTPATIENT)
Dept: RADIOLOGY | Facility: CLINIC | Age: 64
End: 2024-09-04
Payer: COMMERCIAL

## 2024-09-04 ENCOUNTER — OFFICE VISIT (OUTPATIENT)
Dept: OBGYN CLINIC | Facility: CLINIC | Age: 64
End: 2024-09-04

## 2024-09-04 VITALS
WEIGHT: 216 LBS | HEIGHT: 67 IN | BODY MASS INDEX: 33.9 KG/M2 | SYSTOLIC BLOOD PRESSURE: 120 MMHG | DIASTOLIC BLOOD PRESSURE: 78 MMHG | HEART RATE: 59 BPM

## 2024-09-04 DIAGNOSIS — Z96.611 STATUS POST REVERSE TOTAL REPLACEMENT OF RIGHT SHOULDER: Primary | ICD-10-CM

## 2024-09-04 DIAGNOSIS — Z96.611 STATUS POST REVERSE TOTAL REPLACEMENT OF RIGHT SHOULDER: ICD-10-CM

## 2024-09-04 PROCEDURE — 99024 POSTOP FOLLOW-UP VISIT: CPT | Performed by: ORTHOPAEDIC SURGERY

## 2024-09-04 PROCEDURE — 73030 X-RAY EXAM OF SHOULDER: CPT

## 2024-09-04 NOTE — PROGRESS NOTES
Assessment/Plan:  1. Status post reverse total replacement of right shoulder  XR shoulder 2+ vw right        Scribe Attestation    I,:  Valeriemaren Cumminsshaina am acting as a scribe while in the presence of the attending physician.:       I,:  Sina Randle MD personally performed the services described in this documentation    as scribed in my presence.:             Demetrio is a pleasant 64 y.o. adult who presents 9 days status post Arthroplasty Shoulder Reverse - Right on 8/26/2024. He is doing well postoperatively today. His incision appears clean, dry, and intact today. Suture tails were trimmed and steri strips were applied. We discussed that his sling is fitting appropriately. He may begin physical therapy at his facility at this time. The rehabilitation protocol was provided to him today to give to the facility. He will follow-up in 5 weeks for re-evaluation of the right shoulder.    Subjective:   Demetrio Villalobos is a 64 y.o. adult who presents 9 days status post Arthroplasty Shoulder Reverse - Right on 8/26/2024. He presents in the postoperative sling today. He is not in any significant pain at this time. His facility was concerned regarding how the sling was fitting as he was readjusting it frequently.      Review of Systems   Constitutional:  Negative for chills and fever.   HENT:  Negative for ear pain and sore throat.    Eyes:  Negative for pain and visual disturbance.   Respiratory:  Negative for cough and shortness of breath.    Cardiovascular:  Negative for chest pain and palpitations.   Gastrointestinal:  Negative for abdominal pain and vomiting.   Genitourinary:  Negative for dysuria and hematuria.   Musculoskeletal:  Negative for arthralgias and back pain.   Skin:  Negative for color change and rash.   Neurological:  Negative for seizures and syncope.   All other systems reviewed and are negative.        Past Medical History:   Diagnosis Date   • Chronic kidney disease    • Edema    • GERD  (gastroesophageal reflux disease)    • Gout     hospitalized   • High cholesterol    • Hypertension    • PVD (peripheral vascular disease) ([facility])    • Renal disorder    • Rotator cuff tear     right   • Shortness of breath    • Stroke ([facility]) 2014   • Ventral hernia without obstruction or gangrene    • Walker as ambulation aid        Past Surgical History:   Procedure Laterality Date   • APPENDECTOMY     • [place] ARTHROPLASTY GLENOHUMERAL JOINT TOTAL SHOULDER Right 8/26/2024    Procedure: ARTHROPLASTY SHOULDER REVERSE;  Surgeon: Sina Randle MD;  Location: [facility];  Service: Orthopedics       History reviewed. No pertinent family history.    Social History     Occupational History   • Not on file   Tobacco Use   • Smoking status: Former     Types: Cigarettes     Passive exposure: Never   • Smokeless tobacco: Never   • Tobacco comments:     Smoker 30 yrs   Vaping Use   • Vaping status: Never Used   Substance and Sexual Activity   • Alcohol use: Never   • Drug use: Never   • Sexual activity: Not Currently     Partners: Female         Current Outpatient Medications:   •  allopurinol (ZYLOPRIM) 100 mg tablet, Take 1 tablet by mouth every morning, Disp: , Rfl:   •  amLODIPine (NORVASC) 10 mg tablet, Take 10 mg by mouth every morning, Disp: , Rfl:   •  aspirin 81 mg chewable tablet, Chew 1 tablet (81 mg total) 2 (two) times a day for 28 days Last dose per surgeon to be 8/20/24, Disp: 56 tablet, Rfl: 0  •  atorvastatin (LIPITOR) 40 mg tablet, Take 40 mg by mouth daily with dinner, Disp: , Rfl:   •  esomeprazole (NexIUM) 40 MG capsule, Take 40 mg by mouth in the morning, Disp: , Rfl:   •  metoprolol succinate (TOPROL-XL) 50 mg 24 hr tablet, Take 50 mg by mouth daily with dinner, Disp: , Rfl:   •  oxyCODONE (Roxicodone) 5 immediate release tablet, Take 1 tablet (5 mg total) by mouth every 4 (four) hours as needed for moderate pain for up to 20 doses Max Daily Amount: 30 mg, Disp: 20 tablet, Rfl: 0  •  polyethylene glycol  (MIRALAX) 17 g packet, Take 17 g by mouth daily as needed, Disp: , Rfl:   •  potassium chloride (Klor-Con) 10 mEq tablet, Take 1 tablet by mouth 3 (three) times a week In the JH-Qtm-Lqw-Fri, Disp: , Rfl:   •  ondansetron (ZOFRAN) 4 mg tablet, Take 1 tablet (4 mg total) by mouth every 8 (eight) hours as needed for nausea or vomiting (Patient not taking: Reported on 9/4/2024), Disp: 20 tablet, Rfl: 0    No Known Allergies    Objective:  Vitals:    09/04/24 0923   BP: 120/78   Pulse: 59       Right Shoulder Exam     Other   Erythema: absent  Scars: present (clean, dry, intact)  Sensation: normal  Pulse: present    Comments:  Strength and ROM deferred today            Physical Exam  Vitals and nursing note reviewed.   Constitutional:       Appearance: Normal appearance.   HENT:      Head: Normocephalic and atraumatic.      Right Ear: External ear normal.      Left Ear: External ear normal.      Nose: Nose normal.   Eyes:      General: No scleral icterus.     Extraocular Movements: Extraocular movements intact.      Conjunctiva/sclera: Conjunctivae normal.   Cardiovascular:      Rate and Rhythm: Normal rate.   Pulmonary:      Effort: Pulmonary effort is normal. No respiratory distress.   Musculoskeletal:      Cervical back: Normal range of motion and neck supple.      Comments: See ortho exam   Skin:     General: Skin is warm and dry.   Neurological:      Mental Status: He is alert and oriented to person, place, and time.   Psychiatric:         Mood and Affect: Mood normal.         Behavior: Behavior normal.         I have personally reviewed pertinent films in PACS and my interpretation is as follows:  X-rays of the right shoulder obtained in the office today demonstrate a well-positioned, well-aligned reverse total shoulder prosthesis without evidence of complication or failure.    This document was created using speech voice recognition software.   Grammatical errors, random word insertions, pronoun errors, and  incomplete sentences are an occasional consequence of this system due to software limitations, ambient noise, and hardware issues.   Any formal questions or concerns about content, text, or information contained within the body of this dictation should be directly addressed to the provider for clarification.

## 2024-09-18 ENCOUNTER — TELEPHONE (OUTPATIENT)
Age: 64
End: 2024-09-18

## 2024-09-18 NOTE — TELEPHONE ENCOUNTER
Caller: STEPHANIE mahan @ [facility]    Doctor: compa    Reason for call: Stephanie is asking for more clarification on Phase 2 of the reverse total shoulder sling use.  Please advise  Thank you    Call back#: [phone]

## 2024-10-09 ENCOUNTER — OFFICE VISIT (OUTPATIENT)
Dept: OBGYN CLINIC | Facility: CLINIC | Age: 64
End: 2024-10-09

## 2024-10-09 VITALS — HEIGHT: 67 IN | BODY MASS INDEX: 33.9 KG/M2 | WEIGHT: 216 LBS

## 2024-10-09 DIAGNOSIS — G56.01 CARPAL TUNNEL SYNDROME ON RIGHT: ICD-10-CM

## 2024-10-09 DIAGNOSIS — Z96.611 STATUS POST REVERSE TOTAL REPLACEMENT OF RIGHT SHOULDER: Primary | ICD-10-CM

## 2024-10-09 PROCEDURE — 99024 POSTOP FOLLOW-UP VISIT: CPT | Performed by: ORTHOPAEDIC SURGERY

## 2024-10-09 NOTE — PROGRESS NOTES
Assessment/Plan:  1. Status post reverse total replacement of right shoulder        2. Carpal tunnel syndrome on right          Scribe Attestation      I,:  Valerie Troncoso am acting as a scribe while in the presence of the attending physician.:       I,:  Sina Randle MD personally performed the services described in this documentation    as scribed in my presence.:               Demetrio is a pleasant 64 y.o. adult who presents 6 weeks status post Arthroplasty Shoulder Reverse - Right on 8/26/2024. He is doing well postoperatively. He may begin to wean out of the sling at this time. He should continue his efforts at physical therapy per the protocol provided to his facility at his last visit. He may take over the counter tylenol as needed for pain control. We discussed that the numbness and tingling he is experiencing could be related to his surgery. For the time being, we will continue to monitor these symptoms. He will follow-up in 6 weeks for re-evaluation of the right shoulder with repeat X-rays upon arrival.    Subjective:   Demetrio Villalobos is a 64 y.o. adult who presents 6 weeks status post Arthroplasty Shoulder Reverse - Right on 8/26/2024. He denies experiencing significant pain about the right shoulder at rest. He has been attending physical therapy at his facility, and he feels it is helping very much. He has been wearing the postoperative sling for the right shoulder at night. He denies taking any over the counter pain medications. He does report numbness of the right thumb and index finger when holding a phone to his ear. He also reports numbness and tingling of all fingers at night occasionally. He notes this started after surgery.      Review of Systems   Constitutional:  Positive for activity change. Negative for chills and fever.   HENT:  Negative for ear pain and sore throat.    Eyes:  Negative for pain and visual disturbance.   Respiratory:  Negative for cough and shortness of breath.     Cardiovascular:  Negative for chest pain and palpitations.   Gastrointestinal:  Negative for abdominal pain and vomiting.   Genitourinary:  Negative for dysuria and hematuria.   Musculoskeletal:  Negative for arthralgias and back pain.   Skin:  Negative for color change and rash.   Neurological:  Negative for seizures and syncope.   All other systems reviewed and are negative.        Past Medical History:   Diagnosis Date    Chronic kidney disease     Edema     GERD (gastroesophageal reflux disease)     Gout     hospitalized    High cholesterol     Hypertension     PVD (peripheral vascular disease) ([facility])     Renal disorder     Rotator cuff tear     right    Shortness of breath     Stroke ([facility]) 2014    Ventral hernia without obstruction or gangrene     Walker as ambulation aid        Past Surgical History:   Procedure Laterality Date    APPENDECTOMY      [place] ARTHROPLASTY GLENOHUMERAL JOINT TOTAL SHOULDER Right 8/26/2024    Procedure: ARTHROPLASTY SHOULDER REVERSE;  Surgeon: Sina Randle MD;  Location: [facility];  Service: Orthopedics       History reviewed. No pertinent family history.    Social History     Occupational History    Not on file   Tobacco Use    Smoking status: Former     Types: Cigarettes     Passive exposure: Never    Smokeless tobacco: Never    Tobacco comments:     Smoker 30 yrs   Vaping Use    Vaping status: Never Used   Substance and Sexual Activity    Alcohol use: Never    Drug use: Never    Sexual activity: Not Currently     Partners: Female         Current Outpatient Medications:     allopurinol (ZYLOPRIM) 100 mg tablet, Take 1 tablet by mouth every morning, Disp: , Rfl:     amLODIPine (NORVASC) 10 mg tablet, Take 10 mg by mouth every morning, Disp: , Rfl:     atorvastatin (LIPITOR) 40 mg tablet, Take 40 mg by mouth daily with dinner, Disp: , Rfl:     esomeprazole (NexIUM) 40 MG capsule, Take 40 mg by mouth in the morning, Disp: , Rfl:     metoprolol succinate (TOPROL-XL) 50 mg 24 hr  tablet, Take 50 mg by mouth daily with dinner, Disp: , Rfl:     polyethylene glycol (MIRALAX) 17 g packet, Take 17 g by mouth daily as needed, Disp: , Rfl:     potassium chloride (Klor-Con) 10 mEq tablet, Take 1 tablet by mouth 3 (three) times a week In the QX-Ecc-Wmh-Fri, Disp: , Rfl:     aspirin 81 mg chewable tablet, Chew 1 tablet (81 mg total) 2 (two) times a day for 28 days Last dose per surgeon to be 8/20/24, Disp: 56 tablet, Rfl: 0    ondansetron (ZOFRAN) 4 mg tablet, Take 1 tablet (4 mg total) by mouth every 8 (eight) hours as needed for nausea or vomiting (Patient not taking: Reported on 9/4/2024), Disp: 20 tablet, Rfl: 0    oxyCODONE (Roxicodone) 5 immediate release tablet, Take 1 tablet (5 mg total) by mouth every 4 (four) hours as needed for moderate pain for up to 20 doses Max Daily Amount: 30 mg (Patient not taking: Reported on 10/9/2024), Disp: 20 tablet, Rfl: 0    No Known Allergies    Objective:  Vitals:       Right Shoulder Exam     Tenderness   The patient is experiencing no tenderness.    Range of Motion   External rotation:  20   Forward flexion:  80     Other   Erythema: absent  Scars: present (well-healed)  Sensation: normal  Pulse: present    Comments:  Strength deferred today            Physical Exam  Vitals and nursing note reviewed.   Constitutional:       Appearance: Normal appearance.   HENT:      Head: Normocephalic and atraumatic.      Right Ear: External ear normal.      Left Ear: External ear normal.      Nose: Nose normal.   Eyes:      General: No scleral icterus.     Extraocular Movements: Extraocular movements intact.      Conjunctiva/sclera: Conjunctivae normal.   Cardiovascular:      Rate and Rhythm: Normal rate.   Pulmonary:      Effort: Pulmonary effort is normal. No respiratory distress.   Musculoskeletal:      Cervical back: Normal range of motion and neck supple.      Comments: See ortho exam   Skin:     General: Skin is warm and dry.   Neurological:      Mental Status: He  is alert and oriented to person, place, and time.   Psychiatric:         Mood and Affect: Mood normal.         Behavior: Behavior normal.         This document was created using speech voice recognition software.   Grammatical errors, random word insertions, pronoun errors, and incomplete sentences are an occasional consequence of this system due to software limitations, ambient noise, and hardware issues.   Any formal questions or concerns about content, text, or information contained within the body of this dictation should be directly addressed to the provider for clarification.

## 2024-10-21 ENCOUNTER — APPOINTMENT (OUTPATIENT)
Dept: LAB | Facility: CLINIC | Age: 64
End: 2024-10-21
Payer: COMMERCIAL

## 2024-10-21 ENCOUNTER — OFFICE VISIT (OUTPATIENT)
Age: 64
End: 2024-10-21

## 2024-10-21 ENCOUNTER — EVALUATION (OUTPATIENT)
Dept: PHYSICAL THERAPY | Facility: CLINIC | Age: 64
End: 2024-10-21
Payer: COMMERCIAL

## 2024-10-21 VITALS
WEIGHT: 220 LBS | HEIGHT: 67 IN | RESPIRATION RATE: 17 BRPM | HEART RATE: 73 BPM | DIASTOLIC BLOOD PRESSURE: 83 MMHG | SYSTOLIC BLOOD PRESSURE: 149 MMHG | BODY MASS INDEX: 34.53 KG/M2 | OXYGEN SATURATION: 97 %

## 2024-10-21 DIAGNOSIS — M54.50 LUMBAR BACK PAIN: ICD-10-CM

## 2024-10-21 DIAGNOSIS — Z23 ENCOUNTER FOR IMMUNIZATION: ICD-10-CM

## 2024-10-21 DIAGNOSIS — Z11.59 NEED FOR HEPATITIS C SCREENING TEST: ICD-10-CM

## 2024-10-21 DIAGNOSIS — Z09 HOSPITAL DISCHARGE FOLLOW-UP: Primary | ICD-10-CM

## 2024-10-21 DIAGNOSIS — Z86.73 H/O: CVA (CEREBROVASCULAR ACCIDENT): ICD-10-CM

## 2024-10-21 DIAGNOSIS — Z87.39 HISTORY OF GOUT: ICD-10-CM

## 2024-10-21 DIAGNOSIS — I12.9 BENIGN HYPERTENSION WITH CHRONIC KIDNEY DISEASE, STAGE III (HCC): ICD-10-CM

## 2024-10-21 DIAGNOSIS — R60.0 BILATERAL LOWER EXTREMITY EDEMA: ICD-10-CM

## 2024-10-21 DIAGNOSIS — I73.9 PERIPHERAL ARTERIAL DISEASE (HCC): ICD-10-CM

## 2024-10-21 DIAGNOSIS — I10 PRIMARY HYPERTENSION: ICD-10-CM

## 2024-10-21 DIAGNOSIS — Z59.819 HOUSING INSTABILITY: ICD-10-CM

## 2024-10-21 DIAGNOSIS — Z12.12 ENCOUNTER FOR COLORECTAL CANCER SCREENING: ICD-10-CM

## 2024-10-21 DIAGNOSIS — Z12.11 ENCOUNTER FOR COLORECTAL CANCER SCREENING: ICD-10-CM

## 2024-10-21 DIAGNOSIS — Z11.4 SCREENING FOR HIV (HUMAN IMMUNODEFICIENCY VIRUS): ICD-10-CM

## 2024-10-21 DIAGNOSIS — M54.50 LUMBAR BACK PAIN: Primary | ICD-10-CM

## 2024-10-21 DIAGNOSIS — N18.31 STAGE 3A CHRONIC KIDNEY DISEASE (HCC): ICD-10-CM

## 2024-10-21 DIAGNOSIS — Z59.9 FINANCIAL DIFFICULTIES: ICD-10-CM

## 2024-10-21 DIAGNOSIS — N18.30 BENIGN HYPERTENSION WITH CHRONIC KIDNEY DISEASE, STAGE III (HCC): ICD-10-CM

## 2024-10-21 PROBLEM — M1A.9XX0 CHRONIC GOUT: Status: ACTIVE | Noted: 2023-09-09

## 2024-10-21 LAB
TSH SERPL DL<=0.05 MIU/L-ACNC: 1.69 UIU/ML (ref 0.45–4.5)
URATE SERPL-MCNC: 6.1 MG/DL (ref 3.5–7.5)

## 2024-10-21 PROCEDURE — 84443 ASSAY THYROID STIM HORMONE: CPT

## 2024-10-21 PROCEDURE — 86803 HEPATITIS C AB TEST: CPT

## 2024-10-21 PROCEDURE — 87389 HIV-1 AG W/HIV-1&-2 AB AG IA: CPT

## 2024-10-21 PROCEDURE — 36415 COLL VENOUS BLD VENIPUNCTURE: CPT

## 2024-10-21 PROCEDURE — 97162 PT EVAL MOD COMPLEX 30 MIN: CPT

## 2024-10-21 PROCEDURE — 84550 ASSAY OF BLOOD/URIC ACID: CPT

## 2024-10-21 RX ORDER — POLYETHYLENE GLYCOL 3350 17 G/17G
POWDER ORAL
COMMUNITY
Start: 2024-10-16

## 2024-10-21 RX ORDER — NIFEDIPINE 60 MG/1
60 TABLET, EXTENDED RELEASE ORAL DAILY
Qty: 30 TABLET | Refills: 5 | Status: SHIPPED | OUTPATIENT
Start: 2024-10-21 | End: 2025-04-19

## 2024-10-21 RX ORDER — FUROSEMIDE 20 MG/1
1 TABLET ORAL EVERY 24 HOURS
COMMUNITY
Start: 2024-08-30

## 2024-10-21 SDOH — ECONOMIC STABILITY - INCOME SECURITY: PROBLEM RELATED TO HOUSING AND ECONOMIC CIRCUMSTANCES, UNSPECIFIED: Z59.9

## 2024-10-21 SDOH — ECONOMIC STABILITY - HOUSING INSECURITY: HOUSING INSTABILITY UNSPECIFIED: Z59.819

## 2024-10-21 NOTE — PROGRESS NOTES
Ambulatory Visit  Name: Demetrio Villalobos      : 1960      MRN: [id number]  Encounter Provider: Lisa Blount DO  Encounter Date: 10/21/2024   Encounter department: [facility]    Assessment & Plan  Hospital discharge follow-up  Noting has been discharged from [facility] in 10/16/24 after approximately one year of admission. Noting he is having difficulty at home in setting of mobility.        Primary hypertension  Chronic, BP not well controlled at this visit. Noting he has been unable to regularly measure BP at home in setting of mobility    Plan:  -Continue metoprolol and procardia     Orders:    NIFEdipine (PROCARDIA XL) 60 mg 24 hr tablet; Take 1 tablet (60 mg total) by mouth daily    Screening for HIV (human immunodeficiency virus)    Orders:    HIV 1/2 AG/AB w Reflex [facility] for 2 yr old and above; Future    Need for hepatitis C screening test    Orders:    Hepatitis C Antibody; Future    Encounter for immunization    Orders:    TDAP VACCINE GREATER THAN OR EQUAL TO 8YO IM    Housing instability    Orders:    Ambulatory referral to social work care management program; Future    Financial difficulties    Orders:    Ambulatory referral to social work care management program; Future    Encounter for colorectal cancer screening    Orders:    Ambulatory Referral to Gastroenterology; Future    Benign hypertension with chronic kidney disease, stage III (HCC)  Lab Results   Component Value Date    EGFR 44 2023    EGFR 46 2023    EGFR 42 09/10/2023    CREATININE 2.13 (H) 2024    CREATININE 1.65 (H) 2023    CREATININE 1.57 (H) 2023            Peripheral arterial disease (HCC)  Chronic, follows with vascular center.        Lumbar back pain  Chronic - stable.     Plan:  -Voltaren gel PRN for pain  -PRN tylenol   -PT referral for pain and mobility   Orders:    Ambulatory Referral to Physical Therapy; Future    Diclofenac Sodium (VOLTAREN) 1 %; Apply  "2 g topically 4 (four) times a day    Bilateral lower extremity edema  Noting daily bilateral LE edema and having to elevate, wearing compression stockings        H/O: CVA (cerebrovascular accident)  Chronic, has not had TSH or lipid panel   Orders:    Lipid Panel with Direct LDL reflex; Future    TSH, 3rd generation with Free T4 reflex; Future    History of gout  Chronic, stable - denies current episode   Orders:    Uric acid; Future       History of Present Illness     HPI    Presents for hospital discharge f/u:   -Admitted in 10/2023, discharged 10/2024   -Living by himself, has an aid come in 25 hours per week. Concerns with getting dressed  -Only intermittently taking BP, having difficulty with cuff in setting of mobility   -Has nephro appointment on 11/4   -due for blood work via nephro   -denies CP, fevers, chills, SOB  -needs PT evaluation for power wheel chair  -noting back pain with standing and walking  -needs voltaren gel for back      Review of Systems   All other systems reviewed and are negative.          Objective     /83 (BP Location: Right arm, Patient Position: Sitting)   Pulse 73   Resp 17   Ht 5' 7\" (1.702 m)   Wt 99.8 kg (220 lb)   SpO2 97%   BMI 34.46 kg/m²     Physical Exam  Constitutional:       Appearance: Normal appearance.   HENT:      Head: Normocephalic and atraumatic.      Right Ear: External ear normal.      Left Ear: External ear normal.      Nose: Nose normal.      Mouth/Throat:      Pharynx: Oropharynx is clear.   Eyes:      Conjunctiva/sclera: Conjunctivae normal.   Cardiovascular:      Rate and Rhythm: Normal rate and regular rhythm.      Heart sounds: Normal heart sounds.   Pulmonary:      Breath sounds: Normal breath sounds.   Abdominal:      General: There is no distension.      Palpations: Abdomen is soft.      Tenderness: There is no abdominal tenderness.   Musculoskeletal:      Right lower leg: Edema (1+) present.      Left lower leg: Edema (1+) present. "   Skin:     General: Skin is warm and dry.   Neurological:      General: No focal deficit present.      Mental Status: He is alert.

## 2024-10-21 NOTE — LETTER
2024    Lisa Blount DO  [address] [facility]  [address] [place] [id number]    Patient: Demetrio Villalobos   YOB: 1960   Date of Visit: 10/21/2024     Encounter Diagnosis     ICD-10-CM    1. Lumbar back pain  M54.50 Ambulatory Referral to Physical Therapy          Dear Dr. Blount:    Thank you for your recent referral of Demetrio Villalobos. Please review the attached evaluation summary from Demetrio's recent visit.     Please verify that you agree with the plan of care by signing the attached order.     If you have any questions or concerns, please do not hesitate to call.     I sincerely appreciate the opportunity to share in the care of one of your patients and hope to have another opportunity to work with you in the near future.       Sincerely,    Akanksha Dozier      Referring Provider:      I certify that I have read the below Plan of Care and certify the need for these services furnished under this plan of treatment while under my care.                    Lisa Blount DO  [address] [address]  Via In Basket              PT Evaluation   Today's date: 10/21/2024  Patient name: Demetrio Villalobos  : 1960  MRN: [id number]  Referring provider: Lisa Blount DO  Dx:   Encounter Diagnosis     ICD-10-CM    1. Lumbar back pain  M54.50 Ambulatory Referral to Physical Therapy          Assessment    Demetrio Villalobos is a pleasant 64 y.o. adult who presents with a referring diagnosis of lumbar back pain. The patient's greatest concern is inability to ambulate community distances or work. The primary movement system diagnosis is lumbar hypomobility with nociceptive and neuropathic pain resulting in pathoanatomical symptoms of impaired lumbar ROM, impaired hip and core strength, poor functional mobility, poor ambulation tolerance, poor posture, and poor postural endurance. The aforementioned impairments have limited the patient's ability to work,  perform ADLs, and perform functional mobility tasks. The patient would benefit referral to balance center for neurologic PT assessment . Positive prognostic indicators include motivation to improve and positive attitude. Negative prognostic indicators include chronicity of symptoms, high symptom irritability, and hx of stroke w/ neurologic changes .     Impairments: abnormal muscle firing, abnormal muscle tone, abnormal or restricted ROM, activity intolerance, Impaired balance, Impaired physical strength, lacks appropriate HEP, pain with function, poor posture, and poor body mechanics    Symptom Irritability: moderate    Problem List:  - impaired lumbar ROM   - impaired hip and core strength     Concordant Sign:  - ambulation    Patient education performed this session included: home exercise program, plan of care, expected tissue healing time, prognosis and diagnosis, heat, and ice    Patient verbalized good understanding of POC.    Please contact me if you have any questions or recommendations. Thank you for the referral and the opportunity to share in Demetrio Villalobos's care.       Plan    Patient would benefit from: Skilled PT  Planned modality interventions: biofeedback, cryotherapy, TENS, thermotherapy (hot pack), and traction  Planned therapy interventions: abdominal trunk stabilization, activity modification, behavior modification, body mechanics training, functional ROM exercises, graded exercise, graded motor, HEP, joint mobilization, manual therapy, White taping, motor coordination training, neuromuscular re-education, patient education, postural training, strengthening, stretching, therapeutic activities, and therapeutic exercises    Frequency: 1-2x per week split with neuro PT  Duration in weeks: 6 weeks    Plan of Care beginning date: 10/21/2024  Plan of Care expiration date: 6 weeks - 12/2/2024  Treatment plan discussed with: patient      Goals    Short Term Goals (3 weeks):    Patient will be  "independent with basic HEP.  Patient will report >50% reduction in pain.  Patient will demonstrate >1/3 improvement in MMT grade as applicable  Patient will be able to tolerated standing for > 5 minutes  Patient will be able to ambulate household distances prior to needing to sit secondary to pain    Long Term Goals (6 weeks):  Patient will be independent in a comprehensive home exercise program  Patient FOTO score will improve by 10 points  Patient will self-report >75% improvement in function  Patient will be able to ambulate community distances (ie. Grocery store)  Patient will be able to return to work without pain      History    History of Present Illness  Mechanism of injury: Patient reports that he tore his RTC 30+ years ago and the pain worsened to the point that it was unbearable. 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 He had surgery in August 2024. He is here today for his low back pain. He has had this pain since the stroke in 2014. He gets a lot of pain with walking and standing. He was unable to stand for > 3 minutes and his ambulation tolerance is limited to approx 100 ft. He is interested in getting a power WC, but it was not granted secondary to not \"not being bad enough\". He is concerned about falling as well as the pain which has recently gotten worse with the last episode of gout. He has not had recent imaging of his spine. He also has some issues with his Lt arm and hand as a result of the stroke. He would like to go back to work, but is unsure if that is a realistic goal.    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    Progression: worsening    Previous treatment: physical therapy    Patient goals for therapy: decrease pain, increase motion, increase strength, independence with ADLs, and return to leisure activities    Pain   10/21/2024    Current 2/10    Best 2/10    Worst 7/10     Location: low lumbar, central  Description: dull " aching  Aggravating factors: sitting, standing, walking, and lifting  Relieving factors: change in position      Physical Examination     Red Flag Screen  (+): age >50 years old    Postural Assessment  Posture in Sitting: poor  Posture in Standing: poor  Postural Correction: has no consistent effect   Manual or self correction? self correction    Sensation  Light touch: intact bilaterally    Lumbar Spine ROM   10/21/2024    Flexion 50%*    Extension 10%*    Lt Rotation 50%    Rt Rotation 50%    Lt Lateral Flexion 30%    Rt Lateral Flexion 30%     (*) = reproduction of patient's reported pain    LE MMT   10/21/2024    LEFT RIGHT     Hip Flexion 3/5 4/5    Hip Extension 3/5 4/5    Hip Abduction 3/5 4/5    Hip Adduction 4/5 4/5       Knee Flexion 4/5 5/5    Knee Extension 4/5 5/5       Ankle DF NT (AFO) 5/5    Ankle PF NT (AFO) 4/5     (*) = reproduction of patient's reported pain    Mechanical Assessment  Mechanically unresponsive at time of IE today    Flexibility   10/21/2024    LEFT RIGHT     Hamstring Mod limit Mod limit     Diagnostic Tests Performed  (-): slump    Functional Assessment  Gait Assessment: slow, circumduction of LLE secondary to AFO, Trendelenberg noted as well           Insurance:  [employer]/CMS Eval/ Re-eval Auth #/ Referral # Total units  Start date  Expiration date Extension  Visit limitation?  PT only or  PT+OT? Co-Insurance   CMS ([facility]) 10/21/2024 Auth needed                                                            POC Start Date POC Expiration Date Signed POC?   10/21/2024 6 weeks - 12/2/2024       Date               Units:  Used               Authed:  Remaining                  Precautions:   Past Medical History:   Diagnosis Date   • Chronic kidney disease    • Edema    • GERD (gastroesophageal reflux disease)    • Gout     hospitalized   • High cholesterol    • Hypertension    • PVD (peripheral vascular disease) ([facility])    • Renal disorder    • Rotator cuff tear     right   • Shortness of  breath    • Stroke ([facility]) 2014   • Ventral hernia without obstruction or gangrene    • Walker as ambulation aid        Date 10/21/2024        Visit Number IE        FOTO Tracking Intake survey     Follow-up #1   Manual         Thoracic mobs         Lumbar mobs                                    TherEx         LTR x20        SKTC/DKTC                                                               Neuro Re-Ed         TrA isos         SOC x20                                                              TherAct         Patient education HEP and POC x 10 min        Posture education         Lifting mechanics                           Gait Training                                    Modalities

## 2024-10-21 NOTE — PROGRESS NOTES
PT Evaluation   Today's date: 10/21/2024  Patient name: Demetrio Villalobos  : 1960  MRN: [id number]  Referring provider: Lisa Blount DO  Dx:   Encounter Diagnosis     ICD-10-CM    1. Lumbar back pain  M54.50 Ambulatory Referral to Physical Therapy          Assessment    Demetrio Villalobos is a pleasant 64 y.o. adult who presents with a referring diagnosis of lumbar back pain. The patient's greatest concern is inability to ambulate community distances or work. The primary movement system diagnosis is lumbar hypomobility with nociceptive and neuropathic pain resulting in pathoanatomical symptoms of impaired lumbar ROM, impaired hip and core strength, poor functional mobility, poor ambulation tolerance, poor posture, and poor postural endurance. The aforementioned impairments have limited the patient's ability to work, perform ADLs, and perform functional mobility tasks. The patient would benefit referral to balance center for neurologic PT assessment . Positive prognostic indicators include motivation to improve and positive attitude. Negative prognostic indicators include chronicity of symptoms, high symptom irritability, and hx of stroke w/ neurologic changes .     Impairments: abnormal muscle firing, abnormal muscle tone, abnormal or restricted ROM, activity intolerance, Impaired balance, Impaired physical strength, lacks appropriate HEP, pain with function, poor posture, and poor body mechanics    Symptom Irritability: moderate    Problem List:  - impaired lumbar ROM   - impaired hip and core strength     Concordant Sign:  - ambulation    Patient education performed this session included: home exercise program, plan of care, expected tissue healing time, prognosis and diagnosis, heat, and ice    Patient verbalized good understanding of POC.    Please contact me if you have any questions or recommendations. Thank you for the referral and the opportunity to share in Demetrio Villalobos's care.        Plan    Patient would benefit from: Skilled PT  Planned modality interventions: biofeedback, cryotherapy, TENS, thermotherapy (hot pack), and traction  Planned therapy interventions: abdominal trunk stabilization, activity modification, behavior modification, body mechanics training, functional ROM exercises, graded exercise, graded motor, HEP, joint mobilization, manual therapy, White taping, motor coordination training, neuromuscular re-education, patient education, postural training, strengthening, stretching, therapeutic activities, and therapeutic exercises    Frequency: 1-2x per week split with neuro PT  Duration in weeks: 6 weeks    Plan of Care beginning date: 10/21/2024  Plan of Care expiration date: 6 weeks - 12/2/2024  Treatment plan discussed with: patient      Goals    Short Term Goals (3 weeks):    Patient will be independent with basic HEP.  Patient will report >50% reduction in pain.  Patient will demonstrate >1/3 improvement in MMT grade as applicable  Patient will be able to tolerated standing for > 5 minutes  Patient will be able to ambulate household distances prior to needing to sit secondary to pain    Long Term Goals (6 weeks):  Patient will be independent in a comprehensive home exercise program  Patient FOTO score will improve by 10 points  Patient will self-report >75% improvement in function  Patient will be able to ambulate community distances (ie. Grocery store)  Patient will be able to return to work without pain      History    History of Present Illness  Mechanism of injury: Patient reports that he tore his RTC 30+ years ago and the pain worsened to the point that it was unbearable. 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 He had surgery in August 2024. He is here today for his low back pain. He has had this pain since the stroke in 2014. He gets a lot of pain with walking and standing. He was unable to stand for > 3 minutes and  "his ambulation tolerance is limited to approx 100 ft. He is interested in getting a power WC, but it was not granted secondary to not \"not being bad enough\". He is concerned about falling as well as the pain which has recently gotten worse with the last episode of gout. He has not had recent imaging of his spine. He also has some issues with his Lt arm and hand as a result of the stroke. He would like to go back to work, but is unsure if that is a realistic goal.    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    Progression: worsening    Previous treatment: physical therapy    Patient goals for therapy: decrease pain, increase motion, increase strength, independence with ADLs, and return to leisure activities    Pain   10/21/2024    Current 2/10    Best 2/10    Worst 7/10     Location: low lumbar, central  Description: dull aching  Aggravating factors: sitting, standing, walking, and lifting  Relieving factors: change in position      Physical Examination     Red Flag Screen  (+): age >50 years old    Postural Assessment  Posture in Sitting: poor  Posture in Standing: poor  Postural Correction: has no consistent effect   Manual or self correction? self correction    Sensation  Light touch: intact bilaterally    Lumbar Spine ROM   10/21/2024    Flexion 50%*    Extension 10%*    Lt Rotation 50%    Rt Rotation 50%    Lt Lateral Flexion 30%    Rt Lateral Flexion 30%     (*) = reproduction of patient's reported pain    LE MMT   10/21/2024    LEFT RIGHT     Hip Flexion 3/5 4/5    Hip Extension 3/5 4/5    Hip Abduction 3/5 4/5    Hip Adduction 4/5 4/5       Knee Flexion 4/5 5/5    Knee Extension 4/5 5/5       Ankle DF NT (AFO) 5/5    Ankle PF NT (AFO) 4/5     (*) = reproduction of patient's reported pain    Mechanical Assessment  Mechanically unresponsive at time of IE today    Flexibility   10/21/2024    LEFT RIGHT     Hamstring Mod limit Mod limit     Diagnostic Tests Performed  (-): slump    Functional Assessment  Gait " Assessment: slow, circumduction of LLE secondary to AFO, Trendelenberg noted as well           Insurance:  AMA/CMS Eval/ Re-eval Auth #/ Referral # Total units  Start date  Expiration date Extension  Visit limitation?  PT only or  PT+OT? Co-Insurance   CMS ([facility]) 10/21/2024 Auth needed                                                            POC Start Date POC Expiration Date Signed POC?   10/21/2024 6 weeks - 12/2/2024       Date               Units:  Used               Authed:  Remaining                  Precautions:   Past Medical History:   Diagnosis Date    Chronic kidney disease     Edema     GERD (gastroesophageal reflux disease)     Gout     hospitalized    High cholesterol     Hypertension     PVD (peripheral vascular disease) ([facility])     Renal disorder     Rotator cuff tear     right    Shortness of breath     Stroke ([facility]) 2014    Ventral hernia without obstruction or gangrene     Walker as ambulation aid        Date 10/21/2024        Visit Number IE        FOTO Tracking Intake survey     Follow-up #1   Manual         Thoracic mobs         Lumbar mobs                                    TherEx         LTR x20        SKTC/DKTC                                                               Neuro Re-Ed         TrA isos         SOC x20                                                              TherAct         Patient education HEP and POC x 10 min        Posture education         Lifting mechanics                           Gait Training                                    Modalities

## 2024-10-21 NOTE — ASSESSMENT & PLAN NOTE
Lab Results   Component Value Date    EGFR 44 09/12/2023    EGFR 46 09/11/2023    EGFR 42 09/10/2023    CREATININE 2.13 (H) 08/27/2024    CREATININE 1.65 (H) 09/12/2023    CREATININE 1.57 (H) 09/11/2023

## 2024-10-21 NOTE — ASSESSMENT & PLAN NOTE
Chronic, BP not well controlled at this visit. Noting he has been unable to regularly measure BP at home in setting of mobility    Plan:  -Continue metoprolol and procardia     Orders:    NIFEdipine (PROCARDIA XL) 60 mg 24 hr tablet; Take 1 tablet (60 mg total) by mouth daily

## 2024-10-22 LAB
HCV AB SER QL: NORMAL
HIV 1+2 AB+HIV1 P24 AG SERPL QL IA: NORMAL
HIV 2 AB SERPL QL IA: NORMAL
HIV1 AB SERPL QL IA: NORMAL
HIV1 P24 AG SERPL QL IA: NORMAL

## 2024-10-24 ENCOUNTER — OFFICE VISIT (OUTPATIENT)
Dept: PHYSICAL THERAPY | Facility: CLINIC | Age: 64
End: 2024-10-24
Payer: COMMERCIAL

## 2024-10-24 DIAGNOSIS — M54.50 LUMBAR BACK PAIN: Primary | ICD-10-CM

## 2024-10-24 DIAGNOSIS — Z96.611 STATUS POST REVERSE TOTAL SHOULDER REPLACEMENT, RIGHT: ICD-10-CM

## 2024-10-24 PROCEDURE — 97112 NEUROMUSCULAR REEDUCATION: CPT

## 2024-10-24 PROCEDURE — 97110 THERAPEUTIC EXERCISES: CPT

## 2024-10-24 NOTE — PROGRESS NOTES
Daily Note     Today's date: 10/24/2024  Patient name: Demetrio Villalobos  : 1960  MRN: [id number]  Referring provider: César Malone DO  Dx:   Encounter Diagnosis     ICD-10-CM    1. Lumbar back pain  M54.50       2. Status post reverse total shoulder replacement, right  Z96.611           Start Time: 1330  Stop Time: 1415  Total time in clinic (min): 45 minutes    Subjective: Patient reports feeling no different than at time of IE. He would like to confirm we are working on the shoulder too.       Objective: See treatment diary below    UE PROM   10/24/2024    LEFT RIGHT     Shoulder Flexion 90 160    Shoulder Abduction 45 90    Shoulder ER @ 0 0 10    Shoulder IR @ 0 To belly To belly       UE MMT   10/24/2024    LEFT RIGHT     Shoulder Flexion 2+/5 2+/5    Shoulder Abduction 2/5 2/5    Shoulder Extension 3/5 3/5    Shoulder ER  2/5 2/5    Shoulder IR  2/5 3/5       Elbow Flexion 3+/5 4/5    Elbow Extension 3/5 4/5       Wrist Extension 2/5 5/5    Wrist Flexion 2/5 5/5        25# 45#       Assessment: Tolerated treatment well. Formally assessed the shoulder today to add to PT POC as he has an active script for that as well. Performed mobility activities today with emphasis on both shoulder and lumbar mobility. Overall activities limited secondary to remaining lt sided deficits post-stroke. Patient demonstrated fatigue post treatment and would benefit from continued PT to improve functional mobility deficits and return to PLOF.       Plan: Continue per plan of care. POC will be updated based on neuro evaluation next week.        Insurance:  AMA/CMS Eval/ Re-eval Auth #/ Referral # Total units  Start date  Expiration date Extension  Visit limitation?  PT only or  PT+OT? Co-Insurance   CMS ([facility]) 10/21/2024 Auth approved  10/21 1/21  12 visits                                                       POC Start Date POC Expiration Date Signed POC?   10/21/2024 6 weeks - 2024       Date                Units:  Used               Authed:  Remaining                  Precautions:   Past Medical History:   Diagnosis Date    Chronic kidney disease     Edema     GERD (gastroesophageal reflux disease)     Gout     hospitalized    High cholesterol     Hypertension     PVD (peripheral vascular disease) ([facility])     Renal disorder     Rotator cuff tear     right    Shortness of breath     Stroke ([facility]) 2014    Ventral hernia without obstruction or gangrene     Walker as ambulation aid        Date 10/21/2024 10/24/24       Visit Number IE 2       FOTO Tracking Intake survey     Follow-up #1   Manual         Thoracic mobs         Lumbar mobs         Shoulder ROM  Stretching in all directions x 8 min                         TherEx         Active HENDERSON  NS x 8 min (arms and legs for range)       LTR x20 x20       SKTC/DKTC         Seated ball roll outs  Hand over hand on ball 10x w/ LF       Active shoulder flexion  2x10                                           Neuro Re-Ed         TrA isos         SOC x20 x20                                                             TherAct         Patient education HEP and POC x 10 min        Posture education         Lifting mechanics                           Gait Training                                    Modalities

## 2024-10-26 NOTE — ASSESSMENT & PLAN NOTE
Chronic, has not had TSH or lipid panel   Orders:    Lipid Panel with Direct LDL reflex; Future    TSH, 3rd generation with Free T4 reflex; Future

## 2024-10-26 NOTE — ASSESSMENT & PLAN NOTE
Chronic - stable.     Plan:  -Voltaren gel PRN for pain  -PRN tylenol   -PT referral for pain and mobility   Orders:    Ambulatory Referral to Physical Therapy; Future    Diclofenac Sodium (VOLTAREN) 1 %; Apply 2 g topically 4 (four) times a day

## 2024-10-30 ENCOUNTER — EVALUATION (OUTPATIENT)
Facility: CLINIC | Age: 64
End: 2024-10-30
Payer: COMMERCIAL

## 2024-10-30 DIAGNOSIS — R26.89 OTHER ABNORMALITIES OF GAIT AND MOBILITY: ICD-10-CM

## 2024-10-30 DIAGNOSIS — Z86.73 CHRONIC CEREBROVASCULAR ACCIDENT (CVA): Primary | ICD-10-CM

## 2024-10-30 PROCEDURE — 97164 PT RE-EVAL EST PLAN CARE: CPT

## 2024-10-30 NOTE — PROGRESS NOTES
PT Re-Evaluation          POC expires Unit limit Auth Expiration date PT/OT + Visit Limit? Co-Insurance   25 12 visits (sharing with ortho) 24 BOMN No                               Visit/Unit Tracking  AUTH Status:  Date 10/21 10/24 10/30            Approved Used 1 1 1             Remaining  11 10 9                       Today's date: 10/30/2024  Patient name: Demetrio Villalobos  : 1960  MRN: [id number]  Referring provider: Lisa Blount DO   Dx:   Encounter Diagnosis     ICD-10-CM    1. Chronic cerebrovascular accident (CVA)  Z86.73       2. Other abnormalities of gait and mobility  R26.89             Assessment  Assessment details: Patient is a 64 year old presenting to skilled OPPT for reassessment with complaints of gait, balance, and strength deficits s/p CVA that in turn has limited safe functional mobility at home and in the community. PMH significant for: HTN, CKD, GERD, and CVA. Patient suffered CVA approximately 10 years ago and reports residual weakness on left side. Strength screen reveals gross LE weakness (L > R) per MMT grading (see details below). Sensation intact. Unable to formally test coordination due to left sided hemiplegia. Spasticity present in L quadriceps, hamstrings, and gastrocnemius per Modified Rosetta. Patient performed below age-matched normative values for the following outcome measures: 5 x STS, TUG, Meredith, and 10 MWT, likely indicating overall deficits in functional LE strength, safe functional mobility, static balance, and ambulation speed, respectively. Per cutoff scores for the 5 x STS, TUG, and Meredith he is classified as HIGH risk for falls. Additionally, patient is classified a limited community ambulator per 10 MWT, further placing him at increased risk for falls. Deferred FGA, DGI, miniBEST, and 6 MWT due to time constraints; plan to perform next session. Patient ambulated with PLS brace  donned on LLE with the following gait deficits: decreased hip/knee flexion during swing with associated hip circumduction, slowed samantha, Trendelenburg. Plan to start at frequency of 1x/week while patient is attending ortho PT services following (R) reverse total shoulder arthroplasty. Plan to focus on HIGT and HIIT to patient tolerance. Patient is undoubtedly a good candidate for skilled OPPT services in order to maximize safe functional mobility in the home and community setting.        Impairments: Abnormal coordination, Abnormal gait, Abnormal muscle tone, Abnormal or restricted ROM, Activity intolerance, Impaired balance, Impaired physical strength, Lacks appropriate HEP, Poor posture, Poor body mechanics, Pain with function, Safety issue, Weight-bearing intolerance, Abnormal movement, Difficulty understanding, Abnormal muscle firing  Understanding of Dx/Px/POC: Excellent  Prognosis: Excellent      Patient verbalized understanding of POC.         Please contact me if you have any questions or recommendations. Thank you for the referral and the opportunity to share in Demetrio Villalobos's care.        Plan  Plan details: FGA, DGI, miniBEST, 6 MWT; HIGT and HIIT to tolerance  Patient would benefit from: PT Eval, Skilled PT, and OT Eval  Planned modality interventions: Biofeedback, Cryotherapy, TENS, Thermotherapy  Planned therapy interventions: Abdominal trunk stabilization, ADL training, Balance, Balance/WB training, Breathing training, Body mechanics training, Coordination, Functional ROM exercises, Gait training, HEP, Joint Mobilization, Manual Therapy, White taping, Motor coordination training, Neuromuscular re-education, Patient education, Postural training, Strengthening, Stretching, Therapeutic activities, Therapeutic exercises, Therapeutic training, Transfer training, Activity modification, Work reintegration  Frequency: 1-2x/wk  Duration in weeks: 12 weeks  Plan of Care beginning date: 10/30/2024  Plan  of Care expiration date: 12 weeks - 1/22/2025  Treatment plan discussed with: Patient         Goals  Short Term Goals (4 weeks):    - Patient will improve time on TUG by 2.9 seconds to facilitate improved safety in all ambulation  - Patient will improve scoring on DGI by 2.6 points to progress safety  - Patient will be independent in basic HEP 2-3 weeks  - Patient will improve 5xSTS score by 2.3 seconds to promote improved LE functional strength needed for ADLs  - Patient will complete components of CB&M to promote agility necessary for sports related tasks    Long Term Goals (12 weeks):  - Patient will be independent in a comprehensive home exercise program  - Patient will improve gait speed by 0.18 m/s to improve safety with community ambulation  - Patient will improve LACEY by 6 points to facilitate return to safe independent ambulation  - Patient will improve scoring on FGA by 4 points to progress safety with dynamic tasks  - Patient will be able to demonstrate HT in gait without veering  - Patient will improve 6 Minute Walk Test score by 190 feet to promote improved cardiovascular endurance  - Patient will report 50% reduction in near falls in order to improve safety with functional tasks and reduce his risk for falls  - Patient will report going on walks at least 3 days per week to promote independence and improved cardiovascular endurance  - Patient will be able to ascend/descend stairs reciprocally without UE assist to promote independence and safety with ADLs  - Patient will report 50% reduction in near falls when ambulating on uneven terrain      Cut off score    All date taken from APTA Neuro Section or Rehab Measures      [place]acey:  Álvaro et al., 2018  MDC: 2.7 pts    Amanda enrique al., 2011  Cut-off score: 45/56    Chronic CVA  < 44/56 high risk for falls (Álvaro et al., 2018)  < 47.5/56 slow walker status (Amanda enrique al., 2011) 5xSTS: Rupal 2010  MDC: 2.3 sec  Age Norms:  60-69: 11.1 sec  70-79: 12.6  sec  80-89: 14.8 sec    [facility], 2010, Chronic Stroke  Chronic CVA: 12 sec   TUG  Addison et al., 2005  MDC: 2.9 sec    Cut off score:  >13.5 sec community dwelling adults  >32.2 frail elderly  <20 I for basic transfers  >30 dependent on transfers 10 Meter Walk Test:   Shawanda et al., 2006  Small meaningful change: 0.06 m/s  Substantial meaningful change: 0.14 m/s  MCID: 0.16 m/s    < 0.4 m/s household ambulators  0.4 - 0.8 m/s limited community ambulators  > 0.8 m/s community ambulators   FGA: Rossi et al., 2010  MCID: 4.2 pts  Geriatrics/community < 22/30 fall risk  Geriatrics/community < 20/30 unexplained falls DGI  MDC: vestibular - 4 pts  MDC: geriatric/community - 3 pts  Falls risk <19/24   6 Minute Walk Test  Shawanda et al., 2006  MDC: 60.98 m (200.01 ft)    Farhat Desai, & Ruthie, 2012  MCID: 34.4 m    Age Norms  60-69: M - 1876 ft   F - 1765 ft  70-79: M - 1729 ft   F - 1545 ft  80-89: M - 1368 ft   F - 1286 ft Modified Rosetta  0: No increase in tone  1: Catch and release or min resistance at end range  1+: Catch f/b min resistance throughout remainder (< half ROM)  2: Easily moved, but more marked tone throughout most ROM  3: Significant tone, PROM difficult  4: Rigid   MiniBest: Juanjose et al., 2013  CVA < 17.5 fall risk Pass (Acute CVA)  MDC: 1.8 points (acute), 3.2 points (chronic)         Subjective    History of Present Illness  Mechanism of injury: Patient is a 64 year old presenting to skilled OPPT for reassessment with complaints of gait, balance, and strength deficits s/p CVA. PMH significant for: HTN, CKD, GERD, and CVA. Patient suffered CVA approximately 10 years ago and reports residual weakness on left side (UE and LE), but cannot recall exact location of stroke. He reports he participated in PT/OT/ST services immediately following the stroke, but has not participated in much rehab since. Patient recently underwent (R) reverse total shoulder arthroplasty on 8/26 and is currently following shoulder  "precautions (no flexion/abduction > 90 degrees). He has HHA that come 25 hours/week (ever day in the AM) that assist with ADL/IADL's.     Primary AD: none  Assist level at home: assist for ADL's/IADL's  WC usage: NA    Patient goal: \"I want to get better with walking\"    Pain  Current pain ratin-3/10  Location: R shoulder  Aggravating factors: movement    Social Support  Steps to enter house: none  Stairs in house: none   Lives in: 4 story, 1 floor set up  Lives with: alone    Employment status: retired  Hand dominance: R    Treatments  Previous treatment: PT/OT/ST immediately following stroke  Current treatment: ortho PT, balance eval  Diagnostic Testing: see chart for details      Objective     Vitals  - HR: 74 bpm  - BP: 140/92  - SPO2: 97%    HR Max  - 207 - (0.7x62)  = 164 bpm    LE MMT  - R Hip Flexion: 3/5  - L Hip Flexion: 3-/5  - R Hip Extension: 3/5  - L Hip Extension: 3-/5  - R Hip Abduction: 4+/5  - L Hip abduction: 4-/5  - R Hip adduction: 4+/5  - L Hip adduction: 4-/5  - R Knee Extension: 3/5  - L Knee Extension: 2+/5  - R Knee Flexion: 3/5  - L Knee Flexion: 2+/5  - R Ankle DF: 4/5  - L Ankle DF: 1/5  - R Ankle PF: 3/5  - L Ankle PF: 1/5    Sensation  - Light touch: intact  - Deep pressure: intact    Coordination  - Dysmetria: unable 2/2 LUE hemiplegia  - Dysdiadochokinesia: unable 2/2 LUE hemiplegia  - Alternating Toe Taps: unable 2/2 LLE hemiplegia    Modified Rosetta  - R knee extension: 0 - no increase in tone  - L knee extension: 1+ - catch followed by minimum resistance throughout remainder (< half ROM)  - R knee flexion: 0 - no increase in tone  - L knee flexion: 1-   - R ankle DF: 0 - no increase in tone   - L ankle DF: 3 - significant tone, PROM difficulty  - R ankle inversion: 0 - no increase in tone  - L ankle inversion: 3 - significant tone, PROM difficulty  - R ankle eversion: 0 - no increase in tone  - L ankle eversion: 3 - significant tone, PROM difficulty    Clonus  - L: No  - R: " No  - Fatiguing: NA  - Number of beats: NA    Vision  - Glasses: No  - Abnormalities prior to CVA: No, NA  - Abnormalities post CVA: No, NA    Neglect  - R sided: No  - L sided: No    Pusher's Syndrome  - R sided: No  - L sided: No        Outcome Measures Initial Eval  10/30/2024        5xSTS 17.36 sec, no UE        TUG 22.75 sec        10 meter 0.49 m/s        LACEY 38/56        FGA defer/30        DGI defer/24        MiniBEST defer/28        6MWT defer ft                      Precautions: CKD, GERD, HTN, PVD, history CVA 10 years ago with L sided hemiplegia, REVERSE TOTAL SHOULDER PRECAUTIONS (NO FLEX/ABD > 90)  Past Medical History:   Diagnosis Date    Chronic kidney disease     Edema     GERD (gastroesophageal reflux disease)     Gout     hospitalized    High cholesterol     Hypertension     PVD (peripheral vascular disease) (HCC)     Renal disorder     Rotator cuff tear     right    Shortness of breath     Stroke ([facility]) 2014    Ventral hernia without obstruction or gangrene     Walker as ambulation aid

## 2024-11-04 ENCOUNTER — OFFICE VISIT (OUTPATIENT)
Dept: NEPHROLOGY | Facility: CLINIC | Age: 64
End: 2024-11-04
Payer: COMMERCIAL

## 2024-11-04 ENCOUNTER — APPOINTMENT (OUTPATIENT)
Dept: LAB | Facility: CLINIC | Age: 64
End: 2024-11-04
Payer: COMMERCIAL

## 2024-11-04 ENCOUNTER — TRANSCRIBE ORDERS (OUTPATIENT)
Dept: LAB | Facility: CLINIC | Age: 64
End: 2024-11-04

## 2024-11-04 VITALS
HEART RATE: 74 BPM | HEIGHT: 67 IN | DIASTOLIC BLOOD PRESSURE: 88 MMHG | WEIGHT: 220 LBS | OXYGEN SATURATION: 98 % | BODY MASS INDEX: 34.53 KG/M2 | SYSTOLIC BLOOD PRESSURE: 140 MMHG

## 2024-11-04 DIAGNOSIS — N18.32 STAGE 3B CHRONIC KIDNEY DISEASE (HCC): Primary | ICD-10-CM

## 2024-11-04 DIAGNOSIS — N18.32 STAGE 3B CHRONIC KIDNEY DISEASE (HCC): ICD-10-CM

## 2024-11-04 DIAGNOSIS — N18.30 BENIGN HYPERTENSION WITH CHRONIC KIDNEY DISEASE, STAGE III (HCC): ICD-10-CM

## 2024-11-04 DIAGNOSIS — I12.9 BENIGN HYPERTENSION WITH CHRONIC KIDNEY DISEASE, STAGE III (HCC): ICD-10-CM

## 2024-11-04 DIAGNOSIS — E83.9 CHRONIC KIDNEY DISEASE-MINERAL AND BONE DISORDER: ICD-10-CM

## 2024-11-04 DIAGNOSIS — N18.9 CHRONIC KIDNEY DISEASE-MINERAL AND BONE DISORDER: ICD-10-CM

## 2024-11-04 DIAGNOSIS — M89.9 CHRONIC KIDNEY DISEASE-MINERAL AND BONE DISORDER: ICD-10-CM

## 2024-11-04 DIAGNOSIS — R60.0 BILATERAL LOWER EXTREMITY EDEMA: ICD-10-CM

## 2024-11-04 LAB
ANION GAP SERPL CALCULATED.3IONS-SCNC: 8 MMOL/L (ref 4–13)
BUN SERPL-MCNC: 25 MG/DL (ref 5–25)
CALCIUM SERPL-MCNC: 9.5 MG/DL (ref 8.4–10.2)
CHLORIDE SERPL-SCNC: 103 MMOL/L (ref 96–108)
CO2 SERPL-SCNC: 28 MMOL/L (ref 21–32)
CREAT SERPL-MCNC: 2.03 MG/DL (ref 0.6–1.3)
GLUCOSE P FAST SERPL-MCNC: 98 MG/DL (ref 65–99)
POTASSIUM SERPL-SCNC: 4 MMOL/L (ref 3.5–5.3)
SODIUM SERPL-SCNC: 139 MMOL/L (ref 135–147)

## 2024-11-04 PROCEDURE — 99214 OFFICE O/P EST MOD 30 MIN: CPT | Performed by: PHYSICIAN ASSISTANT

## 2024-11-04 PROCEDURE — 36415 COLL VENOUS BLD VENIPUNCTURE: CPT

## 2024-11-04 PROCEDURE — 80048 BASIC METABOLIC PNL TOTAL CA: CPT

## 2024-11-04 RX ORDER — FUROSEMIDE 40 MG/1
40 TABLET ORAL DAILY
Qty: 90 TABLET | Refills: 3 | Status: SHIPPED | OUTPATIENT
Start: 2024-11-04

## 2024-11-04 RX ORDER — MULTIVITAMIN
1 CAPSULE ORAL DAILY
COMMUNITY

## 2024-11-04 NOTE — ASSESSMENT & PLAN NOTE
Increase lasix to 40mg daily.  Repeat blood work in 1-2 weeks.  Keep legs elevated.  Eat a low sodium diet.  Continue to wear compression stockings during the day.

## 2024-11-04 NOTE — ASSESSMENT & PLAN NOTE
Lab Results   Component Value Date    EGFR 44 09/12/2023    EGFR 46 09/11/2023    EGFR 42 09/10/2023    CREATININE 2.13 (H) 08/27/2024    CREATININE 1.65 (H) 09/12/2023    CREATININE 1.57 (H) 09/11/2023   Baseline creatinine 1.6-1.9.  Previously saw Dr. Reyes.  Suspected etiology is due to nephrosclerosis.   Please get blood work this week.   Kidney Smart class- referred  Orders:    Basic metabolic panel; Future    Basic metabolic panel    Basic metabolic panel; Future    Basic metabolic panel    Basic metabolic panel; Future    Phosphorus; Future    CBC; Future    Magnesium; Future    Protein / creatinine ratio, urine; Future    Urinalysis with microscopic; Future    Vitamin D 25 hydroxy; Future    PTH, intact; Future    Iron Panel (Includes Ferritin, Iron Sat%, Iron, and TIBC); Future    Ambulatory Referral to CKD Education Program; Future    Basic metabolic panel    Phosphorus    CBC    Magnesium    Protein / creatinine ratio, urine    Urinalysis with microscopic    Vitamin D 25 hydroxy    PTH, intact    furosemide (LASIX) 40 mg tablet; Take 1 tablet (40 mg total) by mouth daily

## 2024-11-04 NOTE — ASSESSMENT & PLAN NOTE
Antihypertensive regimen includes nifedipine 60mg daily, metoprolol 50mg daily, and lasix 20mg daily (for 5 months).  Avoid salt.  Avoid NSAIDs.  Stay active.    Home BPs: 140/90.  Will continue to monitor.  Call if BP>150/90.  BP okay.  Orders:    furosemide (LASIX) 40 mg tablet; Take 1 tablet (40 mg total) by mouth daily

## 2024-11-04 NOTE — PROGRESS NOTES
Ambulatory Visit  Name: Demetrio Villalobos      : 1960      MRN: [id number]  Encounter Provider: Kelley Jewell PA-C  Encounter Date: 2024   Encounter department: [facility] [place]    Assessment & Plan  Stage 3b chronic kidney disease (HCC)  Lab Results   Component Value Date    EGFR 44 2023    EGFR 46 2023    EGFR 42 09/10/2023    CREATININE 2.13 (H) 2024    CREATININE 1.65 (H) 2023    CREATININE 1.57 (H) 2023   Baseline creatinine 1.6-1.9.  Previously saw Dr. Reyes.  Suspected etiology is due to nephrosclerosis.   Please get blood work this week.   Kidney Smart class- referred  Orders:    Basic metabolic panel; Future    Basic metabolic panel    Basic metabolic panel; Future    Basic metabolic panel    Basic metabolic panel; Future    Phosphorus; Future    CBC; Future    Magnesium; Future    Protein / creatinine ratio, urine; Future    Urinalysis with microscopic; Future    Vitamin D 25 hydroxy; Future    PTH, intact; Future    Iron Panel (Includes Ferritin, Iron Sat%, Iron, and TIBC); Future    Ambulatory Referral to CKD Education Program; Future    Basic metabolic panel    Phosphorus    CBC    Magnesium    Protein / creatinine ratio, urine    Urinalysis with microscopic    Vitamin D 25 hydroxy    PTH, intact    furosemide (LASIX) 40 mg tablet; Take 1 tablet (40 mg total) by mouth daily    Benign hypertension with chronic kidney disease, stage III (HCC)  Antihypertensive regimen includes nifedipine 60mg daily, metoprolol 50mg daily, and lasix 20mg daily (for 5 months).  Avoid salt.  Avoid NSAIDs.  Stay active.    Home BPs: 140/90.  Will continue to monitor.  Call if BP>150/90.  BP okay.  Orders:    furosemide (LASIX) 40 mg tablet; Take 1 tablet (40 mg total) by mouth daily    Chronic kidney disease-mineral and bone disorder  Check studies prior to next visit.        Bilateral lower extremity edema  Increase lasix to 40mg daily.  Repeat blood work  in 1-2 weeks.  Keep legs elevated.  Eat a low sodium diet.  Continue to wear compression stockings during the day.       Follow up in 4 months.  Please call the office with any questions or concerns.      History of Present Illness     Demetrio Villalobos is a 64 y.o. adult who presents for follow up of chronic kidney disease.  He was last seen in the summer by Dr. Reyes.  Since then, he is feeling well.  He had shoulder replacement surgery on 8/26/24.  He denies hospitalizations.  His main complaint is lower extremity edema.  He states this started with a gout flare and has been there ever since.  He started lasix 20mg daily about 2-3 months ago without improvement.  He wears compression stockings and keeps his legs elevated.  He has some shortness of breath with exertion but no significant symptoms with laying flat except for one night recently.      His cousin had a renal transplant 10-15 years ago.  Patient does not know etiology of renal failure.     History obtained from : patient  Review of Systems  Current Outpatient Medications on File Prior to Visit   Medication Sig Dispense Refill    allopurinol (ZYLOPRIM) 100 mg tablet Take 1 tablet by mouth every morning      aspirin 81 mg chewable tablet Chew 1 tablet (81 mg total) 2 (two) times a day for 28 days Last dose per surgeon to be 8/20/24 56 tablet 0    atorvastatin (LIPITOR) 40 mg tablet Take 40 mg by mouth daily with dinner      esomeprazole (NexIUM) 40 MG capsule Take 40 mg by mouth in the morning      metoprolol succinate (TOPROL-XL) 50 mg 24 hr tablet Take 50 mg by mouth daily with dinner      Multiple Vitamin (multivitamin) capsule Take 1 capsule by mouth daily      NIFEdipine (PROCARDIA XL) 60 mg 24 hr tablet Take 1 tablet (60 mg total) by mouth daily 30 tablet 5    polyethylene glycol (MIRALAX) 17 g packet Take 17 g by mouth daily as needed      potassium chloride (Klor-Con) 10 mEq tablet Take 1 tablet by mouth 3 (three) times a week In the NR-Ryp-Oad-Fri  "     [DISCONTINUED] furosemide (LASIX) 20 mg tablet Take 1 tablet by mouth every 24 hours      Diclofenac Sodium (VOLTAREN) 1 % Apply 2 g topically 4 (four) times a day 50 g 1    polyethylene glycol (GLYCOLAX) 17 GM/SCOOP        No current facility-administered medications on file prior to visit.          Objective     /88 (BP Location: Right arm, Patient Position: Sitting, Cuff Size: Large)   Pulse 74   Ht 5' 7\" (1.702 m)   Wt 99.8 kg (220 lb)   SpO2 98%   BMI 34.46 kg/m²     Physical Exam  General: NAD  Neuro: alert awake  Psych: mood and affect appropriate  Skin: no rash  Eyes: anicteric  ENMT: mm moist  Neck: no masses  Respiratory: CTAB  Cardiovascular: RRR  Extremities: + bilateral LE edema  Gastrointestinal: soft nt nd      "

## 2024-11-04 NOTE — PATIENT INSTRUCTIONS
Stage 3b chronic kidney disease (HCC)  Lab Results   Component Value Date    EGFR 44 09/12/2023    EGFR 46 09/11/2023    EGFR 42 09/10/2023    CREATININE 2.13 (H) 08/27/2024    CREATININE 1.65 (H) 09/12/2023    CREATININE 1.57 (H) 09/11/2023   Baseline creatinine 1.6-1.9.  Previously saw Dr. Reyes.  Suspected etiology is due to nephrosclerosis.   Please get blood work this week.   Kidney Smart class- referred    Benign hypertension with chronic kidney disease, stage III (HCC)  Antihypertensive regimen includes nifedipine 60mg daily, metoprolol 50mg daily, and lasix 20mg daily (for 5 months).  Avoid salt.  Avoid NSAIDs.  Stay active.    Home BPs: 140/90.  Will continue to monitor.  Call if BP>150/90.    Orders:    furosemide (LASIX) 40 mg tablet; Take 1 tablet (40 mg total) by mouth daily    Chronic kidney disease-mineral and bone disorder  Check studies prior to next visit.     Edema- Increase lasix to 40mg daily starting tomorrow 11/5.  Check blood work today and in 1-2 weeks.     Follow up in 4 months.  Please call the office with any questions or concerns.

## 2024-11-05 ENCOUNTER — EVALUATION (OUTPATIENT)
Facility: CLINIC | Age: 64
End: 2024-11-05
Payer: COMMERCIAL

## 2024-11-05 DIAGNOSIS — Z96.611 S/P REVERSE TOTAL SHOULDER ARTHROPLASTY, RIGHT: ICD-10-CM

## 2024-11-05 DIAGNOSIS — G81.10 SPASTIC HEMIPARESIS AFFECTING NONDOMINANT SIDE (HCC): ICD-10-CM

## 2024-11-05 DIAGNOSIS — I63.9 CEREBROVASCULAR ACCIDENT (CVA), UNSPECIFIED MECHANISM (HCC): ICD-10-CM

## 2024-11-05 DIAGNOSIS — Z86.73 H/O: CVA (CEREBROVASCULAR ACCIDENT): Primary | ICD-10-CM

## 2024-11-05 DIAGNOSIS — R26.2 AMBULATORY DYSFUNCTION: ICD-10-CM

## 2024-11-05 PROCEDURE — 97166 OT EVAL MOD COMPLEX 45 MIN: CPT

## 2024-11-05 NOTE — PROGRESS NOTES
OCCUPATIONAL THERAPY EVALUATION - Mobility Device Evaluation    Name: Demetrio Villalobos  Date: 24  : 1960  Referring Provider: No ref. provider found  AUTHORIZATION:   Insurance: Payor: [employer] [place] [employer] / Plan: [employer] / Product Type: Fee for Service Commercial /     SUBJECTIVE:  HPI: Demetrio Villalobos is a 64 y.o. adult referred to outpatient occupational therapy evaluation as part of the mobility device clinic for the following diagnosis   Encounter Diagnoses   Name Primary?    Ambulatory dysfunction     H/O: CVA (cerebrovascular accident) Yes    S/P reverse total shoulder arthroplasty, right     Spastic hemiparesis affecting nondominant side (HCC)           Reason patient is seeking a mobility device Power wheelchair   Type of device sought by patient Power wheelchair with controls on R   Current device (if present, how old and who ordered it?) None   Current pressure relief method and it's efficiency Standing vs lateral leans   Where does patient live? apartment   Access to home walk-up (leveled home, or ramp present to enter, no stairs within home), elevator access   Access to medical appointments [employer] or [facility]   Living situation Lives alone, but has local social support   Is patient currently receiving physical therapy? Yes, ortho (back pain and recent shoulder replacement) and neuro   Does patient have aides or caregivers? (If so, how many hours and who pays?) HHA 5x/wk for 3 hours each time. (Approved for 25 hours/wk)    Friend assists with bathing and dressing 2* feeling uncomfortable with male HHA    Height: 5 feet 7 inches  Weight: 220 lbs    Based on patient history recommend device for trial: power wheelchair    Visual problems No     History of falls Yes     History of skin breakdown No     Problems of bowel or bladder Yes , bladder accidents   Pain Yes, severe back pain duirng ambulation and shoulder pain R UE with movement   Use of assistive or mobility devices No      Patient goals: get a device to maximize independence    Occupational Profile:  Pt was discharged from [facility] in October 2024 after an almost year long admission with intermittent therapy services.  He is now living alone in a fourth floor apartment with 0STE and elevator access.  He reports that he has 15-25 hours of HHA assist per week.  He requires assist with UB dressing, LB dressing and bathing, cooking, laundry, cleaning, getting his mail, etc.  He owns a rw, but was never able to use it 2* L UE spastic hemiparesis.  Prior to CVA, pt was working full time in [place] and hopes to some day return to work.  He is not a , and relies on [employer] for transportation.  He reports he is able to ambulate ~15ft on average before pain is too severe.  He also reports standing tolerance of 3 minutes.  He owns a shower chair.      Past Medical History:   Diagnosis Date    Chronic kidney disease     Edema     GERD (gastroesophageal reflux disease)     Gout     hospitalized    High cholesterol     Hypertension     PVD (peripheral vascular disease) ([facility])     Renal disorder     Rotator cuff tear     right    Shortness of breath     Stroke ([facility]) 2014    Ventral hernia without obstruction or gangrene     Walker as ambulation aid        Current Outpatient Medications:     allopurinol (ZYLOPRIM) 100 mg tablet, Take 1 tablet by mouth every morning, Disp: , Rfl:     aspirin 81 mg chewable tablet, Chew 1 tablet (81 mg total) 2 (two) times a day for 28 days Last dose per surgeon to be 8/20/24, Disp: 56 tablet, Rfl: 0    atorvastatin (LIPITOR) 40 mg tablet, Take 40 mg by mouth daily with dinner, Disp: , Rfl:     Diclofenac Sodium (VOLTAREN) 1 %, Apply 2 g topically 4 (four) times a day, Disp: 50 g, Rfl: 1    esomeprazole (NexIUM) 40 MG capsule, Take 40 mg by mouth in the morning, Disp: , Rfl:     furosemide (LASIX) 40 mg tablet, Take 1 tablet (40 mg total) by mouth daily, Disp: 90 tablet, Rfl: 3    metoprolol  succinate (TOPROL-XL) 50 mg 24 hr tablet, Take 50 mg by mouth daily with dinner, Disp: , Rfl:     Multiple Vitamin (multivitamin) capsule, Take 1 capsule by mouth daily, Disp: , Rfl:     NIFEdipine (PROCARDIA XL) 60 mg 24 hr tablet, Take 1 tablet (60 mg total) by mouth daily, Disp: 30 tablet, Rfl: 5    polyethylene glycol (GLYCOLAX) 17 GM/SCOOP, , Disp: , Rfl:     polyethylene glycol (MIRALAX) 17 g packet, Take 17 g by mouth daily as needed, Disp: , Rfl:     potassium chloride (Klor-Con) 10 mEq tablet, Take 1 tablet by mouth 3 (three) times a week In the [id number]-Fri, Disp: , Rfl:     Communication: Understandable    Relevant Medical History:   Diagnosis: h/o CVA with residual L sided spastic hemiparesis.  Complete total tear of R rotator cuff now s/p reverse TSA    Onset: CVA in 2014, TSA summer 2024   Respiratory Status: SOB   Orthotics: MAFO L LE for chronic foot drop    OBJECTIVE:    Mobility/Balance:   Sitting Balance: WFL   Standing Balance: Sho intermittently.  Unsteady in stance  Transfers: Sho   Ambulation: 1 person assist   Fall History: 0 falls in the past 6 months; multiple near falls    Current Seating/Mobility:   Current device: None.  He was provided with a rw s/p CVA, however he has never been able to hold it with L UE   Is the currently mobility device meeting medical necessity? No.  Unable to grasp with L UE    Ability to complete Mobility-Related Activities of Daily Living (MRADL's) with Current Mobility Device:   Move room to room: Sho   Meal prep: ModA   Feeding: Set-up required   Bathing: MaxA   Grooming: Set-up required   UE dressing: MaxA   LE dressing: ModA   Toileting: Set-up required   Bowel management: Continent   Bladder management:  Accidents   Comments: His independence with daily routine would greatly increase with use of pwc within his apartment    Current Mobility Equipment Trialed/Ruled Out:   Cane/crutches: Risk of falling or h/o falls   Walker/rollator: Decreased/limitations  of motor skills & coordination   Manual Wheelchair: Contraindicated by diagnosis. Spastic hemiparesis L UE, shoulder limitations R UE s/p rTSA   Manual Wheelchair with Power Assist: Decreased/limitations endurance & strength   Scooter: Decreased/limitations endurance & strength   Power Wheelchair with Standard Joystick: Meets needs for safe independent functional ambulation/mobility     Summary: The least costly alternative for independent functional mobility was found to be: Power w/c with std joystick on the R    Functional Processing Skills for Wheeled Mobility:   Processing skills are adequate for safe mobility equipment operation: Yes    Patient is willing and motivated to use recommended mobility equipment: Yes    Pt is UNABLE to safely operate mobility equipment independently and requires dependent care equipment: No    Sensation and Skin Issues:   Sensation: Impaired L side   Pressure Relieve Method(s): Lean side to side to offload (without risk of falling)    Effective pressure relief method(s) can be performed consistently throughout the day: Yes in pwc   Skin Integrity Risk: Low risk   Current skin issues: Intact   Pain: Yes ; Location: Back/LE; Intensity: 8/10    How does pain interfere with mobility and/or ADLs? Limits his ability to stand and ambulate during daily routine in his apartment    Neuro-Muscular Status: Spasticity    Muscle Tone--Modified Rosetta Scale Right Left   Elbow flexors/extensors 0 2   Wrist flexors/extensors 0 2   Digit flexors 0 2   Shoulder extensors 0 2       Posture:   Pelvis:     -Tilt: Neutral    -Obliquity (viewed form front): WFL  -Rotation-Pelvis: WFL  -Tonal Influence: Normal  Trunk:   Anterior/posterior: WFL   L/R: WFL   Rotation-shoulders and upper trunk: Neutral   Tonal Influence: Normal  Head & Neck: Functional  Hips:   Position: Neutral   Windswept: Neutral  Foot positioning: Abnormal   ROM Concerns:    R: WNL  L: Plantar Flexed without AFO  Tonal/Movements LE:  Spasticity  Wrist & Hand:   Handedness: R    R Functioning: WNL  L Functioning: Hand movement non-functional      Mobility Base Recommendations and Justification: Power mobility base   Recommended features: Elevator on mobility base, controls on R   Arm rests: Flip back   Leg rests: Swing away   Food support: Flip up   Seating: Seat cushion    Manual Muscle Tests Right Left   Shoulder abduction 4+/5 4-/5   Shoulder flexion 4+/5 2/5   Elbow flexion 5/5 3-/5   Elbow extension 5/5 2-/5   Wrist extension 5/5 2-/5   Wrist flexion 5/5 2-/5   Hip flexion 5/5 3-/5   Knee extension 5/5 4-/5   Knee flexion 5/5 2-/5   Dorsiflexion 5/5 1/5       Finger Nose Test without visual occlusion: L UE dysmetric    LE Coordination testing: L LE dysmetric    Mobility Measures 11/6/2024   Assistive device used? None   3 Meter Timed  Up & Go 22.72 seconds with 2 LOB requiring assist to correct     ASSESSMENT:  Pt presents to OP OT as part of the mobility device clinic seeking a power wheelchair s/p CVA with residual L sided spastic hemiparesis and recent total tear R rotator cuff s/p rTSA.  Pt was recently d/c from rehab/SNF after ~1 year.  He is now living alone with HHA 15-25 hours per week.  He requires assist with ADLs and IADLs.  He reports multiple near falls and his limited mobility in the apartment significantly limits his participation in daily activities, including mobility during ADL routine.  His mobility deficits also limit his ability to prepare food for himself, get his mail on the first floor of his building, do his laundry, etc.  He reports he was provided with a rw from insurance ~5 years ago, however was never able to grasp it with L UE 2* spastic hemiparesis.  He is not appropriate to use spc or rw 2* balance and UE deficits.  He is unable to propel a mwc or wc with power assist 2* b/l UE deficits.  Based on pt's functional presentation, as well as anticipated prognosis for recovery 10 years s/p CVA, it is recommended  that he receive a power wheelchair with joystick on the R to maximize his safety, independence with daily activities, and improve QOL.  Discussed with pt and he is in agreement.  Discussed process of receiving the device, including therapist coordinating 1 additional session with vendor present and LMN writing.  Pt acknowledges understanding.    Additionally, pt appears to have minimal-no follow up after his stroke.  He doesn't have an established PCP, neurologist or PMR.  He reports he received minimal rehab following his stroke.  OTR reached out to his orthopedic MD and requested neuro OT referral, referral to neurology and referral to PMR.  Pt likely would benefit from medical interventions to manage L sided spasticity to decrease discomfort and inc functional use of L UE.  Discussed all recommendations with pt and he is receptive.      Precautions: fall risk, h/o CVA, recent rTSA    Short-term/long-term goals:  Patient receives mobility device within 5 months.  Patient demonstrates modified independence with navigating around clinic in mobility device in 5 months.  Patient reports comfortable in mobility device in 5 months.    PLAN:  Wheelchair fitting and instruction in use of mobility device.  Follow-up as needed when mobility device is approved and delivered, instruction as needed.  Up to 5 visits as needed over the next 12 months.    Devika Johnson, MOT, OTR/L, CSRS

## 2024-11-06 ENCOUNTER — OFFICE VISIT (OUTPATIENT)
Facility: CLINIC | Age: 64
End: 2024-11-06
Payer: COMMERCIAL

## 2024-11-06 ENCOUNTER — OFFICE VISIT (OUTPATIENT)
Dept: PHYSICAL THERAPY | Facility: CLINIC | Age: 64
End: 2024-11-06
Payer: COMMERCIAL

## 2024-11-06 DIAGNOSIS — M54.50 LUMBAR BACK PAIN: ICD-10-CM

## 2024-11-06 DIAGNOSIS — Z86.73 CHRONIC CEREBROVASCULAR ACCIDENT (CVA): Primary | ICD-10-CM

## 2024-11-06 DIAGNOSIS — R26.89 OTHER ABNORMALITIES OF GAIT AND MOBILITY: ICD-10-CM

## 2024-11-06 DIAGNOSIS — Z96.611 STATUS POST REVERSE TOTAL SHOULDER REPLACEMENT, RIGHT: ICD-10-CM

## 2024-11-06 DIAGNOSIS — I63.9 CEREBROVASCULAR ACCIDENT (CVA), UNSPECIFIED MECHANISM (HCC): Primary | ICD-10-CM

## 2024-11-06 PROCEDURE — 97140 MANUAL THERAPY 1/> REGIONS: CPT | Performed by: PHYSICAL THERAPIST

## 2024-11-06 PROCEDURE — 97112 NEUROMUSCULAR REEDUCATION: CPT

## 2024-11-06 PROCEDURE — 97112 NEUROMUSCULAR REEDUCATION: CPT | Performed by: PHYSICAL THERAPIST

## 2024-11-06 PROCEDURE — 97530 THERAPEUTIC ACTIVITIES: CPT

## 2024-11-06 NOTE — PROGRESS NOTES
Daily Note     Today's date: 2024  Patient name: Demetrio Villalobos  : 1960  MRN: [id number]  Referring provider: César Malone DO  Dx:   Encounter Diagnosis     ICD-10-CM    1. Chronic cerebrovascular accident (CVA)  Z86.73       2. Other abnormalities of gait and mobility  R26.89                      Subjective: Patient reports to PT session with no new issues or complaints, states he had his OT evaluation yesterday.      Objective: See treatment diary below    BP start of session: 163/107 mmHg (seated, RUE)  After 5 minutes rest: 149/104 mmHg (seated, RUE)    TA:  - FGA/DGI (see below)  - miniBEST (see below)  - 2 MWT (see below)    Access Code: [email or web address]  URL: [email or web address]  Date: 2024  Prepared by: Celestina Valderrama    Exercises  - Sit to Stand  - 1 x daily - 7 x weekly - 2 sets - 10 reps  - Side Stepping with Counter Support  - 1 x daily - 7 x weekly - 3 sets - 10 reps  - Standing March with Counter Support  - 1 x daily - 7 x weekly - 2 sets - 10 reps - 3 sec hold    BP following tests: 131/87 mmHg (seated, RUE)    - STS from standard chair to fatigue (2 sets): 10 reps, 10 reps  - Sidestepping along bar 4 x 15 ft  - Standing hip flexion x 20 reps, 3 second hold    Assessment: Patient tolerated treatment session fairly today with focus on concluding formal outcome measures. Patient performed well below age-matched normative values for the following: FGA/DGI, miniBEST, and 2 MWT, likely indicating deficits in dynamic balance, postural stability/reactive balance, and cardiovascular endurance, respectively. Per cutoff scores for the FGA, DGI, and miniBEST he is classified as HIGH risk for falls. Patient unable to complete full 6 MWT this date due to excessive fatigue. Patient will continue to benefit from skilled OPPT services in order to maximize safe functional mobility and reduce risk for falls post-CVA.      Plan: Continue per plan of care.  Progress treatment as tolerated.        POC expires Unit limit Auth Expiration date PT/OT + Visit Limit? Co-Insurance   1/22/25 12 visits (sharing with ortho) 1/21/24 BOMN No                               Visit/Unit Tracking  AUTH Status:  Date 10/21 10/24 10/30 11/6           Approved Used 1 1 1 1            Remaining  11 10 9 8               Outcome Measures Initial Eval  10/30/2024 & DN 11/6/24        5xSTS 17.36 sec, no UE        TUG 22.75 sec        10 meter 0.49 m/s        LACEY 38/56        FGA 9/30        DGI 8/24        MiniBEST 16/28        2MWT 140 ft

## 2024-11-06 NOTE — PROGRESS NOTES
Daily Note     Today's date: 2024  Patient name: Demetrio Villalobos  : 1960  MRN: [id number]  Referring provider: César Malone DO  Dx:   Encounter Diagnosis     ICD-10-CM    1. Chronic cerebrovascular accident (CVA)  Z86.73       2. Other abnormalities of gait and mobility  R26.89       3. Lumbar back pain  M54.50       4. Status post reverse total shoulder replacement, right  Z96.611                      Subjective: Pt reports that he has pain in his right shoulder. He had surgery back in August on his right shoulder. He is unable to lift his arm overhead, lift anything heavy, difficulty shaving and washing his hair. States that his current pain level is 4/10.       Objective: See treatment diary below      Assessment: Tolerated treatment well. Patient demos limitations in the right shoulder ROM, strength, and pain with overhead reaching. He will continue to benefit from skilled physical therapy in order to maximize strength and improve quality of movement.     Plan: Continue per plan of care.      POC expires Unit limit Auth Expiration date PT/OT + Visit Limit? Co-Insurance   25 12 visits (sharing with ortho) 24 BOMN No                               Visit/Unit Tracking  AUTH Status:  Date 10/21 10/24 10/30 11/6           Approved Used 1 1 1 1            Remaining  11 10 9 8                 Date 10/21/2024 10/24/24 11/06/24      Visit Number IE 2 3      FOTO Tracking Intake survey     Follow-up #1   Manual         Thoracic mobs         Lumbar mobs         Shoulder ROM  Stretching in all directions x 8 min Stretching in all directions - 10 min IM          IASTM UT and bicep               TherEx         Active HENDERSON  NS x 8 min (arms and legs for range)       LTR x20 x20       SKTC/DKTC         Seated ball roll outs  Hand over hand on ball 10x w/ LF       Active shoulder flexion  2x10 2 x 10          Scap ret 5s x 20          Iso ER and IR and flexion x 15 5s hold ea.          Table slides  flexion x 20                Neuro Re-Ed         TrA isos         SOC x20 x20                                                             TherAct         Patient education HEP and POC x 10 min        Posture education         Lifting mechanics                           Gait Training                                    Modalities                      visit Wound assessment     Swelling assessment of upper extremity     Neurovascular assessment of upper extremity     Sling fit and ability to don/doff properly     Patient reported outcome measure     Pain level     Range of motion for elevation and ER    PHASE PRECAUTIONS AND GUIDELINES GOALS EXERCISES CRITERIA TO ADVANCE  EXAMINATION   2  (Post-operative week 3 to 6) May discontinue sling use at 3 weeks; after 2 weeks can remove the sling at home and just use the sling at night and in community for 3rd week     May use arm for basic activities of daily living (such as feeding, brushing teeth, dressing…)      May submerge in water after 4 weeks     Avoid combined IR/EXT/ADD (hand behind the back) and IR/ADD (reaching across chest) for dislocation precautions     Avoid acromial or scapular spine pain as increase deltoid loading - decrease load if this occurs    Elevation to 130 deg and ER to 30 deg - passive, active assisted or active     Low (less than 3/10) to no pain     Ability to fire all heads of the deltoid May discontinue , and active elbow and wrist exercises since using the arm in ADLs with sling removed around the home     Continue elevation to 130 and ER to 30, both in scapular plane      Submaximal isometrics (pain-free effort) for all functional heads of deltoid (anterior, posterior, middle).      Active exercise as able:   Supine forward punch     Place in balanced position with circumduction and progressive arcs in sagittal plane     Side-lying abduction to 90      Lateral raise with bent elbow      Prone extension to hip    Elevation in scapular plane to 130; ER in scapular plane to 30      Ability to fire isometrically all heads of the deltoid muscle without pain     Ability to place and hold the arm in balanced position (90 deg elevation in supine) Wound assessment     Neurovascular assessment     Swelling assessment     ROM shoulder elevation and ER(0)     Patient reported outcome measure      Pain level      PHASE PRECAUTIONS AND GUIDELINES GOALS EXERCISES CRITERIA TO ADVANCE  EXAMINATION   3  (Post-operative week 6 to 12) Avoid forceful end-range motion in any direction      Progress active use of the arm in ADLs without being restricted to arm by the side of the body;      No heavy lifting or carrying     Initiate functional IR behind the back gently without forceful overpressure     Avoid acromial or scapular spine pain as increase deltoid loading - decrease load if this occurs     NO UPPER BODY ERGOMETER    Optimize ROM for elevation and ER in scapular plane      Expected PROM: Elevation - 145-160; ER - 40-50 ; functional IR to L1     Recover AROM to approach as close to PROM available as possible     Establish dynamic stability of the shoulder  Forward elevation in scapular plane active progression: supine to incline to vertical; short to long lever arm     Lateral raise with bent elbow; side-lying abduction     Active ER/IR with arm at side     Scapular retraction with light band resistance     Serratus anterior punches in supine; avoid wall, incline or prone press-ups for serratus anterior     Functional IR with hand slide up back - very gentle and gradual       AROM equals/approaches PROM with good mechanics for elevation     No pain     Higher level demand on shoulder than ADL functions PROM for elevation, ER(0)     AROM for elevation, ER(0) and functional IR     Patient reported outcome measure     Pain                          PHASE PRECAUTIONS AND GUIDELINES GOALS EXERCISES CRITERIA TO ADVANCE  EXAMINATION   4  (Post-operative week 12+) No heavy lifting and no overhead sports     Weight lifting limit 25.lb     No heavy pushing activity     Gradually increase strength      NO UPPER BODY ERGOMETER    Optimize functional use of operative UE to patient specific goals     Gradual increase in deltoid, scapular muscle and rotator cuff strength     Pain-free functional activities Light hand weights  for deltoid up to and not to exceed 3 lbs for anterior and posterior with long arm lift against gravity; elbow bent to 90 deg for abduction in scapular plane     Band progression for extension to hip with scapular depression/retraction     Band progression for serratus anterior punches in supine; avoid wall, incline or prone press-ups for serratus anterior     End-range stretching gently without forceful overpressure in all planes (elevation in scapular plane, ER in scapular plane, functional IR) with stretching done for life as part of daily routine     NO UPPER BODY ERGOMETER    Pain-free AROM for shoulder elevation (expect around 135-150 deg)     Functional strength for all ADLs, work tasks, and hobbies approved by surgeon     [place] with home maintenance program PROM for elevation, ER(0); ER(90)     AROM for elevation, ER(0) and functional IR     Scapulohumeral rhythm/biomechanics of active movement strategies     Strength testing for deltoid, RTC, scapular muscles     Patient reported outcome measure     Pain

## 2024-11-13 ENCOUNTER — EVALUATION (OUTPATIENT)
Facility: CLINIC | Age: 64
End: 2024-11-13
Payer: COMMERCIAL

## 2024-11-13 ENCOUNTER — OFFICE VISIT (OUTPATIENT)
Facility: CLINIC | Age: 64
End: 2024-11-13
Payer: COMMERCIAL

## 2024-11-13 ENCOUNTER — OFFICE VISIT (OUTPATIENT)
Dept: PHYSICAL THERAPY | Facility: CLINIC | Age: 64
End: 2024-11-13
Payer: COMMERCIAL

## 2024-11-13 DIAGNOSIS — Z86.73 CHRONIC CEREBROVASCULAR ACCIDENT (CVA): Primary | ICD-10-CM

## 2024-11-13 DIAGNOSIS — Z86.73 CHRONIC CEREBROVASCULAR ACCIDENT (CVA): ICD-10-CM

## 2024-11-13 DIAGNOSIS — M54.50 LUMBAR BACK PAIN: ICD-10-CM

## 2024-11-13 DIAGNOSIS — Z86.73 H/O: CVA (CEREBROVASCULAR ACCIDENT): ICD-10-CM

## 2024-11-13 DIAGNOSIS — Z96.611 STATUS POST REVERSE TOTAL SHOULDER REPLACEMENT, RIGHT: Primary | ICD-10-CM

## 2024-11-13 DIAGNOSIS — R26.2 AMBULATORY DYSFUNCTION: Primary | ICD-10-CM

## 2024-11-13 DIAGNOSIS — R26.89 OTHER ABNORMALITIES OF GAIT AND MOBILITY: ICD-10-CM

## 2024-11-13 PROCEDURE — 97110 THERAPEUTIC EXERCISES: CPT

## 2024-11-13 PROCEDURE — 97112 NEUROMUSCULAR REEDUCATION: CPT

## 2024-11-13 PROCEDURE — 97167 OT EVAL HIGH COMPLEX 60 MIN: CPT

## 2024-11-13 NOTE — PROGRESS NOTES
Daily Note     Today's date: 2024  Patient name: Demetrio Villalobos  : 1960  MRN: [id number]  Referring provider: Lisa Bolunt DO  Dx:   Encounter Diagnosis     ICD-10-CM    1. Status post reverse total shoulder replacement, right  Z96.611       2. Chronic cerebrovascular accident (CVA)  Z86.73       3. Other abnormalities of gait and mobility  R26.89       4. Lumbar back pain  M54.50                        Subjective: Patient reports that his Rt arm feels alright today. His primary report is persistent pain when sleeping.      Objective: See treatment diary below      Assessment: Tolerated treatment well. Focused today on gradual loading of the Rt shoulder within tolerance. Patient had no pain with any activities, but fatigue especially with contract-relax into flexion and abduction. Will continue to progress as tolerated. Patient would benefit from continued PT to address functional mobility deficits and return to PLOF.       Plan: Continue per plan of care.        POC expires Unit limit Auth Expiration date PT/OT + Visit Limit? Co-Insurance   25 12 visits (sharing with ortho) 24 BOMN No                               Visit/Unit Tracking  AUTH Status:  Date 10/21 10/24 10/30 11/6 11/13          Approved Used 1 1 1 1 1           Remaining  11 10 9 8                 Date 10/21/2024 10/24/24 11/06/24 11/13/24     Visit Number IE 2 3 4     FOTO Tracking Intake survey     Follow-up #1   Manual         Thoracic mobs         Lumbar mobs         Shoulder ROM  Stretching in all directions x 8 min Stretching in all directions - 10 min IM  Stretching in all directions x 8 min        IASTM UT and bicep               TherEx         Active HENDERSON  NS x 8 min (arms and legs for range)       LTR x20 x20       SKTC/DKTC         Seated ball roll outs  Hand over hand on ball 10x w/ LF       Active shoulder flexion  2x10 2 x 10  2x10 w/ contract relax PNF resistance        Scap ret 5s x 20  Banded flexion/ext  from OH GTB x10    Banded horizontal abd GTB x10        Iso ER and IR and flexion x 15 5s hold ea.  Flexion, ext, abd 10 x 5 sec        Table slides flexion x 20  Table slides flexion x 20     Into abd x20              Neuro Re-Ed         TrA isos         SOC x20 x20                                                             TherAct         Patient education HEP and POC x 10 min        Posture education         Lifting mechanics                           Gait Training                                    Modalities

## 2024-11-13 NOTE — PROGRESS NOTES
"Daily Note     Today's date: 2024  Patient name: Demetrio Villalobos  : 1960  MRN: [id number]  Referring provider: César Malone DO  Dx:   Encounter Diagnosis     ICD-10-CM    1. Chronic cerebrovascular accident (CVA)  Z86.73       2. Other abnormalities of gait and mobility  R26.89           GOES BY \"WILL\"             Subjective: Patient reports to PT session with no new issues or complaints, states he had his OT evaluation yesterday.      Objective: See treatment diary below    BP start of session: 160/108 mmHg (seated, RUE)  BP after 3 minutes rest: 154/108 mmHg (seated, RUE)    NMR (2.5# L ankle weight):  - Treadmill ambulation @ 0.5-0.7 mph x 3 trials (seated rest breaks between) - Peak RPE: 3/10   1st trial: 2 minutes   2nd trial: 3 minutes   3rd trial: 4 minutes    BP following treadmill trials: 160/100 mmHg (seated, RUE)    - STS from standard chair (2-4\" step under RLE) x 1 minute (2 sets) - Peak RPE: 4/10  - FWD mel negotiation (6\") x 2 minutes, no UE - Peak RPE: 6/10  - Alternating step taps (6\") x 2 minutes; 1 UE - Peak RPE: 4/10      Assessment: Patient tolerated treatment session well today with focus on initiating gait and mobility training within high intensity framework. Able to slowly increased treadmill speed with each trial, allowing for more symmetrical stance time as well as increased step length. Visual reliance for feedback on gait mechanics, likely due to reduced neuromuscular input from affected LLE. As patient is on beta blocker, plan to utilize RPE in sessions in order to gauge if intensity falls within target therapeutic ranges. Patient will continue to benefit from skilled OPPT services in order to maximize safe functional mobility and reduce risk for falls post-CVA.      Plan: Continue per plan of care.  Progress treatment as tolerated.      *PATIENT ON BETA BLOCKER, USE RPE*     POC expires Unit limit Auth Expiration date PT/OT + Visit Limit? Co-Insurance   25 12 " visits (sharing with ortho) 1/21/24 BOMN No                               Visit/Unit Tracking  AUTH Status:  Date 10/21 10/24 10/30 11/6 11/6 11/13         Approved Used 1 1 1 1 1 1          Remaining  11 10 9 8 7 6             Outcome Measures Initial Eval  10/30/2024 & DN 11/6/24        5xSTS 17.36 sec, no UE        TUG 22.75 sec        10 meter 0.49 m/s        LACEY 38/56        FGA 9/30        DGI 8/24        MiniBEST 16/28        2MWT 140 ft                   Access Code: [email or web address]  URL: [email or web address]  Date: 11/06/2024  Prepared by: Celestina Valderrama    Exercises  - Sit to Stand  - 1 x daily - 7 x weekly - 2 sets - 10 reps  - Side Stepping with Counter Support  - 1 x daily - 7 x weekly - 3 sets - 10 reps  - Standing March with Counter Support  - 1 x daily - 7 x weekly - 2 sets - 10 reps - 3 sec hold

## 2024-11-13 NOTE — PROGRESS NOTES
"OCCUPATIONAL THERAPY INITIAL EVALUATION    Today's Date: 2024  Patient Name: Demetrio Villalobos  : 1960  MRN: [id number]  Referring Provider: Sina Randle MD  Dx: Ambulatory dysfunction [R26.2]      Eval/ Re-eval POC expires Auth #/ Referral # Total units  Start date  Expiration date Extension      AUTH                                                  Date               Units:  Used 2              Authed:  Remaining  10                  SKILLED ANALYSIS:  Pt is a R hand dominant 64 year old male presenting to OP OT s/p chronic CVA.  Pt currently requires assistance with ADLs and IADLs.  Pt is demonstrating deficits in the following domains: spasticity, L UE coordination, L hemiparesis, proprioception, LUE strength and functional cognition.  Deficits are noted based on the following assessments: MOCA, TM B, Fugyl abraham, MMT, modified jonna scale and clinical observation.  Recommend OP OT 2x/wk for 8-12 weeks with focus on aforementioned deficits to maximize functional recovery s/p CVA and improve QOL.  Findings and recommendations discussed with pt, and they are in agreement.    Educated pt on charges of insurance, acknowledge understanding, and are in agreement.    PLAN OF CARE START: 24  PLAN OF CARE END: 24  FREQUENCY: 2 times per week  PRECAUTIONS RUE (NO FLEX/ABD > 90) sx, HTN    Subjective    Mechanism of Injury  CVA approximately 10 years ago, went to [facility] for outpatient rehabilitation; pt denies seizures and is not on anti-seizure medication    Occupational Profile    \"Pt lives alone in an apartment with no stairs to get in. Pt reports difficulty with ADLs (UBD, shaving head, LBD ) however was able to put on with independence. Pt reports he has an aide who assists 5 hours a day and does cleaning, cooking, and bathing pt. Pt manages own medication. Pt reports he has not been driving. Pt reports no pain in LUE. Pt is a retired .Pt report no diplopia and no issues " "with cognition. Pt enjoys reading bible, watching TV. \"    PATIENT GOAL: \"as much use of my L hand and walking as possible.\"    HISTORY OF PRESENT ILLNESS:     PMH:   Past Medical History:   Diagnosis Date    Chronic kidney disease     Edema     GERD (gastroesophageal reflux disease)     Gout     hospitalized    High cholesterol     Hypertension     PVD (peripheral vascular disease) ([facility])     Renal disorder     Rotator cuff tear     right    Shortness of breath     Stroke ([facility]) 2014    Ventral hernia without obstruction or gangrene     Walker as ambulation aid        Past Surgical Hx:   Past Surgical History:   Procedure Laterality Date    APPENDECTOMY      MT ARTHROPLASTY GLENOHUMERAL JOINT TOTAL SHOULDER Right 2024    Procedure: ARTHROPLASTY SHOULDER REVERSE;  Surgeon: Sina Randle MD;  Location: [facility];  Service: Orthopedics        Pain:  Location:R shoulder     Restin    With Activity:  2    Objective    Upper Extremities:  Pt is R hand dominant  Did not assess RUE strength due to rotator cuff sx.               JANA: RUE: lb LUE: 19lb  The age norm is approximately 76.8 lbs, indicating decreased  strength.    Lateral pinch: RUE: , LUE: 4.5                Range of Motion:  AROM:   R UE   Did not assess due to sx     L UE   - Shoulder flexion: approximately 70 degrees  - Shoulder scaption/abduction: approximately 80 degrees  - Shoulder extension: approximately 90%  - Shoulder internal rotation: 100%  - Shoulder external rotation: 80%  - Elbow flexion:100%  - Elbow extension: 100%  - Wrist flexion: 50%  - Wrist extension:50%  - Pronation: 50%  - Supination:100%  - Finger extension: 10%  - thumb extension approximately 25%  - Finger flexion: 100%     Manual Muscle Testing:  R UE:  -Did not assess due to sx   L UE:  - Shoulder flexors: 2+/5  - Shoulder abductors: 2+/5  - Shoulder extensors: 3-/5  - Shoulder internal rotators: 3+/5  - Shoulder external rotators: 2/5  - Elbow flexors: 3+/5  - " Elbow extensors: 3+/5  - Wrist flexors: 2+/5  - Wrist extensors: 2/5    FUGL LOVE ASSESSMENT OF MOTOR RECOVERY AFTER STROKE:  L UE:  UPPER EXTREMITY SEATED: 17/36  WRIST: 4/10   HAND: 5/14  COORDINATION/SPEED: 1/6  OVERALL SCORE: 27/66     (Clinical change= 5 points, severe impairment <19, and mild impairment is >50)          Modified Rosetta Scale: measures spasticity in patients with lesions in the Central Nervous System  L UE:    Shoulder external rotators: 0/4  Shoulder internal rotators: 2/4  Elbow flexors: 1+/4  Elbow extensors: 0/4  Wrist flexors: 1/4  Wrist extensors 0/4  Pronators 2/4  Finger flexors: 1/4  Finger extensors: 0/4    0: No increase in muscle tone  1: Slight increase in muscle tone, manifested by a catch and release or by minimal resistance at the end of the range of motion when the affected part(s) is moved in flexion or extension  1+: Slight increase in muscle tone, manifested by a catch, followed by minimal resistance throughout the remainder (less than half) of the ROM  2: More marked increase in muscle tone through most of the ROM, but affected part(s) easily moved  3: Considerable increase in muscle tone, passive movement difficult  4: Affected part(s) rigid in flexion or extension    Subluxation: none    Scapular winging: slight    Spasticity: slight     Finger to Nose Testing with Visual Occlusion (proprioception):  unable to complete due to hemiparesis    Finger to Nose Testing without Visual Occlusion: unable to complete due to hemiparesis    Monofilaments/sensory testing: NOT tested    Functional Cognition:  Highest level of education: 9th grade    [place] Cognitive Assessment Version 8.1 (MoCA V8.1)  Visuospatial/executive functioning:  3/5  Naming:  3/3  Memory: 1st trial:  2/5, 2nd trial:  5/5  Attention/concentration: 2/2  List of letters: 1/1  Serial Seven Subtraction:  2/3 w/  errors  Language/sentence repetition:  1/2  Language Fluency:   1/1  Abstract/Correlational  Thinkin/2  Delayed Recall:    Orientation:     Memory Index Score: 1315   Raw Score:  25/30, MIS:  13/15, indicative of mild neurocognitive impairments.    MoCA Scoring        Normal: 26+         Mild Cognitive Impairment: 18-25          Moderate Cognitive Impairment: 10-17         Severe Cognitive Impairment: <10    Trail making Part A and Part B:   Part A: 31.28 with errors however able to self correct   Part B: 1 minute 34 seconds with independence   Indicating deficits: Part A > 33.12 seconds and Part B > 74.55 seconds            GOALS:   Short Term Goals:  Pt will increase UE  strength by 2-3 lbs to complete gardening and IADLs (19)  Pt will increase divided attention by completing trail making part B in 1 minute 20 seconds to complete IADLs  Pt will demo with G understanding and carryover of tone reduction strategies for improved AROM  Pt will demo with decreased L  UE hypertonicity to 1+/4 on modified jonna scale for improved grasp release, termination of flexors on command of time  Pt will increase  L UE cooordination to complete 36 of upper extremity section of fugyl abraham for tabletop tasks  Pt will increase  L UE coordination to complete 5/10 of wrist section of fugyl abraham for tabletop tasks            Long Term Goals: 8-12 weeks  Pt will increase UE  strength by 4-5 lbs to complete gardening and IADLs (19)  Pt will increase divided attention by completing trail making part B in 1 minute 10 seconds to complete IADLs  Pt will demo with G understanding and carryover of tone reduction strategies for improved AROM  Pt will demo with decreased L  UE hypertonicity to 1/4 on modified jonna scale for improved grasp release, termination of flexors on command of time  Pt will increase  L UE coordination to complete /36 of upper extremity section of fugyl abraham for tabletop tasks  Pt will increase  L UE coordination to complete fugyl abraham in 32 for tabletop tasks      OTHER PLANNED  THERAPY INTERVENTIONS:   Supine, seated, and in stance neuro re-ed  Tricep AG  NMES/FES  FMC/prehension  Timed Trials  Manual tx  Hand to target  Sensory re-ed  Seated functional reach: crossing midline  Supine place and hold  WBearing strategies   Closed chain activities  Open chain activities  Internal and external memory aides  Multimatrix for saccades/ visual clutter/attention  Hypersensitivity strategies education  Multi-modal environment  Sustained/alternating/divided attention  Work stations with timed transitions  Temporal Awareness  Memory and mental manipulation  Auditory processing with immediate recall  Memory retention with immediate and delayed recall  Edu on cog/vision apps      Objective Measurement Tracker:    IE (11/13) 1st Re-eval: () 2nd Re-eval: (]) 3rd Re-eval: ()    MoCA 25/30 /30 /30 /30 /30   TM Test A 31       TM Test B 1:35       CMT        Nine Hole Peg Test        Functional Dexterity Test                                                Manual Muscle Testing             Fugl Antunez UE: 17/36  Wrist: 4/10  Hand: 5/14  Coordination: 1/6

## 2024-11-14 ENCOUNTER — TELEPHONE (OUTPATIENT)
Dept: CARDIOLOGY CLINIC | Facility: CLINIC | Age: 64
End: 2024-11-14

## 2024-11-14 NOTE — TELEPHONE ENCOUNTER
Left message on phone regarding echo being cx due to insurance denying it also on daughters machine

## 2024-11-15 ENCOUNTER — OFFICE VISIT (OUTPATIENT)
Facility: CLINIC | Age: 64
End: 2024-11-15
Payer: COMMERCIAL

## 2024-11-15 DIAGNOSIS — Z86.73 H/O: CVA (CEREBROVASCULAR ACCIDENT): ICD-10-CM

## 2024-11-15 DIAGNOSIS — I63.9 CEREBROVASCULAR ACCIDENT (CVA), UNSPECIFIED MECHANISM (HCC): Primary | ICD-10-CM

## 2024-11-15 DIAGNOSIS — G81.10 SPASTIC HEMIPARESIS AFFECTING NONDOMINANT SIDE (HCC): ICD-10-CM

## 2024-11-15 PROCEDURE — 97112 NEUROMUSCULAR REEDUCATION: CPT

## 2024-11-15 NOTE — PROGRESS NOTES
Daily Note     Today's date: 11/15/2024  Patient name: Demetrio Villalobos  : 1960  MRN: [id number]  Referring provider: Sina Randle MD  Dx:   Encounter Diagnoses   Name Primary?    Cerebrovascular accident (CVA), unspecified mechanism (HCC) Yes    H/O: CVA (cerebrovascular accident)     Spastic hemiparesis affecting nondominant side (HCC)        Start Time: 1240  Stop Time: 1315  Total time in clinic (min): 35 minutes    PLAN OF CARE START: 24  PLAN OF CARE END: 24  FREQUENCY: 2 times per week  PRECAUTIONS RUE (NO FLEX/ABD > 90) sx, HTN    Subjective: Pt reports trialing to use robotic glove at home      Objective: See treatment below.  NMR:    -Lateral forearm pushups 40x with FES to triceps with focus on Wbing R UE, massed practice elbow flexion/extension.  -Foam cube transfer with use of FES to wrist/digit extensors with retrieval of items anteriorly on table and placement onto mat 90* to L.  25x2.  Focus on pincer vs 3 point grasp/release, shoulder rotation, reaching.  Drops 2  -Retrieval and transfer of spice jars and small soda cans with FES to wrist/digit extensors in PNF D2 flexion pattern.  Focus on cylindrical grasp/release with forearm in neutral, reaching across midline.  Requires inc time.  Digits 2-5 with good extension (~80%) with use of FES, ~20% thumb extension.    Assessment: Tolerated treatment well. Pt demonstrating improved spasticity following Wbing and tolerates FES well. Pt would benefit from continued OT services to address NMR L UE s/p chronic CVA.      Plan: Continued skilled OT per POC.

## 2024-11-18 ENCOUNTER — PATIENT OUTREACH (OUTPATIENT)
Age: 64
End: 2024-11-18

## 2024-11-18 NOTE — PROGRESS NOTES
SWCM received referral from provider to assist patient with needs, housing and financial instability.     SWCM completed chart review. SWCM called patient to follow up and assist with needs. SWCM spoke with patient. SWCM introduced self, role, and reason for outreach. Contact information provided.    Patient reports he is doing well. Patient reports he lives in [facility]. Patient reports no financial needs. Patient reports at one time having needs but this is no longer the case. Patient reports no other needs currently. SWCM encouraged patient to call with questions/ needs. SWCM will be closing case at this time and removing self from care team.    SWCM will remain available to assist as needed.

## 2024-11-19 ENCOUNTER — OFFICE VISIT (OUTPATIENT)
Facility: CLINIC | Age: 64
End: 2024-11-19
Payer: COMMERCIAL

## 2024-11-19 ENCOUNTER — OFFICE VISIT (OUTPATIENT)
Dept: PHYSICAL THERAPY | Facility: CLINIC | Age: 64
End: 2024-11-19
Payer: COMMERCIAL

## 2024-11-19 DIAGNOSIS — Z96.611 STATUS POST REVERSE TOTAL SHOULDER REPLACEMENT, RIGHT: Primary | ICD-10-CM

## 2024-11-19 DIAGNOSIS — I63.9 CEREBROVASCULAR ACCIDENT (CVA), UNSPECIFIED MECHANISM (HCC): Primary | ICD-10-CM

## 2024-11-19 DIAGNOSIS — R26.89 OTHER ABNORMALITIES OF GAIT AND MOBILITY: ICD-10-CM

## 2024-11-19 DIAGNOSIS — M54.50 LUMBAR BACK PAIN: ICD-10-CM

## 2024-11-19 DIAGNOSIS — Z86.73 CHRONIC CEREBROVASCULAR ACCIDENT (CVA): ICD-10-CM

## 2024-11-19 PROCEDURE — 97112 NEUROMUSCULAR REEDUCATION: CPT

## 2024-11-19 PROCEDURE — 97110 THERAPEUTIC EXERCISES: CPT

## 2024-11-19 NOTE — PROGRESS NOTES
Daily Note     Today's date: 2024  Patient name: Demetrio Villalobos  : 1960  MRN: [id number]  Referring provider: Lisa Blount DO  Dx:   Encounter Diagnosis     ICD-10-CM    1. Status post reverse total shoulder replacement, right  Z96.611       2. Chronic cerebrovascular accident (CVA)  Z86.73       3. Other abnormalities of gait and mobility  R26.89       4. Lumbar back pain  M54.50                          Subjective: Patient continues to report minimal to no pain throughout the day, but continued pain at night.      Objective: See treatment diary below      Assessment: Tolerated treatment well. Continued avdancement of functional shoulder strengthening. As patient is now 12+ weeks out of surgery, goal of today's session was on functional optimization since he is Rt hand dominant. He tolerated strengthening well today. Will continue to progress as tolerated. Patient would benefit from continued PT to address functional mobility deficits and return to PLOF.       Plan: Continue per plan of care.        POC expires Unit limit Auth Expiration date PT/OT + Visit Limit? Co-Insurance   25 12 visits (sharing with ortho) 24 BOMN No                               Visit/Unit Tracking  AUTH Status:  Date 10/21 10/24 10/30 11/6 11/13          Approved Used 1 1 1 1 1           Remaining  11 10 9 8               PHASE PRECAUTIONS AND GUIDELINES GOALS EXERCISES CRITERIA TO ADVANCE  EXAMINATION   4  (Post-operative week 12+) No heavy lifting and no overhead sports     Weight lifting limit 25.lb     No heavy pushing activity     Gradually increase strength      NO UPPER BODY ERGOMETER    Optimize functional use of operative UE to patient specific goals     Gradual increase in deltoid, scapular muscle and rotator cuff strength     Pain-free functional activities Light hand weights for deltoid up to and not to exceed 3 lbs for anterior and posterior with long arm lift against gravity; elbow bent to 90 deg  for abduction in scapular plane     Band progression for extension to hip with scapular depression/retraction     Band progression for serratus anterior punches in supine; avoid wall, incline or prone press-ups for serratus anterior     End-range stretching gently without forceful overpressure in all planes (elevation in scapular plane, ER in scapular plane, functional IR) with stretching done for life as part of daily routine     NO UPPER BODY ERGOMETER    Pain-free AROM for shoulder elevation (expect around 135-150 deg)     Functional strength for all ADLs, work tasks, and hobbies approved by surgeon     [place] with home maintenance program PROM for elevation, ER(0); ER(90)     AROM for elevation, ER(0) and functional IR     Scapulohumeral rhythm/biomechanics of active movement strategies     Strength testing for deltoid, RTC, scapular muscles     Patient reported outcome measure     Pain                Date 10/21/2024 10/24/24 11/06/24 11/13/24 11/19/24    Visit Number IE 2 3 4 5    FOTO Tracking Intake survey     Follow-up #1   Manual         Thoracic mobs         Lumbar mobs         Shoulder ROM  Stretching in all directions x 8 min Stretching in all directions - 10 min IM  Stretching in all directions x 8 min Stretching in all directions x 10 min       IASTM UT and bicep               TherEx         Active HENDERSON  NS x 8 min (arms and legs for range)       LTR x20 x20       SKTC/DKTC         Seated ball roll outs  Hand over hand on ball 10x w/ LF       Active shoulder flexion  2x10 2 x 10  2x10 w/ contract relax PNF resistance 2x10 w/ contract relax PNF resistance       Scap ret 5s x 20  Banded flexion/ext from OH GTB x10    Banded horizontal abd GTB x10 Banded flexion/ext from OH GTB 3x10    Banded horizontal abd GTB 3x10       Iso ER and IR and flexion x 15 5s hold ea.  Flexion, ext, abd 10 x 5 sec        Table slides flexion x 20  Table slides flexion x 20     Into abd x20     Circuit     Circuit: 3  rounds x 10 each (Rt only)  - flexion 3#  - abd 3#  - bent over row 5#             Neuro Re-Ed         TrA isos         SOC x20 x20       Scap squeezes     x30                                                 TherAct         Patient education HEP and POC x 10 min        Posture education         Lifting mechanics                           Gait Training                                    Modalities

## 2024-11-19 NOTE — PROGRESS NOTES
Daily Note     Today's date: 2024  Patient name: Demetrio Villalobos  : 1960  MRN: [id number]  Referring provider: Sina Randle MD  Dx:   Encounter Diagnosis   Name Primary?    Cerebrovascular accident (CVA), unspecified mechanism (HCC) Yes                  PLAN OF CARE START: 24  PLAN OF CARE END: 24  FREQUENCY: 2 times per week  PRECAUTIONS RUE (NO FLEX/ABD > 90) sx, HTN    Subjective: Pt reports no changes      Objective: See treatment below.  NMR:    -Lateral forearm pushups 30x with FES to triceps with focus on Wbing R UE, massed practice elbow flexion/extension.  -completed the following exercises with FES donned on wrist/digit extensors to complete UE coordination, UE strength, grasp/release:  Completed x 30reps of functional reaching towards cans with bringing to mouth, and placing to L side required AAROM at thumb for  Completed x 30 reps of functional reaching with shoulder flexion of bean bag placement with shoulder abduction, shoulder flexion approximatley 70-80 degrees  Completed x 30 reps of functional reaching in PNF D2 patterns using bean bags     Assessment: Tolerated treatment well. Pt required A to achieved cylindrical grasp. Pt would benefit from continued OT services to address NMR L UE s/p chronic CVA.      Plan: Continued skilled OT per POC.

## 2024-11-20 ENCOUNTER — OFFICE VISIT (OUTPATIENT)
Dept: OBGYN CLINIC | Facility: CLINIC | Age: 64
End: 2024-11-20

## 2024-11-20 ENCOUNTER — APPOINTMENT (OUTPATIENT)
Dept: RADIOLOGY | Facility: CLINIC | Age: 64
End: 2024-11-20
Payer: COMMERCIAL

## 2024-11-20 ENCOUNTER — APPOINTMENT (OUTPATIENT)
Dept: PHYSICAL THERAPY | Facility: CLINIC | Age: 64
End: 2024-11-20
Payer: COMMERCIAL

## 2024-11-20 VITALS
WEIGHT: 220 LBS | BODY MASS INDEX: 34.53 KG/M2 | DIASTOLIC BLOOD PRESSURE: 106 MMHG | SYSTOLIC BLOOD PRESSURE: 149 MMHG | HEART RATE: 91 BPM | HEIGHT: 67 IN

## 2024-11-20 DIAGNOSIS — G56.01 CARPAL TUNNEL SYNDROME ON RIGHT: ICD-10-CM

## 2024-11-20 DIAGNOSIS — Z96.611 STATUS POST REVERSE TOTAL REPLACEMENT OF RIGHT SHOULDER: ICD-10-CM

## 2024-11-20 DIAGNOSIS — Z96.611 STATUS POST REVERSE TOTAL REPLACEMENT OF RIGHT SHOULDER: Primary | ICD-10-CM

## 2024-11-20 PROCEDURE — 73030 X-RAY EXAM OF SHOULDER: CPT

## 2024-11-20 PROCEDURE — 99024 POSTOP FOLLOW-UP VISIT: CPT | Performed by: ORTHOPAEDIC SURGERY

## 2024-11-21 ENCOUNTER — TRANSCRIBE ORDERS (OUTPATIENT)
Dept: LAB | Facility: CLINIC | Age: 64
End: 2024-11-21

## 2024-11-21 ENCOUNTER — APPOINTMENT (OUTPATIENT)
Dept: NON INVASIVE DIAGNOSTICS | Facility: HOSPITAL | Age: 64
End: 2024-11-21
Payer: COMMERCIAL

## 2024-11-21 ENCOUNTER — APPOINTMENT (OUTPATIENT)
Dept: LAB | Facility: CLINIC | Age: 64
End: 2024-11-21
Payer: COMMERCIAL

## 2024-11-21 ENCOUNTER — OFFICE VISIT (OUTPATIENT)
Facility: CLINIC | Age: 64
End: 2024-11-21
Payer: COMMERCIAL

## 2024-11-21 ENCOUNTER — HOSPITAL ENCOUNTER (OUTPATIENT)
Dept: RADIOLOGY | Facility: HOSPITAL | Age: 64
Discharge: HOME | End: 2024-11-21
Payer: COMMERCIAL

## 2024-11-21 DIAGNOSIS — N18.32 STAGE 3B CHRONIC KIDNEY DISEASE (HCC): ICD-10-CM

## 2024-11-21 DIAGNOSIS — Z86.73 CHRONIC CEREBROVASCULAR ACCIDENT (CVA): Primary | ICD-10-CM

## 2024-11-21 DIAGNOSIS — N18.31 STAGE 3A CHRONIC KIDNEY DISEASE (HCC): ICD-10-CM

## 2024-11-21 LAB
ANION GAP SERPL CALCULATED.3IONS-SCNC: 10 MMOL/L (ref 4–13)
BUN SERPL-MCNC: 24 MG/DL (ref 5–25)
CALCIUM SERPL-MCNC: 9.1 MG/DL (ref 8.4–10.2)
CHLORIDE SERPL-SCNC: 101 MMOL/L (ref 96–108)
CO2 SERPL-SCNC: 27 MMOL/L (ref 21–32)
CREAT SERPL-MCNC: 2.14 MG/DL (ref 0.6–1.3)
GLUCOSE P FAST SERPL-MCNC: 101 MG/DL (ref 65–99)
POTASSIUM SERPL-SCNC: 4 MMOL/L (ref 3.5–5.3)
SODIUM SERPL-SCNC: 138 MMOL/L (ref 135–147)

## 2024-11-21 PROCEDURE — 76775 US EXAM ABDO BACK WALL LIM: CPT

## 2024-11-21 PROCEDURE — 97112 NEUROMUSCULAR REEDUCATION: CPT

## 2024-11-21 PROCEDURE — 36415 COLL VENOUS BLD VENIPUNCTURE: CPT

## 2024-11-21 PROCEDURE — 80048 BASIC METABOLIC PNL TOTAL CA: CPT

## 2024-11-21 NOTE — PROGRESS NOTES
"Daily Note     Today's date: 2024  Patient name: Demetrio Villalobos  : 1960  MRN: [id number]  Referring provider: César Malone DO  Dx:   Encounter Diagnosis     ICD-10-CM    1. Chronic cerebrovascular accident (CVA)  Z86.73             GOES BY \"WILL\"           Subjective: Patient reports to PT session with no new issues or complaints. States he has f/u with PCP next week, he has been on different BP med for ~3 weeks and its been higher than usual. No HA, nausea, etc.      Objective: See treatment diary below    BP start of session: 148/86 mmHg (seated, RUE)      NMR (2.5# L ankle weights):  - Treadmill ambulation @ 0.8- 0.9 mph x 3 trials (seated rest breaks between)    1st trial: 3 minutes RPE 4/10   2nd trial: 4 minutes RPE 6/10   3rd trial: 5 minutes RPE 7/10    BP following treadmill trials: 164/78 mmHg (seated, RUE) asymptomatic    - FWD mel negotiation (6-9\") x 2 minutes, no UE - Peak RPE: 7/10  - Step ups to medium river rocks, 2 minutes, no UE, Peak RPE 5/10  - Agility ladder outs/ins, 2 minutes, 0 UE Max RPE  5/10      Assessment: Patient tolerated treatment session well today with focus on initiating gait and mobility training within high intensity framework. Able to  tolerate increased speed and increased duration for each trial of TM training today. Reports increased exertion on RPE but quick recovery with seated/standing break. Able to clear LE on 9\" mel with increased effort. Difficulty maintaining coordination of step with agility ladder task due to impaired weight shifting. Patient will continue to benefit from skilled OPPT services in order to maximize safe functional mobility and reduce risk for falls post-CVA.      Plan: Continue per plan of care.  Progress treatment as tolerated.      *PATIENT ON BETA BLOCKER, USE RPE*     POC expires Unit limit Auth Expiration date PT/OT + Visit Limit? Co-Insurance   25 12 visits (sharing with ortho) 24 BOMN No                      "          Visit/Unit Tracking  AUTH Status:  Date 10/21 10/24 10/30 11/6 11/6 11/13 11/21        Approved Used 1 1 1 1 1 1 1         Remaining  11 10 9 8 7 6 5            Outcome Measures Initial Eval  10/30/2024 & DN 11/6/24        5xSTS 17.36 sec, no UE        TUG 22.75 sec        10 meter 0.49 m/s        LACEY 38/56        FGA 9/30        DGI 8/24        MiniBEST 16/28        2MWT 140 ft                   Access Code: [email or web address]  URL: [email or web address]  Date: 11/06/2024  Prepared by: Celestina Valderrama    Exercises  - Sit to Stand  - 1 x daily - 7 x weekly - 2 sets - 10 reps  - Side Stepping with Counter Support  - 1 x daily - 7 x weekly - 3 sets - 10 reps  - Standing March with Counter Support  - 1 x daily - 7 x weekly - 2 sets - 10 reps - 3 sec hold

## 2024-11-22 ENCOUNTER — OFFICE VISIT (OUTPATIENT)
Facility: CLINIC | Age: 64
End: 2024-11-22
Payer: COMMERCIAL

## 2024-11-22 DIAGNOSIS — Z86.73 H/O: CVA (CEREBROVASCULAR ACCIDENT): ICD-10-CM

## 2024-11-22 DIAGNOSIS — I63.9 CEREBROVASCULAR ACCIDENT (CVA), UNSPECIFIED MECHANISM (HCC): Primary | ICD-10-CM

## 2024-11-22 DIAGNOSIS — G81.10 SPASTIC HEMIPARESIS AFFECTING NONDOMINANT SIDE (HCC): ICD-10-CM

## 2024-11-22 PROCEDURE — 97112 NEUROMUSCULAR REEDUCATION: CPT

## 2024-11-22 NOTE — PROGRESS NOTES
Assessment/Plan:  1. Status post reverse total replacement of right shoulder  XR shoulder 2+ vw right      2. Carpal tunnel syndrome on right  Cock Up Wrist Splint        Scribe Attestation      I,:   am acting as a scribe while in the presence of the attending physician.:       I,:   personally performed the services described in this documentation    as scribed in my presence.:               Demetrio is a pleasant 64 y.o. adult who presents 12 weeks status post Arthroplasty Shoulder Reverse - Right on 8/26/2024.  He continues to do well overall.  He has minimal to no pain about the shoulder.  He is happy with the progression.  He may continue work physical therapy to help with his range of motion.  We discussed the numbness in his hand is likely coming from carpal tunnel syndrome.  He was given a nighttime wrist brace today.  We discussed possible cortisone injections as well however he deferred these.  We did discuss possibly getting a ultrasound in the future if he continues to have carpal tunnel symptoms.  All questions were answered today.  Plan to follow-up in 3 months with repeat radiographs.    Subjective:   Demetrio Villalobos is a 64 y.o. adult who presents 12 weeks status post Arthroplasty Shoulder Reverse - Right on 8/26/2024.  Overall he is doing well.  Has minimal pain about the shoulder.  Doing physical therapy.  He is happy with his progress.  His 1 complaint is that he does get some nighttime numbness in the radial side of the hand in the medial nerve distribution.    Review of Systems   Constitutional:  Positive for activity change. Negative for chills and fever.   HENT:  Negative for ear pain and sore throat.    Eyes:  Negative for pain and visual disturbance.   Respiratory:  Negative for cough and shortness of breath.    Cardiovascular:  Negative for chest pain and palpitations.   Gastrointestinal:  Negative for abdominal pain and vomiting.   Genitourinary:  Negative for dysuria and hematuria.    Musculoskeletal:  Negative for arthralgias and back pain.   Skin:  Negative for color change and rash.   Neurological:  Negative for seizures and syncope.   All other systems reviewed and are negative.        Past Medical History:   Diagnosis Date    Chronic kidney disease     Edema     GERD (gastroesophageal reflux disease)     Gout     hospitalized    High cholesterol     Hypertension     PVD (peripheral vascular disease) ([facility])     Renal disorder     Rotator cuff tear     right    Shortness of breath     Stroke ([facility]) 2014    Ventral hernia without obstruction or gangrene     Walker as ambulation aid        Past Surgical History:   Procedure Laterality Date    APPENDECTOMY      NM ARTHROPLASTY GLENOHUMERAL JOINT TOTAL SHOULDER Right 8/26/2024    Procedure: ARTHROPLASTY SHOULDER REVERSE;  Surgeon: Sina Randle MD;  Location: [facility] OR;  Service: Orthopedics       No family history on file.    Social History     Occupational History    Not on file   Tobacco Use    Smoking status: Former     Types: Cigarettes     Passive exposure: Never    Smokeless tobacco: Never    Tobacco comments:     Smoker 30 yrs   Vaping Use    Vaping status: Never Used   Substance and Sexual Activity    Alcohol use: Never    Drug use: Never    Sexual activity: Not Currently     Partners: Female         Current Outpatient Medications:     allopurinol (ZYLOPRIM) 100 mg tablet, Take 1 tablet by mouth every morning, Disp: , Rfl:     aspirin 81 mg chewable tablet, Chew 1 tablet (81 mg total) 2 (two) times a day for 28 days Last dose per surgeon to be 8/20/24, Disp: 56 tablet, Rfl: 0    atorvastatin (LIPITOR) 40 mg tablet, Take 40 mg by mouth daily with dinner, Disp: , Rfl:     esomeprazole (NexIUM) 40 MG capsule, Take 40 mg by mouth in the morning, Disp: , Rfl:     furosemide (LASIX) 40 mg tablet, Take 1 tablet (40 mg total) by mouth daily, Disp: 90 tablet, Rfl: 3    metoprolol succinate (TOPROL-XL) 50 mg 24 hr tablet, Take 50 mg by  mouth daily with dinner, Disp: , Rfl:     Multiple Vitamin (multivitamin) capsule, Take 1 capsule by mouth daily, Disp: , Rfl:     NIFEdipine (PROCARDIA XL) 60 mg 24 hr tablet, Take 1 tablet (60 mg total) by mouth daily, Disp: 30 tablet, Rfl: 5    polyethylene glycol (GLYCOLAX) 17 GM/SCOOP, , Disp: , Rfl:     polyethylene glycol (MIRALAX) 17 g packet, Take 17 g by mouth daily as needed, Disp: , Rfl:     potassium chloride (Klor-Con) 10 mEq tablet, Take 1 tablet by mouth 3 (three) times a week In the TV-Ssg-Fiz-Fri, Disp: , Rfl:     Diclofenac Sodium (VOLTAREN) 1 %, Apply 2 g topically 4 (four) times a day (Patient not taking: Reported on 11/20/2024), Disp: 50 g, Rfl: 1    No Known Allergies    Objective:  Vitals:    11/20/24 0939   BP: (!) 149/106   Pulse: 91       Right Shoulder Exam     Tenderness   The patient is experiencing no tenderness.    Range of Motion   External rotation:  40   Forward flexion:  120     Muscle Strength   Abduction: 5/5   External rotation: 5/5     Other   Erythema: absent  Scars: present (well-healed)  Sensation: normal  Pulse: present    Comments:  Well-healed surgical scar    Positive Tinel at carpal tunnel  Fires thenar musculature with full strength.            Physical Exam  Vitals and nursing note reviewed.   Constitutional:       Appearance: Normal appearance.   HENT:      Head: Normocephalic and atraumatic.      Right Ear: External ear normal.      Left Ear: External ear normal.      Nose: Nose normal.   Eyes:      General: No scleral icterus.     Extraocular Movements: Extraocular movements intact.      Conjunctiva/sclera: Conjunctivae normal.   Cardiovascular:      Rate and Rhythm: Normal rate.   Pulmonary:      Effort: Pulmonary effort is normal. No respiratory distress.   Musculoskeletal:      Cervical back: Normal range of motion and neck supple.      Comments: See ortho exam   Skin:     General: Skin is warm and dry.   Neurological:      Mental Status: He is alert and  oriented to person, place, and time.   Psychiatric:         Mood and Affect: Mood normal.         Behavior: Behavior normal.     3 views of the right shoulder performed today and 11/20/2024 show status post right reverse total shoulder arthroplasty with maintained alignment and no signs of complication.    This document was created using speech voice recognition software.   Grammatical errors, random word insertions, pronoun errors, and incomplete sentences are an occasional consequence of this system due to software limitations, ambient noise, and hardware issues.   Any formal questions or concerns about content, text, or information contained within the body of this dictation should be directly addressed to the provider for clarification.

## 2024-11-22 NOTE — PROGRESS NOTES
Daily Note     Today's date: 2024  Patient name: Demetrio Villalobos  : 1960  MRN: [id number]  Referring provider: Sina Randle MD  Dx:   Encounter Diagnoses   Name Primary?    Cerebrovascular accident (CVA), unspecified mechanism (HCC) Yes    H/O: CVA (cerebrovascular accident)     Spastic hemiparesis affecting nondominant side (HCC)          Start Time: 930  Stop Time: 1015  Total time in clinic (min): 45 minutes    PLAN OF CARE START: 24  PLAN OF CARE END: 24  FREQUENCY: 2 times per week  PRECAUTIONS RUE (NO FLEX/ABD > 90) sx, HTN    Subjective: Pt reports he notes improvement with L UE since starting OT.  He reports he is unsure if he wants the pwc.      Objective: See treatment below.  NMR:    -Needlethreads 10x3 b/l UE with focus on scapular retraction/protraction, reaching across midline, Wbing L UE for proprioception input for spasticity mgmt    -Pushups performed with use of FES to triceps 30x with 2 rest breaks.  Focus on massed practice elbow flexion/extension, Wbing L UE for proprioceptive input    -Tennis ball transfer with use of FES to wrist/digit extensors with retrieval of balls anteriorly on tabletop and placement into container 90* to L.  Focus on spherical grasp/release, shoulder external rotation, dynamic reaching.  x35    -Paper cup transfer and stacking x20 with cups presented on elevated mat table anteriorly  and stacking on table 90* to L.  Focus on cylindrical grasp/release, L UE coordination and motor planning, shoulder rotation, reaching across midline.  FES to wrist/digit extensors    -Foam cube retrieval 2 at a time with FES to wrist/digit extensors and placement into container 90* to L.  Focus on separation of hand, finger to palm translation, pincer grasp, reaching.  Drops 2    Assessment: Tolerated treatment well. Demonstrating improved digit extension AROM and grasp/release, but thumb still limited.  Pt would benefit from continued OT services to  address NMR L UE s/p chronic CVA.      Plan: Continued skilled OT per POC.

## 2024-11-25 ENCOUNTER — PATIENT OUTREACH (OUTPATIENT)
Age: 64
End: 2024-11-25

## 2024-11-25 DIAGNOSIS — I12.9 BENIGN HYPERTENSION WITH CHRONIC KIDNEY DISEASE, STAGE III (HCC): Primary | ICD-10-CM

## 2024-11-25 DIAGNOSIS — N18.30 BENIGN HYPERTENSION WITH CHRONIC KIDNEY DISEASE, STAGE III (HCC): Primary | ICD-10-CM

## 2024-11-25 NOTE — TELEPHONE ENCOUNTER
NERISSA left on RX Line:    Aud[facility] [birth year] I'm calling in to refill my phone potassium prescription. I don't have the number but I'm just calling in to confirm my prescription. It's been a week now and I haven't got a refill. Thank you.

## 2024-11-26 ENCOUNTER — EVALUATION (OUTPATIENT)
Facility: CLINIC | Age: 64
End: 2024-11-26
Payer: COMMERCIAL

## 2024-11-26 ENCOUNTER — OFFICE VISIT (OUTPATIENT)
Dept: PHYSICAL THERAPY | Facility: CLINIC | Age: 64
End: 2024-11-26
Payer: COMMERCIAL

## 2024-11-26 ENCOUNTER — OFFICE VISIT (OUTPATIENT)
Facility: CLINIC | Age: 64
End: 2024-11-26
Payer: COMMERCIAL

## 2024-11-26 DIAGNOSIS — M54.50 LUMBAR BACK PAIN: ICD-10-CM

## 2024-11-26 DIAGNOSIS — I63.9 CEREBROVASCULAR ACCIDENT (CVA), UNSPECIFIED MECHANISM (HCC): Primary | ICD-10-CM

## 2024-11-26 DIAGNOSIS — Z96.611 STATUS POST REVERSE TOTAL SHOULDER REPLACEMENT, RIGHT: Primary | ICD-10-CM

## 2024-11-26 DIAGNOSIS — R26.89 OTHER ABNORMALITIES OF GAIT AND MOBILITY: ICD-10-CM

## 2024-11-26 DIAGNOSIS — Z86.73 CHRONIC CEREBROVASCULAR ACCIDENT (CVA): ICD-10-CM

## 2024-11-26 DIAGNOSIS — Z86.73 CHRONIC CEREBROVASCULAR ACCIDENT (CVA): Primary | ICD-10-CM

## 2024-11-26 PROCEDURE — 97112 NEUROMUSCULAR REEDUCATION: CPT

## 2024-11-26 PROCEDURE — 97110 THERAPEUTIC EXERCISES: CPT

## 2024-11-26 RX ORDER — POTASSIUM CHLORIDE 750 MG/1
10 TABLET, EXTENDED RELEASE ORAL 3 TIMES WEEKLY
Qty: 36 TABLET | Refills: 3 | Status: SHIPPED | OUTPATIENT
Start: 2024-11-27

## 2024-11-26 NOTE — PROGRESS NOTES
Received email from Alicia Baldwin [place] Medicaid representative. Alicia is contacting RN CM because patient has not yet received a refill on his potassium.     Advised Alicia that order was request was sent to cardiology pod for review and refill. Suggested that Alicia suggest that patient sign up for Mychart. Explained benefits of Mychart, including the ability to request refills on medications.     Alicia will speak with patient and advise of above.

## 2024-11-26 NOTE — PROGRESS NOTES
PT Re-Evaluation          POC expires Unit limit Auth Expiration date PT/OT + Visit Limit? Co-Insurance   25 12 visits (sharing with ortho) 24 BOMN No                               Visit/Unit Tracking  AUTH Status:  Date 10/21 10/24 10/30 11/6 11/6 11/13 11/21 11/26       Approved Used 1 1 1 1 1 1 1         Remaining  11 10 9 8 7 6 5                   Today's date: 2024  Patient name: Demetrio Villalobos  : 1960  MRN: [id number]  Referring provider: Lisa Blount DO   Dx:   Encounter Diagnosis     ICD-10-CM    1. Chronic cerebrovascular accident (CVA)  Z86.73             Assessment  Assessment details: Patient is a 64 year old presenting to skilled OPPT for reassessment with complaints of gait, balance, and strength deficits s/p CVA that in turn has limited safe functional mobility at home and in the community. PMH significant for: HTN, CKD, GERD, and CVA. Patient suffered CVA approximately 10 years ago and reports residual weakness on left side. Strength screen reveals gross LE weakness (L > R) per MMT grading (see details below). Sensation intact. Spasticity present in L quadriceps, hamstrings, and gastrocnemius per Modified Rosetta. Patient has been participating in skilled OPPT for ~ 1 month and has demonstrated improvements in all areas as evidenced by improved scores on outcome measures that were re-assessed today: 5xSTS, TUG, 2MWT, LACEY, FGA, and DGI. miniBest and 10MWT will be re-assessed next session; unable this date due to time constraints. Despite improvements, scores are still classified as increased risk for falls. He also demos the following gait deficits: decreased hip/knee flexion during swing with associated hip circumduction, slowed samantha, Trendelenburg. Patient will continue to benefit from skilled OPPT services in order to maximize safe functional mobility in the home and community setting. Plan to  continue  frequency of 1x/week while patient is attending ortho PT services following (R) reverse total shoulder arthroplasty. Continued focus on HIGT and HIIT to patient tolerance.         Impairments: Abnormal coordination, Abnormal gait, Abnormal muscle tone, Abnormal or restricted ROM, Activity intolerance, Impaired balance, Impaired physical strength, Lacks appropriate HEP, Poor posture, Poor body mechanics, Pain with function, Safety issue, Weight-bearing intolerance, Abnormal movement, Difficulty understanding, Abnormal muscle firing  Understanding of Dx/Px/POC: Excellent  Prognosis: Excellent      Patient verbalized understanding of POC.         Please contact me if you have any questions or recommendations. Thank you for the referral and the opportunity to share in Demetrio Villalobos's care.        Plan  Plan details: FGA, DGI, miniBEST, 6 MWT; HIGT and HIIT to tolerance  Patient would benefit from: PT Eval, Skilled PT, and OT Eval  Planned modality interventions: Biofeedback, Cryotherapy, TENS, Thermotherapy  Planned therapy interventions: Abdominal trunk stabilization, ADL training, Balance, Balance/WB training, Breathing training, Body mechanics training, Coordination, Functional ROM exercises, Gait training, HEP, Joint Mobilization, Manual Therapy, White taping, Motor coordination training, Neuromuscular re-education, Patient education, Postural training, Strengthening, Stretching, Therapeutic activities, Therapeutic exercises, Therapeutic training, Transfer training, Activity modification, Work reintegration  Frequency: 1-2x/wk  Duration in weeks: 12 weeks  Plan of Care beginning date: 10/30/2024  Plan of Care expiration date: 12 weeks - 1/22/2025  Treatment plan discussed with: Patient         Goals  Short Term Goals (4 weeks):    - Patient will improve time on TUG by 2.9 seconds to facilitate improved safety in all ambulation- MET  - Patient will improve scoring on DGI by 2.6 points to progress  safety- MET  - Patient will be independent in basic HEP 2-3 weeks  - Patient will improve 5xSTS score by 2.3 seconds to promote improved LE functional strength needed for ADLs- MET  - Patient will complete components of CB&M to promote agility necessary for sports related tasks    Long Term Goals (12 weeks):  - Patient will be independent in a comprehensive home exercise program  - Patient will improve gait speed by 0.18 m/s to improve safety with community ambulation  - Patient will improve LACEY by 6 points to facilitate return to safe independent ambulation  - Patient will improve scoring on FGA by 4 points to progress safety with dynamic tasks- MET  - Patient will improve FGA score to >/= 22/30 to classify as decreased risk for falls  - Patient will be able to demonstrate HT in gait without veering  - Patient will improve 6 Minute Walk Test score by 190 feet to promote improved cardiovascular endurance  - Patient will report 50% reduction in near falls in order to improve safety with functional tasks and reduce his risk for falls  - Patient will report going on walks at least 3 days per week to promote independence and improved cardiovascular endurance  - Patient will be able to ascend/descend stairs reciprocally without UE assist to promote independence and safety with ADLs  - Patient will report 50% reduction in near falls when ambulating on uneven terrain      Cut off score    All date taken from APTA Neuro Section or Rehab Measures      Lacey:  Álvaro et al., 2018  MDC: 2.7 pts    Amanda enrique al., 2011  Cut-off score: 45/56    Chronic CVA  < 44/56 high risk for falls (Álvaro et al., 2018)  < 47.5/56 slow walker status (Amanda et al., 2011) 5xSTS: Rupal 2010  MDC: 2.3 sec  Age Norms:  60-69: 11.1 sec  70-79: 12.6 sec  80-89: 14.8 sec    Andrea 2010, Chronic Stroke  Chronic CVA: 12 sec   TUG  Addison enrique al., 2005  MDC: 2.9 sec    Cut off score:  >13.5 sec community dwelling adults  >32.2 frail  elderly  <20 I for basic transfers  >30 dependent on transfers 10 Meter Walk Test:   Shawanda et al., 2006  Small meaningful change: 0.06 m/s  Substantial meaningful change: 0.14 m/s  MCID: 0.16 m/s    < 0.4 m/s household ambulators  0.4 - 0.8 m/s limited community ambulators  > 0.8 m/s community ambulators   FGA: Rossi et al., 2010  MCID: 4.2 pts  Geriatrics/community < 22/30 fall risk  Geriatrics/community < 20/30 unexplained falls DGI  MDC: vestibular - 4 pts  MDC: geriatric/community - 3 pts  Falls risk <19/24   6 Minute Walk Test  Shawanda et al., 2006  MDC: 60.98 m (200.01 ft)    Farhat Desai, & Ruthie, 2012  MCID: 34.4 m    Age Norms  60-69: M - 1876 ft   F - 1765 ft  70-79: M - 1729 ft   F - 1545 ft  80-89: M - 1368 ft   F - 1286 ft Modified Rosetta  0: No increase in tone  1: Catch and release or min resistance at end range  1+: Catch f/b min resistance throughout remainder (< half ROM)  2: Easily moved, but more marked tone throughout most ROM  3: Significant tone, PROM difficult  4: Rigid   MiniBest: Juanjose et al., 2013  CVA < 17.5 fall risk Pass (Acute CVA)  MDC: 1.8 points (acute), 3.2 points (chronic)         Subjective    History of Present Illness  Mechanism of injury: Patient is a 64 year old presenting to skilled OPPT for reassessment with complaints of gait, balance, and strength deficits s/p CVA. PMH significant for: HTN, CKD, GERD, and CVA. Patient suffered CVA approximately 10 years ago and reports residual weakness on left side (UE and LE), but cannot recall exact location of stroke. He reports he participated in PT/OT/ST services immediately following the stroke, but has not participated in much rehab since. Patient recently underwent (R) reverse total shoulder arthroplasty on 8/26 and is currently following shoulder precautions (no flexion/abduction > 90 degrees). He has HHA that come 25 hours/week (ever day in the AM) that assist with ADL/IADL's.     Update 11/26/24: Patient participated in 1xs/week  "neuro PT for ~ 1 month and reports noting small improvements in balance and endurance; no falls since SOC. Reports back pain is primary barrier to intensity/duration of gait training (still sees ortho PT for back/shoulder)    Primary AD: none  Assist level at home: assist for ADL's/IADL's  WC usage: NA    Patient goal: \"I want to get better with walking\"    Pain  Current pain ratin-3/10  Location: R shoulder  Aggravating factors: movement    Social Support  Steps to enter house: none  Stairs in house: none   Lives in: 4 story, 1 floor set up  Lives with: alone    Employment status: retired  Hand dominance: R    Treatments  Previous treatment: PT/OT/ST immediately following stroke  Current treatment: ortho PT, balance eval  Diagnostic Testing: see chart for details      Objective     Vitals  - HR: 74 bpm  - BP: 158/86  - SPO2: 97%    HR Max  - 207 - (0.7x62)  = 164 bpm    LE MMT  - R Hip Flexion: 3/5  - L Hip Flexion: 3-/5  - R Hip Extension: 3/5  - L Hip Extension: 3-/5  - R Hip Abduction: 4+/5  - L Hip abduction: 4-/5  - R Hip adduction: 4+/5  - L Hip adduction: 4-/5  - R Knee Extension: 3/5  - L Knee Extension: 2+/5  - R Knee Flexion: 3/5  - L Knee Flexion: 2+/5  - R Ankle DF: 4/5  - L Ankle DF: 1/5  - R Ankle PF: 3/5  - L Ankle PF: 1/5    Sensation  - Light touch: intact  - Deep pressure: intact    Coordination  - Dysmetria: unable 2/2 LUE hemiplegia  - Dysdiadochokinesia: unable 2/2 LUE hemiplegia  - Alternating Toe Taps: unable 2/2 LLE hemiplegia    Modified Rosetta  - R knee extension: 0 - no increase in tone  - L knee extension: 1+ - catch followed by minimum resistance throughout remainder (< half ROM)  - R knee flexion: 0 - no increase in tone  - L knee flexion: 1-   - R ankle DF: 0 - no increase in tone   - L ankle DF: 3 - significant tone, PROM difficulty  - R ankle inversion: 0 - no increase in tone  - L ankle inversion: 3 - significant tone, PROM difficulty  - R ankle eversion: 0 - no increase in " tone  - L ankle eversion: 3 - significant tone, PROM difficulty    Clonus  - L: No  - R: No  - Fatiguing: NA  - Number of beats: NA    Vision  - Glasses: No  - Abnormalities prior to CVA: No, NA  - Abnormalities post CVA: No, NA    Neglect  - R sided: No  - L sided: No    Pusher's Syndrome  - R sided: No  - L sided: No             Outcome Measures Initial Eval  10/30/2024 & DN 11/6/24 RE  11/26/24       5xSTS 17.36 sec, no UE 14.25 sec, 0 UE       TUG 22.75 sec 15.91 sec       10 meter 0.49 m/s TBA- time constraints       LACEY 38/56 43/56       FGA 9/30 15/30       DGI 8/24 14/24       MiniBEST 16/28 TBA- time constraints       2MWT 140 ft 230 ft (limited by back and right hip pain)                    Precautions: CKD, GERD, HTN, PVD, history CVA 10 years ago with L sided hemiplegia, REVERSE TOTAL SHOULDER PRECAUTIONS (NO FLEX/ABD > 90)  Past Medical History:   Diagnosis Date    Chronic kidney disease     Edema     GERD (gastroesophageal reflux disease)     Gout     hospitalized    High cholesterol     Hypertension     PVD (peripheral vascular disease) ([facility])     Renal disorder     Rotator cuff tear     right    Shortness of breath     Stroke ([facility]) 2014    Ventral hernia without obstruction or gangrene     Walker as ambulation aid        DN 11/26:    NMR (2.5# L ankle weights):  - Treadmill ambulation @ 1.0 mph x 3 trials (seated rest breaks between)    1st trial: 3 minutes RPE 5/10   2nd trial: 4 minutes RPE 6/10

## 2024-11-26 NOTE — PROGRESS NOTES
Daily Note     Today's date: 2024  Patient name: Demetrio Villalobos  : 1960  MRN: [id number]  Referring provider: Bridget Pollard MD  Dx:   Encounter Diagnosis     ICD-10-CM    1. Status post reverse total shoulder replacement, right  Z96.611       2. Other abnormalities of gait and mobility  R26.89       3. Lumbar back pain  M54.50       4. Chronic cerebrovascular accident (CVA)  Z86.73                          Subjective: Patient reports minimal to no pain, but persistent numbness into his Rt hand at night. He believes this is secondary to the tightness in his shoulder.      Objective: See treatment diary below      Assessment: Tolerated treatment well. Continued with established POC emphasizing gentle shoulder mobility and strength. Base don reports of anterior stiffness and neural symptoms, trialed cupping along the incision and surrounding structures which seemed to improve patient's overall stiffness and neural symptoms. Patient would benefit from continued PT to address functional mobility deficits and return to PLOF.       Plan: Continue per plan of care.        POC expires Unit limit Auth Expiration date PT/OT + Visit Limit? Co-Insurance   25 12 visits (sharing with ortho) 24 BOMN No                               Visit/Unit Tracking  AUTH Status:  Date 10/21 10/24 10/30 11/6 11/13          Approved Used 1 1 1 1 1           Remaining  11 10 9 8               PHASE PRECAUTIONS AND GUIDELINES GOALS EXERCISES CRITERIA TO ADVANCE  EXAMINATION   4  (Post-operative week 12+) No heavy lifting and no overhead sports     Weight lifting limit 25.lb     No heavy pushing activity     Gradually increase strength      NO UPPER BODY ERGOMETER    Optimize functional use of operative UE to patient specific goals     Gradual increase in deltoid, scapular muscle and rotator cuff strength     Pain-free functional activities Light hand weights for deltoid up to and not to exceed 3 lbs for anterior and  posterior with long arm lift against gravity; elbow bent to 90 deg for abduction in scapular plane     Band progression for extension to hip with scapular depression/retraction     Band progression for serratus anterior punches in supine; avoid wall, incline or prone press-ups for serratus anterior     End-range stretching gently without forceful overpressure in all planes (elevation in scapular plane, ER in scapular plane, functional IR) with stretching done for life as part of daily routine     NO UPPER BODY ERGOMETER    Pain-free AROM for shoulder elevation (expect around 135-150 deg)     Functional strength for all ADLs, work tasks, and hobbies approved by surgeon     [place] with home maintenance program PROM for elevation, ER(0); ER(90)     AROM for elevation, ER(0) and functional IR     Scapulohumeral rhythm/biomechanics of active movement strategies     Strength testing for deltoid, RTC, scapular muscles     Patient reported outcome measure     Pain                Date 10/21/2024 10/24/24 11/06/24 11/13/24 11/19/24 11/26/24   Visit Number IE 2 3 4 5 6   FOTO Tracking Intake survey     Follow-up #1   Manual         Thoracic mobs         Lumbar mobs         Shoulder ROM  Stretching in all directions x 8 min Stretching in all directions - 10 min IM  Stretching in all directions x 8 min Stretching in all directions x 10 min Stretching in all directions x 10 min      IASTM UT and bicep   Cupping x 8 min            TherEx         Active HENDERSON  NS x 8 min (arms and legs for range)       LTR x20 x20       SKTC/DKTC         Seated ball roll outs  Hand over hand on ball 10x w/ LF       Active shoulder flexion  2x10 2 x 10  2x10 w/ contract relax PNF resistance 2x10 w/ contract relax PNF resistance 2x10 w/ contract relax PNF resistance      Scap ret 5s x 20  Banded flexion/ext from OH GTB x10    Banded horizontal abd GTB x10 Banded flexion/ext from OH GTB 3x10    Banded horizontal abd GTB 3x10       Iso ER and IR and  flexion x 15 5s hold ea.  Flexion, ext, abd 10 x 5 sec        Table slides flexion x 20  Table slides flexion x 20     Into abd x20     Circuit     Circuit: 3 rounds x 10 each (Rt only)  - flexion 3#  - abd 3#  - bent over row 5# Circuit: 3 rounds x 10 each (Rt only)  - flexion 3#  - abd 3#  - bent over row 5#            Neuro Re-Ed         TrA isos         SOC x20 x20       Scap squeezes     x30                                                 TherAct         Patient education HEP and POC x 10 min        Posture education         Lifting mechanics                           Gait Training                                    Modalities

## 2024-11-26 NOTE — PROGRESS NOTES
Daily Note     Today's date: 2024  Patient name: Demetrio Villalobos  : 1960  MRN: [id number]  Referring provider: Sina Randle MD  Dx:   Encounter Diagnosis   Name Primary?    Cerebrovascular accident (CVA), unspecified mechanism (HCC) Yes         Start Time: 1100  Stop Time: 1143  Total time in clinic (min): 43 minutes    PLAN OF CARE START: 24  PLAN OF CARE END: 24  FREQUENCY: 2 times per week  PRECAUTIONS RUE (NO FLEX/ABD > 90) sx, HTN    Subjective: Pt reports no changes.       Objective: See treatment below.  NMR:  - standing modified pushups using FES on tricep for proprioceptive feedback.   -completed using 1lb wrist weight on LUE for proprioceptive feedback of simulated painting wall task focusing on shoulder flexion/extension x 25 reps with shoulder flexion to approxiamtely 90*   - completed using 1lb wrist weight for proprioceptive feedback of functional reaching for socks on elevated surface with 80* of flexion> taking apart > placing into bucket on R side with FES on wrist/digit extensors focusing on UE coordination, FMC, dexterity, bilateral coordination and shoulder abduction  - Completed with FES on wrist/digit extensors of functional reaching for stacking cups using 1lb wrist weight for proprioceptive feedback  - completed with FES on bicep/supinators using hammer for 3-4 minutes focusing on UE corodiantion     Assessment: Tolerated treatment well. Demonstrating improved shoulder flexion at end of session however was fatigued.  Pt would benefit from continued OT services to address NMR L UE s/p chronic CVA.      Plan: Continued skilled OT per POC.

## 2024-12-02 ENCOUNTER — APPOINTMENT (OUTPATIENT)
Facility: CLINIC | Age: 64
End: 2024-12-02
Payer: COMMERCIAL

## 2024-12-02 DIAGNOSIS — N18.30 BENIGN HYPERTENSION WITH CHRONIC KIDNEY DISEASE, STAGE III (HCC): Primary | ICD-10-CM

## 2024-12-02 DIAGNOSIS — Z87.39 HISTORY OF GOUT: ICD-10-CM

## 2024-12-02 DIAGNOSIS — I73.9 PERIPHERAL ARTERIAL DISEASE (HCC): ICD-10-CM

## 2024-12-02 DIAGNOSIS — I12.9 BENIGN HYPERTENSION WITH CHRONIC KIDNEY DISEASE, STAGE III (HCC): Primary | ICD-10-CM

## 2024-12-02 DIAGNOSIS — E78.49 OTHER HYPERLIPIDEMIA: Primary | ICD-10-CM

## 2024-12-02 DIAGNOSIS — K21.00 GASTROESOPHAGEAL REFLUX DISEASE WITH ESOPHAGITIS, UNSPECIFIED WHETHER HEMORRHAGE: ICD-10-CM

## 2024-12-02 NOTE — TELEPHONE ENCOUNTER
Rx refill request. Confirmed pharmacy, Patient will be out of 1 med tomorrow and over the next few days the others. Please Review, Thank you

## 2024-12-03 ENCOUNTER — PATIENT OUTREACH (OUTPATIENT)
Age: 64
End: 2024-12-03

## 2024-12-03 ENCOUNTER — OFFICE VISIT (OUTPATIENT)
Dept: OBGYN CLINIC | Facility: CLINIC | Age: 64
End: 2024-12-03
Payer: COMMERCIAL

## 2024-12-03 ENCOUNTER — TELEPHONE (OUTPATIENT)
Age: 64
End: 2024-12-03

## 2024-12-03 VITALS — BODY MASS INDEX: 34.69 KG/M2 | HEIGHT: 67 IN | WEIGHT: 221 LBS

## 2024-12-03 DIAGNOSIS — G56.01 CARPAL TUNNEL SYNDROME ON RIGHT: Primary | ICD-10-CM

## 2024-12-03 PROCEDURE — 99213 OFFICE O/P EST LOW 20 MIN: CPT | Performed by: ORTHOPAEDIC SURGERY

## 2024-12-03 RX ORDER — ATORVASTATIN CALCIUM 40 MG/1
40 TABLET, FILM COATED ORAL
Qty: 30 TABLET | Refills: 1 | Status: SHIPPED | OUTPATIENT
Start: 2024-12-03

## 2024-12-03 RX ORDER — ESOMEPRAZOLE MAGNESIUM 40 MG/1
40 CAPSULE, DELAYED RELEASE ORAL DAILY
Qty: 30 CAPSULE | Refills: 1 | Status: SHIPPED | OUTPATIENT
Start: 2024-12-03

## 2024-12-03 RX ORDER — POTASSIUM CHLORIDE 750 MG/1
TABLET, EXTENDED RELEASE ORAL
COMMUNITY
Start: 2024-11-26 | End: 2024-12-10

## 2024-12-03 RX ORDER — METOPROLOL SUCCINATE 50 MG/1
50 TABLET, EXTENDED RELEASE ORAL
Qty: 30 TABLET | Refills: 1 | Status: SHIPPED | OUTPATIENT
Start: 2024-12-03

## 2024-12-03 RX ORDER — ALLOPURINOL 100 MG/1
50 TABLET ORAL EVERY MORNING
Qty: 30 TABLET | Refills: 1 | Status: SHIPPED | OUTPATIENT
Start: 2024-12-03

## 2024-12-03 RX ORDER — METOPROLOL SUCCINATE 50 MG/1
50 TABLET, EXTENDED RELEASE ORAL
Qty: 30 TABLET | Refills: 2 | Status: SHIPPED | OUTPATIENT
Start: 2024-12-03

## 2024-12-03 NOTE — PROGRESS NOTES
Discussed medication refills with provider. Provider advises that she needs to adjust some of the patients medications and dosages. Provider attempted to call patient, but there was no answer.     Outreach to Alicia Baldwin. She will advise patient to answer the phone.     Provider made aware.

## 2024-12-03 NOTE — PROGRESS NOTES
Assessment/Plan:  1. Carpal tunnel syndrome on right  US MSK limited        Scribe Attestation      I,:  Carole Rodriguez am acting as a scribe while in the presence of the attending physician.:       I,:  Sina Randle MD personally performed the services described in this documentation    as scribed in my presence.:             Demetrio is a pleasant 64 y.o. with right sided carpal tunnel. Based on his reported symptoms, clinical exam, and failure of non operative treatments like night time splinting I do recommend an US MSK for further evaluation. US ordered today, he will follow up after US to discuss further treatment.     Subjective:     Demetrio Villalobos is a 64 y.o. adult who presents for follow up evaluation of right hand numbness. History of right shoulder Arthroplasty Shoulder Reverse - Right on 8/26/2024. At his last visit patient noted numbness to his hands, he was s provided night time splint but states they did not help. He reports his symptoms have increased in severity. He has started dropping items. He has nocturnal symptoms, he needs to shake his hands out at night. Numbness and pain impacts thumb, index, and long finger, can radiate into forearm.       Review of Systems   Constitutional:  Negative for chills and fever.   HENT:  Negative for ear pain and sore throat.    Eyes:  Negative for pain and visual disturbance.   Respiratory:  Negative for cough and shortness of breath.    Cardiovascular:  Negative for chest pain and palpitations.   Gastrointestinal:  Negative for abdominal pain and vomiting.   Genitourinary:  Negative for dysuria and hematuria.   Musculoskeletal:  Negative for arthralgias and back pain.   Skin:  Negative for color change and rash.   Neurological:  Negative for seizures and syncope.   All other systems reviewed and are negative.        Past Medical History:   Diagnosis Date    Chronic kidney disease     Edema     GERD (gastroesophageal reflux disease)     Gout      hospitalized    High cholesterol     Hypertension     PVD (peripheral vascular disease) ([facility])     Renal disorder     Rotator cuff tear     right    Shortness of breath     Stroke ([facility]) 2014    Ventral hernia without obstruction or gangrene     Walker as ambulation aid        Past Surgical History:   Procedure Laterality Date    APPENDECTOMY      PA ARTHROPLASTY GLENOHUMERAL JOINT TOTAL SHOULDER Right 8/26/2024    Procedure: ARTHROPLASTY SHOULDER REVERSE;  Surgeon: Sina Randle MD;  Location: [facility];  Service: Orthopedics       No family history on file.    Social History     Occupational History    Not on file   Tobacco Use    Smoking status: Former     Types: Cigarettes     Passive exposure: Never    Smokeless tobacco: Never    Tobacco comments:     Smoker 30 yrs   Vaping Use    Vaping status: Never Used   Substance and Sexual Activity    Alcohol use: Never    Drug use: Never    Sexual activity: Not Currently     Partners: Female         Current Outpatient Medications:     allopurinol (ZYLOPRIM) 100 mg tablet, Take 1 tablet by mouth every morning, Disp: , Rfl:     atorvastatin (LIPITOR) 40 mg tablet, Take 40 mg by mouth daily with dinner, Disp: , Rfl:     esomeprazole (NexIUM) 40 MG capsule, Take 40 mg by mouth in the morning, Disp: , Rfl:     furosemide (LASIX) 40 mg tablet, Take 1 tablet (40 mg total) by mouth daily, Disp: 90 tablet, Rfl: 3    metoprolol succinate (TOPROL-XL) 50 mg 24 hr tablet, Take 1 tablet (50 mg total) by mouth daily with dinner, Disp: 30 tablet, Rfl: 2    Multiple Vitamin (multivitamin) capsule, Take 1 capsule by mouth daily, Disp: , Rfl:     NIFEdipine (PROCARDIA XL) 60 mg 24 hr tablet, Take 1 tablet (60 mg total) by mouth daily, Disp: 30 tablet, Rfl: 5    polyethylene glycol (GLYCOLAX) 17 GM/SCOOP, , Disp: , Rfl:     polyethylene glycol (MIRALAX) 17 g packet, Take 17 g by mouth daily as needed, Disp: , Rfl:     potassium chloride (Klor-Con M10) 10 mEq tablet, , Disp: , Rfl:      potassium chloride (Klor-Con) 10 mEq tablet, Take 1 tablet (10 mEq total) by mouth 3 (three) times a week In the EW-Vbu-Rto-Fri, Disp: 36 tablet, Rfl: 3    aspirin 81 mg chewable tablet, Chew 1 tablet (81 mg total) 2 (two) times a day for 28 days Last dose per surgeon to be 8/20/24, Disp: 56 tablet, Rfl: 0    Diclofenac Sodium (VOLTAREN) 1 %, Apply 2 g topically 4 (four) times a day (Patient not taking: Reported on 11/20/2024), Disp: 50 g, Rfl: 1    No Known Allergies    Objective:  Vitals:       Right Hand Exam     Tenderness   The patient is experiencing no tenderness.     Range of Motion   The patient has normal right wrist ROM.     Tests   Tinel's sign (median nerve): positive    Other   Erythema: absent  Sensation: normal  Pulse: present    Comments:  + tinel at the wrist  + compression at the wrist   Skin is warm and dry to touch with no signs of erythema, ecchymosis, or infection  No soft tissue swelling or effusion noted  Full FDS, FDP, extensor mechanisms are intact  No rotational deformity with composite finger flexion  Forearm compartments are soft and supple  2+ distal radial pulse with brisk capillary refill to the fingers  Radial, median, and ulnar motor and sensory distribution intact  Sensations light to touch intact distally            Tests     Right Wrist/Hand   Positive Tinel's sign (medial nerve).       Physical Exam  Vitals and nursing note reviewed.   Constitutional:       Appearance: Normal appearance.   HENT:      Head: Normocephalic and atraumatic.      Right Ear: External ear normal.      Left Ear: External ear normal.   Eyes:      Extraocular Movements: Extraocular movements intact.      Conjunctiva/sclera: Conjunctivae normal.   Cardiovascular:      Rate and Rhythm: Normal rate.      Pulses: Normal pulses.   Pulmonary:      Effort: Pulmonary effort is normal.   Musculoskeletal:         General: Normal range of motion.      Cervical back: Normal range of motion and neck supple.       Comments: See ortho exam   Skin:     General: Skin is warm and dry.   Neurological:      General: No focal deficit present.      Mental Status: He is alert.   Psychiatric:         Behavior: Behavior normal.         I have personally reviewed pertinent films in PACS and my interpretation is as follows:  No new images obtained today       This document was created using speech voice recognition software.   Grammatical errors, random word insertions, pronoun errors, and incomplete sentences are an occasional consequence of this system due to software limitations, ambient noise, and hardware issues.   Any formal questions or concerns about content, text, or information contained within the body of this dictation should be directly addressed to the provider for clarification.

## 2024-12-04 ENCOUNTER — OFFICE VISIT (OUTPATIENT)
Facility: CLINIC | Age: 64
End: 2024-12-04
Payer: COMMERCIAL

## 2024-12-04 DIAGNOSIS — I63.9 CEREBROVASCULAR ACCIDENT (CVA), UNSPECIFIED MECHANISM (HCC): Primary | ICD-10-CM

## 2024-12-04 DIAGNOSIS — Z86.73 H/O: CVA (CEREBROVASCULAR ACCIDENT): ICD-10-CM

## 2024-12-04 DIAGNOSIS — Z86.73 CHRONIC CEREBROVASCULAR ACCIDENT (CVA): ICD-10-CM

## 2024-12-04 DIAGNOSIS — G81.10 SPASTIC HEMIPARESIS AFFECTING NONDOMINANT SIDE (HCC): ICD-10-CM

## 2024-12-04 DIAGNOSIS — R26.89 OTHER ABNORMALITIES OF GAIT AND MOBILITY: Primary | ICD-10-CM

## 2024-12-04 PROCEDURE — 97112 NEUROMUSCULAR REEDUCATION: CPT

## 2024-12-04 PROCEDURE — 97110 THERAPEUTIC EXERCISES: CPT

## 2024-12-04 NOTE — PROGRESS NOTES
"Daily Note     Today's date: 2024  Patient name: Demetrio Villalobos  : 1960  MRN: [id number]  Referring provider: César Malone DO  Dx:   Encounter Diagnosis     ICD-10-CM    1. Other abnormalities of gait and mobility  R26.89       2. Chronic cerebrovascular accident (CVA)  Z86.73             GOES BY \"WILL\"           Subjective: Patient reports to PT session with no new issues or complaints. Sees his family doctor next week to discuss his blood pressure medication, since he has been running high.      Objective: See treatment diary below    BP start of session: 142/98 mmHg (seated, RUE)    NMR (3# L ankle weights):  - Treadmill ambulation @ 0.8-1.0 mph x 1 trial (seated rest breaks between)    1st trial: 4 minutes - Peak RPE: 4/10    - Treadmill ambulation (gait  mode) @ 1.0 mph x 5 trials - Peak RPE: 4/10   Average step length: L 36 cm R 42 cm   Time spent on each foot: L 46% R 54%    - STS with medium RR under RLE x 1 minute (2 sets) - Peak RPE: 5-6/10  - Ambulation with posterior resistance (green) x 2 minutes - Peak RPE: 7/10  - Ambulation with anterior pull from therapist for increased speed x 2 minutes - Peak RPE: 3/10      Assessment: Patient tolerated treatment session well today with focus on initiating gait and mobility training within high intensity framework. Utilized gait  mode for second ambulation trial to provide external feedback regarding step length, as this is one of patient's primary gait deficits. Plan to perform reassessment next session. Patient will continue to benefit from skilled OPPT services in order to maximize safe functional mobility and reduce risk for falls post-CVA.      Plan: Continue per plan of care.  Progress treatment as tolerated.      *PATIENT ON BETA BLOCKER, USE RPE*     POC expires Unit limit Auth Expiration date PT/OT + Visit Limit? Co-Insurance   25 12 visits (sharing with ortho) 24 BOMN No                               Visit/Unit " Tracking  AUTH Status:  Date 10/21 10/24 10/30 11/6 11/13 11/19 11/21 11/26 12/4      Approved Used 1 1 1 1 1 1 1 1 1       Remaining  11 10 9 8 7 6 5 4 3          Outcome Measures Initial Eval  10/30/2024 & DN 11/6/24        5xSTS 17.36 sec, no UE        TUG 22.75 sec        10 meter 0.49 m/s        LACEY 38/56        FGA 9/30        DGI 8/24        MiniBEST 16/28        2MWT 140 ft                   Access Code: [email or web address]  URL: [email or web address]  Date: 11/06/2024  Prepared by: Celestina Valderrama    Exercises  - Sit to Stand  - 1 x daily - 7 x weekly - 2 sets - 10 reps  - Side Stepping with Counter Support  - 1 x daily - 7 x weekly - 3 sets - 10 reps  - Standing March with Counter Support  - 1 x daily - 7 x weekly - 2 sets - 10 reps - 3 sec hold

## 2024-12-04 NOTE — PROGRESS NOTES
Daily Note     Today's date: 2024  Patient name: Demetrio Villalobos  : 1960  MRN: [id number]  Referring provider: Sina Randle MD  Dx:   Encounter Diagnoses   Name Primary?    Cerebrovascular accident (CVA), unspecified mechanism (HCC) Yes    H/O: CVA (cerebrovascular accident)     Spastic hemiparesis affecting nondominant side (HCC)      Start Time: 1100  Stop Time: 1145  Total time in clinic (min): 45 minutes    PLAN OF CARE START: 24  PLAN OF CARE END: 24  FREQUENCY: 2 times per week  PRECAUTIONS RUE (NO FLEX/ABD > 90) sx, HTN    Subjective: Pt reports no changes.       Objective: See treatment below.  NMR/TE:  -Standing modified pushups using FES on tricep for proprioceptive feedback.   -Punching with targets provided in a variety of planes with focus on massed practice elbow flexion/extension, scapular retraction/protraction, L UE motor planning and coordination.  30x2 with seated rest break.  Inc difficulty with targets anteriorly and to L 2* shoulder spasticity and decreased elbow extension   -Simulated drinking with cone and double yellow theraband 10x2 with decreased AROM elbow extension and rest breaks 2* fatigue  -Retrieval and transfer of large pegs from pegboard anteriorly and placement into container 90* to L.  Focus on pinch/release, reaching, external shoulder rotation.  Requires inc time and unable to complete with use of effective tripod grasp, instead typically using lateral pinch.  30x    TA:   -Re-educated on recommendation for botox evaluation by physiatry in the setting of significant spasticity s/p CVA and provided contact info for pt to call and schedule appt.    Assessment: Tolerated treatment well. Demonstrating improved L UE grasp/release, coordination but still significantly limited.  Spasticity greatly limits pts L UE volitional movement.  Pt would benefit from continued OT services to address NMR L UE s/p chronic CVA.      Plan: Continued skilled OT per  POC.

## 2024-12-05 ENCOUNTER — OFFICE VISIT (OUTPATIENT)
Facility: CLINIC | Age: 64
End: 2024-12-05
Payer: COMMERCIAL

## 2024-12-05 DIAGNOSIS — I63.9 CEREBROVASCULAR ACCIDENT (CVA), UNSPECIFIED MECHANISM (HCC): Primary | ICD-10-CM

## 2024-12-05 PROCEDURE — 97112 NEUROMUSCULAR REEDUCATION: CPT

## 2024-12-05 NOTE — PROGRESS NOTES
Daily Note     Today's date: 2024  Patient name: Demetrio Villalobos  : 1960  MRN: [id number]  Referring provider: Sina Randle MD  Dx:   Encounter Diagnosis   Name Primary?    Cerebrovascular accident (CVA), unspecified mechanism (HCC) Yes                PLAN OF CARE START: 24  PLAN OF CARE END: 24  FREQUENCY: 2 times per week  PRECAUTIONS RUE (NO FLEX/ABD > 90) sx, HTN    Subjective: Pt reports he feels more coordinated with LUE D2 and sensation       Objective: See treatment below.  NMR/TE:  -Standing modified pushups using FES on tricep for proprioceptive feedback with LUE only;    -completed UE coordination task of pulling apart socks and placing into bucket with shoulder flexion to 70 * approximately with FES on wrist extensors focusing on functional reaching, shoulder flexion, grasp/release  - completed simulated reaching for cookie task using jumbo checkers in foam with pt reaching bringing checkers to mouth then place into target ie. Shoulder abduction focusing on grasp/release, elbow flexion/extension and utilzed 1lb wrist weight for proprioceptive feedback and FES donned on wrist extensors.   - completed taking out large pegs with shoulder add/abduction x 40 reps with FES donned on wrist extensors using 1lb wrist weight for proprioceptive feedback focusing on L UE coordination, motor planning, grasp/release,     TA:   -Re-educated on recommendation for botox evaluation by physiatry in the setting of significant spasticity s/p CVA and provided contact info for pt to call and schedule appt. Provided information to pt today for 2nd time.     Assessment: Tolerated treatment well. Demonstrating improved L UE grasp/release, coordination but still significantly limited. Pt reports improvements in UE and demonstrating improvements with shoulder strength then previous sessions.   Pt would benefit from continued OT services to address NMR L UE s/p chronic CVA.      Plan: Continued  skilled OT per POC.

## 2024-12-06 NOTE — TELEPHONE ENCOUNTER
Noting 4 to 5 months since last gout flare, declines probenecid at this time - recommend adding if gout flare. Decreased allopurinol to 50 mg QD in setting of Ckd and reduced CrCl    Recommend repeat BMP to f/u on GFR and Cr since lasix increase    Endorses no improvement in LE edema, f/u with me scheduled

## 2024-12-09 ENCOUNTER — OFFICE VISIT (OUTPATIENT)
Facility: CLINIC | Age: 64
End: 2024-12-09
Payer: COMMERCIAL

## 2024-12-09 DIAGNOSIS — I63.9 CEREBROVASCULAR ACCIDENT (CVA), UNSPECIFIED MECHANISM (HCC): Primary | ICD-10-CM

## 2024-12-09 DIAGNOSIS — I12.9 BENIGN HYPERTENSION WITH CHRONIC KIDNEY DISEASE, STAGE III (HCC): ICD-10-CM

## 2024-12-09 DIAGNOSIS — Z86.73 H/O: CVA (CEREBROVASCULAR ACCIDENT): ICD-10-CM

## 2024-12-09 DIAGNOSIS — N18.30 BENIGN HYPERTENSION WITH CHRONIC KIDNEY DISEASE, STAGE III (HCC): ICD-10-CM

## 2024-12-09 DIAGNOSIS — R26.2 AMBULATORY DYSFUNCTION: ICD-10-CM

## 2024-12-09 DIAGNOSIS — G81.10 SPASTIC HEMIPARESIS AFFECTING NONDOMINANT SIDE (HCC): ICD-10-CM

## 2024-12-09 PROCEDURE — 97112 NEUROMUSCULAR REEDUCATION: CPT | Performed by: OCCUPATIONAL THERAPIST

## 2024-12-09 NOTE — PROGRESS NOTES
Daily Note     Today's date: 2024  Patient name: Demetrio Villalobos  : 1960  MRN: [id number]  Referring provider: Sina Randle MD  Dx:   Encounter Diagnoses   Name Primary?    Cerebrovascular accident (CVA), unspecified mechanism (HCC) Yes    Benign hypertension with chronic kidney disease, stage III (HCC)     H/O: CVA (cerebrovascular accident)     Spastic hemiparesis affecting nondominant side (HCC)     Ambulatory dysfunction      Start Time: 1018  Stop Time: 1059  Total time in clinic (min): 41 minutes    PLAN OF CARE START: 24  PLAN OF CARE END: 24  FREQUENCY: 2 times per week  PRECAUTIONS RUE (NO FLEX/ABD > 90) sx, HTN    POC expires Auth Status Total   Visits  Start date  Expiration date PT/OT + Visit Limit? Co-Pay    approved   $0                                             Visit/Unit Tracking  AUTH Status:  Date               Visits  Authed: 12 Used 9               Remaining  3                   Subjective: Pt reports he does more with his LUE in therapy than he does at home.       Objective: See treatment below.  NMR:  -Seated L forearm pushups using FES on tricep for proprioceptive feedback  Completed to improve motor control and strength of scapular stabilizers for reaching tasks  -Standing shoulder extension with green theraband, verbal cues for scap retraction 2x10  -Standing scapular rows with green theraband, verbal cues for scap retraction 2x10    -Completed LUE coordination task of unplugging and plugging cord in outlet x10 reps. Graded by pt stepping further back.  -Completed LUE coordination task of transferring dry beans from 1 bowl to another. Required intermittent rest breaks 2/2 fatigue.    -Discussed using L hand to hold waterbottle when drinking (with straw as needed), holding finger foods when eating, and turning light switches on/off as basic HEP. Pt was receptive.    Assessment: Tolerated treatment well. Demonstrating improved L UE  grasp/release, coordination but still significantly limited. Pt reports improvements in UE and demonstrating improvements with shoulder strength then previous sessions.   Pt would benefit from continued OT services to address NMR L UE s/p chronic CVA.      Plan: Continued skilled OT per POC.

## 2024-12-10 ENCOUNTER — OFFICE VISIT (OUTPATIENT)
Age: 64
End: 2024-12-10

## 2024-12-10 ENCOUNTER — APPOINTMENT (OUTPATIENT)
Dept: LAB | Facility: CLINIC | Age: 64
End: 2024-12-10
Payer: COMMERCIAL

## 2024-12-10 VITALS
TEMPERATURE: 98 F | BODY MASS INDEX: 34.78 KG/M2 | SYSTOLIC BLOOD PRESSURE: 144 MMHG | DIASTOLIC BLOOD PRESSURE: 86 MMHG | HEIGHT: 67 IN | RESPIRATION RATE: 20 BRPM | WEIGHT: 221.6 LBS | OXYGEN SATURATION: 96 % | HEART RATE: 84 BPM

## 2024-12-10 DIAGNOSIS — R60.0 BILATERAL LOWER EXTREMITY EDEMA: ICD-10-CM

## 2024-12-10 DIAGNOSIS — E87.6 HYPOKALEMIA: Primary | ICD-10-CM

## 2024-12-10 PROCEDURE — 80048 BASIC METABOLIC PNL TOTAL CA: CPT

## 2024-12-10 PROCEDURE — 80061 LIPID PANEL: CPT

## 2024-12-10 PROCEDURE — 99213 OFFICE O/P EST LOW 20 MIN: CPT | Performed by: FAMILY MEDICINE

## 2024-12-10 RX ORDER — SPIRONOLACTONE 25 MG/1
12.5 TABLET ORAL DAILY
Qty: 15 TABLET | Refills: 1 | Status: SHIPPED | OUTPATIENT
Start: 2024-12-10

## 2024-12-10 NOTE — PROGRESS NOTES
"Name: Demetrio Villalobos      : 1960      MRN: [id number]  Encounter Provider: Lisa Blount DO  Encounter Date: 12/10/2024   Encounter department: [facility]  :  Assessment & Plan  Hypokalemia  Chronic, hx of hypokalemia with 3 times weekly potassium 10 mEq replacement  Discontinue potassium replacement  Start Aldactone 12.5 mg daily in setting of consistent lower extremity edema  Orders:    spironolactone (ALDACTONE) 25 mg tablet; Take 0.5 tablets (12.5 mg total) by mouth daily    Bilateral lower extremity edema  Chronic, unresolved, stable  Patient has bilateral lower extremity duplex scheduled  Plan for echo to rule out heart failure aspect  Continue Lasix  Start Aldactone 12.5 mg daily    Orders:    Echo complete w/ contrast if indicated; Future    spironolactone (ALDACTONE) 25 mg tablet; Take 0.5 tablets (12.5 mg total) by mouth daily           History of Present Illness     HPI    Reporting worsening LE edema:  -tolerating BP meds well  -was left side now both sides 2 - 3 months ago   -taking potassium 3x per week     Review of Systems   Constitutional:  Negative for activity change, fatigue and fever.   HENT:  Negative for congestion, hearing loss, postnasal drip, rhinorrhea, sneezing and sore throat.    Eyes:  Negative for visual disturbance.   Respiratory:  Negative for cough, shortness of breath and wheezing.    Cardiovascular:  Negative for chest pain and palpitations.   Gastrointestinal:  Negative for abdominal pain, constipation, diarrhea, nausea and vomiting.   Musculoskeletal:  Negative for arthralgias, myalgias and neck pain.   Skin:  Negative for rash and wound.   Neurological:  Negative for weakness and numbness.       Objective   /86 (BP Location: Right arm, Patient Position: Sitting, Cuff Size: Standard)   Pulse 84   Temp 98 °F (36.7 °C) (Tympanic)   Resp 20   Ht 5' 7\" (1.702 m)   Wt 101 kg (221 lb 9.6 oz)   SpO2 96%   BMI 34.71 kg/m² "      Physical Exam  Vitals and nursing note reviewed.   Constitutional:       General: He is not in acute distress.     Appearance: He is well-developed.   HENT:      Head: Normocephalic and atraumatic.   Eyes:      Conjunctiva/sclera: Conjunctivae normal.   Cardiovascular:      Rate and Rhythm: Normal rate and regular rhythm.      Heart sounds: No murmur heard.  Pulmonary:      Effort: Pulmonary effort is normal. No respiratory distress.      Breath sounds: Normal breath sounds.   Abdominal:      Palpations: Abdomen is soft.      Tenderness: There is no abdominal tenderness.   Musculoskeletal:         General: No swelling.      Cervical back: Neck supple.      Right lower leg: Edema present.      Left lower leg: Edema present.   Skin:     General: Skin is warm and dry.      Capillary Refill: Capillary refill takes less than 2 seconds.   Neurological:      Mental Status: He is alert.   Psychiatric:         Mood and Affect: Mood normal.

## 2024-12-11 ENCOUNTER — EVALUATION (OUTPATIENT)
Facility: CLINIC | Age: 64
End: 2024-12-11
Payer: COMMERCIAL

## 2024-12-11 DIAGNOSIS — R26.89 OTHER ABNORMALITIES OF GAIT AND MOBILITY: Primary | ICD-10-CM

## 2024-12-11 DIAGNOSIS — Z86.73 CHRONIC CEREBROVASCULAR ACCIDENT (CVA): ICD-10-CM

## 2024-12-11 DIAGNOSIS — I63.9 CEREBROVASCULAR ACCIDENT (CVA), UNSPECIFIED MECHANISM (HCC): Primary | ICD-10-CM

## 2024-12-11 LAB
ANION GAP SERPL CALCULATED.3IONS-SCNC: 10 MMOL/L (ref 4–13)
BUN SERPL-MCNC: 29 MG/DL (ref 5–25)
CALCIUM SERPL-MCNC: 9 MG/DL (ref 8.4–10.2)
CHLORIDE SERPL-SCNC: 103 MMOL/L (ref 96–108)
CHOLEST SERPL-MCNC: 152 MG/DL (ref ?–200)
CO2 SERPL-SCNC: 24 MMOL/L (ref 21–32)
CREAT SERPL-MCNC: 1.94 MG/DL (ref 0.6–1.3)
GFR SERPL CREATININE-BSD FRML MDRD: 36 ML/MIN/1.73SQ M
GLUCOSE P FAST SERPL-MCNC: 129 MG/DL (ref 65–99)
HDLC SERPL-MCNC: 37 MG/DL
LDLC SERPL CALC-MCNC: 75 MG/DL (ref 0–100)
POTASSIUM SERPL-SCNC: 3.9 MMOL/L (ref 3.5–5.3)
SODIUM SERPL-SCNC: 137 MMOL/L (ref 135–147)
TRIGL SERPL-MCNC: 201 MG/DL (ref ?–150)

## 2024-12-11 PROCEDURE — 97112 NEUROMUSCULAR REEDUCATION: CPT

## 2024-12-11 PROCEDURE — 97530 THERAPEUTIC ACTIVITIES: CPT

## 2024-12-11 PROCEDURE — 97110 THERAPEUTIC EXERCISES: CPT

## 2024-12-11 NOTE — PROGRESS NOTES
PT Re-Evaluation          POC expires Unit limit Auth Expiration date PT/OT + Visit Limit? Co-Insurance   25 12 visits (sharing with ortho) 24 BOMN No                               Visit/Unit Tracking  AUTH Status:  Date 10/21 10/24 10/30 11/6 11/6 11/13 11/21 11/26 12/4 12/11     Approved Used 1 1 1 1 1 1 1 1 1 1      Remaining  11 10 9 8 7 6 5 4 3 2                Today's date: 2024  Patient name: Demetrio Villalobos  : 1960  MRN: [id number]  Referring provider: Lisa Blount DO   Dx:   Encounter Diagnosis     ICD-10-CM    1. Other abnormalities of gait and mobility  R26.89       2. Chronic cerebrovascular accident (CVA)  Z86.73             Assessment  Assessment details: Patient is a 64 year old presenting to skilled OPPT for reassessment with complaints of gait, balance, and strength deficits s/p CVA that in turn has limited safe functional mobility at home and in the community. PMH significant for: HTN, CKD, GERD, and CVA. Patient suffered CVA approximately 10 years ago and reports residual weakness on left side. Patient demonstrated improvements in the following outcome measures since last reassessment: 5 x STS, TUG, Meredith, FGA, and 10 MWT, likely indicating overall gains in functional LE strength, safe mobility, static balance, dynamic balance, and ambulation speed, respectively. While patient had slight regression in 2 MWT score, he was able to complete entirety of 6 MWT for first time today, which is likely supportive of improvements in cardiovascular endurance. Per cutoff scores for the FGA and DGI he is classified as HIGH risk for falls and per Meredith he is classified as borderline risk for falls.   He continues to demonstrate the following gait deficits: decreased hip/knee flexion during swing with associated hip circumduction, slowed samantha, Trendelenburg. Plan to continue to focus on gait training, balance, and  strengthen within high intensity parameters. He has met 3 short term and 3 long term goals at this time and is progressing well towards remaining goals. Patient will continue to benefit from skilled OPPT services in order to maximize safe functional mobility in the home and community setting.       Impairments: Abnormal coordination, Abnormal gait, Abnormal muscle tone, Abnormal or restricted ROM, Activity intolerance, Impaired balance, Impaired physical strength, Lacks appropriate HEP, Poor posture, Poor body mechanics, Pain with function, Safety issue, Weight-bearing intolerance, Abnormal movement, Difficulty understanding, Abnormal muscle firing  Understanding of Dx/Px/POC: Excellent  Prognosis: Excellent      Patient verbalized understanding of POC.         Please contact me if you have any questions or recommendations. Thank you for the referral and the opportunity to share in Demetrio Villalobos's care.        Plan  Plan details: FGA, DGI, miniBEST, 6 MWT; HIGT and HIIT to tolerance  Patient would benefit from: PT Eval, Skilled PT, and OT Eval  Planned modality interventions: Biofeedback, Cryotherapy, TENS, Thermotherapy  Planned therapy interventions: Abdominal trunk stabilization, ADL training, Balance, Balance/WB training, Breathing training, Body mechanics training, Coordination, Functional ROM exercises, Gait training, HEP, Joint Mobilization, Manual Therapy, White taping, Motor coordination training, Neuromuscular re-education, Patient education, Postural training, Strengthening, Stretching, Therapeutic activities, Therapeutic exercises, Therapeutic training, Transfer training, Activity modification, Work reintegration  Frequency: 1-2x/wk  Duration in weeks: 12 weeks  Plan of Care beginning date: 10/30/2024  Plan of Care expiration date: 12 weeks - 1/22/2025  Treatment plan discussed with: Patient         Goals  Short Term Goals (4 weeks):    - Patient will improve time on TUG by 2.9 seconds to  facilitate improved safety in all ambulation- MET  - Patient will improve scoring on DGI by 2.6 points to progress safety- MET  - Patient will be independent in basic HEP 2-3 weeks - NOT MET  - Patient will improve 5xSTS score by 2.3 seconds to promote improved LE functional strength needed for ADLs- MET  - Patient will complete components of CB&M to promote agility necessary for sports related tasks (d/c 12/11/2024)    Long Term Goals (12 weeks):  - Patient will be independent in a comprehensive home exercise program - NOT MET  - Patient will improve gait speed by 0.18 m/s to improve safety with community ambulation - MET  - Patient will improve LACEY by 6 points to facilitate return to safe independent ambulation - MET  - Patient will improve scoring on FGA by 4 points to progress safety with dynamic tasks- MET  - Patient will improve FGA score to >/= 22/30 to classify as decreased risk for falls - NOT MET  - Patient will be able to demonstrate HT in gait without veering - NOT MET  - Patient will improve 6 Minute Walk Test score by 190 feet to promote improved cardiovascular endurance - NOT MET  - Patient will report 50% reduction in near falls in order to improve safety with functional tasks and reduce his risk for falls - NOT MET  - Patient will report going on walks at least 3 days per week to promote independence and improved cardiovascular endurance - NOT MET  - Patient will be able to ascend/descend stairs reciprocally without UE assist to promote independence and safety with ADLs - NOT MET  - Patient will report 50% reduction in near falls when ambulating on uneven terrain - NOT MET      Cut off score    All date taken from APTA Neuro Section or Rehab Measures      [place]acey:  Álvaro et al., 2018  MDC: 2.7 pts    Amanda enrique al., 2011  Cut-off score: 45/56    Chronic CVA  < 44/56 high risk for falls (Álvaro et al., 2018)  < 47.5/56 slow walker status (Amanda enrique al., 2011) 5xSTS: Andrea enrique al 2010  MDC: 2.3  sec  Age Norms:  60-69: 11.1 sec  70-79: 12.6 sec  80-89: 14.8 sec    Andrea, 2010, Chronic Stroke  Chronic CVA: 12 sec   TUG  Addison et al., 2005  MDC: 2.9 sec    Cut off score:  >13.5 sec community dwelling adults  >32.2 frail elderly  <20 I for basic transfers  >30 dependent on transfers 10 Meter Walk Test:   Shawanda et al., 2006  Small meaningful change: 0.06 m/s  Substantial meaningful change: 0.14 m/s  MCID: 0.16 m/s    < 0.4 m/s household ambulators  0.4 - 0.8 m/s limited community ambulators  > 0.8 m/s community ambulators   FGA: Rossi et al., 2010  MCID: 4.2 pts  Geriatrics/community < 22/30 fall risk  Geriatrics/community < 20/30 unexplained falls DGI  MDC: vestibular - 4 pts  MDC: geriatric/community - 3 pts  Falls risk <19/24   6 Minute Walk Test  Shawanda et al., 2006  MDC: 60.98 m (200.01 ft)    Farhat Desai, & Ruthie, 2012  MCID: 34.4 m    Age Norms  60-69: M - 1876 ft   F - 1765 ft  70-79: M - 1729 ft   F - 1545 ft  80-89: M - 1368 ft   F - 1286 ft Modified Rosetta  0: No increase in tone  1: Catch and release or min resistance at end range  1+: Catch f/b min resistance throughout remainder (< half ROM)  2: Easily moved, but more marked tone throughout most ROM  3: Significant tone, PROM difficult  4: Rigid   MiniBest: Juanjose et al., 2013  CVA < 17.5 fall risk Pass (Acute CVA)  MDC: 1.8 points (acute), 3.2 points (chronic)         Subjective    History of Present Illness  Mechanism of injury: Patient is a 64 year old presenting to skilled OPPT for reassessment with complaints of gait, balance, and strength deficits s/p CVA. PMH significant for: HTN, CKD, GERD, and CVA. Patient suffered CVA approximately 10 years ago and reports residual weakness on left side (UE and LE), but cannot recall exact location of stroke. He reports he participated in PT/OT/ST services immediately following the stroke, but has not participated in much rehab since. Patient recently underwent (R) reverse total shoulder arthroplasty on  " and is currently following shoulder precautions (no flexion/abduction > 90 degrees). He has HHA that come 25 hours/week (ever day in the AM) that assist with ADL/IADL's.     Update 24: Patient participated in 1xs/week neuro PT for ~ 1 month and reports noting small improvements in balance and endurance; no falls since SOC. Reports back pain is primary barrier to intensity/duration of gait training (still sees ortho PT for back/shoulder)    Updated (2024): Patient reports for reassessment stating he has not yet noted significant improvements in his walking. He is interested in increasing frequency of neuro PT services to further drive improvements. His primary goal is to improve his walking speed and standing tolerance.    Primary AD: none  Assist level at home: assist for ADL's/IADL's  WC usage: NA    Patient goal: \"I want to get better with walking\"    Pain  Current pain ratin-3/10  Location: R shoulder  Aggravating factors: movement    Social Support  Steps to enter house: none  Stairs in house: none   Lives in: 4 story, 1 floor set up  Lives with: alone    Employment status: retired  Hand dominance: R    Treatments  Previous treatment: PT/OT/ST immediately following stroke  Current treatment: ortho PT, balance eval  Diagnostic Testing: see chart for details      Objective     Vitals  - HR: 74 bpm  - BP: 158/86  - SPO2: 97%    HR Max  - 207 - (0.7x62)  = 164 bpm    LE MMT  - R Hip Flexion: 3/5  - L Hip Flexion: 3-/5  - R Hip Extension: 3/5  - L Hip Extension: 3-/5  - R Hip Abduction: 4+/5 - L Hip abduction: 4-/5  - R Hip adduction: 4+/5 - L Hip adduction: 4-/5  - R Knee Extension: 3/5 - L Knee Extension: 2+/5  - R Knee Flexion: 3/5  - L Knee Flexion: 2+/5  - R Ankle DF: 4/5  - L Ankle DF: 1/5  - R Ankle PF: 3/5  - L Ankle PF: 1/5    Sensation  - Light touch: intact  - Deep pressure: intact    Coordination  - Dysmetria: unable 2/2 LUE hemiplegia  - Dysdiadochokinesia: unable 2/2 LUE " hemiplegia  - Alternating Toe Taps: unable 2/2 LLE hemiplegia    Modified Rosetta  - R knee extension: 0 - no increase in tone  - L knee extension: 1+ - catch followed by minimum resistance throughout remainder (< half ROM)  - R knee flexion: 0 - no increase in tone  - L knee flexion: 1-   - R ankle DF: 0 - no increase in tone   - L ankle DF: 3 - significant tone, PROM difficulty  - R ankle inversion: 0 - no increase in tone  - L ankle inversion: 3 - significant tone, PROM difficulty  - R ankle eversion: 0 - no increase in tone  - L ankle eversion: 3 - significant tone, PROM difficulty    Clonus  - L: No  - R: No  - Fatiguing: NA  - Number of beats: NA    Vision  - Glasses: No  - Abnormalities prior to CVA: No, NA  - Abnormalities post CVA: No, NA    Neglect  - R sided: No  - L sided: No    Pusher's Syndrome  - R sided: No  - L sided: No             Outcome Measures Initial Eval  10/30/2024 & DN 11/6/24 RE  11/26/24 PN  12/11/2024      5xSTS 17.36 sec, no UE 14.25 sec, 0 UE 11.40 sec, 0 UE      TUG 22.75 sec 15.91 sec 13.70 sec      10 meter 0.49 m/s TBA- time constraints 0.68 m/s      LACEY 38/56 43/56 45/56      FGA 9/30 15/30 16/30      DGI 8/24 14/24 14/24      MiniBEST 16/28 TBA- time constraints       2MWT 140 ft 230 ft (limited by back and right hip pain) 200 ft      6MWT   515 ft          Precautions: CKD, GERD, HTN, PVD, history CVA 10 years ago with L sided hemiplegia  Past Medical History:   Diagnosis Date    Chronic kidney disease     Edema     GERD (gastroesophageal reflux disease)     Gout     hospitalized    High cholesterol     Hypertension     PVD (peripheral vascular disease) ([facility])     Renal disorder     Rotator cuff tear     right    Shortness of breath     Stroke (HCC) 2014    Ventral hernia without obstruction or gangrene     Walker as ambulation aid        DN 11/26:    NMR (2.5# L ankle weights):  - Treadmill ambulation @ 1.0 mph x 3 trials (seated rest breaks between)    1st trial: 3 minutes  RPE 5/10   2nd trial: 4 minutes RPE 6/10

## 2024-12-11 NOTE — PROGRESS NOTES
OCCUPATIONAL THERAPY INITIAL EVALUATION    Today's Date: 2024  Patient Name: Demetrio Villalobos  : 1960  MRN: [id number]  Referring Provider: Sina Randle MD  Dx: Cerebrovascular accident (CVA), unspecified mechanism (HCC) [I63.9]      Eval/ Re-eval POC expires Auth #/ Referral # Total units  Start date  Expiration date Extension      AUTH                                                  Date               Units:  Used 2              Authed:  Remaining  10                  SUBMITTED AUTH     SKILLED ANALYSIS:  Pt presents to re-evaluation on 24 status post CVA. As per interview, pt reports improvements in ability to hold smaller items ie. Holding toothbrush when applying tooth paste. Pt reports improvements in hand function with increased movement to D1 and D2. Pt reports slight improvements in spasticity in the elbow at bicep. Pt reports improvements in wrist extension strength. Pt reports he would like this month to focus on bringing hand to mouth to simulate feeding. Pt completed following assessments: fugyl abraham, modified jonna, ROM/MMT, dynamometer and TM B today. Post assessments, pt demonstrating improvements in UE coordination with 4-5 point improvement on fugyl abraham than previous assessment. Pt demonstrating improvement in ROM and strength in shoulder flexion and demonstrating improvements in wrist extension strength. Pt continues to demonstrate impairments in UE strength, spasticity, UE coordination, FMC, dexterity. Pt achieved 4 STGs and is progressing towards LTGs. Pt would benefit from continued OT services focusing on impairments for 8-12 more weeks. Pt educated on progress/therapy process and pt in understanding and agreement of recommendations. Recommending to continue HEP     Educated pt on charges of insurance, acknowledge understanding, and are in agreement.    TREATMENT:  Educated pt on NMES device to buy on amaz[employer] and to bring it into therapy  Applied  "Kinesio tape on wrist utilizing I strip to provide facilitation to wrist extensors for neuro-motor recovery to participate in ADL/IADLs. Educated on purpose of tape and donning time.   Performed x 30 reps of pushups standing at   PLAN OF CARE START: 24  PLAN OF CARE END: 24  FREQUENCY: 2 times per week  PRECAUTIONS no restrictions with RUE as per PT vicky, HTN    Subjective    Mechanism of Injury  CVA approximately 10 years ago, went to [facility] for outpatient rehabilitation; pt denies seizures and is not on anti-seizure medication    Occupational Profile    \"Pt lives alone in an apartment with no stairs to get in. Pt reports difficulty with ADLs (UBD, shaving head, LBD ) however was able to put on with independence. Pt reports he has an aide who assists 5 hours a day and does cleaning, cooking, and bathing pt. Pt manages own medication. Pt reports he has not been driving. Pt reports no pain in LUE. Pt is a retired .Pt report no diplopia and no issues with cognition. Pt enjoys reading bible, watching TV. \"    PATIENT GOAL: \"as much use of my L hand and walking as possible.\"    HISTORY OF PRESENT ILLNESS:     PMH:   Past Medical History:   Diagnosis Date    Chronic kidney disease     Edema     GERD (gastroesophageal reflux disease)     Gout     hospitalized    High cholesterol     Hypertension     PVD (peripheral vascular disease) ([facility])     Renal disorder     Rotator cuff tear     right    Shortness of breath     Stroke ([facility])     Ventral hernia without obstruction or gangrene     Walker as ambulation aid        Past Surgical Hx:   Past Surgical History:   Procedure Laterality Date    APPENDECTOMY      KS ARTHROPLASTY GLENOHUMERAL JOINT TOTAL SHOULDER Right 2024    Procedure: ARTHROPLASTY SHOULDER REVERSE;  Surgeon: Sina Randle MD;  Location: [facility];  Service: Orthopedics        Pain:  Location:R shoulder     Restin    With Activity:  2    Objective    Upper Extremities:  Pt " is R hand dominant  Did not assess RUE strength due to rotator cuff sx.               JANA: RUE: lb LUE: 21 (prior 19lb)  The age norm is approximately 76.8 lbs, indicating decreased  strength.    Lateral pinch: RUE: , LUE: 4.5                Range of Motion:  AROM:   R UE   Did not assess due to sx     L UE   - Shoulder flexion: approximately 90 degrees PROM approxiamtey 120*  - Shoulder scaption/abduction: approximately 80 degrees  - Shoulder extension: approximately 100%  - Elbow flexion:100%  - Elbow extension: 100%  - Wrist flexion: 50*  - Wrist extension:approximately 30*   - Pronation: 100%  - Supination: 75%  - Finger extension: 10%  - thumb extension approximately 25%  - Finger flexion: 100%     Manual Muscle Testing:  R UE:  -Did not assess due to sx   L UE:  - Shoulder flexors: 3- (prior 2+/5)  - Shoulder abductors: 2+/5  - Shoulder extensors: 3/5  - Shoulder internal rotators: 3+/5  - Shoulder external rotators: 2/5  - Elbow flexors: 4- (prior 3+/5)  - Elbow extensors: 4- (prior 3+/5)  - Wrist flexors: 3-/5 (prior 2+/5)  - Wrist extensors: 2+ (prior 2/5)    FUGL LOVE ASSESSMENT OF MOTOR RECOVERY AFTER STROKE:  L UE:  UPPER EXTREMITY SEATED: 20/36  WRIST: 5/10   HAND: 6/14  COORDINATION/SPEED: 1/6  OVERALL SCORE: 32/66     (Clinical change= 5 points, severe impairment <19, and mild impairment is >50)          Modified Rosetta Scale: measures spasticity in patients with lesions in the Central Nervous System  L UE:    Shoulder external rotators: 0/4  Shoulder internal rotators: 2/4  Elbow flexors: 1+/4  Elbow extensors: 0/4  Wrist flexors: 1/4  Wrist extensors 0/4  Pronators 1+/4  Finger flexors: 1/4  Finger extensors: 0/4    0: No increase in muscle tone  1: Slight increase in muscle tone, manifested by a catch and release or by minimal resistance at the end of the range of motion when the affected part(s) is moved in flexion or extension  1+: Slight increase in muscle tone, manifested by a catch,  followed by minimal resistance throughout the remainder (less than half) of the ROM  2: More marked increase in muscle tone through most of the ROM, but affected part(s) easily moved  3: Considerable increase in muscle tone, passive movement difficult  4: Affected part(s) rigid in flexion or extension    Subluxation: none    Scapular winging: slight    Spasticity: slight     Finger to Nose Testing with Visual Occlusion (proprioception):  unable to complete due to hemiparesis    Finger to Nose Testing without Visual Occlusion: unable to complete due to hemiparesis    Monofilaments/sensory testing: NOT tested    Functional Cognition:  Highest level of education: 9th grade    Tulio Cognitive Assessment Version 8.1 (MoCA V8.1)  Visuospatial/executive functioning:  3/5  Naming:  3/3  Memory: 1st trial:  , 2nd trial:    Attention/concentration:   List of letters:   Serial Seven Subtraction:  2/3 w/  errors  Language/sentence repetition:    Language Fluency:     Abstract/Correlational Thinkin/2  Delayed Recall:    Orientation:     Memory Index Score: 13/15   Raw Score:  25/30, MIS:  13/15, indicative of mild neurocognitive impairments.    MoCA Scoring        Normal: 26+         Mild Cognitive Impairment: 18-25          Moderate Cognitive Impairment: 10-17         Severe Cognitive Impairment: <10    Trail making Part A and Part B:   Part A: 31.28 with errors however able to self correct   Part B: 1 minute 49 with 1 cue (prior 1 minute 34 seconds with independence )  Indicating deficits: Part A > 33.12 seconds and Part B > 74.55 seconds            GOALS:   Short Term Goals:  Pt will increase UE  strength by 2-3 lbs to complete gardening and IADLs (19) ACHIEVED   Pt will increase divided attention by completing trail making part B in 1 minute 20 seconds to complete IADLs AHCIEVED  Pt will demo with G understanding and carryover of tone reduction strategies for improved AROM ACHIEVED   Pt  will demo with decreased L  UE hypertonicity to 1+/4 on modified jonna scale for improved grasp release, termination of flexors on command of time  Pt will increase  L UE cooordination to complete 19/36 of upper extremity section of fugyl abraham for tabletop tasks ACHIEVED  Pt will increase  L UE coordination to complete 5/10 of wrist section of fugyl abraham for tabletop tasks ACHIEVED             Long Term Goals: 8-12 weeks  Pt will increase UE  strength by 4-5 lbs to complete gardening and IADLs (19)  Pt will increase divided attention by completing trail making part B in 1 minute 10 seconds to complete IADLs  Pt will demo with G understanding and carryover of tone reduction strategies for improved AROM  Pt will demo with decreased L  UE hypertonicity to 1/4 on modified jonna scale for improved grasp release, termination of flexors on command of time  Pt will increase  L UE coordination to complete 21/36 of upper extremity section of fugyl abraham for tabletop tasks  Pt will increase  L UE coordination to complete fugyl abraham in 32 for tabletop tasks      OTHER PLANNED THERAPY INTERVENTIONS:   Supine, seated, and in stance neuro re-ed  Tricep AG  NMES/FES  FMC/prehension  Timed Trials  Manual tx  Hand to target  Sensory re-ed  Seated functional reach: crossing midline  Supine place and hold  WBearing strategies   Closed chain activities  Open chain activities  Internal and external memory aides  Multimatrix for saccades/ visual clutter/attention  Hypersensitivity strategies education  Multi-modal environment  Sustained/alternating/divided attention  Work stations with timed transitions  Temporal Awareness  Memory and mental manipulation  Auditory processing with immediate recall  Memory retention with immediate and delayed recall  Edu on cog/vision apps      Objective Measurement Tracker:    IE (11/13) 1st Re-eval: () 2nd Re-eval: (]) 3rd Re-eval: ()    MoCA 25/30 /30 /30 /30 /30   TM Test A 31       TM Test  B 1:35       CMT        Nine Hole Peg Test        Functional Dexterity Test                                                Manual Muscle Testing             Fugl Antunez UE: 17/36  Wrist: 4/10  Hand: 5/14  Coordination: 1/6

## 2024-12-15 NOTE — ASSESSMENT & PLAN NOTE
Chronic, hx of hypokalemia with 3 times weekly potassium 10 mEq replacement  Discontinue potassium replacement  Start Aldactone 12.5 mg daily in setting of consistent lower extremity edema  Orders:    spironolactone (ALDACTONE) 25 mg tablet; Take 0.5 tablets (12.5 mg total) by mouth daily

## 2024-12-15 NOTE — ASSESSMENT & PLAN NOTE
Chronic, unresolved, stable  Patient has bilateral lower extremity duplex scheduled  Plan for echo to rule out heart failure aspect  Continue Lasix  Start Aldactone 12.5 mg daily    Orders:    Echo complete w/ contrast if indicated; Future    spironolactone (ALDACTONE) 25 mg tablet; Take 0.5 tablets (12.5 mg total) by mouth daily

## 2024-12-17 ENCOUNTER — OFFICE VISIT (OUTPATIENT)
Facility: CLINIC | Age: 64
End: 2024-12-17
Payer: COMMERCIAL

## 2024-12-17 DIAGNOSIS — R26.2 AMBULATORY DYSFUNCTION: ICD-10-CM

## 2024-12-17 DIAGNOSIS — I63.9 CEREBROVASCULAR ACCIDENT (CVA), UNSPECIFIED MECHANISM (HCC): Primary | ICD-10-CM

## 2024-12-17 DIAGNOSIS — G81.10 SPASTIC HEMIPARESIS AFFECTING NONDOMINANT SIDE (HCC): ICD-10-CM

## 2024-12-17 DIAGNOSIS — Z86.73 H/O: CVA (CEREBROVASCULAR ACCIDENT): ICD-10-CM

## 2024-12-17 PROCEDURE — 97112 NEUROMUSCULAR REEDUCATION: CPT | Performed by: OCCUPATIONAL THERAPIST

## 2024-12-17 PROCEDURE — 97530 THERAPEUTIC ACTIVITIES: CPT | Performed by: OCCUPATIONAL THERAPIST

## 2024-12-17 NOTE — PROGRESS NOTES
Daily Note     Today's date: 2024  Patient name: Demetrio Villalobos  : 1960  MRN: [id number]  Referring provider: Sina Randle MD  Dx:   Encounter Diagnoses   Name Primary?    Cerebrovascular accident (CVA), unspecified mechanism (HCC) Yes    Spastic hemiparesis affecting nondominant side (HCC)     H/O: CVA (cerebrovascular accident)     Ambulatory dysfunction      Start Time: 1418  Stop Time: 1500  Total time in clinic (min): 42 minutes    PLAN OF CARE START: 24  PLAN OF CARE END: 24  FREQUENCY: 2 times per week  PRECAUTIONS RUE (NO FLEX/ABD > 90) sx, HTN    POC expires Auth Status Total   Visits  Start date  Expiration date PT/OT + Visit Limit? Co-Pay    approved   $0                                             Visit/Unit Tracking  AUTH Status:  Date               Visits  Authed: 12 Used 9               Remaining  3                   Subjective: Pt reports that since being educated on self feeding using LUE he has purchased an adapted cup that he can hold in his L hand and has been using it at home.      Objective: See treatment below.  NMR:  -Nustep level 1 with LUE only x5 minutes focusing on gross motor coordination, proximal strengthening, isometric gross grasp strength.  -Pt utilized 2 point pinch to  legos from tabletop and place in box on L side using pt's e-stim unit for digit extension to facilitate release of blocks.    TA:  -Pt brought TENS unit that he bought to trial in session. Setup for pulse width 300, 30 Hz, and mode adjusted for use at home. Pt educated on how to increase intensity with dial, how to setup electrodes on triceps and on digit extensors. Pictures of electrode placement taken on pt's phone for him to refer to at home. Discussed incorporation of functional task while using e-stim at home and pt was receptive.    Assessment: Tolerated treatment well. Demonstrating improved L UE grasp/release, coordination but still significantly  limited. Pt reports improvements in UE and demonstrating improvements with shoulder strength then previous sessions.   Pt would benefit from continued OT services to address NMR L UE s/p chronic CVA.      Plan: Continued skilled OT per POC.

## 2024-12-18 ENCOUNTER — OFFICE VISIT (OUTPATIENT)
Facility: CLINIC | Age: 64
End: 2024-12-18
Payer: COMMERCIAL

## 2024-12-18 DIAGNOSIS — Z86.73 H/O: CVA (CEREBROVASCULAR ACCIDENT): ICD-10-CM

## 2024-12-18 DIAGNOSIS — R26.89 OTHER ABNORMALITIES OF GAIT AND MOBILITY: Primary | ICD-10-CM

## 2024-12-18 DIAGNOSIS — I63.9 CEREBROVASCULAR ACCIDENT (CVA), UNSPECIFIED MECHANISM (HCC): Primary | ICD-10-CM

## 2024-12-18 DIAGNOSIS — G81.10 SPASTIC HEMIPARESIS AFFECTING NONDOMINANT SIDE (HCC): ICD-10-CM

## 2024-12-18 DIAGNOSIS — Z86.73 CHRONIC CEREBROVASCULAR ACCIDENT (CVA): ICD-10-CM

## 2024-12-18 PROCEDURE — 97112 NEUROMUSCULAR REEDUCATION: CPT

## 2024-12-18 PROCEDURE — 97530 THERAPEUTIC ACTIVITIES: CPT

## 2024-12-18 NOTE — PROGRESS NOTES
"Daily Note     Today's date: 2024  Patient name: Demetrio Villalobos  : 1960  MRN: [id number]  Referring provider: Sina Randle MD  Dx:   Encounter Diagnoses   Name Primary?    Cerebrovascular accident (CVA), unspecified mechanism (HCC) Yes    Spastic hemiparesis affecting nondominant side (HCC)     H/O: CVA (cerebrovascular accident)      Start Time: 0930  Stop Time: 1015  Total time in clinic (min): 45 minutes    PLAN OF CARE START: 24  PLAN OF CARE END: 24  FREQUENCY: 2 times per week  PRECAUTIONS RUE (NO FLEX/ABD > 90) sx, HTN    POC expires Auth Status Total   Visits  Start date  Expiration date PT/OT + Visit Limit? Co-Pay    approved   $0                                             Visit/Unit Tracking  AUTH Status:  Date            Visits  Authed: 12 Used 9 1 1 1            Remaining  3 2 1 0                Subjective: \"Can I get one of these for home\" - pt referring to wooden peg board.     Objective: See treatment below.    NMR:  -Nustep level 2 with LUE  x6 minutes focusing on gross motor coordination, proximal strengthening, isometric gross grasp strength.  -PNF D1 and D2 patterns with 3 lb dumb bell with patient in seated. Performed 3x10 sets for each D1 and D2 patterns.   -Performed exercise with XL squigz in standing. Push 6 squigz onto wall, then pulling off the wall. FES with the dorsal forearm for improved wrist/finger extension. Min support from the therapist to assist with grasp.     TA:  -Removing wood pegs from wooden peg board with FES to the forearm for wrist/finger extension. Performed 2x15 sets.     Assessment: Tolerated treatment well. Demonstrating improved L UE grasp/release, coordination but still significantly limited.  Pt would benefit from continued OT services to address NMR L UE s/p chronic CVA.      Plan: Continued skilled OT per POC.      "

## 2024-12-18 NOTE — PROGRESS NOTES
"Daily Note     Today's date: 2024  Patient name: Demetrio Villalobos  : 1960  MRN: [id number]  Referring provider: César Malone DO  Dx:   Encounter Diagnosis     ICD-10-CM    1. Other abnormalities of gait and mobility  R26.89       2. Chronic cerebrovascular accident (CVA)  Z86.73             GOES BY \"WILL\"           Subjective: Patient reports to PT session with no new issues or complaints. Went to see his doctor about his BP and he states he was kept on the same BP medication, but was prescribed a water pill for the swelling his ankle and feet.      Objective: See treatment diary below    BP start of session: NA mmHg (seated, RUE)    NMR (3# L ankle weight):  - STS with medium RR under RLE x 1 minute (2 sets) - Peak RPE: 4-5/10    - Treadmill ambulation (gait  mode) @ 1.0-1.1 mph x 3 minutes - Peak RPE: 4/10   Average step length: L 35 cm R 42 cm   Time spent on each foot: L 46% R 54%    - Treadmill ambulation (gait  mode) @ 1.0-1.1 mph x 3 minutes - Peak RPE: 6/10   Average step length: L 38 cm R 47 cm   Time spent on each foot: L 45% R 55%    - Agility ladder negotiation (in/outs) x 2 minutes - Peak RPE: 3/10  - Ambulation with 10# L ankle weight - RPE: 4/10    BP end of session: 126/80 mmHg    Assessment: Patient tolerated treatment session well today with focus on initiating gait and mobility training within high intensity framework. Patient continues to be limited in duration of ambulation due to reported LBP, likely due to increased reliance on RLE due to tone and gait  restrictions on affected L side. Significantly challenged with L limb progression with addition of heavier (10 lb) ankle weight. Patient will continue to benefit from skilled OPPT services in order to maximize safe functional mobility and reduce risk for falls post-CVA.      Plan: Continue per plan of care.  Progress treatment as tolerated.      *PATIENT ON BETA BLOCKER, USE RPE*     POC expires Unit limit " Auth Expiration date PT/OT + Visit Limit? Co-Insurance   1/22/25 12 visits (sharing with ortho) 1/21/24 BOMN No    12 visits 3/24                          Visit/Unit Tracking  AUTH Status:  Date 10/21 10/24 10/30 11/6 11/6 11/13 11/21 11/26 12/4 12/11 12/18    Approved Used 1 1 1 1 1 1 1 1 1 1 1     Remaining  11 10 9 8 7 6 5 4 3 2 1          Outcome Measures Initial Eval  10/30/2024 & DN 11/6/24 RE  11/26/24 PN  12/11/2024      5xSTS 17.36 sec, no UE 14.25 sec, 0 UE 11.40 sec, 0 UE      TUG 22.75 sec 15.91 sec 13.70 sec      10 meter 0.49 m/s TBA- time constraints 0.68 m/s      LACEY 38/56 43/56 45/56      FGA 9/30 15/30 16/30      DGI 8/24 14/24 14/24      MiniBEST 16/28 TBA- time constraints       2MWT 140 ft 230 ft (limited by back and right hip pain) 200 ft      6MWT   515 ft        Access Code: [email or web address]  URL: [email or web address]  Date: 11/06/2024  Prepared by: Celestina Valderrama    Exercises  - Sit to Stand  - 1 x daily - 7 x weekly - 2 sets - 10 reps  - Side Stepping with Counter Support  - 1 x daily - 7 x weekly - 3 sets - 10 reps  - Standing March with Counter Support  - 1 x daily - 7 x weekly - 2 sets - 10 reps - 3 sec hold

## 2024-12-19 ENCOUNTER — HOSPITAL ENCOUNTER (OUTPATIENT)
Dept: NON INVASIVE DIAGNOSTICS | Facility: HOSPITAL | Age: 64
Discharge: HOME | End: 2024-12-19
Payer: COMMERCIAL

## 2024-12-19 ENCOUNTER — HOSPITAL ENCOUNTER (OUTPATIENT)
Dept: RADIOLOGY | Facility: HOSPITAL | Age: 64
Discharge: HOME | End: 2024-12-19
Attending: ORTHOPAEDIC SURGERY
Payer: COMMERCIAL

## 2024-12-19 VITALS
BODY MASS INDEX: 34.95 KG/M2 | WEIGHT: 222.66 LBS | HEIGHT: 67 IN | SYSTOLIC BLOOD PRESSURE: 144 MMHG | HEART RATE: 82 BPM | DIASTOLIC BLOOD PRESSURE: 86 MMHG

## 2024-12-19 DIAGNOSIS — G56.01 CARPAL TUNNEL SYNDROME ON RIGHT: ICD-10-CM

## 2024-12-19 DIAGNOSIS — R60.0 BILATERAL LOWER EXTREMITY EDEMA: ICD-10-CM

## 2024-12-19 PROCEDURE — 93306 TTE W/DOPPLER COMPLETE: CPT

## 2024-12-19 PROCEDURE — 76882 US LMTD JT/FCL EVL NVASC XTR: CPT

## 2024-12-19 PROCEDURE — 93306 TTE W/DOPPLER COMPLETE: CPT | Performed by: INTERNAL MEDICINE

## 2024-12-20 LAB
AORTIC ROOT: 4 CM
APICAL FOUR CHAMBER EJECTION FRACTION: 59 %
ASCENDING AORTA: 3.7 CM
BSA FOR ECHO PROCEDURE: 2.12 M2
E WAVE DECELERATION TIME: 203 MS
E/A RATIO: 0.59
FRACTIONAL SHORTENING: 36 (ref 28–44)
INTERVENTRICULAR SEPTUM IN DIASTOLE (PARASTERNAL SHORT AXIS VIEW): 1.4 CM
INTERVENTRICULAR SEPTUM: 1.4 CM (ref 0.6–1.1)
LAAS-AP2: 23 CM2
LAAS-AP4: 19.4 CM2
LEFT ATRIUM SIZE: 3.2 CM
LEFT ATRIUM VOLUME (MOD BIPLANE): 68 ML
LEFT ATRIUM VOLUME INDEX (MOD BIPLANE): 34 ML/M2
LEFT INTERNAL DIMENSION IN SYSTOLE: 2.3 CM (ref 2.1–4)
LEFT VENTRICULAR INTERNAL DIMENSION IN DIASTOLE: 3.6 CM (ref 3.5–6)
LEFT VENTRICULAR POSTERIOR WALL IN END DIASTOLE: 1.2 CM
LEFT VENTRICULAR STROKE VOLUME: 35 ML
LVSV (TEICH): 35 ML
MV E'TISSUE VEL-LAT: 10 CM/S
MV E'TISSUE VEL-SEP: 6 CM/S
MV PEAK A VEL: 0.63 M/S
MV PEAK E VEL: 37 CM/S
MV STENOSIS PRESSURE HALF TIME: 59 MS
MV VALVE AREA P 1/2 METHOD: 3.73
RIGHT ATRIUM AREA SYSTOLE A4C: 12.7 CM2
RIGHT VENTRICLE ID DIMENSION: 3.3 CM
SINOTUBULAR JUNCTION: 3.2 CM
SL CV LEFT ATRIUM LENGTH A2C: 5.2 CM
SL CV LV EF: 55
SL CV PED ECHO LEFT VENTRICLE DIASTOLIC VOLUME (MOD BIPLANE) 2D: 54 ML
SL CV PED ECHO LEFT VENTRICLE SYSTOLIC VOLUME (MOD BIPLANE) 2D: 19 ML
SL CV SINUS OF VALSALVA 2D: 3.8 CM
STJ: 3.2 CM
TRICUSPID ANNULAR PLANE SYSTOLIC EXCURSION: 1.8 CM

## 2024-12-22 ENCOUNTER — RESULTS FOLLOW-UP (OUTPATIENT)
Age: 64
End: 2024-12-22

## 2024-12-22 DIAGNOSIS — R93.1 ABNORMAL ECHOCARDIOGRAM: Primary | ICD-10-CM

## 2024-12-22 DIAGNOSIS — E87.6 HYPOKALEMIA: ICD-10-CM

## 2024-12-22 NOTE — TELEPHONE ENCOUNTER
Reviewed generally normal echo     Discussed mildly aortic root and valve with recommendation of screening imaging - he is amenable so order placed     Reporting b/l LE edema is stable - noting he has not been wearing compression stockings at home     Endorsing he has stopped taking potassium currently, tolerating spironolactone well    Advised to repeat BMP in 2 weeks

## 2024-12-23 ENCOUNTER — APPOINTMENT (OUTPATIENT)
Facility: CLINIC | Age: 64
End: 2024-12-23
Payer: COMMERCIAL

## 2024-12-30 ENCOUNTER — OFFICE VISIT (OUTPATIENT)
Facility: CLINIC | Age: 64
End: 2024-12-30
Payer: COMMERCIAL

## 2024-12-30 DIAGNOSIS — I63.9 CEREBROVASCULAR ACCIDENT (CVA), UNSPECIFIED MECHANISM (HCC): Primary | ICD-10-CM

## 2024-12-30 DIAGNOSIS — G81.10 SPASTIC HEMIPARESIS AFFECTING NONDOMINANT SIDE (HCC): ICD-10-CM

## 2024-12-30 DIAGNOSIS — R26.89 OTHER ABNORMALITIES OF GAIT AND MOBILITY: Primary | ICD-10-CM

## 2024-12-30 DIAGNOSIS — Z86.73 CHRONIC CEREBROVASCULAR ACCIDENT (CVA): ICD-10-CM

## 2024-12-30 PROCEDURE — 97112 NEUROMUSCULAR REEDUCATION: CPT

## 2024-12-30 NOTE — PROGRESS NOTES
"Daily Note     Today's date: 2024  Patient name: Demetrio Villalobos  : 1960  MRN: [id number]  Referring provider: Lisa Blount DO  Dx:   Encounter Diagnosis     ICD-10-CM    1. Other abnormalities of gait and mobility  R26.89       2. Chronic cerebrovascular accident (CVA)  Z86.73             GOES BY \"WILL\"           Subjective: Patient reports to PT session with no new issues or complaints. Went to see his doctor about his BP and he states he was kept on the same BP medication, but was prescribed a water pill for the swelling his ankle and feet.      Objective: See treatment diary below    BP start of session: 135/95 mmHg (seated, RUE)    NMR (3# L ankle weight):  - STS with medium RR under RLE x 1 minute (2 sets) - Peak RPE: 4-5/10    - Treadmill ambulation (gait  mode) @ 1.0-1.1 mph x 4 minutes - Peak RPE: 4/10   Average step length: L 39 cm R 46 cm   Time spent on each foot: L 46% R 54%    - Treadmill ambulation @ 1.0-1.1 mph + 4% incline; 4 minutes (2 sets) - Peak RPE: 6/10    - Retrowalking, 50 feet x 4 (RPE: 4/10)   - Resisted RLE step forward/back with red t-band, cueing to reduce trunk lean and prevent hip circumduction x 2 minutes        Assessment: Patient tolerated treatment session well today with focus on initiating gait and mobility training within high intensity framework. Continues to demonstrate L hip circumduction and R lateral trunk lean to compensate for decreased L hip flexion and L knee flexion to advance LLE forward. R lateral trunk lean also likely contributes to his R lower back pain.  Improved hamstring activation with retro-walking.Patient will continue to benefit from skilled OPPT services in order to maximize safe functional mobility and reduce risk for falls post-CVA.      Plan: Continue per plan of care.  Progress treatment as tolerated.      *PATIENT ON BETA BLOCKER, USE RPE*     POC expires Unit limit Auth Expiration date PT/OT + Visit Limit? Co-Insurance "   1/22/25 12 visits (sharing with ortho) 1/21/24 BOMN No    12 visits 3/24                          Visit/Unit Tracking  AUTH Status:  Date 12/30 11/21 11/26 12/4 12/11 12/18    Approved Used 1      1 1 1 1 1    Auth #:[id number]    Date Range:12/24-3/24/25  Remaining  11      5 4 3 2 1          Outcome Measures Initial Eval  10/30/2024 & DN 11/6/24 RE  11/26/24 PN  12/11/2024      5xSTS 17.36 sec, no UE 14.25 sec, 0 UE 11.40 sec, 0 UE      TUG 22.75 sec 15.91 sec 13.70 sec      10 meter 0.49 m/s TBA- time constraints 0.68 m/s      LACEY 38/56 43/56 45/56      FGA 9/30 15/30 16/30      DGI 8/24 14/24 14/24      MiniBEST 16/28 TBA- time constraints       2MWT 140 ft 230 ft (limited by back and right hip pain) 200 ft      6MWT   515 ft        Access Code: [email or web address]  URL: [email or web address]  Date: 11/06/2024  Prepared by: Celestina Valderrama    Exercises  - Sit to Stand  - 1 x daily - 7 x weekly - 2 sets - 10 reps  - Side Stepping with Counter Support  - 1 x daily - 7 x weekly - 3 sets - 10 reps  - Standing March with Counter Support  - 1 x daily - 7 x weekly - 2 sets - 10 reps - 3 sec hold

## 2024-12-30 NOTE — PROGRESS NOTES
"Daily Note     Today's date: 2024  Patient name: Demetrio Villalobos  : 1960  MRN: [id number]  Referring provider: Sina Randle MD  Dx:   No diagnosis found.               PLAN OF CARE START: 24  PLAN OF CARE END: 24  FREQUENCY: 2 times per week  PRECAUTIONS RUE (NO FLEX/ABD > 90) sx, HTN    POC expires Auth Status Total   Visits  Start date  Expiration date PT/OT + Visit Limit? Co-Pay    approved   $0                                             Visit/Unit Tracking  AUTH Status:  Date            Visits  Authed: 12 Used 9 1 1 1            Remaining  3 2 1 0                Subjective: \"This is hard\"    Objective: See treatment below.    NMR:  -Simulated drinking 20x with 3lb dumbbell with focus on massed practice elbow flexion/extension, maintaining effective cylindrical grasp.  -D2 flexion with 3lb dumbbell 20x2 with focus on reaching across midline, shoulder abduction, coordination  -Pushups in stance EOM 25x2 with focus on Wbing L UE for proprioceptive input for spasticity mgmt, massed practice elbow flexion/extension  -Scooping and transfer of beans with cup L UE with focus on forearm rotation, maintaining effective cylindrical grasp.  X30 with inc time  -Graded grasp with spray bottle and wiping door with focus on massed grasp/release, proximal control and motor planning at shoulder.  Able to reach to shoulder height actively, requires PRN AAROM from R UE.        Assessment: Tolerated treatment well. Demonstrating improved L UE functional use and coordination, however still markedly impaired. Pt would benefit from continued OT services to address NMR L UE s/p chronic CVA.      Plan: Continued skilled OT per POC.      "

## 2024-12-31 ENCOUNTER — TELEPHONE (OUTPATIENT)
Age: 64
End: 2024-12-31

## 2024-12-31 ENCOUNTER — OFFICE VISIT (OUTPATIENT)
Dept: OBGYN CLINIC | Facility: CLINIC | Age: 64
End: 2024-12-31
Payer: COMMERCIAL

## 2024-12-31 VITALS — BODY MASS INDEX: 34 KG/M2 | WEIGHT: 216.6 LBS | HEIGHT: 67 IN

## 2024-12-31 DIAGNOSIS — G56.01 CARPAL TUNNEL SYNDROME ON RIGHT: Primary | ICD-10-CM

## 2024-12-31 PROCEDURE — 99214 OFFICE O/P EST MOD 30 MIN: CPT | Performed by: ORTHOPAEDIC SURGERY

## 2024-12-31 RX ORDER — CHLORHEXIDINE GLUCONATE ORAL RINSE 1.2 MG/ML
15 SOLUTION DENTAL ONCE
OUTPATIENT
Start: 2024-12-31 | End: 2024-12-31

## 2024-12-31 NOTE — PROGRESS NOTES
Assessment/Plan:  1. Carpal tunnel syndrome on right  Case request operating room: RELEASE CARPAL TUNNEL    Case request operating room: RELEASE CARPAL TUNNEL        Scribe Attestation    I,:  Dieter Singh am acting as a scribe while in the presence of the attending physician.:       I,:  Sina Randle MD personally performed the services described in this documentation    as scribed in my presence.:         Demetrio is a pleasant 64-year-old gentleman who returns today for follow-up evaluation and ultrasound review for his right wrist.  We discussed the results of the ultrasound today which does demonstrate underlying carpal tunnel syndrome. He remains symptomatic of his underlying carpal tunnel syndrome and we discussed treatment options today including corticosteroid injection vs carpal tunnel release.  He has been trying nonoperative treatment with nighttime bracing without much improvement.  He would like this treated definitively after discussion, he elected to proceed with surgery.  We discussed the procedure itself as well as the expected recovery.  He will meet with my surgery scheduler today to pick a date for his procedure and make preoperative arrangements.  All of his questions and concerns were addressed today.  We will see him back at time of surgery.      Given that the patient has failed conservative treatment and continues to have severe pain and/or dysfunction that limits their activities of daily living, they would like to proceed with operative intervention.  We discussed risks, benefits and alternatives to surgery today in the clinic. We discussed with the patient the risks of no treatment, non-operative treatment, and operative treatment. The risks of operative intervention were discussed and include but are not limited to: Infection, bleeding, stiffness, loss of range of motion, blood clot, failure of surgery, continued problems with swelling, injury to surrounding  structures/nerve/artery/vein, recurrence of cysts, pain, dysfunction, or disability despite repair/release.     Specifically, for   Carpal tunnel release: Nerve injury, numbness of the fingers or palm.  Pillar pain.  Risk of recurrence.      Subjective: Follow-up evaluation and ultrasound review for right wrist    Patient ID: Demetrio Villalobos is a 64 y.o. male who returns today for follow-up evaluation and ultrasound review for his right wrist.  The study was ordered to evaluate for carpal tunnel syndrome. He reports persistent numbness and dysesthesia about the right hand and fingers. At times, he experiences pain in the hand and fingers as well. He reports his shoulder is still doing well. He denies any new injury or trauma.     Review of Systems   Constitutional:  Positive for activity change. Negative for chills, fever and unexpected weight change.   HENT:  Negative for hearing loss, nosebleeds and sore throat.    Eyes:  Negative for pain, redness and visual disturbance.   Respiratory:  Negative for cough, shortness of breath and wheezing.    Cardiovascular:  Negative for chest pain, palpitations and leg swelling.   Gastrointestinal:  Negative for abdominal pain, nausea and vomiting.   Endocrine: Negative for polyphagia and polyuria.   Genitourinary:  Negative for dysuria and hematuria.   Musculoskeletal:  Positive for myalgias. Negative for arthralgias and joint swelling.        See HPI   Skin:  Negative for rash and wound.   Neurological:  Negative for dizziness, numbness and headaches.   Psychiatric/Behavioral:  Negative for decreased concentration and suicidal ideas. The patient is not nervous/anxious.          Past Medical History:   Diagnosis Date   • Chronic kidney disease    • Edema    • GERD (gastroesophageal reflux disease)    • Gout     hospitalized   • High cholesterol    • Hypertension    • PVD (peripheral vascular disease) ([facility])    • Renal disorder    • Rotator cuff tear     right   • Shortness of  breath    • Stroke (HCC) 2014   • Ventral hernia without obstruction or gangrene    • Walker as ambulation aid        Past Surgical History:   Procedure Laterality Date   • APPENDECTOMY     • [place] ARTHROPLASTY GLENOHUMERAL JOINT TOTAL SHOULDER Right 8/26/2024    Procedure: ARTHROPLASTY SHOULDER REVERSE;  Surgeon: Sina Randle MD;  Location: [facility];  Service: Orthopedics       History reviewed. No pertinent family history.    Social History     Occupational History   • Not on file   Tobacco Use   • Smoking status: Former     Types: Cigarettes     Passive exposure: Never   • Smokeless tobacco: Never   • Tobacco comments:     Smoker 30 yrs   Vaping Use   • Vaping status: Never Used   Substance and Sexual Activity   • Alcohol use: Never   • Drug use: Never   • Sexual activity: Not Currently     Partners: Female         Current Outpatient Medications:   •  allopurinol (ZYLOPRIM) 100 mg tablet, Take 0.5 tablets (50 mg total) by mouth every morning, Disp: 30 tablet, Rfl: 1  •  atorvastatin (LIPITOR) 40 mg tablet, Take 1 tablet (40 mg total) by mouth daily with dinner, Disp: 30 tablet, Rfl: 1  •  esomeprazole (NexIUM) 40 MG capsule, Take 1 capsule (40 mg total) by mouth in the morning, Disp: 30 capsule, Rfl: 1  •  furosemide (LASIX) 40 mg tablet, Take 1 tablet (40 mg total) by mouth daily, Disp: 90 tablet, Rfl: 3  •  metoprolol succinate (TOPROL-XL) 50 mg 24 hr tablet, Take 1 tablet (50 mg total) by mouth daily with dinner, Disp: 30 tablet, Rfl: 2  •  metoprolol succinate (TOPROL-XL) 50 mg 24 hr tablet, Take 1 tablet (50 mg total) by mouth daily with dinner, Disp: 30 tablet, Rfl: 1  •  Multiple Vitamin (multivitamin) capsule, Take 1 capsule by mouth daily, Disp: , Rfl:   •  NIFEdipine (PROCARDIA XL) 60 mg 24 hr tablet, Take 1 tablet (60 mg total) by mouth daily, Disp: 30 tablet, Rfl: 5  •  polyethylene glycol (GLYCOLAX) 17 GM/SCOOP, , Disp: , Rfl:   •  polyethylene glycol (MIRALAX) 17 g packet, Take 17 g by mouth  daily as needed, Disp: , Rfl:   •  spironolactone (ALDACTONE) 25 mg tablet, Take 0.5 tablets (12.5 mg total) by mouth daily, Disp: 15 tablet, Rfl: 1  •  aspirin 81 mg chewable tablet, Chew 1 tablet (81 mg total) 2 (two) times a day for 28 days Last dose per surgeon to be 8/20/24, Disp: 56 tablet, Rfl: 0  •  Diclofenac Sodium (VOLTAREN) 1 %, Apply 2 g topically 4 (four) times a day (Patient not taking: Reported on 11/20/2024), Disp: 50 g, Rfl: 1    No Known Allergies    Objective:  There were no vitals filed for this visit.    Body mass index is 33.92 kg/m².    Right Hand Exam     Tenderness   The patient is experiencing no tenderness.     Range of Motion   The patient has normal right wrist ROM.     Tests   Tinel's sign (median nerve): positive    Other   Erythema: absent  Sensation: normal  Pulse: present    Comments:  + tinel at the wrist  + compression at the wrist   Skin is warm and dry to touch with no signs of erythema, ecchymosis, or infection  No soft tissue swelling or effusion noted  Full FDS, FDP, extensor mechanisms are intact  No rotational deformity with composite finger flexion  Forearm compartments are soft and supple  2+ distal radial pulse with brisk capillary refill to the fingers  Radial, median, and ulnar motor and sensory distribution intact  Sensations light to touch intact distally            Tests     Right Wrist/Hand   Positive Tinel's sign (medial nerve).       Physical Exam  Vitals and nursing note reviewed.   Constitutional:       Appearance: Normal appearance. He is well-developed.   HENT:      Head: Normocephalic and atraumatic.      Right Ear: External ear normal.      Left Ear: External ear normal.      Nose: Nose normal.   Eyes:      General: No scleral icterus.     Extraocular Movements: Extraocular movements intact.      Conjunctiva/sclera: Conjunctivae normal.   Cardiovascular:      Rate and Rhythm: Normal rate.   Pulmonary:      Effort: Pulmonary effort is normal. No respiratory  distress.   Musculoskeletal:      Cervical back: Normal range of motion and neck supple.      Comments: See Ortho exam   Skin:     General: Skin is warm and dry.   Neurological:      General: No focal deficit present.      Mental Status: He is alert and oriented to person, place, and time.   Psychiatric:         Behavior: Behavior normal.         I have personally reviewed pertinent films in PACS.    Ultrasound of the right wrist obtained on 12/19/2024 reviewed demonstrating evidence of carpal tunnel syndrome.    This document was created using speech voice recognition software.   Grammatical errors, random word insertions, pronoun errors, and incomplete sentences are an occasional consequence of this system due to software limitations, ambient noise, and hardware issues.   Any formal questions or concerns about content, text, or information contained within the body of this dictation should be directly addressed to the provider for clarification.

## 2024-12-31 NOTE — TELEPHONE ENCOUNTER
Caller: Patient    Doctor: eJr    Reason for call: Left a bag w/his sx supplies in the office. Per [employer], bag at . Patient stated friend Ceci Greg will stop by and     Call back#: [phone]   [Blood in urine that you can see] : blood visible in urine [Told you have blood in urine on a urine test] : told blood was present in a urine test [Negative] : Heme/Lymph [see HPI] : see HPI

## 2025-01-02 ENCOUNTER — OFFICE VISIT (OUTPATIENT)
Facility: CLINIC | Age: 65
End: 2025-01-02
Payer: COMMERCIAL

## 2025-01-02 DIAGNOSIS — Z86.73 H/O: CVA (CEREBROVASCULAR ACCIDENT): ICD-10-CM

## 2025-01-02 DIAGNOSIS — I63.9 CEREBROVASCULAR ACCIDENT (CVA), UNSPECIFIED MECHANISM (HCC): Primary | ICD-10-CM

## 2025-01-02 DIAGNOSIS — G81.10 SPASTIC HEMIPARESIS AFFECTING NONDOMINANT SIDE (HCC): ICD-10-CM

## 2025-01-02 PROCEDURE — 97112 NEUROMUSCULAR REEDUCATION: CPT

## 2025-01-02 NOTE — PROGRESS NOTES
"Daily Note     Today's date: 2025  Patient name: Demetrio Villalobos  : 1960  MRN: [id number]  Referring provider: Sina Randle MD  Dx:   Encounter Diagnoses   Name Primary?    Cerebrovascular accident (CVA), unspecified mechanism (HCC) Yes    Spastic hemiparesis affecting nondominant side (HCC)     H/O: CVA (cerebrovascular accident)                   PLAN OF CARE START: 24  PLAN OF CARE END: 24  FREQUENCY: 2 times per week  PRECAUTIONS RUE (NO FLEX/ABD > 90) sx, HTN    POC expires Auth Status Total   Visits  Start date  Expiration date PT/OT + Visit Limit? Co-Pay    approved   $0                                             Visit/Unit Tracking  AUTH Status:  Date            Visits  Authed: 12 Used 9 1 1 1            Remaining  3 2 1 0                Subjective: Pt reports his new years resolution is to be consistent with UE NMR HEP.  \"I am determined now.  Since coming here I feel like I know more and I can do more at home\"    Objective: See treatment below.    NMR:  -Pushups in stance EOM 20x2 with focus on proprioceptive input for spasticity mgmt, massed practice elbow flexion/extension  -Chest pass with basketball performed with use of b/l UE with focus on elbow flexion/extension with forearm in neutral, b/l UE coordination.  Limited by decreased L UE digit flexion.  30x  -Honeybee tree piece retrieval from foam pad anteriorly and placement onto table 90* to L.  FES to wrist/digit extensors.  Focus on pincer grasp/release, FMC.  Requires significantly inc time to attain effective pincer grasp.  25x2  -Foam block stacking with FES to wrist/digit extensors with focus on FMC, pincer vs 3 point pinch/release, motor planning and coordination.  Knocks over stacks and places cubes inaccurately multiple times but able to complete ~10 stacks of 3.  -Connect 4 piece retrieval with pieces presented vertically in foam board with FES to wrist/digit extensors.  " Focus on pincer grasp/release, FMC, reaching.  Requires inc time and 1 rest break    Assessment: Tolerated treatment well. Demonstrating improved L UE functional use and coordination, however still markedly impaired. Pt would benefit from continued OT services to address NMR L UE s/p chronic CVA.      Plan: Continued skilled OT per POC.

## 2025-01-03 DIAGNOSIS — E78.49 OTHER HYPERLIPIDEMIA: ICD-10-CM

## 2025-01-03 DIAGNOSIS — K21.00 GASTROESOPHAGEAL REFLUX DISEASE WITH ESOPHAGITIS, UNSPECIFIED WHETHER HEMORRHAGE: ICD-10-CM

## 2025-01-03 RX ORDER — ATORVASTATIN CALCIUM 40 MG/1
40 TABLET, FILM COATED ORAL
Qty: 30 TABLET | Refills: 1 | Status: SHIPPED | OUTPATIENT
Start: 2025-01-03

## 2025-01-03 RX ORDER — ESOMEPRAZOLE MAGNESIUM 40 MG/1
40 CAPSULE, DELAYED RELEASE ORAL EVERY MORNING
Qty: 30 CAPSULE | Refills: 1 | Status: SHIPPED | OUTPATIENT
Start: 2025-01-03

## 2025-01-08 ENCOUNTER — TELEPHONE (OUTPATIENT)
Age: 65
End: 2025-01-08

## 2025-01-09 ENCOUNTER — OFFICE VISIT (OUTPATIENT)
Facility: CLINIC | Age: 65
End: 2025-01-09
Payer: COMMERCIAL

## 2025-01-09 DIAGNOSIS — I63.9 CEREBROVASCULAR ACCIDENT (CVA), UNSPECIFIED MECHANISM (HCC): Primary | ICD-10-CM

## 2025-01-09 DIAGNOSIS — R26.2 AMBULATORY DYSFUNCTION: ICD-10-CM

## 2025-01-09 DIAGNOSIS — G81.10 SPASTIC HEMIPARESIS AFFECTING NONDOMINANT SIDE (HCC): ICD-10-CM

## 2025-01-09 DIAGNOSIS — Z86.73 H/O: CVA (CEREBROVASCULAR ACCIDENT): ICD-10-CM

## 2025-01-09 PROCEDURE — 97112 NEUROMUSCULAR REEDUCATION: CPT

## 2025-01-09 NOTE — PROGRESS NOTES
"Daily Note     Today's date: 2025  Patient name: Demetrio Villalobos  : 1960  MRN: [id number]  Referring provider: Sina Randle MD  Dx:   No diagnosis found.                 PLAN OF CARE START: 24  PLAN OF CARE END: 24  FREQUENCY: 2 times per week  PRECAUTIONS RUE (NO FLEX/ABD > 90) sx, HTN    POC expires Auth Status Total   Visits  Start date  Expiration date PT/OT + Visit Limit? Co-Pay    approved   $0                                             Visit/Unit Tracking  AUTH Status:  Date            Visits  Authed: 12 Used 9 1 1 1            Remaining  3 2 1 0                Subjective:\"I haven't been able to do this before!\"    Objective: See treatment below.    NMR:  -Pushups in stance at windowsill 25x with focus on proprioceptive input for spasticity mgmt, massed practice elbow flexion/extension  -Shoulder taps in modified plank at windowsill 25x each with focus on Wbing L UE, massed practice elbow flexion/extension and reaching across midline.  -Simulated drinking activity performed 30x with 2lb wrist weight and cup ~1/3 full of water.  Focus on massed practice elbow flexion/extension, maintaining effective cylindrical grasp, maintaining neutral forearm.  Pt requires assist to place cup into hand, but then able to maintain effective grasp without crushing the cup.  Minimal spillage (~10%), FES to biceps and 1 rest break  -Golf helga retrieval and placement into foam mat 15x with focus on 3 point pinch/release, reaching across midline.  100% compensatory use of table for items rotated.  Pt then places golf balls on top of each helga.  Requires inc time and demonstrates mild dysmetria.  Assessment: Tolerated treatment well. Demonstrating improved L UE functional use and coordination, however still markedly impaired. Pt would benefit from continued OT services to address NMR L UE s/p chronic CVA.      Plan: Continued skilled OT per POC.      "

## 2025-01-10 ENCOUNTER — TELEPHONE (OUTPATIENT)
Age: 65
End: 2025-01-10

## 2025-01-10 DIAGNOSIS — R93.1 ABNORMAL ECHOCARDIOGRAM: Primary | ICD-10-CM

## 2025-01-10 NOTE — TELEPHONE ENCOUNTER
MRA CHEST W OR WO CONTRAST was Denied .Please let me know if you plan to appeal the decision or modify your order..  [employer] ([employer]) phone #[phone]

## 2025-01-13 ENCOUNTER — EVALUATION (OUTPATIENT)
Facility: CLINIC | Age: 65
End: 2025-01-13
Payer: COMMERCIAL

## 2025-01-13 ENCOUNTER — OFFICE VISIT (OUTPATIENT)
Facility: CLINIC | Age: 65
End: 2025-01-13
Payer: COMMERCIAL

## 2025-01-13 DIAGNOSIS — Z86.73 CHRONIC CEREBROVASCULAR ACCIDENT (CVA): ICD-10-CM

## 2025-01-13 DIAGNOSIS — G81.10 SPASTIC HEMIPARESIS AFFECTING NONDOMINANT SIDE (HCC): ICD-10-CM

## 2025-01-13 DIAGNOSIS — I63.9 CEREBROVASCULAR ACCIDENT (CVA), UNSPECIFIED MECHANISM (HCC): Primary | ICD-10-CM

## 2025-01-13 DIAGNOSIS — R26.89 OTHER ABNORMALITIES OF GAIT AND MOBILITY: Primary | ICD-10-CM

## 2025-01-13 PROCEDURE — 97530 THERAPEUTIC ACTIVITIES: CPT

## 2025-01-13 NOTE — PROGRESS NOTES
"Daily Note     Today's date: 2025  Patient name: Demetrio Villalobos  : 1960  MRN: [id number]  Referring provider: Sina Randle MD  Dx:   Encounter Diagnoses   Name Primary?    Cerebrovascular accident (CVA), unspecified mechanism (HCC) Yes    Spastic hemiparesis affecting nondominant side (HCC)          Start Time: 1415  Stop Time: 1500  Total time in clinic (min): 45 minutes    PLAN OF CARE START: 24  PLAN OF CARE END: 24  FREQUENCY: 2 times per week  PRECAUTIONS RUE (NO FLEX/ABD > 90) sx, HTN    POC expires Auth Status Total   Visits  Start date  Expiration date PT/OT + Visit Limit? Co-Pay    approved 12   $0                                             Visit/Unit Tracking  AUTH Status:  Date               Visits  Authed: 12 Used 4               Remaining  8                   Subjective:\"my arm is going good\"    Objective: See treatment below.    NMR:  -Pushups in stance at table 2 x 15 with focus on proprioceptive input for spasticity mgmt, massed practice elbow flexion/extension  - completed 2 x 15 reps of functional reaching task with simulated drinking of coke/v8 cans with bringing up to mouth to simulate feeding FES on wrist extensors  -- completed x 25 reps of taking apart socks with shoulder flexion approxmately 80* focusing on FMC, dexterity FES on wrist extensors, bimanual coordination   - completed x 30 reps of simulated cooking task of spices with functional reaching and pronation/supination for placing spices over simulated cooking food focusing on UE coordination, grasp/release, cylindrical grasp.       Assessment: Tolerated treatment well. Demonstrating improved L UE functional use and coordination, however still markedly impaired. Pt would benefit from continued OT services to address NMR L UE s/p chronic CVA.      Plan: Continued skilled OT per POC.      "

## 2025-01-13 NOTE — PROGRESS NOTES
PT Re-Evaluation          POC expires Unit limit Auth Expiration date PT/OT + Visit Limit? Co-Insurance   25 12 visits (sharing with ortho) 24 BOMN No                               Visit/Unit Tracking  AUTH Status:  Date              Approved Used 1 1              Remaining  11 10                        Today's date: 2025  Patient name: Demetrio Villalobos  : 1960  MRN: [id number]  Referring provider: Lisa Blount DO   Dx:   Encounter Diagnosis     ICD-10-CM    1. Other abnormalities of gait and mobility  R26.89       2. Chronic cerebrovascular accident (CVA)  Z86.73             Assessment  Assessment details: Patient is a 64 year old presenting to skilled OPPT for reassessment with complaints of gait, balance, and strength deficits s/p CVA that in turn has limited safe functional mobility at home and in the community. PMH significant for: HTN, CKD, GERD, and CVA. Patient suffered CVA approximately 10 years ago and reports residual weakness on left side. Despite inconsistent attendance over the holidays, patient demonstrated improvements in the following outcome measures since last reassessment: TUG, Meredith, FGA, DGI, miniBEST, and 6 MWT, likely indicating overall gains in safe mobility, dynamic balance, postural stability/reactive balance, and cardiovascular endurance, respectively. Per cutoff scores for the FGA, DGI, and miniBEST he is classified as HIGH risk for falls and per Meredith he is classified as borderline risk for falls. He continues to demonstrate the following gait deficits: decreased hip/knee flexion during swing with associated hip circumduction, slowed samantha, Trendelenburg. Plan to continue to focus on gait training, balance, and strengthen within high intensity parameters. He has not met any additional goals at this time but is progressing well towards remaining. Patient will continue to benefit from  skilled OPPT services in order to maximize safe functional mobility in the home and community setting.       Impairments: Abnormal coordination, Abnormal gait, Abnormal muscle tone, Abnormal or restricted ROM, Activity intolerance, Impaired balance, Impaired physical strength, Lacks appropriate HEP, Poor posture, Poor body mechanics, Pain with function, Safety issue, Weight-bearing intolerance, Abnormal movement, Difficulty understanding, Abnormal muscle firing  Understanding of Dx/Px/POC: Excellent  Prognosis: Excellent      Patient verbalized understanding of POC.         Please contact me if you have any questions or recommendations. Thank you for the referral and the opportunity to share in Demetrio Villalobos's care.        Plan  Plan details: FGA, DGI, miniBEST, 6 MWT; HIGT and HIIT to tolerance  Patient would benefit from: PT Eval, Skilled PT, and OT Eval  Planned modality interventions: Biofeedback, Cryotherapy, TENS, Thermotherapy  Planned therapy interventions: Abdominal trunk stabilization, ADL training, Balance, Balance/WB training, Breathing training, Body mechanics training, Coordination, Functional ROM exercises, Gait training, HEP, Joint Mobilization, Manual Therapy, White taping, Motor coordination training, Neuromuscular re-education, Patient education, Postural training, Strengthening, Stretching, Therapeutic activities, Therapeutic exercises, Therapeutic training, Transfer training, Activity modification, Work reintegration  Frequency: 1-2x/wk  Duration in weeks: 12 weeks  Plan of Care beginning date: 1/13/2025  Plan of Care expiration date: 12 weeks - 4/7/2025  Treatment plan discussed with: Patient         Goals  Short Term Goals (4 weeks):    - Patient will improve time on TUG by 2.9 seconds to facilitate improved safety in all ambulation- MET  - Patient will improve scoring on DGI by 2.6 points to progress safety- MET  - Patient will be independent in basic HEP 2-3 weeks - NOT MET  - Patient  will improve 5xSTS score by 2.3 seconds to promote improved LE functional strength needed for ADLs- MET  - Patient will complete components of CB&M to promote agility necessary for sports related tasks (d/c 12/11/2024)    Long Term Goals (12 weeks):  - Patient will be independent in a comprehensive home exercise program - NOT MET  - Patient will improve gait speed by 0.18 m/s to improve safety with community ambulation - MET  - Patient will improve LACEY by 6 points to facilitate return to safe independent ambulation - MET  - Patient will improve scoring on FGA by 4 points to progress safety with dynamic tasks- MET  - Patient will improve FGA score to >/= 22/30 to classify as decreased risk for falls - NOT MET  - Patient will be able to demonstrate HT in gait without veering - NOT MET  - Patient will improve 6 Minute Walk Test score by 190 feet to promote improved cardiovascular endurance - ONGOING  - Patient will report 50% reduction in near falls in order to improve safety with functional tasks and reduce his risk for falls - NOT MET  - Patient will report going on walks at least 3 days per week to promote independence and improved cardiovascular endurance - NOT MET  - Patient will be able to ascend/descend stairs reciprocally without UE assist to promote independence and safety with ADLs - NOT MET  - Patient will report 50% reduction in near falls when ambulating on uneven terrain - NOT MET      Cut off score    All date taken from [facility] Neuro Section or Rehab Measures      Lacey:  Álvaro et al., 2018  MDC: 2.7 pts    Amanda et al., 2011  Cut-off score: 45/56    Chronic CVA  < 44/56 high risk for falls (Álvaro et al., 2018)  < 47.5/56 slow walker status (Amanda et al., 2011) 5xSTS: Rupal 2010  MDC: 2.3 sec  Age Norms:  60-69: 11.1 sec  70-79: 12.6 sec  80-89: 14.8 sec    Andrea 2010, Chronic Stroke  Chronic CVA: 12 sec   TUG  Addison enrique alTash, 2005  MDC: 2.9 sec    Cut off score:  >13.5 sec [facility]  dwelling adults  >32.2 frail elderly  <20 I for basic transfers  >30 dependent on transfers 10 Meter Walk Test:   Shawanda et al., 2006  Small meaningful change: 0.06 m/s  Substantial meaningful change: 0.14 m/s  MCID: 0.16 m/s    < 0.4 m/s household ambulators  0.4 - 0.8 m/s limited community ambulators  > 0.8 m/s community ambulators   FGA: Rossi et al., 2010  MCID: 4.2 pts  Geriatrics/community < 22/30 fall risk  Geriatrics/community < 20/30 unexplained falls DGI  MDC: vestibular - 4 pts  MDC: geriatric/community - 3 pts  Falls risk <19/24   6 Minute Walk Test  Shawanda et al., 2006  MDC: 60.98 m (200.01 ft)    Farhat Desai, & Ruthie, 2012  MCID: 34.4 m    Age Norms  60-69: M - 1876 ft   F - 1765 ft  70-79: M - 1729 ft   F - 1545 ft  80-89: M - 1368 ft   F - 1286 ft Modified Rosetta  0: No increase in tone  1: Catch and release or min resistance at end range  1+: Catch f/b min resistance throughout remainder (< half ROM)  2: Easily moved, but more marked tone throughout most ROM  3: Significant tone, PROM difficult  4: Rigid   MiniBest: Juanjose et al., 2013  CVA < 17.5 fall risk Pass (Acute CVA)  MDC: 1.8 points (acute), 3.2 points (chronic)         Subjective    History of Present Illness  Mechanism of injury: Patient is a 64 year old presenting to skilled OPPT for reassessment with complaints of gait, balance, and strength deficits s/p CVA. PMH significant for: HTN, CKD, GERD, and CVA. Patient suffered CVA approximately 10 years ago and reports residual weakness on left side (UE and LE), but cannot recall exact location of stroke. He reports he participated in PT/OT/ST services immediately following the stroke, but has not participated in much rehab since. Patient recently underwent (R) reverse total shoulder arthroplasty on 8/26 and is currently following shoulder precautions (no flexion/abduction > 90 degrees). He has HHA that come 25 hours/week (ever day in the AM) that assist with ADL/IADL's.     Update 11/26/24:  "Patient participated in 1xs/week neuro PT for ~ 1 month and reports noting small improvements in balance and endurance; no falls since SOC. Reports back pain is primary barrier to intensity/duration of gait training (still sees ortho PT for back/shoulder)    Updated (2024): Patient reports for reassessment stating he has not yet noted significant improvements in his walking. He is interested in increasing frequency of neuro PT services to further drive improvements. His primary goal is to improve his walking speed and standing tolerance.    Updated (2025): Patient reports for reassessment with no new issues or complaints, states he had to take some time off from PT/OT services over the holidays. Has an upcoming neurology appointment on .    Primary AD: none  Assist level at home: assist for ADL's/IADL's  WC usage: NA    Patient goal: \"I want to get better with walking\"    Pain  Current pain ratin-3/10  Location: R shoulder  Aggravating factors: movement    Social Support  Steps to enter house: none  Stairs in house: none   Lives in: 4 story, 1 floor set up  Lives with: alone    Employment status: retired  Hand dominance: R    Treatments  Previous treatment: PT/OT/ST immediately following stroke  Current treatment: ortho PT, balance eval  Diagnostic Testing: see chart for details      Objective     Vitals  - HR: 74 bpm  - BP: 158/86  - SPO2: 97%    HR Max  - 207 - (0.7x62)  = 164 bpm    LE MMT  - R Hip Flexion: 3/5  - L Hip Flexion: 3-/5  - R Hip Extension: 3/5  - L Hip Extension: 3-/5  - R Hip Abduction: 4+/5 - L Hip abduction: 4-/5  - R Hip adduction: 4+/5 - L Hip adduction: 4-/5  - R Knee Extension: 3/5 - L Knee Extension: 2+/5  - R Knee Flexion: 3/5  - L Knee Flexion: 2+/5  - R Ankle DF: 4/5  - L Ankle DF: 1/5  - R Ankle PF: 3/5  - L Ankle PF: 1/5    Sensation  - Light touch: intact  - Deep pressure: intact    Coordination  - Dysmetria: unable 2/2 LUE hemiplegia  - Dysdiadochokinesia: unable " 2/2 LUE hemiplegia  - Alternating Toe Taps: unable 2/2 LLE hemiplegia    Modified Rosetta  - R knee extension: 0 - no increase in tone  - L knee extension: 1+ - catch followed by minimum resistance throughout remainder (< half ROM)  - R knee flexion: 0 - no increase in tone  - L knee flexion: 1-   - R ankle DF: 0 - no increase in tone   - L ankle DF: 3 - significant tone, PROM difficulty  - R ankle inversion: 0 - no increase in tone  - L ankle inversion: 3 - significant tone, PROM difficulty  - R ankle eversion: 0 - no increase in tone  - L ankle eversion: 3 - significant tone, PROM difficulty    Clonus  - L: No  - R: No  - Fatiguing: NA  - Number of beats: NA    Vision  - Glasses: No  - Abnormalities prior to CVA: No, NA  - Abnormalities post CVA: No, NA    Neglect  - R sided: No  - L sided: No    Pusher's Syndrome  - R sided: No  - L sided: No             Outcome Measures Initial Eval  10/30/2024 & DN 11/6/24 RE  11/26/24 PN  12/11/2024 PN  1/13/2025     5xSTS 17.36 sec, no UE 14.25 sec, 0 UE 11.40 sec, 0 UE 11.65 sec, 0 UE     TUG 22.75 sec 15.91 sec 13.70 sec 12.33 sec     10 meter 0.49 m/s TBA- time constraints 0.68 m/s 0.63 m/s     LACEY 38/56 43/56 45/56 47/56     FGA 9/30 15/30 16/30 17/30     DGI 8/24 14/24 14/24 15/24     MiniBEST 16/28 TBA- time constraints  19/28     2MWT 140 ft 230 ft (limited by back and right hip pain) 200 ft NT     6MWT   515 ft 600 ft         Precautions: CKD, GERD, HTN, PVD, history CVA 10 years ago with L sided hemiplegia  Past Medical History:   Diagnosis Date    Chronic kidney disease     Edema     GERD (gastroesophageal reflux disease)     Gout     hospitalized    High cholesterol     Hypertension     PVD (peripheral vascular disease) (HCC)     Renal disorder     Rotator cuff tear     right    Shortness of breath     Stroke (HCC) 2014    Ventral hernia without obstruction or gangrene     Walker as ambulation aid

## 2025-01-13 NOTE — LETTER
2025    Lisa Blount DO  [address]  [address] [place] [address]    Patient: Demetrio Villalobos   YOB: 1960   Date of Visit: 2025     Encounter Diagnosis     ICD-10-CM    1. Other abnormalities of gait and mobility  R26.89       2. Chronic cerebrovascular accident (CVA)  Z86.73           Dear Dr. Blount:    Thank you for your recent referral of Demetrio Villalobos. Please review the attached evaluation summary from Demetrio's recent visit.     Please verify that you agree with the plan of care by signing the attached order.     If you have any questions or concerns, please do not hesitate to call.     I sincerely appreciate the opportunity to share in the care of one of your patients and hope to have another opportunity to work with you in the near future.       Sincerely,    Celestina Valderrama, PT      Referring Provider:      I certify that I have read the below Plan of Care and certify the need for these services furnished under this plan of treatment while under my care.                    Lisa Blount DO  [address]  [address] [place] [id number]  Via In Basket                                                                             PT Re-Evaluation          POC expires Unit limit Auth Expiration date PT/OT + Visit Limit? Co-Insurance   25 12 visits (sharing with ortho) 24 BOMN No                               Visit/Unit Tracking  AUTH Status:  Date              Approved Used 1 1                11 10                        Today's date: 2025  Patient name: Demetrio Villalobos  : 1960  MRN: [id number]  Referring provider: Lisa Blount DO   Dx:   Encounter Diagnosis     ICD-10-CM    1. Other abnormalities of gait and mobility  R26.89       2. Chronic cerebrovascular accident (CVA)  Z86.73             Assessment  Assessment details: Patient is a 64 year old presenting to skilled OPPT for reassessment with  complaints of gait, balance, and strength deficits s/p CVA that in turn has limited safe functional mobility at home and in the community. PMH significant for: HTN, CKD, GERD, and CVA. Patient suffered CVA approximately 10 years ago and reports residual weakness on left side. Despite inconsistent attendance over the holidays, patient demonstrated improvements in the following outcome measures since last reassessment: TUG, Meredith, FGA, DGI, miniBEST, and 6 MWT, likely indicating overall gains in safe mobility, dynamic balance, postural stability/reactive balance, and cardiovascular endurance, respectively. Per cutoff scores for the FGA, DGI, and miniBEST he is classified as HIGH risk for falls and per Meredith he is classified as borderline risk for falls. He continues to demonstrate the following gait deficits: decreased hip/knee flexion during swing with associated hip circumduction, slowed samantha, Trendelenburg. Plan to continue to focus on gait training, balance, and strengthen within high intensity parameters. He has not met any additional goals at this time but is progressing well towards remaining. Patient will continue to benefit from skilled OPPT services in order to maximize safe functional mobility in the home and community setting.       Impairments: Abnormal coordination, Abnormal gait, Abnormal muscle tone, Abnormal or restricted ROM, Activity intolerance, Impaired balance, Impaired physical strength, Lacks appropriate HEP, Poor posture, Poor body mechanics, Pain with function, Safety issue, Weight-bearing intolerance, Abnormal movement, Difficulty understanding, Abnormal muscle firing  Understanding of Dx/Px/POC: Excellent  Prognosis: Excellent      Patient verbalized understanding of POC.         Please contact me if you have any questions or recommendations. Thank you for the referral and the opportunity to share in Demetrio Villalobos's care.        Plan  Plan details: FGA, DGI, miniBEST, 6 MWT; HIGT and  HIIT to tolerance  Patient would benefit from: PT Eval, Skilled PT, and OT Eval  Planned modality interventions: Biofeedback, Cryotherapy, TENS, Thermotherapy  Planned therapy interventions: Abdominal trunk stabilization, ADL training, Balance, Balance/WB training, Breathing training, Body mechanics training, Coordination, Functional ROM exercises, Gait training, HEP, Joint Mobilization, Manual Therapy, White taping, Motor coordination training, Neuromuscular re-education, Patient education, Postural training, Strengthening, Stretching, Therapeutic activities, Therapeutic exercises, Therapeutic training, Transfer training, Activity modification, Work reintegration  Frequency: 1-2x/wk  Duration in weeks: 12 weeks  Plan of Care beginning date: 1/13/2025  Plan of Care expiration date: 12 weeks - 4/7/2025  Treatment plan discussed with: Patient         Goals  Short Term Goals (4 weeks):    - Patient will improve time on TUG by 2.9 seconds to facilitate improved safety in all ambulation- MET  - Patient will improve scoring on DGI by 2.6 points to progress safety- MET  - Patient will be independent in basic HEP 2-3 weeks - NOT MET  - Patient will improve 5xSTS score by 2.3 seconds to promote improved LE functional strength needed for ADLs- MET  - Patient will complete components of CB&M to promote agility necessary for sports related tasks (d/c 12/11/2024)    Long Term Goals (12 weeks):  - Patient will be independent in a comprehensive home exercise program - NOT MET  - Patient will improve gait speed by 0.18 m/s to improve safety with community ambulation - MET  - Patient will improve LACEY by 6 points to facilitate return to safe independent ambulation - MET  - Patient will improve scoring on FGA by 4 points to progress safety with dynamic tasks- MET  - Patient will improve FGA score to >/= 22/30 to classify as decreased risk for falls - NOT MET  - Patient will be able to demonstrate HT in gait without veering - NOT  MET  - Patient will improve 6 Minute Walk Test score by 190 feet to promote improved cardiovascular endurance - ONGOING  - Patient will report 50% reduction in near falls in order to improve safety with functional tasks and reduce his risk for falls - NOT MET  - Patient will report going on walks at least 3 days per week to promote independence and improved cardiovascular endurance - NOT MET  - Patient will be able to ascend/descend stairs reciprocally without UE assist to promote independence and safety with ADLs - NOT MET  - Patient will report 50% reduction in near falls when ambulating on uneven terrain - NOT MET      Cut off score    All date taken from [facility] Neuro Section or Rehab Measures      Meredith:  Álvaro et al., 2018  MDC: 2.7 pts    Amanda et al., 2011  Cut-off score: 45/56    Chronic CVA  < 44/56 high risk for falls (Álvaro et al., 2018)  < 47.5/56 slow walker status (Amanda enrique al., 2011) 5xSTS: Rupal 2010  MDC: 2.3 sec  Age Norms:  60-69: 11.1 sec  70-79: 12.6 sec  80-89: 14.8 sec    Andrea 2010, Chronic Stroke  Chronic CVA: 12 sec   TUG  Addison et al., 2005  MDC: 2.9 sec    Cut off score:  >13.5 sec community dwelling adults  >32.2 frail elderly  <20 I for basic transfers  >30 dependent on transfers 10 Meter Walk Test:   Shawanda et al., 2006  Small meaningful change: 0.06 m/s  Substantial meaningful change: 0.14 m/s  MCID: 0.16 m/s    < 0.4 m/s household ambulators  0.4 - 0.8 m/s limited community ambulators  > 0.8 m/s community ambulators   FGA: Rossi et al., 2010  MCID: 4.2 pts  Geriatrics/community < 22/30 fall risk  Geriatrics/community < 20/30 unexplained falls DGI  MDC: vestibular - 4 pts  MDC: geriatric/community - 3 pts  Falls risk <19/24   6 Minute Walk Test  Shawanda et al., 2006  MDC: 60.98 m (200.01 ft)    Farhat Desai, & Ruthie, 2012  MCID: 34.4 m    Age Norms  60-69: M - 1876 ft   F - 1765 ft  70-79: M - 1729 ft   F - 1545 ft  80-89: M - 1368 ft   F - 1286 ft Modified Rosetta  0: No  increase in tone  1: Catch and release or min resistance at end range  1+: Catch f/b min resistance throughout remainder (< half ROM)  2: Easily moved, but more marked tone throughout most ROM  3: Significant tone, PROM difficult  4: Rigid   MiniBest: Juanjose et al., 2013  CVA < 17.5 fall risk Pass (Acute CVA)  MDC: 1.8 points (acute), 3.2 points (chronic)         Subjective    History of Present Illness  Mechanism of injury: Patient is a 64 year old presenting to skilled OPPT for reassessment with complaints of gait, balance, and strength deficits s/p CVA. PMH significant for: HTN, CKD, GERD, and CVA. Patient suffered CVA approximately 10 years ago and reports residual weakness on left side (UE and LE), but cannot recall exact location of stroke. He reports he participated in PT/OT/ST services immediately following the stroke, but has not participated in much rehab since. Patient recently underwent (R) reverse total shoulder arthroplasty on 8/26 and is currently following shoulder precautions (no flexion/abduction > 90 degrees). He has HHA that come 25 hours/week (ever day in the AM) that assist with ADL/IADL's.     Update 11/26/24: Patient participated in 1xs/week neuro PT for ~ 1 month and reports noting small improvements in balance and endurance; no falls since SOC. Reports back pain is primary barrier to intensity/duration of gait training (still sees ortho PT for back/shoulder)    Updated (12/11/2024): Patient reports for reassessment stating he has not yet noted significant improvements in his walking. He is interested in increasing frequency of neuro PT services to further drive improvements. His primary goal is to improve his walking speed and standing tolerance.    Updated (1/13/2025): Patient reports for reassessment with no new issues or complaints, states he had to take some time off from PT/OT services over the holidays. Has an upcoming neurology appointment on 1/28.    Primary AD: none  Assist level at  "home: assist for ADL's/IADL's  WC usage: NA    Patient goal: \"I want to get better with walking\"    Pain  Current pain ratin-3/10  Location: R shoulder  Aggravating factors: movement    Social Support  Steps to enter house: none  Stairs in house: none   Lives in: 4 story, 1 floor set up  Lives with: alone    Employment status: retired  Hand dominance: R    Treatments  Previous treatment: PT/OT/ST immediately following stroke  Current treatment: ortho PT, balance eval  Diagnostic Testing: see chart for details      Objective     Vitals  - HR: 74 bpm  - BP: 158/86  - SPO2: 97%    HR Max  - 207 - (0.7x62)  = 164 bpm    LE MMT  - R Hip Flexion: 3/5  - L Hip Flexion: 3-/5  - R Hip Extension: 3/5  - L Hip Extension: 3-/5  - R Hip Abduction: 4+/5 - L Hip abduction: 4-/5  - R Hip adduction: 4+/5 - L Hip adduction: 4-/5  - R Knee Extension: 3/5 - L Knee Extension: 2+/5  - R Knee Flexion: 3/5  - L Knee Flexion: 2+/5  - R Ankle DF: 4/5  - L Ankle DF: 1/5  - R Ankle PF: 3/5  - L Ankle PF: 1/5    Sensation  - Light touch: intact  - Deep pressure: intact    Coordination  - Dysmetria: unable 2/2 LUE hemiplegia  - Dysdiadochokinesia: unable 2/2 LUE hemiplegia  - Alternating Toe Taps: unable 2/2 LLE hemiplegia    Modified Rosetta  - R knee extension: 0 - no increase in tone  - L knee extension: 1+ - catch followed by minimum resistance throughout remainder (< half ROM)  - R knee flexion: 0 - no increase in tone  - L knee flexion: 1-   - R ankle DF: 0 - no increase in tone   - L ankle DF: 3 - significant tone, PROM difficulty  - R ankle inversion: 0 - no increase in tone  - L ankle inversion: 3 - significant tone, PROM difficulty  - R ankle eversion: 0 - no increase in tone  - L ankle eversion: 3 - significant tone, PROM difficulty    Clonus  - L: No  - R: No  - Fatiguing: NA  - Number of beats: NA    Vision  - Glasses: No  - Abnormalities prior to CVA: No, NA  - Abnormalities post CVA: No, NA    Neglect  - R sided: No  - L " sided: No    Pusher's Syndrome  - R sided: No  - L sided: No             Outcome Measures Initial Eval  10/30/2024 & DN 11/6/24 RE  11/26/24 PN  12/11/2024 PN  1/13/2025     5xSTS 17.36 sec, no UE 14.25 sec, 0 UE 11.40 sec, 0 UE 11.65 sec, 0 UE     TUG 22.75 sec 15.91 sec 13.70 sec 12.33 sec     10 meter 0.49 m/s TBA- time constraints 0.68 m/s 0.63 m/s     LACEY 38/56 43/56 45/56 47/56     FGA 9/30 15/30 16/30 17/30     DGI 8/24 14/24 14/24 15/24     MiniBEST 16/28 TBA- time constraints  19/28     2MWT 140 ft 230 ft (limited by back and right hip pain) 200 ft NT     6MWT   515 ft 600 ft         Precautions: CKD, GERD, HTN, PVD, history CVA 10 years ago with L sided hemiplegia  Past Medical History:   Diagnosis Date   • Chronic kidney disease    • Edema    • GERD (gastroesophageal reflux disease)    • Gout     hospitalized   • High cholesterol    • Hypertension    • PVD (peripheral vascular disease) ([facility])    • Renal disorder    • Rotator cuff tear     right   • Shortness of breath    • Stroke ([facility]) 2014   • Ventral hernia without obstruction or gangrene    • Walker as ambulation aid

## 2025-01-15 ENCOUNTER — OFFICE VISIT (OUTPATIENT)
Facility: CLINIC | Age: 65
End: 2025-01-15
Payer: COMMERCIAL

## 2025-01-15 DIAGNOSIS — I63.9 CEREBROVASCULAR ACCIDENT (CVA), UNSPECIFIED MECHANISM (HCC): Primary | ICD-10-CM

## 2025-01-15 DIAGNOSIS — Z86.73 CHRONIC CEREBROVASCULAR ACCIDENT (CVA): ICD-10-CM

## 2025-01-15 DIAGNOSIS — Z86.73 H/O: CVA (CEREBROVASCULAR ACCIDENT): ICD-10-CM

## 2025-01-15 DIAGNOSIS — G81.10 SPASTIC HEMIPARESIS AFFECTING NONDOMINANT SIDE (HCC): ICD-10-CM

## 2025-01-15 DIAGNOSIS — R26.89 OTHER ABNORMALITIES OF GAIT AND MOBILITY: Primary | ICD-10-CM

## 2025-01-15 PROCEDURE — 97112 NEUROMUSCULAR REEDUCATION: CPT

## 2025-01-15 NOTE — PROGRESS NOTES
"Daily Note     Today's date: 1/15/2025  Patient name: Demetrio Villalobos  : 1960  MRN: [id number]  Referring provider: Lisa Blount DO  Dx:   Encounter Diagnosis     ICD-10-CM    1. Other abnormalities of gait and mobility  R26.89       2. Chronic cerebrovascular accident (CVA)  Z86.73           Patient treated by MARIKA Sam under supervision from this PT. We discussed the case to ensure appropriate continuation and progression of care and I reviewed the documentation.     GOES BY \"WILL\"  Start Time: 1500  Stop Time: 1545  Total time in clinic (min): 45 minutes    Subjective: Patient reports to PT session with no new issues or complaints. Pt notes continued low back pain that is chronic in nature with pt noting that walking and standing activities are most bothersome for the back. Pt notes relief with sitting with pt reporting 0/10 low back pain at the moment.      Objective: See treatment diary below    BP start of session: 140/90 mmHg (seated, RUE)    NMR (3# L ankle weight):  - STS with medium RR under RLE, 50 seconds - Peak RPE: 3/10    - Treadmill ambulation (gait  mode) @ 1.1 mph x 4 minutes - Peak RPE: 4/10   Average step length: L 41 cm R 47 cm   Time spent on each foot: L 47% R 53%    -6in step ups b/l 2x12 (RPE 3-4/10)    -Clinic ambulation #5 tidal tank carry in L hand (1:45 interval x 2)    TE: Lumbar PB rollout :10x10      Assessment: Patient tolerated today's session well. Continued today's session with high intensity LE endurance and gait training. Pt required frequent rest breaks during LE endurance exercises such as STS and step ups, indicating persistent mm endurance deficits that future sessions should continue to address. Added lumbar PB rollout today due to pt reports of increased low back pain following standing/walking exercises with pt noting relief in low back pain following PB rollout. Patient will continue to benefit from skilled OPPT services in order to " maximize safe functional mobility and reduce risk for falls post-CVA.      Plan: Continue per plan of care.  Progress treatment as tolerated.      *PATIENT ON BETA BLOCKER, USE RPE*     POC expires Unit limit Auth Expiration date PT/OT + Visit Limit? Co-Insurance   1/22/25 12 visits (sharing with ortho) 1/21/24 BOMN No    12 visits 3/24                            Visit/Unit Tracking  AUTH Status:  Date 12/30 1/13 1/15            Approved Used 1 1 1             Remaining  11 10 9                  Outcome Measures Initial Eval  10/30/2024 & DN 11/6/24 RE  11/26/24 PN  12/11/2024      5xSTS 17.36 sec, no UE 14.25 sec, 0 UE 11.40 sec, 0 UE      TUG 22.75 sec 15.91 sec 13.70 sec      10 meter 0.49 m/s TBA- time constraints 0.68 m/s      LACEY 38/56 43/56 45/56      FGA 9/30 15/30 16/30      DGI 8/24 14/24 14/24      MiniBEST 16/28 TBA- time constraints       2MWT 140 ft 230 ft (limited by back and right hip pain) 200 ft      6MWT   515 ft        Access Code: [email or web address]  URL: [email or web address]  Date: 11/06/2024  Prepared by: Celestina Valderrama    Exercises  - Sit to Stand  - 1 x daily - 7 x weekly - 2 sets - 10 reps  - Side Stepping with Counter Support  - 1 x daily - 7 x weekly - 3 sets - 10 reps  - Standing March with Counter Support  - 1 x daily - 7 x weekly - 2 sets - 10 reps - 3 sec hold

## 2025-01-15 NOTE — PROGRESS NOTES
"Daily Note     Today's date: 1/15/2025  Patient name: Demetrio Villalobos  : 1960  MRN: [id number]  Referring provider: Sina Randle MD  Dx:   Encounter Diagnoses   Name Primary?    Cerebrovascular accident (CVA), unspecified mechanism (HCC) Yes    Spastic hemiparesis affecting nondominant side (HCC)     H/O: CVA (cerebrovascular accident)            Start Time: 1415  Stop Time: 1500  Total time in clinic (min): 45 minutes    PLAN OF CARE START: 24  PLAN OF CARE END: 24  FREQUENCY: 2 times per week  PRECAUTIONS RUE (NO FLEX/ABD > 90) sx, HTN    POC expires Auth Status Total   Visits  Start date  Expiration date PT/OT + Visit Limit? Co-Pay    approved   $0                                             Visit/Unit Tracking  AUTH Status:  Date               Visits  Authed: 12 Used 4               Remaining  8                   Subjective:\" Pt reports he continues to do HEP at home and has been seeing consistent progress with UE    Objective: See treatment below.    NMR:  -Hammer rotation  -Jenga stacking activity performed with FES to wrist/digit extensors.  Focus on pincer grasp/release, rotation of items in hand, proprioception, kinesthesia.  Requires PRN compensatory use of table for manipulation of items and inc time.  Attempted digit isolation to push out one piece at a time, however unable.  Therapist trialed pushing out 2 and having pt use pincer grasp to remove with inc time  -sock pull apart activity performed with FES to wrist/digit extensors.  Retrieval from tabletop, use of b/l UE to pull apart, L UE to place into ~8 inch tall container on tabletop.  Focus on lateral pinch/release, bimanual coordination, shoulder flexion and reaching.  Requires 2 rest breaks to complete ~20x      Assessment: Tolerated treatment well. Demonstrating improved L UE functional use and coordination, however still markedly impaired. Pt would benefit from continued OT services to address NMR " L UE s/p chronic CVA.      Plan: Continued skilled OT per POC.

## 2025-01-16 ENCOUNTER — OFFICE VISIT (OUTPATIENT)
Facility: CLINIC | Age: 65
End: 2025-01-16
Payer: COMMERCIAL

## 2025-01-16 DIAGNOSIS — G81.10 SPASTIC HEMIPARESIS AFFECTING NONDOMINANT SIDE (HCC): ICD-10-CM

## 2025-01-16 DIAGNOSIS — Z86.73 H/O: CVA (CEREBROVASCULAR ACCIDENT): ICD-10-CM

## 2025-01-16 DIAGNOSIS — I63.9 CEREBROVASCULAR ACCIDENT (CVA), UNSPECIFIED MECHANISM (HCC): Primary | ICD-10-CM

## 2025-01-16 PROCEDURE — 97112 NEUROMUSCULAR REEDUCATION: CPT

## 2025-01-16 NOTE — PROGRESS NOTES
"Daily Note     Today's date: 2025  Patient name: Demetrio Villalobos  : 1960  MRN: [id number]  Referring provider: Sina Randle MD  Dx:   Encounter Diagnoses   Name Primary?    Cerebrovascular accident (CVA), unspecified mechanism (HCC) Yes    Spastic hemiparesis affecting nondominant side (HCC)     H/O: CVA (cerebrovascular accident)                         PLAN OF CARE START: 24  PLAN OF CARE END: 24  FREQUENCY: 2 times per week  PRECAUTIONS RUE (NO FLEX/ABD > 90) sx, HTN    POC expires Auth Status Total   Visits  Start date  Expiration date PT/OT + Visit Limit? Co-Pay    approved   $0                                             Visit/Unit Tracking  AUTH Status:  Date              Visits  Authed: 12 Used 4 1              Remaining  8 6                  Subjective: \"I told my friend, I've been to [facility], I've been to [facility], but it's nothing like here.  I have great teachers here\"  Objective: See treatment below.    NMR:  -Bicep curls with use of 10lb tidal tank with focus on massed practice elbow flexion/extension with forearm in neutral  -Coffee pot bean pouring with 2lb wrist weight.  Focus on sustained elbow flexion, attaining and maintaining effective cylindrical grasp, release of grasp, wrist ulnar and radial deviation  -Retrieval of paper cups with beans inside and pouring into container with L UE.  FES to wrist/digit extensors.  Focus on cylindrical grasp/release, forearm rotation.  Pt then stacks cups with stack stabilized in R UE.  15x with 2 rest breaks  -Connect 4 piece retrieval from foam board anteriorly and placement into basin.  Focus on FMC, pincer grasp, reaching.  Requires 2 rest breaks, inc time      Assessment: Tolerated treatment well. Demonstrating improved L UE functional use and coordination, however still markedly impaired. Pt would benefit from continued OT services to address NMR L UE s/p chronic CVA.      Plan: Continued " skilled OT per POC.

## 2025-01-21 DIAGNOSIS — Z87.39 HISTORY OF GOUT: ICD-10-CM

## 2025-01-21 RX ORDER — ALLOPURINOL 100 MG/1
TABLET ORAL
Qty: 30 TABLET | Refills: 1 | Status: SHIPPED | OUTPATIENT
Start: 2025-01-21 | End: 2025-01-24 | Stop reason: SDUPTHER

## 2025-01-23 ENCOUNTER — TRANSCRIBE ORDERS (OUTPATIENT)
Dept: LAB | Facility: CLINIC | Age: 65
End: 2025-01-23

## 2025-01-23 ENCOUNTER — TELEPHONE (OUTPATIENT)
Dept: GASTROENTEROLOGY | Facility: CLINIC | Age: 65
End: 2025-01-23

## 2025-01-23 ENCOUNTER — OFFICE VISIT (OUTPATIENT)
Age: 65
End: 2025-01-23
Payer: COMMERCIAL

## 2025-01-23 ENCOUNTER — APPOINTMENT (OUTPATIENT)
Dept: LAB | Facility: CLINIC | Age: 65
End: 2025-01-23
Payer: COMMERCIAL

## 2025-01-23 VITALS
DIASTOLIC BLOOD PRESSURE: 80 MMHG | BODY MASS INDEX: 35.6 KG/M2 | WEIGHT: 226.8 LBS | SYSTOLIC BLOOD PRESSURE: 120 MMHG | HEIGHT: 67 IN | HEART RATE: 75 BPM

## 2025-01-23 DIAGNOSIS — Z12.11 ENCOUNTER FOR COLORECTAL CANCER SCREENING: ICD-10-CM

## 2025-01-23 DIAGNOSIS — Z12.12 ENCOUNTER FOR COLORECTAL CANCER SCREENING: ICD-10-CM

## 2025-01-23 DIAGNOSIS — E87.6 HYPOKALEMIA: ICD-10-CM

## 2025-01-23 DIAGNOSIS — K21.9 GASTROESOPHAGEAL REFLUX DISEASE, UNSPECIFIED WHETHER ESOPHAGITIS PRESENT: Primary | ICD-10-CM

## 2025-01-23 LAB
ANION GAP SERPL CALCULATED.3IONS-SCNC: 8 MMOL/L (ref 4–13)
BUN SERPL-MCNC: 27 MG/DL (ref 5–25)
CALCIUM SERPL-MCNC: 9.6 MG/DL (ref 8.4–10.2)
CHLORIDE SERPL-SCNC: 101 MMOL/L (ref 96–108)
CO2 SERPL-SCNC: 28 MMOL/L (ref 21–32)
CREAT SERPL-MCNC: 2.16 MG/DL (ref 0.6–1.3)
GFR SERPL CREATININE-BSD FRML MDRD: 31 ML/MIN/1.73SQ M
GLUCOSE SERPL-MCNC: 112 MG/DL (ref 65–140)
POTASSIUM SERPL-SCNC: 4 MMOL/L (ref 3.5–5.3)
SODIUM SERPL-SCNC: 137 MMOL/L (ref 135–147)

## 2025-01-23 PROCEDURE — 36415 COLL VENOUS BLD VENIPUNCTURE: CPT

## 2025-01-23 PROCEDURE — 99243 OFF/OP CNSLTJ NEW/EST LOW 30: CPT | Performed by: NURSE PRACTITIONER

## 2025-01-23 PROCEDURE — 80048 BASIC METABOLIC PNL TOTAL CA: CPT

## 2025-01-23 RX ORDER — SODIUM CHLORIDE, SODIUM LACTATE, POTASSIUM CHLORIDE, CALCIUM CHLORIDE 600; 310; 30; 20 MG/100ML; MG/100ML; MG/100ML; MG/100ML
125 INJECTION, SOLUTION INTRAVENOUS CONTINUOUS
OUTPATIENT
Start: 2025-01-27

## 2025-01-23 NOTE — H&P (VIEW-ONLY)
Name: Demetrio Villalobos      : 1960      MRN: [id number]  Encounter Provider: FAYE Cloud  Encounter Date: 2025   Encounter department: [facility]  :  Assessment & Plan  Encounter for colorectal cancer screening  Patient presents for colon rectal cancer screening.  Patient is age 64 and never had a colonoscopy to screen for colon cancer.  Patient reports he was previously scheduled but then COVID occurred and his procedure had to be canceled.    Orders:    Ambulatory Referral to Gastroenterology    polyethylene glycol (GOLYTELY) 4000 mL solution; Take 4,000 mL by mouth once for 1 dose Take as directed by office prior to procedure    Colonoscopy; Future    Gastroesophageal reflux disease, unspecified whether esophagitis present  Has history of GERD.  Patient reports GERD symptoms are well-controlled on current medication of esomeprazole. Esomeprazole is managed by his PCP.    -Continue esomeprazole           History of Present Illness   HPI  Demetrio Villalobos is a 64 y.o. male with past medical history of HTN, PAD, CKD D stage III, GERD, right rotator cuff tear, CVA, with left-sided weakness, HLD who presents as consult.  Patient reports history of GERD.  Patient reports that GERD is currently well-controlled on current medication.  Patient denies nausea, vomiting, acid reflux, heartburn, dysphagia, epigastric or abdominal pain.  Patient denies blood in stool, blood from rectal area, or black tarry stool.  Bowel patterns are regular.  Consistency of stool varies.  Patient denies any diarrhea or constipation.  Abdomen exam benign no abdominal tenderness or guarding.    Patient does not smoke.  Patient does not drink alcohol.  Denies illicit drug or marijuana use.  No family history of stomach or colon cancer.  No previous EGD or colonoscopy.  Patient is on no blood thinners or antiplatelet medication except for aspirin 81 mg daily.        Review of Systems    Constitutional:  Negative for chills and fever.   HENT:  Negative for ear pain and sore throat.    Eyes:  Negative for pain and visual disturbance.   Respiratory:  Negative for cough and shortness of breath.    Cardiovascular:  Negative for chest pain and palpitations.   Gastrointestinal:  Negative for abdominal pain and vomiting.   Genitourinary:  Negative for dysuria and hematuria.   Musculoskeletal:  Negative for arthralgias and back pain.   Skin:  Negative for color change and rash.   Neurological:  Negative for seizures and syncope.   All other systems reviewed and are negative.    Medical History Reviewed by provider this encounter:  Tobacco  Allergies  Meds  Problems  Med Hx  Surg Hx  Fam Hx     .  Past Medical History   Past Medical History:   Diagnosis Date    Chronic kidney disease     Edema     GERD (gastroesophageal reflux disease)     Gout     hospitalized    High cholesterol     Hypertension     PVD (peripheral vascular disease) ([facility])     Renal disorder     Rotator cuff tear     right    Shortness of breath     Stroke ([facility]) 2014    Ventral hernia without obstruction or gangrene     Walker as ambulation aid      Past Surgical History:   Procedure Laterality Date    APPENDECTOMY      CA ARTHROPLASTY GLENOHUMERAL JOINT TOTAL SHOULDER Right 8/26/2024    Procedure: ARTHROPLASTY SHOULDER REVERSE;  Surgeon: Sina Randle MD;  Location: [facility];  Service: Orthopedics     History reviewed. No pertinent family history.   reports that he has quit smoking. His smoking use included cigarettes. He has never been exposed to tobacco smoke. He has never used smokeless tobacco. He reports that he does not drink alcohol and does not use drugs.  Current Outpatient Medications on File Prior to Visit   Medication Sig Dispense Refill    allopurinol (ZYLOPRIM) 100 mg tablet TAKE 0.5 TABLETS BY MOUTH EVERY MORNING 30 tablet 1    aspirin 81 mg chewable tablet Chew 1 tablet (81 mg total) 2 (two) times a day  for 28 days Last dose per surgeon to be 8/20/24 56 tablet 0    atorvastatin (LIPITOR) 40 mg tablet TAKE 1 TABLET BY MOUTH DAILY WITH DINNER 30 tablet 1    esomeprazole (NexIUM) 40 MG capsule TAKE 1 CAPSULE BY MOUTH IN THE MORNING 30 capsule 1    furosemide (LASIX) 40 mg tablet Take 1 tablet (40 mg total) by mouth daily 90 tablet 3    metoprolol succinate (TOPROL-XL) 50 mg 24 hr tablet Take 1 tablet (50 mg total) by mouth daily with dinner 30 tablet 2    Multiple Vitamin (multivitamin) capsule Take 1 capsule by mouth daily      NIFEdipine (PROCARDIA XL) 60 mg 24 hr tablet Take 1 tablet (60 mg total) by mouth daily 30 tablet 5    spironolactone (ALDACTONE) 25 mg tablet Take 0.5 tablets (12.5 mg total) by mouth daily 15 tablet 1    [DISCONTINUED] metoprolol succinate (TOPROL-XL) 50 mg 24 hr tablet Take 1 tablet (50 mg total) by mouth daily with dinner 30 tablet 1    [DISCONTINUED] Diclofenac Sodium (VOLTAREN) 1 % Apply 2 g topically 4 (four) times a day (Patient not taking: Reported on 11/20/2024) 50 g 1    [DISCONTINUED] polyethylene glycol (GLYCOLAX) 17 GM/SCOOP  (Patient not taking: Reported on 1/23/2025)      [DISCONTINUED] polyethylene glycol (MIRALAX) 17 g packet Take 17 g by mouth daily as needed (Patient not taking: Reported on 1/23/2025)       No current facility-administered medications on file prior to visit.   No Known Allergies   Current Outpatient Medications on File Prior to Visit   Medication Sig Dispense Refill    allopurinol (ZYLOPRIM) 100 mg tablet TAKE 0.5 TABLETS BY MOUTH EVERY MORNING 30 tablet 1    aspirin 81 mg chewable tablet Chew 1 tablet (81 mg total) 2 (two) times a day for 28 days Last dose per surgeon to be 8/20/24 56 tablet 0    atorvastatin (LIPITOR) 40 mg tablet TAKE 1 TABLET BY MOUTH DAILY WITH DINNER 30 tablet 1    esomeprazole (NexIUM) 40 MG capsule TAKE 1 CAPSULE BY MOUTH IN THE MORNING 30 capsule 1    furosemide (LASIX) 40 mg tablet Take 1 tablet (40 mg total) by mouth daily 90  "tablet 3    metoprolol succinate (TOPROL-XL) 50 mg 24 hr tablet Take 1 tablet (50 mg total) by mouth daily with dinner 30 tablet 2    Multiple Vitamin (multivitamin) capsule Take 1 capsule by mouth daily      NIFEdipine (PROCARDIA XL) 60 mg 24 hr tablet Take 1 tablet (60 mg total) by mouth daily 30 tablet 5    spironolactone (ALDACTONE) 25 mg tablet Take 0.5 tablets (12.5 mg total) by mouth daily 15 tablet 1    [DISCONTINUED] metoprolol succinate (TOPROL-XL) 50 mg 24 hr tablet Take 1 tablet (50 mg total) by mouth daily with dinner 30 tablet 1    [DISCONTINUED] Diclofenac Sodium (VOLTAREN) 1 % Apply 2 g topically 4 (four) times a day (Patient not taking: Reported on 11/20/2024) 50 g 1    [DISCONTINUED] polyethylene glycol (GLYCOLAX) 17 GM/SCOOP  (Patient not taking: Reported on 1/23/2025)      [DISCONTINUED] polyethylene glycol (MIRALAX) 17 g packet Take 17 g by mouth daily as needed (Patient not taking: Reported on 1/23/2025)       No current facility-administered medications on file prior to visit.      Social History     Tobacco Use    Smoking status: Former     Types: Cigarettes     Passive exposure: Never    Smokeless tobacco: Never    Tobacco comments:     Smoker 30 yrs   Vaping Use    Vaping status: Never Used   Substance and Sexual Activity    Alcohol use: Never    Drug use: Never    Sexual activity: Not Currently     Partners: Female        Objective   /80 (BP Location: Right arm, Patient Position: Sitting, Cuff Size: Standard)   Pulse 75   Ht 5' 7\" (1.702 m)   Wt 103 kg (226 lb 12.8 oz)   BMI 35.52 kg/m²      Physical Exam  Vitals and nursing note reviewed.   Constitutional:       General: He is not in acute distress.     Appearance: He is well-developed.   HENT:      Head: Normocephalic and atraumatic.   Eyes:      Conjunctiva/sclera: Conjunctivae normal.   Cardiovascular:      Rate and Rhythm: Normal rate and regular rhythm.      Pulses: Normal pulses.      Heart sounds: Normal heart sounds. No " murmur heard.  Pulmonary:      Effort: Pulmonary effort is normal. No respiratory distress.      Breath sounds: Normal breath sounds. No stridor. No wheezing, rhonchi or rales.   Abdominal:      General: Bowel sounds are normal. There is no distension.      Palpations: Abdomen is soft. There is no mass.      Tenderness: There is no abdominal tenderness. There is no guarding or rebound.   Musculoskeletal:         General: No swelling.      Cervical back: Neck supple.      Right lower leg: No edema.      Left lower leg: No edema.   Skin:     General: Skin is warm and dry.      Capillary Refill: Capillary refill takes less than 2 seconds.      Coloration: Skin is not jaundiced or pale.   Neurological:      Mental Status: He is alert and oriented to person, place, and time.      Motor: Weakness present.      Comments: Left-sided weakness.   Psychiatric:         Mood and Affect: Mood normal.

## 2025-01-23 NOTE — PROGRESS NOTES
Name: Demetrio Villalobos      : 1960      MRN: [id number]  Encounter Provider: FAYE Cloud  Encounter Date: 2025   Encounter department: [facility]  :  Assessment & Plan  Encounter for colorectal cancer screening  Patient presents for colon rectal cancer screening.  Patient is age 64 and never had a colonoscopy to screen for colon cancer.  Patient reports he was previously scheduled but then COVID occurred and his procedure had to be canceled.    Orders:    Ambulatory Referral to Gastroenterology    polyethylene glycol (GOLYTELY) 4000 mL solution; Take 4,000 mL by mouth once for 1 dose Take as directed by office prior to procedure    Colonoscopy; Future    Gastroesophageal reflux disease, unspecified whether esophagitis present  Has history of GERD.  Patient reports GERD symptoms are well-controlled on current medication of esomeprazole. Esomeprazole is managed by his PCP.    -Continue esomeprazole           History of Present Illness   HPI  Demetrio Villalobos is a 64 y.o. male with past medical history of HTN, PAD, CKD D stage III, GERD, right rotator cuff tear, CVA, with left-sided weakness, HLD who presents as consult.  Patient reports history of GERD.  Patient reports that GERD is currently well-controlled on current medication.  Patient denies nausea, vomiting, acid reflux, heartburn, dysphagia, epigastric or abdominal pain.  Patient denies blood in stool, blood from rectal area, or black tarry stool.  Bowel patterns are regular.  Consistency of stool varies.  Patient denies any diarrhea or constipation.  Abdomen exam benign no abdominal tenderness or guarding.    Patient does not smoke.  Patient does not drink alcohol.  Denies illicit drug or marijuana use.  No family history of stomach or colon cancer.  No previous EGD or colonoscopy.  Patient is on no blood thinners or antiplatelet medication except for aspirin 81 mg daily.        Review of Systems    Constitutional:  Negative for chills and fever.   HENT:  Negative for ear pain and sore throat.    Eyes:  Negative for pain and visual disturbance.   Respiratory:  Negative for cough and shortness of breath.    Cardiovascular:  Negative for chest pain and palpitations.   Gastrointestinal:  Negative for abdominal pain and vomiting.   Genitourinary:  Negative for dysuria and hematuria.   Musculoskeletal:  Negative for arthralgias and back pain.   Skin:  Negative for color change and rash.   Neurological:  Negative for seizures and syncope.   All other systems reviewed and are negative.    Medical History Reviewed by provider this encounter:  Tobacco  Allergies  Meds  Problems  Med Hx  Surg Hx  Fam Hx     .  Past Medical History   Past Medical History:   Diagnosis Date    Chronic kidney disease     Edema     GERD (gastroesophageal reflux disease)     Gout     hospitalized    High cholesterol     Hypertension     PVD (peripheral vascular disease) ([facility])     Renal disorder     Rotator cuff tear     right    Shortness of breath     Stroke ([facility]) 2014    Ventral hernia without obstruction or gangrene     Walker as ambulation aid      Past Surgical History:   Procedure Laterality Date    APPENDECTOMY      MS ARTHROPLASTY GLENOHUMERAL JOINT TOTAL SHOULDER Right 8/26/2024    Procedure: ARTHROPLASTY SHOULDER REVERSE;  Surgeon: Sina Randle MD;  Location: [facility];  Service: Orthopedics     History reviewed. No pertinent family history.   reports that he has quit smoking. His smoking use included cigarettes. He has never been exposed to tobacco smoke. He has never used smokeless tobacco. He reports that he does not drink alcohol and does not use drugs.  Current Outpatient Medications on File Prior to Visit   Medication Sig Dispense Refill    allopurinol (ZYLOPRIM) 100 mg tablet TAKE 0.5 TABLETS BY MOUTH EVERY MORNING 30 tablet 1    aspirin 81 mg chewable tablet Chew 1 tablet (81 mg total) 2 (two) times a day  for 28 days Last dose per surgeon to be 8/20/24 56 tablet 0    atorvastatin (LIPITOR) 40 mg tablet TAKE 1 TABLET BY MOUTH DAILY WITH DINNER 30 tablet 1    esomeprazole (NexIUM) 40 MG capsule TAKE 1 CAPSULE BY MOUTH IN THE MORNING 30 capsule 1    furosemide (LASIX) 40 mg tablet Take 1 tablet (40 mg total) by mouth daily 90 tablet 3    metoprolol succinate (TOPROL-XL) 50 mg 24 hr tablet Take 1 tablet (50 mg total) by mouth daily with dinner 30 tablet 2    Multiple Vitamin (multivitamin) capsule Take 1 capsule by mouth daily      NIFEdipine (PROCARDIA XL) 60 mg 24 hr tablet Take 1 tablet (60 mg total) by mouth daily 30 tablet 5    spironolactone (ALDACTONE) 25 mg tablet Take 0.5 tablets (12.5 mg total) by mouth daily 15 tablet 1    [DISCONTINUED] metoprolol succinate (TOPROL-XL) 50 mg 24 hr tablet Take 1 tablet (50 mg total) by mouth daily with dinner 30 tablet 1    [DISCONTINUED] Diclofenac Sodium (VOLTAREN) 1 % Apply 2 g topically 4 (four) times a day (Patient not taking: Reported on 11/20/2024) 50 g 1    [DISCONTINUED] polyethylene glycol (GLYCOLAX) 17 GM/SCOOP  (Patient not taking: Reported on 1/23/2025)      [DISCONTINUED] polyethylene glycol (MIRALAX) 17 g packet Take 17 g by mouth daily as needed (Patient not taking: Reported on 1/23/2025)       No current facility-administered medications on file prior to visit.   No Known Allergies   Current Outpatient Medications on File Prior to Visit   Medication Sig Dispense Refill    allopurinol (ZYLOPRIM) 100 mg tablet TAKE 0.5 TABLETS BY MOUTH EVERY MORNING 30 tablet 1    aspirin 81 mg chewable tablet Chew 1 tablet (81 mg total) 2 (two) times a day for 28 days Last dose per surgeon to be 8/20/24 56 tablet 0    atorvastatin (LIPITOR) 40 mg tablet TAKE 1 TABLET BY MOUTH DAILY WITH DINNER 30 tablet 1    esomeprazole (NexIUM) 40 MG capsule TAKE 1 CAPSULE BY MOUTH IN THE MORNING 30 capsule 1    furosemide (LASIX) 40 mg tablet Take 1 tablet (40 mg total) by mouth daily 90  "tablet 3    metoprolol succinate (TOPROL-XL) 50 mg 24 hr tablet Take 1 tablet (50 mg total) by mouth daily with dinner 30 tablet 2    Multiple Vitamin (multivitamin) capsule Take 1 capsule by mouth daily      NIFEdipine (PROCARDIA XL) 60 mg 24 hr tablet Take 1 tablet (60 mg total) by mouth daily 30 tablet 5    spironolactone (ALDACTONE) 25 mg tablet Take 0.5 tablets (12.5 mg total) by mouth daily 15 tablet 1    [DISCONTINUED] metoprolol succinate (TOPROL-XL) 50 mg 24 hr tablet Take 1 tablet (50 mg total) by mouth daily with dinner 30 tablet 1    [DISCONTINUED] Diclofenac Sodium (VOLTAREN) 1 % Apply 2 g topically 4 (four) times a day (Patient not taking: Reported on 11/20/2024) 50 g 1    [DISCONTINUED] polyethylene glycol (GLYCOLAX) 17 GM/SCOOP  (Patient not taking: Reported on 1/23/2025)      [DISCONTINUED] polyethylene glycol (MIRALAX) 17 g packet Take 17 g by mouth daily as needed (Patient not taking: Reported on 1/23/2025)       No current facility-administered medications on file prior to visit.      Social History     Tobacco Use    Smoking status: Former     Types: Cigarettes     Passive exposure: Never    Smokeless tobacco: Never    Tobacco comments:     Smoker 30 yrs   Vaping Use    Vaping status: Never Used   Substance and Sexual Activity    Alcohol use: Never    Drug use: Never    Sexual activity: Not Currently     Partners: Female        Objective   /80 (BP Location: Right arm, Patient Position: Sitting, Cuff Size: Standard)   Pulse 75   Ht 5' 7\" (1.702 m)   Wt 103 kg (226 lb 12.8 oz)   BMI 35.52 kg/m²      Physical Exam  Vitals and nursing note reviewed.   Constitutional:       General: He is not in acute distress.     Appearance: He is well-developed.   HENT:      Head: Normocephalic and atraumatic.   Eyes:      Conjunctiva/sclera: Conjunctivae normal.   Cardiovascular:      Rate and Rhythm: Normal rate and regular rhythm.      Pulses: Normal pulses.      Heart sounds: Normal heart sounds. No " murmur heard.  Pulmonary:      Effort: Pulmonary effort is normal. No respiratory distress.      Breath sounds: Normal breath sounds. No stridor. No wheezing, rhonchi or rales.   Abdominal:      General: Bowel sounds are normal. There is no distension.      Palpations: Abdomen is soft. There is no mass.      Tenderness: There is no abdominal tenderness. There is no guarding or rebound.   Musculoskeletal:         General: No swelling.      Cervical back: Neck supple.      Right lower leg: No edema.      Left lower leg: No edema.   Skin:     General: Skin is warm and dry.      Capillary Refill: Capillary refill takes less than 2 seconds.      Coloration: Skin is not jaundiced or pale.   Neurological:      Mental Status: He is alert and oriented to person, place, and time.      Motor: Weakness present.      Comments: Left-sided weakness.   Psychiatric:         Mood and Affect: Mood normal.

## 2025-01-23 NOTE — ASSESSMENT & PLAN NOTE
Has history of GERD.  Patient reports GERD symptoms are well-controlled on current medication of esomeprazole. Esomeprazole is managed by his PCP.    -Continue esomeprazole

## 2025-01-23 NOTE — TELEPHONE ENCOUNTER
Scheduled date of colonoscopy (as of today):Feb. 13, 2025  Physician performing colonoscopy:Dr. Denis  Location of colonoscopy:[facility]  Bowel prep reviewed with patient:Matthieu  Instructions reviewed with patient by:saima  Clearances: na

## 2025-01-23 NOTE — ASSESSMENT & PLAN NOTE
Patient presents for colon rectal cancer screening.  Patient is age 64 and never had a colonoscopy to screen for colon cancer.  Patient reports he was previously scheduled but then COVID occurred and his procedure had to be canceled.    Orders:    Ambulatory Referral to Gastroenterology    polyethylene glycol (GOLYTELY) 4000 mL solution; Take 4,000 mL by mouth once for 1 dose Take as directed by office prior to procedure    Colonoscopy; Future

## 2025-01-24 ENCOUNTER — OFFICE VISIT (OUTPATIENT)
Age: 65
End: 2025-01-24

## 2025-01-24 ENCOUNTER — RESULTS FOLLOW-UP (OUTPATIENT)
Age: 65
End: 2025-01-24

## 2025-01-24 VITALS
HEIGHT: 67 IN | TEMPERATURE: 98 F | WEIGHT: 222.5 LBS | SYSTOLIC BLOOD PRESSURE: 134 MMHG | RESPIRATION RATE: 19 BRPM | OXYGEN SATURATION: 94 % | DIASTOLIC BLOOD PRESSURE: 82 MMHG | BODY MASS INDEX: 34.92 KG/M2 | HEART RATE: 82 BPM

## 2025-01-24 DIAGNOSIS — I10 PRIMARY HYPERTENSION: ICD-10-CM

## 2025-01-24 DIAGNOSIS — R25.2 SPASTICITY: ICD-10-CM

## 2025-01-24 DIAGNOSIS — M75.121 COMPLETE TEAR OF RIGHT ROTATOR CUFF, UNSPECIFIED WHETHER TRAUMATIC: ICD-10-CM

## 2025-01-24 DIAGNOSIS — Z87.39 HISTORY OF GOUT: ICD-10-CM

## 2025-01-24 DIAGNOSIS — R73.03 PREDIABETES: ICD-10-CM

## 2025-01-24 DIAGNOSIS — N18.30 BENIGN HYPERTENSION WITH CHRONIC KIDNEY DISEASE, STAGE III (HCC): ICD-10-CM

## 2025-01-24 DIAGNOSIS — I73.9 PERIPHERAL ARTERIAL DISEASE (HCC): ICD-10-CM

## 2025-01-24 DIAGNOSIS — R60.0 BILATERAL LOWER EXTREMITY EDEMA: Primary | ICD-10-CM

## 2025-01-24 DIAGNOSIS — E87.6 HYPOKALEMIA: ICD-10-CM

## 2025-01-24 DIAGNOSIS — N18.32 STAGE 3B CHRONIC KIDNEY DISEASE (HCC): ICD-10-CM

## 2025-01-24 DIAGNOSIS — Z86.73 H/O: CVA (CEREBROVASCULAR ACCIDENT): ICD-10-CM

## 2025-01-24 DIAGNOSIS — I12.9 BENIGN HYPERTENSION WITH CHRONIC KIDNEY DISEASE, STAGE III (HCC): ICD-10-CM

## 2025-01-24 DIAGNOSIS — E78.49 OTHER HYPERLIPIDEMIA: ICD-10-CM

## 2025-01-24 DIAGNOSIS — K21.00 GASTROESOPHAGEAL REFLUX DISEASE WITH ESOPHAGITIS, UNSPECIFIED WHETHER HEMORRHAGE: ICD-10-CM

## 2025-01-24 DIAGNOSIS — M54.50 LUMBAR BACK PAIN: ICD-10-CM

## 2025-01-24 DIAGNOSIS — E55.9 VITAMIN D DEFICIENCY: ICD-10-CM

## 2025-01-24 LAB — SL AMB POCT HEMOGLOBIN AIC: 5.9 (ref ?–6.5)

## 2025-01-24 PROCEDURE — 83036 HEMOGLOBIN GLYCOSYLATED A1C: CPT | Performed by: FAMILY MEDICINE

## 2025-01-24 PROCEDURE — 99213 OFFICE O/P EST LOW 20 MIN: CPT | Performed by: FAMILY MEDICINE

## 2025-01-24 RX ORDER — ALLOPURINOL 100 MG/1
50 TABLET ORAL DAILY
Qty: 30 TABLET | Refills: 1 | Status: SHIPPED | OUTPATIENT
Start: 2025-01-24

## 2025-01-24 RX ORDER — BACLOFEN 5 MG/1
2.5 TABLET ORAL 3 TIMES DAILY
Qty: 30 TABLET | Refills: 1 | Status: SHIPPED | OUTPATIENT
Start: 2025-01-24 | End: 2025-02-23

## 2025-01-24 RX ORDER — ESOMEPRAZOLE MAGNESIUM 40 MG/1
40 CAPSULE, DELAYED RELEASE ORAL EVERY MORNING
Qty: 30 CAPSULE | Refills: 1 | Status: SHIPPED | OUTPATIENT
Start: 2025-01-24

## 2025-01-24 RX ORDER — SPIRONOLACTONE 25 MG/1
12.5 TABLET ORAL DAILY
Qty: 15 TABLET | Refills: 1 | Status: SHIPPED | OUTPATIENT
Start: 2025-01-24

## 2025-01-24 RX ORDER — MULTIVITAMIN
1 CAPSULE ORAL DAILY
Qty: 30 CAPSULE | Refills: 1 | Status: SHIPPED | OUTPATIENT
Start: 2025-01-24

## 2025-01-24 RX ORDER — FUROSEMIDE 40 MG/1
40 TABLET ORAL DAILY
Qty: 90 TABLET | Refills: 3 | Status: SHIPPED | OUTPATIENT
Start: 2025-01-24

## 2025-01-24 RX ORDER — ASPIRIN 81 MG/1
81 TABLET, CHEWABLE ORAL DAILY
Start: 2025-01-24 | End: 2025-02-21

## 2025-01-24 RX ORDER — NIFEDIPINE 60 MG/1
60 TABLET, EXTENDED RELEASE ORAL DAILY
Qty: 30 TABLET | Refills: 5 | Status: SHIPPED | OUTPATIENT
Start: 2025-01-24 | End: 2025-07-23

## 2025-01-24 RX ORDER — METOPROLOL SUCCINATE 50 MG/1
50 TABLET, EXTENDED RELEASE ORAL
Qty: 30 TABLET | Refills: 2 | Status: SHIPPED | OUTPATIENT
Start: 2025-01-24

## 2025-01-24 RX ORDER — ATORVASTATIN CALCIUM 40 MG/1
40 TABLET, FILM COATED ORAL
Qty: 30 TABLET | Refills: 1 | Status: SHIPPED | OUTPATIENT
Start: 2025-01-24

## 2025-01-24 NOTE — ASSESSMENT & PLAN NOTE
Orders:    spironolactone (ALDACTONE) 25 mg tablet; Take 0.5 tablets (12.5 mg total) by mouth daily

## 2025-01-24 NOTE — PROGRESS NOTES
Name: Demetrio Villalobos      : 1960      MRN: [id number]  Encounter Provider: Lisa Blount DO  Encounter Date: 2025   Encounter department: [facility]    Assessment & Plan  Bilateral lower extremity edema  Chronic, suspect secondary to CKD.  Patient endorsing that he had stopped his Aldactone on his own accord as he did not think it was necessary since his bilateral lower extremity edema had improved.  Noting that he has been compliant with compression stockings throughout the day, elevating legs. Known hx of PAD and vascular insufficiency.     Plan:  -Continue lasix 40 mg daily  -Continue spironolactone 12.5 mg daily     Orders:    spironolactone (ALDACTONE) 25 mg tablet; Take 0.5 tablets (12.5 mg total) by mouth daily    Peripheral arterial disease (HCC)  Chronic, stable.  Denies claudication  Pending bilateral LE VAS doppler    Benign hypertension with chronic kidney disease, stage III (HCC)  Lab Results   Component Value Date    EGFR 31 2025    EGFR 36 12/10/2024    EGFR 44 2023    CREATININE 2.16 (H) 2025    CREATININE 1.94 (H) 12/10/2024    CREATININE 2.14 (H) 2024     Chronic, folllows with nephrology - no acute concerns. Last BMP stable at baseline     US kidney and bladder (2024): Enlarged bilateral polycystic kidneys with innumerable simple and mildly complicated cysts. No discernible solid mass    Plan:  -Continue to f/u with nephrology  -Recommend continued BP control   -Advised adequate hydration   -Continue renally dosing medications   Orders:    furosemide (LASIX) 40 mg tablet; Take 1 tablet (40 mg total) by mouth daily    metoprolol succinate (TOPROL-XL) 50 mg 24 hr tablet; Take 1 tablet (50 mg total) by mouth daily with dinner    H/O: CVA (cerebrovascular accident)  Chronic, noting hx of CVA in  with left-sided residual deficits including spasticity. Chronically following with PT and OT. Compliant with home ASA  and statin    Pending f/u with neurology     Lumbar back pain  Chronic lumbar back pain without radiation   Recommend starting OMT   Spasticity  Chronic, secondary to hx of CVA. Noting still having left sided spasticity   Start baclofen 2.5 mg TID, advised to monitor for sedation. Advised to start at bedtime   Orders:    Baclofen 5 MG TABS; Take 2.5 mg by mouth 3 (three) times a day    History of gout  Chronic, denies recent flares. Continue allopurinol 50 mg daily   Orders:    allopurinol (ZYLOPRIM) 100 mg tablet; Take 0.5 tablets (50 mg total) by mouth daily    Other hyperlipidemia  Chronic, continue statin   Orders:    atorvastatin (LIPITOR) 40 mg tablet; Take 1 tablet (40 mg total) by mouth daily with dinner    Gastroesophageal reflux disease with esophagitis, unspecified whether hemorrhage  Chronic, following with GI. Continue nexium   Orders:    esomeprazole (NexIUM) 40 MG capsule; Take 1 capsule (40 mg total) by mouth every morning    Primary hypertension  Chronic, well controlled, BP goal <140/90.   Orders:    NIFEdipine (PROCARDIA XL) 60 mg 24 hr tablet; Take 1 tablet (60 mg total) by mouth daily    Prediabetes  Chronic - stable.   Orders:    POCT hemoglobin A1c           History of Present Illness        Presents for chronic follow up:  -Noting bilateral LE edema has decreased, having periods where swelling resolves   -Noting he stopped taking the aldactone and potassium   -still taking lasix 40 mg   -needs to schedule CTA chest  -needs to get colonoscopy   -s/p CVA in 2014, left sided spasticity in arms and legs with daily inability to unlock hands   -seeing OT and PT twice a week  -no dizziness   -compliant with compression stockings  -consider DXA at 67 in setting of hx of alcohol abuse, chronic PPI use, quit smoking in 2014   -hx of vitamin D deficiency, no recent level   -A1c 5.9 in 2023     CrCl 40    Review of Systems   Constitutional:  Negative for fever.   HENT:  Negative for congestion, hearing  "loss, postnasal drip, rhinorrhea, sneezing and sore throat.    Eyes:  Negative for visual disturbance.   Respiratory:  Negative for cough, shortness of breath and wheezing.    Cardiovascular:  Negative for chest pain and palpitations.   Gastrointestinal:  Negative for abdominal pain, constipation, diarrhea, nausea and vomiting.   Musculoskeletal:  Negative for arthralgias, myalgias and neck pain.   Skin:  Negative for rash and wound.   Neurological:  Negative for weakness and numbness.        Spasms in left extremities     Objective   /82   Pulse 82   Temp 98 °F (36.7 °C) (Oral)   Resp 19   Ht 5' 7\" (1.702 m)   Wt 101 kg (222 lb 8 oz)   SpO2 94%   BMI 34.85 kg/m²       Physical Exam  Vitals and nursing note reviewed.   Constitutional:       General: He is not in acute distress.     Appearance: He is well-developed.   HENT:      Head: Normocephalic and atraumatic.   Eyes:      Conjunctiva/sclera: Conjunctivae normal.   Cardiovascular:      Rate and Rhythm: Normal rate and regular rhythm.      Heart sounds: No murmur heard.  Pulmonary:      Effort: Pulmonary effort is normal. No respiratory distress.      Breath sounds: Normal breath sounds.   Abdominal:      Palpations: Abdomen is soft.      Tenderness: There is no abdominal tenderness.   Musculoskeletal:         General: No swelling.      Cervical back: Neck supple.   Skin:     General: Skin is warm and dry.      Capillary Refill: Capillary refill takes less than 2 seconds.   Neurological:      Mental Status: He is alert.   Psychiatric:         Mood and Affect: Mood normal.         "

## 2025-01-24 NOTE — ASSESSMENT & PLAN NOTE
Chronic, secondary to hx of CVA. Noting still having left sided spasticity   Start baclofen 2.5 mg TID, advised to monitor for sedation. Advised to start at bedtime   Orders:    Baclofen 5 MG TABS; Take 2.5 mg by mouth 3 (three) times a day

## 2025-01-24 NOTE — ASSESSMENT & PLAN NOTE
Chronic, noting hx of CVA in 2014 with left-sided residual deficits including spasticity. Chronically following with PT and OT. Compliant with home ASA and statin    Pending f/u with neurology

## 2025-01-24 NOTE — ASSESSMENT & PLAN NOTE
Orders:    aspirin 81 mg chewable tablet; Chew 1 tablet (81 mg total) daily for 28 days Last dose per surgeon to be 8/20/24

## 2025-01-24 NOTE — ASSESSMENT & PLAN NOTE
Chronic, suspect secondary to CKD.  Patient endorsing that he had stopped his Aldactone on his own accord as he did not think it was necessary since his bilateral lower extremity edema had improved.  Noting that he has been compliant with compression stockings throughout the day, elevating legs. Known hx of PAD and vascular insufficiency.     Plan:  -Continue lasix 40 mg daily  -Continue spironolactone 12.5 mg daily     Orders:    spironolactone (ALDACTONE) 25 mg tablet; Take 0.5 tablets (12.5 mg total) by mouth daily

## 2025-01-24 NOTE — ASSESSMENT & PLAN NOTE
Chronic, denies recent flares. Continue allopurinol 50 mg daily   Orders:    allopurinol (ZYLOPRIM) 100 mg tablet; Take 0.5 tablets (50 mg total) by mouth daily

## 2025-01-24 NOTE — ASSESSMENT & PLAN NOTE
Lab Results   Component Value Date    EGFR 31 01/23/2025    EGFR 36 12/10/2024    EGFR 44 09/12/2023    CREATININE 2.16 (H) 01/23/2025    CREATININE 1.94 (H) 12/10/2024    CREATININE 2.14 (H) 11/21/2024       Orders:    furosemide (LASIX) 40 mg tablet; Take 1 tablet (40 mg total) by mouth daily

## 2025-01-24 NOTE — ASSESSMENT & PLAN NOTE
Lab Results   Component Value Date    EGFR 31 01/23/2025    EGFR 36 12/10/2024    EGFR 44 09/12/2023    CREATININE 2.16 (H) 01/23/2025    CREATININE 1.94 (H) 12/10/2024    CREATININE 2.14 (H) 11/21/2024     Chronic, folllows with nephrology - no acute concerns. Last BMP stable at baseline     US kidney and bladder (11/2024): Enlarged bilateral polycystic kidneys with innumerable simple and mildly complicated cysts. No discernible solid mass    Plan:  -Continue to f/u with nephrology  -Recommend continued BP control   -Advised adequate hydration   -Continue renally dosing medications   Orders:    furosemide (LASIX) 40 mg tablet; Take 1 tablet (40 mg total) by mouth daily    metoprolol succinate (TOPROL-XL) 50 mg 24 hr tablet; Take 1 tablet (50 mg total) by mouth daily with dinner

## 2025-01-24 NOTE — ASSESSMENT & PLAN NOTE
Chronic, following with GI. Continue nexium   Orders:    esomeprazole (NexIUM) 40 MG capsule; Take 1 capsule (40 mg total) by mouth every morning

## 2025-01-24 NOTE — ASSESSMENT & PLAN NOTE
Chronic, continue statin   Orders:    atorvastatin (LIPITOR) 40 mg tablet; Take 1 tablet (40 mg total) by mouth daily with dinner

## 2025-01-27 ENCOUNTER — OFFICE VISIT (OUTPATIENT)
Facility: CLINIC | Age: 65
End: 2025-01-27
Payer: COMMERCIAL

## 2025-01-27 DIAGNOSIS — R26.2 AMBULATORY DYSFUNCTION: ICD-10-CM

## 2025-01-27 DIAGNOSIS — Z86.73 H/O: CVA (CEREBROVASCULAR ACCIDENT): ICD-10-CM

## 2025-01-27 DIAGNOSIS — R26.89 OTHER ABNORMALITIES OF GAIT AND MOBILITY: Primary | ICD-10-CM

## 2025-01-27 DIAGNOSIS — Z86.73 CHRONIC CEREBROVASCULAR ACCIDENT (CVA): ICD-10-CM

## 2025-01-27 DIAGNOSIS — G81.10 SPASTIC HEMIPARESIS AFFECTING NONDOMINANT SIDE (HCC): ICD-10-CM

## 2025-01-27 DIAGNOSIS — I63.9 CEREBROVASCULAR ACCIDENT (CVA), UNSPECIFIED MECHANISM (HCC): Primary | ICD-10-CM

## 2025-01-27 PROCEDURE — 97112 NEUROMUSCULAR REEDUCATION: CPT

## 2025-01-27 PROCEDURE — 97112 NEUROMUSCULAR REEDUCATION: CPT | Performed by: OCCUPATIONAL THERAPIST

## 2025-01-27 PROCEDURE — 97110 THERAPEUTIC EXERCISES: CPT

## 2025-01-27 NOTE — PROGRESS NOTES
"Daily Note     Today's date: 2025  Patient name: Demetrio Villalobos  : 1960  MRN: [id number]  Referring provider: César Malone DO  Dx:   Encounter Diagnosis     ICD-10-CM    1. Other abnormalities of gait and mobility  R26.89       2. Chronic cerebrovascular accident (CVA)  Z86.73           GOES BY \"WILL\"             Subjective: Patient reports to PT session stating he continues to experience mild low back pain, which he rates a 2/10 upon arrival. His PCP prescribed him oral Baclofen, but he has not yet been able to take it regularly as he has a difficult time cutting the pills in half as was prescribed.      Objective: See treatment diary below    BP start of session: 140/90 mmHg (seated, RUE)    TE:  - Prolonged extension in prone lying x 8 minutes    NMR (5# L ankle weight, 3# R ankle weight):    - Overground ambulation with unilateral 5# TT carry (LUE only) x 300 ft + ramp - Peak RPE: 3/10    - Treadmill ambulation (gait  mode) @ 1.1 mph x 3 minutes - Peak RPE: 3/10   Average step length: L 42 cm R 49 cm   Time spent on each foot: L 46% R 54%    - BELKIS x 10 reps between treadmill trials    - Treadmill ambulation (gait  mode) @ 1.2 mph x 4 minutes - Peak RPE: 3/10   Average step length: L 44 cm R 52 cm   Time spent on each foot: L 46% R 54%    - Backward leg lift (LLE only) to treadmill (active hamstring/hip flexor activation - active assist as needed) x 10 reps - Peak RPE: 2/10    - Ambulation with posterior resistance (red) x 300 ft    Assessment: Patient tolerated today's session well today with continued focus on high intensity LE endurance and gait training. At start of session, performed prolonged extension in prone lying with resolution of patient's back pain. However, patient continues to experience a spike in back pain during functional mobility, reporting up to 7/10 pain during ambulatory tasks. Educated patient on performing prone lying at home as part of HEP; he verbalized " good understanding. Patient will continue to benefit from skilled OPPT services in order to maximize safe functional mobility and reduce risk for falls post-CVA.      Plan: Continue per plan of care.  Progress treatment as tolerated.      *PATIENT ON BETA BLOCKER, USE RPE*     POC expires Unit limit Auth Expiration date PT/OT + Visit Limit? Co-Insurance   1/22/25 12 visits (sharing with ortho) 1/21/24 BOMN No    12 visits 3/24                            Visit/Unit Tracking  AUTH Status:  Date 12/30 1/13 1/15 1/27           Approved Used 1 1 1 1            Remaining  11 10 9 8               Outcome Measures Initial Eval  10/30/2024 & DN 11/6/24 RE  11/26/24 PN  12/11/2024 PN  1/13/2025     5xSTS 17.36 sec, no UE 14.25 sec, 0 UE 11.40 sec, 0 UE 11.65 sec, 0 UE     TUG 22.75 sec 15.91 sec 13.70 sec 12.33 sec     10 meter 0.49 m/s TBA- time constraints 0.68 m/s 0.63 m/s     LACEY 38/56 43/56 45/56 47/56     FGA 9/30 15/30 16/30 17/30     DGI 8/24 14/24 14/24 15/24     MiniBEST 16/28 TBA- time constraints  19/28     2MWT 140 ft 230 ft (limited by back and right hip pain) 200 ft NT     6MWT   515 ft 600 ft       Access Code: [email or web address]  URL: [email or web address]  Date: 11/06/2024  Prepared by: Celestina Valderrama    Exercises  - Sit to Stand  - 1 x daily - 7 x weekly - 2 sets - 10 reps  - Side Stepping with Counter Support  - 1 x daily - 7 x weekly - 3 sets - 10 reps  - Standing March with Counter Support  - 1 x daily - 7 x weekly - 2 sets - 10 reps - 3 sec hold

## 2025-01-27 NOTE — PROGRESS NOTES
"Daily Note     Today's date: 2025  Patient name: Demetrio Villalobos  : 1960  MRN: [id number]  Referring provider: Sina Randle MD  Dx:   No diagnosis found.            Start Time: 1549  Stop Time: 1630  Total time in clinic (min): 41 minutes    PLAN OF CARE START: 24  PLAN OF CARE END: 24  FREQUENCY: 2 times per week  PRECAUTIONS RUE (NO FLEX/ABD > 90) sx, HTN    POC expires Auth Status Total   Visits  Start date  Expiration date PT/OT + Visit Limit? Co-Pay    approved   $0                                             Visit/Unit Tracking  AUTH Status:  Date             Visits  Authed: 12 Used 4 1 1             Remaining  8 6 5                 Subjective: \"I told my friend, I've been to [facility], I've been to [facility], but it's nothing like here.  I have great teachers here\"  Objective: See treatment below.    NMR:  -Nustep level 1 x5 minutes with LUE only for proprioceptive input and proximal strengthening.  -Grasp and transfer small cans to targets with FES to L digits extensors.  -Multimatrix cube placement with FES to digit extensors and therapist facilitating decreased shoulder abduction.       Assessment: Tolerated treatment well. Max difficulty with shoulder flexion to reach anteriorly. Demonstrating improved L UE functional use and coordination, however still markedly impaired. Pt would benefit from continued OT services to address NMR L UE s/p chronic CVA.      Plan: Continued skilled OT per POC.      "

## 2025-01-28 ENCOUNTER — CONSULT (OUTPATIENT)
Age: 65
End: 2025-01-28
Payer: COMMERCIAL

## 2025-01-28 VITALS
SYSTOLIC BLOOD PRESSURE: 134 MMHG | HEIGHT: 67 IN | DIASTOLIC BLOOD PRESSURE: 100 MMHG | BODY MASS INDEX: 34.69 KG/M2 | WEIGHT: 221 LBS | HEART RATE: 82 BPM

## 2025-01-28 DIAGNOSIS — G81.94 LEFT HEMIPARESIS (HCC): ICD-10-CM

## 2025-01-28 DIAGNOSIS — Z86.73 CHRONIC ISCHEMIC RIGHT ICA STROKE: Primary | ICD-10-CM

## 2025-01-28 DIAGNOSIS — I69.398 SPASTICITY DUE TO OLD STROKE: ICD-10-CM

## 2025-01-28 DIAGNOSIS — R25.2 SPASTICITY DUE TO OLD STROKE: ICD-10-CM

## 2025-01-28 PROCEDURE — 99204 OFFICE O/P NEW MOD 45 MIN: CPT | Performed by: PSYCHIATRY & NEUROLOGY

## 2025-01-28 NOTE — PROGRESS NOTES
Outpatient Neurology History and Physical  Demetrio Villalobos  [id number]  64 y.o.  1960          Consult: Yes    Lisa Blount DO      Chief Complaint   Patient presents with   • New Patient Visit   • Stroke     Patient had stroke in 2014 is currently doing PHYSICAL THERAPY/OCCUPATIONAL THERAPY            History Obtained from: Patient     HPI:     Demetrio Villalobos is a 65 yo M with PMH of stroke presents to establish care. Patient had stroke in 2014 and was managed at [facility]. He says his BP was very much elevated. He had presented there with left arm and left leg weakness. He was slurring his speech. He says there was haziness over his eyes. He was sent home after 5 hours in ED. 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 He only knows his stroke was in right hemisphere but no further details. He used to smoke up until 2014.   He has h/o doing cocaine and stopped sometime in 2007.   He has residual left sided hemiparesis since his stroke.   He remains on aspirin 81mg and lipitor 40mg daily. He denies cramps or pain in left side.         Past Medical History:   Diagnosis Date   • Chronic kidney disease    • Edema    • GERD (gastroesophageal reflux disease)    • Gout     hospitalized   • High cholesterol    • Hypertension    • PVD (peripheral vascular disease) ([facility])    • Renal disorder    • Rotator cuff tear     right   • Shortness of breath    • Stroke ([facility]) 2014   • Ventral hernia without obstruction or gangrene    • Walker as ambulation aid                Current Outpatient Medications on File Prior to Visit   Medication Sig Dispense Refill   • allopurinol (ZYLOPRIM) 100 mg tablet Take 0.5 tablets (50 mg total) by mouth daily 30 tablet 1   • aspirin 81 mg chewable tablet Chew 1 tablet (81 mg total) daily for 28 days Last dose per surgeon to be 8/20/24     • atorvastatin (LIPITOR) 40 mg tablet Take 1 tablet (40 mg total) by mouth daily  with dinner 30 tablet 1   • Baclofen 5 MG TABS Take 2.5 mg by mouth 3 (three) times a day 30 tablet 1   • esomeprazole (NexIUM) 40 MG capsule Take 1 capsule (40 mg total) by mouth every morning 30 capsule 1   • furosemide (LASIX) 40 mg tablet Take 1 tablet (40 mg total) by mouth daily 90 tablet 3   • metoprolol succinate (TOPROL-XL) 50 mg 24 hr tablet Take 1 tablet (50 mg total) by mouth daily with dinner 30 tablet 2   • Multiple Vitamin (multivitamin) capsule Take 1 capsule by mouth daily 30 capsule 1   • NIFEdipine (PROCARDIA XL) 60 mg 24 hr tablet Take 1 tablet (60 mg total) by mouth daily 30 tablet 5   • spironolactone (ALDACTONE) 25 mg tablet Take 0.5 tablets (12.5 mg total) by mouth daily 15 tablet 1   • polyethylene glycol (GOLYTELY) 4000 mL solution Take 4,000 mL by mouth once for 1 dose Take as directed by office prior to procedure (Patient not taking: Reported on 1/28/2025) 4000 mL 0     No current facility-administered medications on file prior to visit.       No Known Allergies      History reviewed. No pertinent family history.             Past Surgical History:   Procedure Laterality Date   • APPENDECTOMY     • TX ARTHROPLASTY GLENOHUMERAL JOINT TOTAL SHOULDER Right 8/26/2024    Procedure: ARTHROPLASTY SHOULDER REVERSE;  Surgeon: Sina Randle MD;  Location: [facility];  Service: Orthopedics           Social History     Socioeconomic History   • Marital status:      Spouse name: Not on file   • Number of children: Not on file   • Years of education: Not on file   • Highest education level: Not on file   Occupational History   • Not on file   Tobacco Use   • Smoking status: Former     Types: Cigarettes     Passive exposure: Never   • Smokeless tobacco: Never   • Tobacco comments:     Smoker 30 yrs   Vaping Use   • Vaping status: Never Used   Substance and Sexual Activity   • Alcohol use: Never   • Drug use: Never   • Sexual activity: Not Currently     Partners: Female   Other Topics  Concern   • Not on file   Social History Narrative   • Not on file     Social Drivers of Health     Financial Resource Strain: Medium Risk (10/16/2024)    Overall Financial Resource Strain (CARDIA)    • Difficulty of Paying Living Expenses: Somewhat hard   Food Insecurity: No Food Insecurity (10/16/2024)    Nursing - Inadequate Food Risk Classification    • Worried About Running Out of Food in the Last Year: Never true    • Ran Out of Food in the Last Year: Never true    • Ran Out of Food in the Last Year: Not on file   Transportation Needs: No Transportation Needs (10/16/2024)    PRAPARE - Transportation    • Lack of Transportation (Medical): No    • Lack of Transportation (Non-Medical): No   Physical Activity: Not on file   Stress: Not on file   Social Connections: Not on file   Intimate Partner Violence: Not At Risk (6/2/2023)    [facility] from [facility], [facility]    Interpersonal Safety    • Safe in Home: Yes    • Are you in immediate danger?: Not on file    • Is your partner at the health facility now?: Not on file    • Do you want to (or have to) go home with your partner?: Not on file    • Do you have someplace safe to go?: Not on file    • Have there been threats or direct abuse of you or your children?: No    • When did the abuse occur?: Not on file    • Do you feel you are still at risk?: Not on file    • Are you in contact with your ex-partner or do you share children or custody?: Not on file    • Are you afraid your life may be in danger?: Not on file    • Has the violence gotten worse or is it getting scarier? More often?: Not on file    • Has anyone ever choked or tried to choke you?: No    • Do you feel you are still at risk for choking?: Not on file    • Are you in contact with ex-partner who choked or attempted to choke you? or do you share children or custody?: Not on file    • Are you afraid your life may be in danger  "due to choking?: Not on file    • Has the choking gotten worse or is it getting scarier? More often?: Not on file    • Has your partner used weapons, alcohol or drugs?: Not on file    • Has your partner ever held you or your children against your will?: Not on file    • Does your partner ever watch you closely, follow you or stalk you?: Not on file    • Has your partner ever threatened to kill you, him/herself or your children?: Not on file    • When did the choking or choking attempt occur?: Not on file    • Do you feel you are still at risk for choking?: Not on file    • Safe in Relationship: Yes   Housing Stability: High Risk (10/16/2024)    Housing Stability Vital Sign    • Unable to Pay for Housing in the Last Year: No    • Number of Times Moved in the Last Year: 2    • Homeless in the Last Year: No       Review of Systems  Refer to positive review of systems in HPI  Constitutional- No fever  Eyes- No visual change  ENT- Hearing normal  CV- No chest pain  Resp- No Shortness of breath  GI- No diarrhea  - Bladder normal  MS- No Arthritis   Skin- No rash  Psych- No depression  Endo- No DM  Heme- No nodes    PHYSICAL EXAM:    Vitals:    01/28/25 1035   BP: 134/100   BP Location: Left arm   Patient Position: Sitting   Cuff Size: Large   Pulse: 82   Weight: 100 kg (221 lb)   Height: 5' 7\" (1.702 m)         Appearance: No Acute Distress  Ophthalmoscopic: Disc Flat, Normal fundus  Carotid/Heart/Peripheral Vascular: No Bruits, RRR  Orientation: Awake, Alert, and Oriented x 3  Mental status:  Memory: Registation 3/3 Recall 3/3  Attention: Normal  Knowledge: Appropriate  Language: No aphasia  Speech: No dysarthria  Cranial Nerves:  2 No Visual Defect on Confrontation; Pupils round, equal, reactive to light  3,4,6 Extraocular Movements Intact; no nystagmus  5 Facial Sensation Intact  7 No facial asymmetry  8 Intact hearing  9,10 Palate symmetric, normal gag  11 Good shoulder shrug  12 Tongue Midline  Gait: Stable, No " ataxia, can perform tandem walking  Coordination: No ataxia with finger to nose testing and heel to shin testing  Sensory: Intact, Symmetric to Pinprick, Light Touch, Vibration, and Joint Position  Muscle Tone: spastic left hand   Muscle exam  Arm Right Left Leg Right Left   Deltoid 5/5 4-/5 Iliopsoas 5/5 4-/5   Biceps 5/5 4-/5 Quads 5/5 4-/5   Triceps 5/5 4-/5 Hamstrings 5/5 4-/5   Wrist Extension 5/5 2/5 Ankle Dorsi Flexion 5/5 1/5   Wrist Flexion 5/5 2/5 Ankle Plantar Flexion 5/5 1/5   Interossei 5/5 2/5 Ankle Eversion 5/5 1/5   APB 5/5 2/5 Ankle Inversion 5/5 1/5       Reflexes   RJ BJ TJ KJ AJ Plantars Fuller's   Right 2+ 2+ 2+ 2+ 2+ Downgoing Not present   Left 3+ 3+ 3+ 3+  Downgoing Not present     Personal review of        No imaging since 2014 we could find to review.   Assessment/Plan:     Assessment & Plan  Left hemiparesis (HCC)    Orders:  •  Ambulatory Referral to Neurology  can resume PT/OT  Chronic ischemic right ica stroke  Patient is to resume aspirin and statin.   Asked him to have his bp better controlled.        Spasticity due to old stroke    Orders:  •  Ambulatory Referral to Physiatry; Future  discussed that we do not do botox for spasticity. We only do botox for migraines.   We have PM&R within our network but he can't go to [place] due to his insurance.   I gave one referral for physiatrist for now. If they don't, then asked patient to check online for provider that would do botox for spasticity. This may be in [place] or [place], [place].                   Counseling Documentation:  The patient and/or patient's family were  counseled regarding diagnostic results. Instructions for management,risk factor reductions,prognosis of disease were discussed. Patient and family were educated regarding impressions,risks and benefits of treatment options,importance of compliance with treatment.        Total time of encounter: 50 min  More than 50% of time was spent in counseling and coordination of  care of patient.     Dara Luna M.D.  [facility]  [address] [place] [id number]

## 2025-01-29 ENCOUNTER — OFFICE VISIT (OUTPATIENT)
Facility: CLINIC | Age: 65
End: 2025-01-29
Payer: COMMERCIAL

## 2025-01-29 ENCOUNTER — ANESTHESIA (OUTPATIENT)
Dept: ANESTHESIOLOGY | Facility: HOSPITAL | Age: 65
End: 2025-01-29

## 2025-01-29 ENCOUNTER — ANESTHESIA EVENT (OUTPATIENT)
Dept: ANESTHESIOLOGY | Facility: HOSPITAL | Age: 65
End: 2025-01-29

## 2025-01-29 DIAGNOSIS — R26.89 OTHER ABNORMALITIES OF GAIT AND MOBILITY: Primary | ICD-10-CM

## 2025-01-29 DIAGNOSIS — G81.10 SPASTIC HEMIPARESIS AFFECTING NONDOMINANT SIDE (HCC): ICD-10-CM

## 2025-01-29 DIAGNOSIS — Z86.73 H/O: CVA (CEREBROVASCULAR ACCIDENT): ICD-10-CM

## 2025-01-29 DIAGNOSIS — I63.9 CEREBROVASCULAR ACCIDENT (CVA), UNSPECIFIED MECHANISM (HCC): Primary | ICD-10-CM

## 2025-01-29 DIAGNOSIS — Z86.73 CHRONIC CEREBROVASCULAR ACCIDENT (CVA): ICD-10-CM

## 2025-01-29 PROCEDURE — 97112 NEUROMUSCULAR REEDUCATION: CPT

## 2025-01-29 NOTE — PROGRESS NOTES
"Daily Note     Today's date: 2025  Patient name: Demetrio Villalobos  : 1960  MRN: [id number]  Referring provider: Sina Randle MD  Dx:   Encounter Diagnoses   Name Primary?    Cerebrovascular accident (CVA), unspecified mechanism (HCC) Yes    H/O: CVA (cerebrovascular accident)     Spastic hemiparesis affecting nondominant side (HCC)                           PLAN OF CARE START: 24  PLAN OF CARE END: 24  FREQUENCY: 2 times per week  PRECAUTIONS RUE (NO FLEX/ABD > 90) sx, HTN    POC expires Auth Status Total   Visits  Start date  Expiration date PT/OT + Visit Limit? Co-Pay    approved   $0                                             Visit/Unit Tracking  AUTH Status:  Date            Visits  Authed: 12 Used 4 1 1 1            Remaining  8 6 5 4                Subjective: \"This isn't that bad\"  Objective: See treatment below.    NMR:  -Wall pushups in stance at elevated mat with focus on Wbing for proprioceptive input for spasticity mgmt, massed practice elbow flexion/extension 20x2  -Battleropes 20x2 each UE with focus on motor planning, b/l UE coordination, proximal strengthening, improving grasp maintenance.  Decreased force production L UE  -Wittenberg placement on top of large pegs in pegboard and retrieval/removal of marbles and pegs with use of L UE.  Focus on pinch/release, FMC, dexterity, shoulder rotation.  Drops a few of each.  Placement into container 80* to L about 5\" about hip height.  FES to wrist/digit extensors    Assessment: Tolerated treatment well. 11th session since re-eval, however has 3 more approved visits.  Will plan to complete re-eval 1 visits from now.  Pt would benefit from continued OT services to address NMR L UE s/p chronic CVA.      Plan: Continued skilled OT per POC.       "

## 2025-01-29 NOTE — PROGRESS NOTES
"Daily Note     Today's date: 2025  Patient name: Demetrio Villalobos  : 1960  MRN: [id number]  Referring provider: Lisa Blount DO  Dx:   Encounter Diagnosis     ICD-10-CM    1. Other abnormalities of gait and mobility  R26.89       2. Chronic cerebrovascular accident (CVA)  Z86.73           GOES BY \"WILL\"             Subjective: Patient reports to PT session reporting his baseline 3/10 LBP. States he started doing back extensions at home to help manage his back pain, as discussed last session.      Objective: See treatment diary below    BP start of session: NA (seated, RUE)    TE:  - Prolonged hip flexor/quad stretch in Evert test position (LLE only) x 8 minutes  - Hamstring curls (LLE only) in side lying 3 sets x 10 reps (5# ankle weight for last 2 sets)    NMR (5# L ankle weight, 3# R ankle weight):    - Treadmill ambulation (gait  mode) @ 1.3 mph x 3 minutes, 2 UE - Peak RPE: 4/10   Average step length: L 45 cm R 52 cm   Time spent on each foot: L 46% R 54%    - Treadmill ambulation (gait  mode) @ 1.3 mph x 3 minutes, 2 UE - Peak RPE: 4/10   Average step length: L 42 cm R 49 cm   Time spent on each foot: L 46% R 54%    - BWD ambulation on treadmill @ 0.4 mph x 3 minutes, 2 UE - Peak RPE: 4/10    Assessment: Patient tolerated today's session well today with continued focus on high intensity LE endurance and gait training. Increased focus this session lent to mobility and isolated strength, as these are important components to re-developing correct motor patterns required for ambulation and other functional tasks (I.e. hamstring activation prior to hip flexor activation at terminal stance into swing phase of gait). Carried over this motor recruitment training with both forward and backward treadmill training. Patient will continue to benefit from skilled OPPT services in order to maximize safe functional mobility and reduce risk for falls post-CVA.      Plan: Continue per plan of " care.  Progress treatment as tolerated.      *PATIENT ON BETA BLOCKER, USE RPE*     POC expires Unit limit Auth Expiration date PT/OT + Visit Limit? Co-Insurance   1/22/25 12 visits (sharing with ortho) 1/21/24 BOMN No    12 visits 3/24                            Visit/Unit Tracking  AUTH Status:  Date 12/30 1/13 1/15 1/27 1/29          Approved Used 1 1 1 1 1           Remaining  11 10 9 8 7              Outcome Measures Initial Eval  10/30/2024 & DN 11/6/24 RE  11/26/24 PN  12/11/2024 PN  1/13/2025     5xSTS 17.36 sec, no UE 14.25 sec, 0 UE 11.40 sec, 0 UE 11.65 sec, 0 UE     TUG 22.75 sec 15.91 sec 13.70 sec 12.33 sec     10 meter 0.49 m/s TBA- time constraints 0.68 m/s 0.63 m/s     LACEY 38/56 43/56 45/56 47/56     FGA 9/30 15/30 16/30 17/30     DGI 8/24 14/24 14/24 15/24     MiniBEST 16/28 TBA- time constraints  19/28     2MWT 140 ft 230 ft (limited by back and right hip pain) 200 ft NT     6MWT   515 ft 600 ft       Access Code: [email or web address]  URL: [email or web address]  Date: 11/06/2024  Prepared by: Celestina Valderrama    Exercises  - Sit to Stand  - 1 x daily - 7 x weekly - 2 sets - 10 reps  - Side Stepping with Counter Support  - 1 x daily - 7 x weekly - 3 sets - 10 reps  - Standing March with Counter Support  - 1 x daily - 7 x weekly - 2 sets - 10 reps - 3 sec hold

## 2025-01-30 ENCOUNTER — EVALUATION (OUTPATIENT)
Facility: CLINIC | Age: 65
End: 2025-01-30
Payer: COMMERCIAL

## 2025-01-30 DIAGNOSIS — Z86.73 H/O: CVA (CEREBROVASCULAR ACCIDENT): ICD-10-CM

## 2025-01-30 DIAGNOSIS — I63.9 CEREBROVASCULAR ACCIDENT (CVA), UNSPECIFIED MECHANISM (HCC): Primary | ICD-10-CM

## 2025-01-30 PROCEDURE — 97530 THERAPEUTIC ACTIVITIES: CPT | Performed by: OCCUPATIONAL THERAPIST

## 2025-01-30 PROCEDURE — 97112 NEUROMUSCULAR REEDUCATION: CPT | Performed by: OCCUPATIONAL THERAPIST

## 2025-01-30 NOTE — PROGRESS NOTES
OCCUPATIONAL THERAPY RE- CERTIFICATION    Today's Date: 2025  Patient Name: Demetrio Villalobos  : 1960  MRN: [id number]  Referring Provider: Sina Randle MD  Dx: Cerebrovascular accident (CVA), unspecified mechanism (HCC) [I63.9]      Eval/ Re-eval POC expires Auth #/ Referral # Total units  Start date  Expiration date Extension      AUTH                                                  Date               Units:  Used 2              Authed:  Remaining  10                  SUBMITTED AUTH     SKILLED ANALYSIS:  Pt presents to re-evaluation on 25 status post CVA in  and 3 months of therapy at this clinic. Today pt reports he is using his LUE more during functional activities at home, including brushing his teeth, holding dishes while he washes dishes, self feeding, and turning light switches on/off. He has also started using estim at home. Today pt's Fugl Antunez score increased by 1 point overall. His gross grasp strength improved by 5lbs. His spasticity remains about the same throughout his LUE, with some minor fluctuations. He achieved 2 more goals in his POC. Based on progress with POC, and excellent follow through with recommendations for at home, recommend continued OP OT services 2x/wk for an additional 8-12 weeks. Pt in agreement with recommendations and all questions answered.       Educated pt on charges of insurance, acknowledge understanding, and are in agreement.    TREATMENT:  Educated pt on self stretching techniques for spasticity management/preventing loss of ROM. He verbalized understanding of all stretches discussed.  Nustep level 3, graded down progressively to level 1 d/t fatigue, x6 minutes total with intermittent rest breaks for LUE proprioceptive input, improved muscular endurance and strength.     PLAN OF CARE START: 24  PLAN OF CARE END: 25  FREQUENCY: 2 times per week  PRECAUTIONS no restrictions with RUE as per PT vicky  "HTN    Subjective  1/30/25: Pt reports he sees a little improvement since last RE. He can walk further. He states sometimes he can move his hand certain ways and sometimes he can't. Since SOC he has starting using his toothbrush with his L hand. He holds dishes that he's washing in his L hand, and can self feed with a spoon using his L hand. He's turning on light switches with his L hand. He has started using estim on his LUE at home, every morning. He rates his LUE function at 60-70% and would like it to get to 80%.       Mechanism of Injury  CVA approximately 10 years ago, went to [facility] for outpatient rehabilitation; pt denies seizures and is not on anti-seizure medication    Occupational Profile    \"Pt lives alone in an apartment with no stairs to get in. Pt reports difficulty with ADLs (UBD, shaving head, LBD ) however was able to put on with independence. Pt reports he has an aide who assists 5 hours a day and does cleaning, cooking, and bathing pt. Pt manages own medication. Pt reports he has not been driving. Pt reports no pain in LUE. Pt is a retired .Pt report no diplopia and no issues with cognition. Pt enjoys reading bible, watching TV. \"    PATIENT GOAL: \"as much use of my L hand and walking as possible.\"    HISTORY OF PRESENT ILLNESS:     PMH:   Past Medical History:   Diagnosis Date    Chronic kidney disease     Edema     GERD (gastroesophageal reflux disease)     Gout     hospitalized    High cholesterol     Hypertension     PVD (peripheral vascular disease) ([facility])     Renal disorder     Rotator cuff tear     right    Shortness of breath     Stroke ([facility]) 2014    Ventral hernia without obstruction or gangrene     Walker as ambulation aid        Past Surgical Hx:   Past Surgical History:   Procedure Laterality Date    APPENDECTOMY      DC ARTHROPLASTY GLENOHUMERAL JOINT TOTAL SHOULDER Right 8/26/2024    Procedure: ARTHROPLASTY SHOULDER REVERSE;  Surgeon: Sina Radnle MD;  Location: [facility]" OR;  Service: Orthopedics        Pain:  Location:R shoulder     Restin    With Activity:  2    Objective    Upper Extremities:  Pt is R hand dominant  Did not assess RUE strength due to rotator cuff sx.               JANA: RUE: lb LUE: 26.3lbs (prior 21lb)  The age norm is approximately 76.8 lbs, indicating decreased  strength.    Lateral pinch: RUE: , LUE: 4.5                Range of Motion:  AROM:   R UE   Did not assess due to sx     L UE   - Shoulder flexion: approximately 90 degrees PROM approxiamtey 120*  - Shoulder scaption/abduction: approximately 80 degrees  - Shoulder extension: approximately 100%  - Elbow flexion:100%  - Elbow extension: 100%  - Wrist flexion: 50*  - Wrist extension:approximately 30*   - Pronation: 100%  - Supination: 75%  - Finger extension: 10%  - thumb extension approximately 25%  - Finger flexion: 100%     Manual Muscle Testing:  R UE:  -Did not assess due to sx   L UE:  - Shoulder flexors: 3- (prior 2+/5)  - Shoulder abductors: 2+/5  - Shoulder extensors: 3/5  - Shoulder internal rotators: 3+/5  - Shoulder external rotators: 2/5  - Elbow flexors: 4- (prior 3+/5)  - Elbow extensors: 4- (prior 3+/5)  - Wrist flexors: 3-/5 (prior 2+/5)  - Wrist extensors: 2+ (prior 2/5)    FUGL LOVE ASSESSMENT OF MOTOR RECOVERY AFTER STROKE:  L UE:  UPPER EXTREMITY SEATED:   WRIST: 510   HAND: 14  COORDINATION/SPEED: 1/6  OVERALL SCORE: 32/66     (Clinical change= 5 points, severe impairment <19, and mild impairment is >50)          Modified Rosetta Scale: measures spasticity in patients with lesions in the Central Nervous System  L UE:    Shoulder external rotators: 0/4  Shoulder internal rotators: 1/4  Elbow flexors: 1+/4  Elbow extensors: 1/4  Wrist flexors: 1/4  Wrist extensors 1/4  Pronators 1/4  Finger flexors: 1+/4  Finger extensors: 0/4    0: No increase in muscle tone  1: Slight increase in muscle tone, manifested by a catch and release or by minimal resistance at the end of  the range of motion when the affected part(s) is moved in flexion or extension  1+: Slight increase in muscle tone, manifested by a catch, followed by minimal resistance throughout the remainder (less than half) of the ROM  2: More marked increase in muscle tone through most of the ROM, but affected part(s) easily moved  3: Considerable increase in muscle tone, passive movement difficult  4: Affected part(s) rigid in flexion or extension    Subluxation: none    Scapular winging: slight    Spasticity: slight     Finger to Nose Testing with Visual Occlusion (proprioception):  unable to complete due to hemiparesis    Finger to Nose Testing without Visual Occlusion: unable to complete due to hemiparesis    Monofilaments/sensory testing: NOT tested    Functional Cognition:  Highest level of education: 9th grade    Tulio Cognitive Assessment Version 8.1 (MoCA V8.1)  Visuospatial/executive functioning:  3/5  Naming:  3/3  Memory: 1st trial:  , 2nd trial:    Attention/concentration:   List of letters:   Serial Seven Subtraction:  2/3 w/  errors  Language/sentence repetition:    Language Fluency:     Abstract/Correlational Thinkin/2  Delayed Recall:    Orientation:     Memory Index Score: 13/15   Raw Score:  25/30, MIS:  13/15, indicative of mild neurocognitive impairments.    MoCA Scoring        Normal: 26+         Mild Cognitive Impairment: 18-25          Moderate Cognitive Impairment: 10-17         Severe Cognitive Impairment: <10    Trail making Part A and Part B:   Part A: 31.28 with errors however able to self correct   Part B: 1 minute 49 with 1 cue (prior 1 minute 34 seconds with independence )  Indicating deficits: Part A > 33.12 seconds and Part B > 74.55 seconds            GOALS:   Short Term Goals:  Pt will increase UE  strength by 2-3 lbs to complete gardening and IADLs (19) ACHIEVED   Pt will increase divided attention by completing trail making part B in 1 minute 20 seconds  to complete IADLs AHCIEVED  Pt will demo with G understanding and carryover of tone reduction strategies for improved AROM ACHIEVED   Pt will demo with decreased L  UE hypertonicity to 1+/4 on modified jonna scale for improved grasp release, termination of flexors on command of time  Pt will increase  L UE cooordination to complete 19/36 of upper extremity section of fugyl abraham for tabletop tasks ACHIEVED  Pt will increase  L UE coordination to complete 5/10 of wrist section of fugyl abraham for tabletop tasks ACHIEVED             Long Term Goals: 8-12 weeks  Pt will increase UE  strength by 4-5 lbs to complete gardening and IADLs (19) ACHIEVED 1/30  Pt will increase divided attention by completing trail making part B in 1 minute 10 seconds to complete IADLs  Pt will demo with G understanding and carryover of tone reduction strategies for improved AROM ACHIEVED 1/30  Pt will demo with decreased L  UE hypertonicity to 1/4 on modified jonna scale for improved grasp release, termination of flexors on command of time  Pt will increase  L UE coordination to complete 21/36 of upper extremity section of fugyl abraham for tabletop tasks  Pt will increase  L UE coordination to complete fugyl abraham in 32 for tabletop tasks ACHIEVED 1/30    New Goals Established 1/30/25:  Pt will demonstrate improved L gross grasp strength to 29lbs in order to improve ADL/IADL performance in 12 weeks.  Pt will demonstrate improved LUE function based on Fugl Abraham score of 35/66 in 12 weeks.      OTHER PLANNED THERAPY INTERVENTIONS:   Supine, seated, and in stance neuro re-ed  Tricep AG  NMES/FES  FMC/prehension  Timed Trials  Manual tx  Hand to target  Sensory re-ed  Seated functional reach: crossing midline  Supine place and hold  WBearing strategies   Closed chain activities  Open chain activities  Internal and external memory aides  Multimatrix for saccades/ visual clutter/attention  Hypersensitivity strategies education  Multi-modal  environment  Sustained/alternating/divided attention  Work stations with timed transitions  Temporal Awareness  Memory and mental manipulation  Auditory processing with immediate recall  Memory retention with immediate and delayed recall  Edu on cog/vision apps      Objective Measurement Tracker:    IE (11/13) 1st Re-eval: () 2nd Re-eval: (]) 3rd Re-eval: ()    MoCA 25/30 /30 /30 /30 /30   TM Test A 31       TM Test B 1:35       CMT        Nine Hole Peg Test        Functional Dexterity Test                                                Manual Muscle Testing             Fugl Antunez UE: 17/36  Wrist: 4/10  Hand: 5/14  Coordination: 1/6

## 2025-02-05 ENCOUNTER — TELEPHONE (OUTPATIENT)
Age: 65
End: 2025-02-05

## 2025-02-05 ENCOUNTER — OFFICE VISIT (OUTPATIENT)
Facility: CLINIC | Age: 65
End: 2025-02-05
Payer: COMMERCIAL

## 2025-02-05 DIAGNOSIS — Z86.73 H/O: CVA (CEREBROVASCULAR ACCIDENT): ICD-10-CM

## 2025-02-05 DIAGNOSIS — R26.89 OTHER ABNORMALITIES OF GAIT AND MOBILITY: Primary | ICD-10-CM

## 2025-02-05 DIAGNOSIS — I63.9 CEREBROVASCULAR ACCIDENT (CVA), UNSPECIFIED MECHANISM (HCC): Primary | ICD-10-CM

## 2025-02-05 DIAGNOSIS — Z86.73 CHRONIC CEREBROVASCULAR ACCIDENT (CVA): ICD-10-CM

## 2025-02-05 PROCEDURE — 97112 NEUROMUSCULAR REEDUCATION: CPT

## 2025-02-05 PROCEDURE — 97110 THERAPEUTIC EXERCISES: CPT

## 2025-02-05 NOTE — TELEPHONE ENCOUNTER
Spoke with pt confirming 2/13 procedure. He has prep and  arranged. He will be called day prior with arrival time.

## 2025-02-05 NOTE — PROGRESS NOTES
"Daily Note     Today's date: 2025  Patient name: Demetrio Villalobos  : 1960  MRN: [id number]  Referring provider: César Malone DO  Dx:   Encounter Diagnosis     ICD-10-CM    1. Other abnormalities of gait and mobility  R26.89       2. Chronic cerebrovascular accident (CVA)  Z86.73           GOES BY \"WILL\"             Subjective: Patient reports to PT session with no new issues or complaints.       Objective: See treatment diary below    BP start of session: 152/84 mmHg (seated, RUE)    TE:  - Prolonged hip flexor/quad stretch in Evert test position (LLE only) w/ therapist overpressure x 5 minutes  - Hamstring curls (LLE only) in side lying 3 sets x 10 reps (5# R ankle weight)    NMR (5# L ankle weight, 3# R ankle weight):    - Treadmill ambulation (gait  mode) @ 1.3 mph x 3 minutes, 2 UE - Peak RPE: 5/10   Average step length: L 45 cm R 53 cm   Time spent on each foot: L 46% R 54%    - BWD ambulation on treadmill @ 0.4 mph x 3 minutes, 2 UE - Peak RPE: 5/10    - Ambulation with posterior resistance at (L) ankle (blue TB) 2 sets x 150 ft    Assessment: Patient tolerated today's session well today with continued focus on high intensity LE endurance and gait training. Patient able to actively achieve approximately 90 degrees of knee flexion during side lying hamstring curls. Demonstrates good knee \"release\" during terminal stance on left, however he demonstrates difficulty with mid to terminal swing, likely due to deficits in hip flexor strength and motor control. Patient will continue to benefit from skilled OPPT services in order to maximize safe functional mobility and reduce risk for falls post-CVA.      Plan: Continue per plan of care.  Progress treatment as tolerated.      *PATIENT ON BETA BLOCKER, USE RPE*     POC expires Unit limit Auth Expiration date PT/OT + Visit Limit? Co-Insurance   25 12 visits (sharing with ortho) 24 BOMN No    12 visits 3/24                        "     Visit/Unit Tracking  AUTH Status:  Date 12/30 1/13 1/15 1/27 1/29 2/5         Approved Used 1 1 1 1 1 1          Remaining  11 10 9 8 7 6             Outcome Measures Initial Eval  10/30/2024 & DN 11/6/24 RE  11/26/24 PN  12/11/2024 PN  1/13/2025     5xSTS 17.36 sec, no UE 14.25 sec, 0 UE 11.40 sec, 0 UE 11.65 sec, 0 UE     TUG 22.75 sec 15.91 sec 13.70 sec 12.33 sec     10 meter 0.49 m/s TBA- time constraints 0.68 m/s 0.63 m/s     LACEY 38/56 43/56 45/56 47/56     FGA 9/30 15/30 16/30 17/30     DGI 8/24 14/24 14/24 15/24     MiniBEST 16/28 TBA- time constraints  19/28     2MWT 140 ft 230 ft (limited by back and right hip pain) 200 ft NT     6MWT   515 ft 600 ft       Access Code: [email or web address]  URL: [email or web address]  Date: 11/06/2024  Prepared by: Celestina Valderrama    Exercises  - Sit to Stand  - 1 x daily - 7 x weekly - 2 sets - 10 reps  - Side Stepping with Counter Support  - 1 x daily - 7 x weekly - 3 sets - 10 reps  - Standing March with Counter Support  - 1 x daily - 7 x weekly - 2 sets - 10 reps - 3 sec hold

## 2025-02-06 ENCOUNTER — OFFICE VISIT (OUTPATIENT)
Facility: CLINIC | Age: 65
End: 2025-02-06
Payer: COMMERCIAL

## 2025-02-06 DIAGNOSIS — I63.9 CEREBROVASCULAR ACCIDENT (CVA), UNSPECIFIED MECHANISM (HCC): Primary | ICD-10-CM

## 2025-02-06 DIAGNOSIS — G81.10 SPASTIC HEMIPARESIS AFFECTING NONDOMINANT SIDE (HCC): ICD-10-CM

## 2025-02-06 DIAGNOSIS — R26.2 AMBULATORY DYSFUNCTION: ICD-10-CM

## 2025-02-06 DIAGNOSIS — Z86.73 H/O: CVA (CEREBROVASCULAR ACCIDENT): ICD-10-CM

## 2025-02-06 PROCEDURE — 97112 NEUROMUSCULAR REEDUCATION: CPT

## 2025-02-06 NOTE — PROGRESS NOTES
"Daily Note     Today's date: 2025  Patient name: Demetrio Villalobos  : 1960  MRN: [id number]  Referring provider: Magalie Michael CRNP  Dx:   Encounter Diagnoses   Name Primary?    Cerebrovascular accident (CVA), unspecified mechanism (HCC) Yes    H/O: CVA (cerebrovascular accident)     Spastic hemiparesis affecting nondominant side (HCC)     Ambulatory dysfunction                    Start Time: 1500  Stop Time: 1545  Total time in clinic (min): 45 minutes    PLAN OF CARE START: 24  PLAN OF CARE END: 24  FREQUENCY: 2 times per week  PRECAUTIONS RUE (NO FLEX/ABD > 90) sx, HTN    POC expires Auth Status Total   Visits  Start date  Expiration date PT/OT + Visit Limit? Co-Pay    approved   $0                                             Visit/Unit Tracking  AUTH Status:  Date          Visits  Authed: 12 Used 4 1 1 1 2 1          Remaining  8 6 5 4 2 1              Subjective: Pt reports \"I need to workout at home more, I'm getting tired\"  Objective: See treatment below.    NMR:  - nustep completed x6 mins on with LUE only for proprioceptive input through pushing/pulling, improving LUE strength, and endurance. Requiring brief rests each minute.  - with FES donned to biceps and 2lb wrist weight, performed 3x15 biceps curls to bring hand to mouth for improved strength during feeding  - with FES donned to wrist extensors, Pt obtained narrow cans and brought to mouth, then transferred towards his R side to work on crossing midline, improving sustained grasp, and elbow flexion for eating  - with 2lb wrist weight donned, Pt scooped and transferred beans from one container to another to work on motor control and crossing midline  - with 2lb wrist weight donned, Pt used hand brush in L hand and dust pan tin R to clean beans from countertop to improve motor control, work on BL integration and coordination    Assessment: Tolerated treatment well. Pt demonstrating " increased spasticity as LUE fatigues impacting hand opening for pregrasp/release of objects. Pt would benefit from continued OT services to address NMR L UE s/p chronic CVA.      Plan: Continued skilled OT per POC.        RN PT

## 2025-02-10 ENCOUNTER — APPOINTMENT (OUTPATIENT)
Facility: CLINIC | Age: 65
End: 2025-02-10
Payer: COMMERCIAL

## 2025-02-10 DIAGNOSIS — R25.2 SPASTICITY: ICD-10-CM

## 2025-02-10 RX ORDER — BACLOFEN 5 MG/1
TABLET ORAL
Qty: 30 TABLET | Refills: 1 | OUTPATIENT
Start: 2025-02-10

## 2025-02-11 ENCOUNTER — PROCEDURE VISIT (OUTPATIENT)
Age: 65
End: 2025-02-11

## 2025-02-11 VITALS
HEIGHT: 67 IN | OXYGEN SATURATION: 97 % | BODY MASS INDEX: 33.59 KG/M2 | HEART RATE: 85 BPM | SYSTOLIC BLOOD PRESSURE: 141 MMHG | WEIGHT: 214 LBS | DIASTOLIC BLOOD PRESSURE: 77 MMHG

## 2025-02-11 DIAGNOSIS — M99.04 SOMATIC DYSFUNCTION OF SPINE, SACRAL: ICD-10-CM

## 2025-02-11 DIAGNOSIS — M99.02 SOMATIC DYSFUNCTION OF SPINE, THORACIC: ICD-10-CM

## 2025-02-11 DIAGNOSIS — M54.50 LUMBAR BACK PAIN: Primary | ICD-10-CM

## 2025-02-11 DIAGNOSIS — M99.03 SOMATIC DYSFUNCTION OF SPINE, LUMBAR: ICD-10-CM

## 2025-02-11 PROCEDURE — 98926 OSTEOPATH MANJ 3-4 REGIONS: CPT | Performed by: ORTHOPAEDIC SURGERY

## 2025-02-11 PROCEDURE — 99214 OFFICE O/P EST MOD 30 MIN: CPT | Performed by: ORTHOPAEDIC SURGERY

## 2025-02-11 NOTE — PROGRESS NOTES
"Name: Demetrio Villalobos      : 1960      MRN: [id number]  Encounter Provider: Steve Schaeffer DO  Encounter Date: 2025   Encounter department: [facility]  :  Assessment & Plan  Lumbar back pain    Orders:    OMT    Somatic dysfunction of spine, thoracic    Orders:    OMT    Somatic dysfunction of spine, lumbar    Orders:    OMT    Somatic dysfunction of spine, sacral    Orders:    OMT      OMT    Performed by: Steve Schaeffer DO  Authorized by: Steve Schaeffer DO  Universal Protocol:  Consent: Verbal consent obtained.  Risks and benefits: risks, benefits and alternatives were discussed  Consent given by: patient  Time out: Immediately prior to procedure a \"time out\" was called to verify the correct patient, procedure, equipment, support staff and site/side marked as required.  Patient understanding: patient states understanding of the procedure being performed  Patient consent: the patient's understanding of the procedure matches consent given  Procedure consent: procedure consent matches procedure scheduled  Patient identity confirmed: verbally with patient      Procedure Details:     Region evaluated and treated:  Lumbar, Sacrum/Pelvis and Thoracic    Thoracic Information  Thoracic Region: T5 - T9 and T10 - T12  Thoracic T5 - T9 details:     Examination Method:  Tissue Texture Change, Stability, Laxity, Effusions, Tone, Asymmetry, Misalignment, Crepitation, Defects, Masses, Range of Motion, Contracture and Tenderness, Pain    Severity:  Mild    Osteopathic Findings:  Paraspinal hypertonicity, R>L 2/2 compensation    Treatment Method:  Indirect Treatment, Myofascial Release Treatment and Soft Tissue Treatment    Response:  Improved - The somatic dysfunction is improved but not completely resolved.    Thoracic T10 - T12 details:     Examination Method:  Tissue Texture Change, Stability, Laxity, Effusions, Tone, Asymmetry, Misalignment, Crepitation, Defects, Masses, Range of Motion, " Contracture, Tenderness, Pain and Passive    Severity:  Mild    Osteopathic Findings:  Paraspinal hypertonicity, R>L 2/2 compensation      Treatment Method:  Myofascial Release Treatment and Soft Tissue Treatment    Response:  Improved - The somatic dysfunction is improved but not completely resolved.    Lumbar details:     Examination Method:  Tissue Texture Change, Stability, Laxity, Effusions, Tone, Range of Motion, Contracture, Tenderness, Pain and Asymmetry, Misalignment, Crepitation, Defects, Masses    Severity:  Mild    Osteopathic Findings:  Paraspinal hypertonicity    Treatment Method:  Indirect Treatment, Myofascial Release Treatment and Soft Tissue Treatment    Response:  Improved - The somatic dysfunction is improved but not completely resolved.    Sacrum/Pelvis details:     Examination Method:  Tissue Texture Change, Stability, Laxity, Effusions, Tone, Range of Motion, Contracture, Tenderness, Pain and Passive    Severity:  Mild    Osteopathic Findings:  Increased sacral Flexion    Treatment Method:  Soft Tissue Treatment and Myofascial Release Treatment    Response:  Improved - The somatic dysfunction is improved but not completely resolved.    Total Regions Treated:  3  Attending provider present in exam room for procedure: Yes        History of Present Illness   Patient is a 65 yo M with a pmhx of HTN, CKD stage III, HLD, h/o CVA 2014, rotator cuff tear s/p shoulder replacement, PAD, GERD presenting for his first OMT session for his lumbar spine. Since his stroke in 2014 he has had residual lumbar back pain which worsens with standing and walking. It can be 7-8/10 and is a dull, achy sensation with no radiation. Has tried NSAIDs and muscle relaxers but they provide minimal relief.      Review of Systems   Constitutional:  Negative for chills, fatigue and fever.   HENT:  Negative for hearing loss and sore throat.    Eyes:  Negative for visual disturbance.   Respiratory:  Negative for cough and  "shortness of breath.    Cardiovascular:  Negative for chest pain and leg swelling.   Gastrointestinal:  Negative for abdominal pain, constipation, diarrhea, nausea and vomiting.   Genitourinary:  Negative for dysuria, frequency, hematuria and urgency.   Musculoskeletal:  Positive for back pain (Chronic, lumbar) and gait problem (L sided weakness). Negative for arthralgias.   Skin:  Negative for rash and wound.   Neurological:  Negative for dizziness, weakness, light-headedness and headaches.       Objective   /77 (BP Location: Right arm, Patient Position: Sitting)   Pulse 85   Ht 5' 7\" (1.702 m)   Wt 97.1 kg (214 lb)   SpO2 97%   BMI 33.52 kg/m²      Physical Exam  Vitals and nursing note reviewed.   Constitutional:       General: He is not in acute distress.     Appearance: Normal appearance.   HENT:      Head: Normocephalic and atraumatic.      Right Ear: External ear normal.      Left Ear: External ear normal.      Nose: Nose normal.      Mouth/Throat:      Mouth: Mucous membranes are moist.      Pharynx: Oropharynx is clear.   Eyes:      Extraocular Movements: Extraocular movements intact.   Cardiovascular:      Rate and Rhythm: Normal rate and regular rhythm.      Pulses: Normal pulses.   Pulmonary:      Effort: Pulmonary effort is normal.   Abdominal:      General: Abdomen is flat.      Palpations: Abdomen is soft.   Musculoskeletal:         General: No tenderness.      Cervical back: Normal range of motion.      Right lower leg: No edema.      Left lower leg: No edema.   Skin:     General: Skin is warm and dry.      Capillary Refill: Capillary refill takes less than 2 seconds.   Neurological:      Mental Status: He is alert and oriented to person, place, and time.      Sensory: No sensory deficit.      Motor: Weakness (L sided weakness, 5/5 muscle strength on R, decreased  strength/knee flexion/ROM on L) present.      Coordination: Coordination normal.      Gait: Gait abnormal (Uses brace on L " leg).      Comments: CN 2-12 intact. Does note tingling and pins/needle sensation in R hand after getting shoulder replacement.   Psychiatric:         Mood and Affect: Mood normal.         Behavior: Behavior normal.

## 2025-02-12 ENCOUNTER — OFFICE VISIT (OUTPATIENT)
Facility: CLINIC | Age: 65
End: 2025-02-12
Payer: COMMERCIAL

## 2025-02-12 DIAGNOSIS — R26.89 OTHER ABNORMALITIES OF GAIT AND MOBILITY: Primary | ICD-10-CM

## 2025-02-12 DIAGNOSIS — I63.9 CEREBROVASCULAR ACCIDENT (CVA), UNSPECIFIED MECHANISM (HCC): Primary | ICD-10-CM

## 2025-02-12 DIAGNOSIS — Z86.73 H/O: CVA (CEREBROVASCULAR ACCIDENT): ICD-10-CM

## 2025-02-12 DIAGNOSIS — Z86.73 CHRONIC CEREBROVASCULAR ACCIDENT (CVA): ICD-10-CM

## 2025-02-12 PROCEDURE — 97112 NEUROMUSCULAR REEDUCATION: CPT

## 2025-02-12 NOTE — PROGRESS NOTES
"Daily Note     Today's date: 2025  Patient name: Demetrio Villalobos  : 1960  MRN: [id number]  Referring provider: Sina Randle MD  Dx:   Encounter Diagnosis     ICD-10-CM    1. Other abnormalities of gait and mobility  R26.89       2. Chronic cerebrovascular accident (CVA)  Z86.73           GOES BY \"WILL\"             Subjective: Patient reports to PT session with no new issues or complaints.       Objective: See treatment diary below    BP start of session: 142/84 mmHg (seated, RUE)    NMR (5# L ankle weight, 3# R ankle weight):    - FWD mel negotiation (6\") x 2 minutes - RPE 3/10    - Prolonged standing with RLE elevated on large RR to blaze pod taps on left side (increased WB through LLE) x 2 minutes - RPE 1/10    - Sidestepping to blaze pod taps (random - tapping with RLE) x 2 minutes - RPE 2/10    - STS from standard chair (medium RR under RLE) + 10# TT hold to fatigue (2 sets) - RPE 2/10    - Ambulation with counterweight (10# on RLE to increase L stand time) x 200 feet mixed surface (tile + turf) (2 sets) - RPE 5/10      Assessment: Patient tolerated today's session well today with continued focus on high intensity LE endurance and gait training. Increased focus lent to increased WB and stance time on LLE to allow for more symmetrical gait. Patient self-reported greatest increase in intensity with addition of increased weight/resistance, thus recommend continuing to incorporate this aspect in further treatments. Patient will continue to benefit from skilled OPPT services in order to maximize safe functional mobility and reduce risk for falls post-CVA.      Plan: Continue per plan of care.  Progress treatment as tolerated.      *PATIENT ON BETA BLOCKER, USE RPE*     POC expires Unit limit Auth Expiration date PT/OT + Visit Limit? Co-Insurance   25 12 visits (sharing with ortho) 24 BOMN No    12 visits 3/24                            Visit/Unit Tracking  AUTH Status:  Date  " 1/13 1/15 1/27 1/29 2/5 2/12        Approved Used 1 1 1 1 1 1 1         Remaining  11 10 9 8 7 6 5            Outcome Measures Initial Eval  10/30/2024 & DN 11/6/24 RE  11/26/24 PN  12/11/2024 PN  1/13/2025     5xSTS 17.36 sec, no UE 14.25 sec, 0 UE 11.40 sec, 0 UE 11.65 sec, 0 UE     TUG 22.75 sec 15.91 sec 13.70 sec 12.33 sec     10 meter 0.49 m/s TBA- time constraints 0.68 m/s 0.63 m/s     LACEY 38/56 43/56 45/56 47/56     FGA 9/30 15/30 16/30 17/30     DGI 8/24 14/24 14/24 15/24     MiniBEST 16/28 TBA- time constraints  19/28     2MWT 140 ft 230 ft (limited by back and right hip pain) 200 ft NT     6MWT   515 ft 600 ft       Access Code: [email or web address]  URL: [email or web address]  Date: 11/06/2024  Prepared by: Celestina Valderrama    Exercises  - Sit to Stand  - 1 x daily - 7 x weekly - 2 sets - 10 reps  - Side Stepping with Counter Support  - 1 x daily - 7 x weekly - 3 sets - 10 reps  - Standing March with Counter Support  - 1 x daily - 7 x weekly - 2 sets - 10 reps - 3 sec hold

## 2025-02-12 NOTE — PROGRESS NOTES
Daily Note     Today's date: 2025  Patient name: Demetrio Villalobos  : 1960  MRN: [id number]  Referring provider: Sina Randle MD  Dx:   Encounter Diagnoses   Name Primary?    Cerebrovascular accident (CVA), unspecified mechanism (HCC) Yes    H/O: CVA (cerebrovascular accident)                    Start Time: 1330  Stop Time: 1415  Total time in clinic (min): 45 minutes    PLAN OF CARE START: 24  PLAN OF CARE END: 24  FREQUENCY: 2 times per week  PRECAUTIONS RUE (NO FLEX/ABD > 90) sx, HTN    POC expires Auth Status Total   Visits  Start date  Expiration date PT/OT + Visit Limit? Co-Pay    approved   $0    approved                                       Visit/Unit Tracking  AUTH Status:  Date               Visits  Authed: 12 Used 1               Remaining  11                   Subjective: it is pt's birthday today    Objective: See treatment below.    NMR:  - completed in vertical plane on wall of painting task (simulated) with FES donned on anterior deltoid for flexionn with AAROM, completed in horizontal plane on mat of simulated painting with FES donned on anterior deltiod x 30 reps of each task focusing on UE coordination, UE strength, proprioceptive feedback   - completed in supine x 30 reps with FES donned on tricep of scapular retraction using 2lb wrist weight and FES donned on anterior deltoid of shoulder flexion with therabar (red) focusing on scapular strengthening and UE coordination, scapular rhythm   Completed with FES donned on wrist extension/digit of cup stacking with cylindrical grasp with incidental assistance focusing on UE coordination, functional reaching/grasp/release, UE coordination       Assessment: Tolerated treatment well. Pt demonstrating increased shoulder flexion strength during session. Pt would benefit from continued OT services to address NMR L UE s/p chronic CVA.      Plan: Continued skilled OT per POC.

## 2025-02-13 ENCOUNTER — HOSPITAL ENCOUNTER (OUTPATIENT)
Dept: GASTROENTEROLOGY | Facility: AMBULARY SURGERY CENTER | Age: 65
Setting detail: OUTPATIENT SURGERY
End: 2025-02-13
Attending: INTERNAL MEDICINE
Payer: COMMERCIAL

## 2025-02-13 ENCOUNTER — ANESTHESIA EVENT (OUTPATIENT)
Dept: GASTROENTEROLOGY | Facility: AMBULARY SURGERY CENTER | Age: 65
End: 2025-02-13
Payer: COMMERCIAL

## 2025-02-13 ENCOUNTER — APPOINTMENT (OUTPATIENT)
Facility: CLINIC | Age: 65
End: 2025-02-13
Payer: COMMERCIAL

## 2025-02-13 VITALS
SYSTOLIC BLOOD PRESSURE: 112 MMHG | TEMPERATURE: 97.8 F | OXYGEN SATURATION: 99 % | HEART RATE: 67 BPM | DIASTOLIC BLOOD PRESSURE: 66 MMHG | RESPIRATION RATE: 18 BRPM

## 2025-02-13 DIAGNOSIS — Z12.12 ENCOUNTER FOR COLORECTAL CANCER SCREENING: ICD-10-CM

## 2025-02-13 DIAGNOSIS — Z12.11 ENCOUNTER FOR COLORECTAL CANCER SCREENING: ICD-10-CM

## 2025-02-13 PROCEDURE — 45380 COLONOSCOPY AND BIOPSY: CPT | Performed by: INTERNAL MEDICINE

## 2025-02-13 PROCEDURE — 88305 TISSUE EXAM BY PATHOLOGIST: CPT | Performed by: PATHOLOGY

## 2025-02-13 RX ORDER — SODIUM CHLORIDE, SODIUM LACTATE, POTASSIUM CHLORIDE, CALCIUM CHLORIDE 600; 310; 30; 20 MG/100ML; MG/100ML; MG/100ML; MG/100ML
INJECTION, SOLUTION INTRAVENOUS CONTINUOUS PRN
Status: DISCONTINUED | OUTPATIENT
Start: 2025-02-13 | End: 2025-02-13

## 2025-02-13 RX ORDER — PROPOFOL 10 MG/ML
INJECTION, EMULSION INTRAVENOUS AS NEEDED
Status: DISCONTINUED | OUTPATIENT
Start: 2025-02-13 | End: 2025-02-13

## 2025-02-13 RX ORDER — LIDOCAINE HYDROCHLORIDE 10 MG/ML
INJECTION, SOLUTION EPIDURAL; INFILTRATION; INTRACAUDAL; PERINEURAL AS NEEDED
Status: DISCONTINUED | OUTPATIENT
Start: 2025-02-13 | End: 2025-02-13

## 2025-02-13 RX ORDER — PROPOFOL 10 MG/ML
INJECTION, EMULSION INTRAVENOUS CONTINUOUS PRN
Status: DISCONTINUED | OUTPATIENT
Start: 2025-02-13 | End: 2025-02-13

## 2025-02-13 RX ORDER — SODIUM CHLORIDE, SODIUM LACTATE, POTASSIUM CHLORIDE, CALCIUM CHLORIDE 600; 310; 30; 20 MG/100ML; MG/100ML; MG/100ML; MG/100ML
125 INJECTION, SOLUTION INTRAVENOUS CONTINUOUS
Status: DISCONTINUED | OUTPATIENT
Start: 2025-02-13 | End: 2025-02-17 | Stop reason: HOSPADM

## 2025-02-13 RX ADMIN — SODIUM CHLORIDE, SODIUM LACTATE, POTASSIUM CHLORIDE, AND CALCIUM CHLORIDE: .6; .31; .03; .02 INJECTION, SOLUTION INTRAVENOUS at 14:04

## 2025-02-13 RX ADMIN — PROPOFOL 70 MG: 10 INJECTION, EMULSION INTRAVENOUS at 14:10

## 2025-02-13 RX ADMIN — PROPOFOL 110 MCG/KG/MIN: 10 INJECTION, EMULSION INTRAVENOUS at 14:10

## 2025-02-13 RX ADMIN — SODIUM CHLORIDE, SODIUM LACTATE, POTASSIUM CHLORIDE, AND CALCIUM CHLORIDE 125 ML/HR: .6; .31; .03; .02 INJECTION, SOLUTION INTRAVENOUS at 13:27

## 2025-02-13 RX ADMIN — LIDOCAINE HYDROCHLORIDE 5 ML: 10 INJECTION, SOLUTION EPIDURAL; INFILTRATION; INTRACAUDAL; PERINEURAL at 14:10

## 2025-02-13 NOTE — ANESTHESIA PREPROCEDURE EVALUATION
Procedure:  COLONOSCOPY    Relevant Problems   CARDIO   (+) Hyperlipidemia   (+) Peripheral arterial disease (HCC)      GI/HEPATIC   (+) Gastroesophageal reflux disease      /RENAL   (+) Benign hypertension with chronic kidney disease, stage III (HCC)   (+) Chronic kidney disease-mineral and bone disorder   (+) Stage 3b chronic kidney disease (HCC)      MUSCULOSKELETAL   (+) Lumbar back pain      PULMONARY   (+) Asthma        Physical Exam    Airway    Mallampati score: I  TM Distance: >3 FB  Neck ROM: full     Dental    lower dentures and upper dentures    Cardiovascular      Pulmonary      Other Findings        Anesthesia Plan  ASA Score- 3     Anesthesia Type- IV sedation with anesthesia with ASA Monitors.         Additional Monitors:     Airway Plan:            Plan Factors-Exercise tolerance (METS): >4 METS.    Chart reviewed. EKG reviewed.   Patient summary reviewed.    Patient is not a current smoker.      There is medical exclusion for perioperative obstructive sleep apnea risk education.        Induction- intravenous.    Postoperative Plan-         Informed Consent- Anesthetic plan and risks discussed with patient.  I personally reviewed this patient with the CRNA. Discussed and agreed on the Anesthesia Plan with the CRNA..      NPO Status:  Vitals Value Taken Time   Date of last liquid 02/13/25 02/13/25 1247   Time of last liquid 0830 02/13/25 1247   Date of last solid 02/11/25 02/13/25 1247   Time of last solid 2000 02/13/25 1247

## 2025-02-13 NOTE — INTERVAL H&P NOTE
H&P reviewed. After examining the patient I find no changes in the patients condition since the H&P had been written.    Vitals:    02/13/25 1326   BP: 131/74   Pulse: 69   Resp: 16   Temp: 97.8 °F (36.6 °C)   SpO2: 98%

## 2025-02-14 ENCOUNTER — OFFICE VISIT (OUTPATIENT)
Facility: CLINIC | Age: 65
End: 2025-02-14
Payer: COMMERCIAL

## 2025-02-14 ENCOUNTER — TELEPHONE (OUTPATIENT)
Age: 65
End: 2025-02-14

## 2025-02-14 DIAGNOSIS — Z86.73 H/O: CVA (CEREBROVASCULAR ACCIDENT): ICD-10-CM

## 2025-02-14 DIAGNOSIS — I63.9 CEREBROVASCULAR ACCIDENT (CVA), UNSPECIFIED MECHANISM (HCC): Primary | ICD-10-CM

## 2025-02-14 DIAGNOSIS — Z86.73 CHRONIC CEREBROVASCULAR ACCIDENT (CVA): ICD-10-CM

## 2025-02-14 DIAGNOSIS — E87.6 HYPOKALEMIA: ICD-10-CM

## 2025-02-14 DIAGNOSIS — R26.89 OTHER ABNORMALITIES OF GAIT AND MOBILITY: Primary | ICD-10-CM

## 2025-02-14 DIAGNOSIS — I77.810 AORTIC ROOT DILATION (HCC): Primary | ICD-10-CM

## 2025-02-14 PROCEDURE — 97112 NEUROMUSCULAR REEDUCATION: CPT

## 2025-02-14 NOTE — TELEPHONE ENCOUNTER
CTA CHEST WO W CONTRAST was denied Please let me know if you plan to appeal the decision or modify your order    [employer] [phone]

## 2025-02-14 NOTE — PROGRESS NOTES
Daily Note     Today's date: 2025  Patient name: Demetrio Villalobos  : 1960  MRN: [id number]  Referring provider: César Malone DO  Dx:   Encounter Diagnoses   Name Primary?    Cerebrovascular accident (CVA), unspecified mechanism (HCC) Yes    H/O: CVA (cerebrovascular accident)                    Start Time: 928  Stop Time: 1011  Total time in clinic (min): 43 minutes    PLAN OF CARE START: 24  PLAN OF CARE END: 24  FREQUENCY: 2 times per week  PRECAUTIONS RUE (NO FLEX/ABD > 90) sx, HTN    POC expires Auth Status Total   Visits  Start date  Expiration date PT/OT + Visit Limit? Co-Pay    approved   $0    approved                                       Visit/Unit Tracking  AUTH Status:  Date              Visits  Authed: 12 Used 1 1              Remaining  11 10                  Subjective: pt reports slight increased pain in LUE and did not perform any weight bearing with LUE. Pt reprots he is saying MD in 2 weeks.      Objective: See treatment below.    NMR:  - completed with 1lb wrist weight for proprioceptive feedback for the following exericses on RUE :  - button idea take out x 25 reps using FES on wrist/digit extensors focusing on pincer grasp, FMC dexterity, shoulder flexion  - magnet ring task with OT holding item in vertical plane with pt had to reach approximately 70* of shoulder abduction and place onto ring with FES on wrist/digit extensors   - x 30 reps of taking apart and then placing together lil' engineers task with lateral pinch and FES on wrist/digit extensors focusing on FMC, dexterity, shoulder flexion      Assessment: Tolerated treatment well. Pt demonstrating increasedFMC and good ability to complete pincer grasp. Pt would benefit from continued OT services to address NMR L UE s/p chronic CVA.      Plan: Continued skilled OT per POC.

## 2025-02-14 NOTE — PROGRESS NOTES
"Daily Note     Today's date: 2025  Patient name: Demetrio Villalobos  : 1960  MRN: [id number]  Referring provider: César Malone DO  Dx:   Encounter Diagnosis     ICD-10-CM    1. Other abnormalities of gait and mobility  R26.89       2. Chronic cerebrovascular accident (CVA)  Z86.73           GOES BY \"WILL\"             Subjective: Patient reports to PT session with no new issues or complaints.       Objective: See treatment diary below    BP start of session: 152/104 mmHg (seated, RUE)  After 5 minutes seated rest: 157/99 mmHg    NMR (5# L ankle weight, 3# R ankle weight):    - STS from standard chair with medium RR under RLE x 1 minute (2 sets) - RPE 3/10    - FWD mel negotiation (6\") x 2 minutes - RPE 4/10    - LAT mel negotiation (6\") x 2 minutes - RPE 4/10    - Agility ladder negotiation (in/outs) x 2 minutes - RPE 2/10    - Ambulation with focus on increasing speed x 200 ft (mixed tile + turf) - RPE 3/10      Assessment: Patient tolerated today's session well today with continued focus on high intensity LE endurance and gait training. Due to increased BP readings at start of session, opted to maintain low intensity to avoid further BP increases. Attempted to increase speed of overground ambulation with use of verbal cues, with patient demonstrating subsequent shortened step length as he attempted to alter speed. Patient will continue to benefit from skilled OPPT services in order to maximize safe functional mobility and reduce risk for falls post-CVA.      Plan: Continue per plan of care.  Progress treatment as tolerated.      *PATIENT ON BETA BLOCKER, USE RPE*     POC expires Unit limit Auth Expiration date PT/OT + Visit Limit? Co-Insurance   25 12 visits (sharing with ortho) 24 BOMN No    12 visits 3/24                            Visit/Unit Tracking  AUTH Status:  Date 12/30 1/13 1/15 1/27 1/29 2/5 2/12 2/14       Approved Used 1 1 1 1 1 1 1 1        Remaining  11 10 9 8 7 6 5 4    "        Outcome Measures Initial Eval  10/30/2024 & DN 11/6/24 RE  11/26/24 PN  12/11/2024 PN  1/13/2025     5xSTS 17.36 sec, no UE 14.25 sec, 0 UE 11.40 sec, 0 UE 11.65 sec, 0 UE     TUG 22.75 sec 15.91 sec 13.70 sec 12.33 sec     10 meter 0.49 m/s TBA- time constraints 0.68 m/s 0.63 m/s     LACEY 38/56 43/56 45/56 47/56     FGA 9/30 15/30 16/30 17/30     DGI 8/24 14/24 14/24 15/24     MiniBEST 16/28 TBA- time constraints  19/28     2MWT 140 ft 230 ft (limited by back and right hip pain) 200 ft NT     6MWT   515 ft 600 ft       Access Code: [email or web address]  URL: [email or web address]  Date: 11/06/2024  Prepared by: Celestina Valderrama    Exercises  - Sit to Stand  - 1 x daily - 7 x weekly - 2 sets - 10 reps  - Side Stepping with Counter Support  - 1 x daily - 7 x weekly - 3 sets - 10 reps  - Standing March with Counter Support  - 1 x daily - 7 x weekly - 2 sets - 10 reps - 3 sec hold

## 2025-02-15 NOTE — TELEPHONE ENCOUNTER
New order placed for CTA chest in setting of aortic root dilation noted on echo     BMP ordered to assess Cr

## 2025-02-18 PROCEDURE — 88305 TISSUE EXAM BY PATHOLOGIST: CPT | Performed by: PATHOLOGY

## 2025-02-19 ENCOUNTER — OFFICE VISIT (OUTPATIENT)
Facility: CLINIC | Age: 65
End: 2025-02-19
Payer: COMMERCIAL

## 2025-02-19 DIAGNOSIS — I63.9 CEREBROVASCULAR ACCIDENT (CVA), UNSPECIFIED MECHANISM (HCC): Primary | ICD-10-CM

## 2025-02-19 DIAGNOSIS — Z86.73 CHRONIC CEREBROVASCULAR ACCIDENT (CVA): ICD-10-CM

## 2025-02-19 DIAGNOSIS — R26.89 OTHER ABNORMALITIES OF GAIT AND MOBILITY: Primary | ICD-10-CM

## 2025-02-19 PROCEDURE — 97112 NEUROMUSCULAR REEDUCATION: CPT

## 2025-02-19 NOTE — PROGRESS NOTES
Daily Note     Today's date: 2025  Patient name: Demetrio Villalobos  : 1960  MRN: [id number]  Referring provider: Lisa Blount DO  Dx:   Encounter Diagnosis   Name Primary?    Cerebrovascular accident (CVA), unspecified mechanism (HCC) Yes                   Start Time: 1545  Stop Time: 1628  Total time in clinic (min): 43 minutes    PLAN OF CARE START: 24  PLAN OF CARE END: 24  FREQUENCY: 2 times per week  PRECAUTIONS RUE (NO FLEX/ABD > 90) sx, HTN    POC expires Auth Status Total   Visits  Start date  Expiration date PT/OT + Visit Limit? Co-Pay    approved   $0    approved                                       Visit/Unit Tracking  AUTH Status:  Date             Visits  Authed: 12 Used 1 1 1             Remaining  11 10 9                 Subjective: pt reports slight increased pain in LUE and did not perform any weight bearing with LUE. Pt reprots he is saying MD in 2 weeks.      Objective: See treatment below.    NMR:  Completed x 20 reps of pushups standing with FES on tricep  - completed with 1lb wrist weight for proprioceptive feedback for the following exericses on RUE :    - large jumbo checkers transfer with shoulder/abduction/adduction from foam using pincer grasp using FES on wrist/digit extensors   - shoulder flexion task approximately 70*-75* degrees of taking apart socks and placing into bucket focusing onlateral pinch, FMC shoulder flexion using FES on wrist/digit extensors    - completed matching talent task placement using FES on wrist extensors focusing on dexterity and FMC     Assessment: Tolerated treatment well. Pt demonstrating emerging skills in dexterity during matching taslen task. Pt would benefit from continued OT services to address NMR L UE s/p chronic CVA.      Plan: Continued skilled OT per POC.

## 2025-02-19 NOTE — PROGRESS NOTES
"Daily Note     Today's date: 2025  Patient name: Demetrio Villalobos  : 1960  MRN: [id number]  Referring provider: César Malone DO  Dx:   Encounter Diagnosis     ICD-10-CM    1. Other abnormalities of gait and mobility  R26.89       2. Chronic cerebrovascular accident (CVA)  Z86.73             GOES BY \"WILL\"             Subjective: Patient reports to PT session with no new issues, complaints, or falls.       Objective: See treatment diary below    BP start of session: 108/80 mmHg (seated, RUE)  HR: 73 bpm  SpO2: 93%    NMR (5# L ankle weight, 3# R ankle weight):  - FWD mel negotiation (6\") x 2 minutes - RPE 4/10  - LAT mel negotiation (6\") x 2 minutes - RPE 6/10  - STS from standard chair with medium RR under RLE x 1 minute (2 sets) - RPE 3/10  - Standing hamstring curls no AW on LLE: 2 min, 1 UE, 3/10    Assessment: Patient tolerated today's session well today with continued focus on high intensity LE endurance and gait training. Patient displays significant difficulty with LLE knee flexion likely due to tone and weakness thus incorporated standing hamstring curls today. He was unable to complete exercise with resistance thus regressed activity which he demonstrated improved AROM following. Consider addition of hamstring curl machine to further facilitate isolated hamstring activation. Patient will continue to benefit from skilled OPPT services in order to maximize safe functional mobility and reduce risk for falls post-CVA.      Plan: Continue per plan of care.  Progress treatment as tolerated.      *PATIENT ON BETA BLOCKER, USE RPE*     POC expires Unit limit Auth Expiration date PT/OT + Visit Limit? Co-Insurance   25 12 visits (sharing with ortho) 24 BOMN No    12 visits 3/24                            Visit/Unit Tracking  AUTH Status:  Date 12/30 1/13 1/15 1/27 1/29 2/5 2/12 2/14 2/19      Approved Used 1 1 1 1 1 1 1 1 1       Remaining  11 10 9 8 7 6 5 4 3          Outcome Measures " Initial Eval  10/30/2024 & DN 11/6/24 RE  11/26/24 PN  12/11/2024 PN  1/13/2025     5xSTS 17.36 sec, no UE 14.25 sec, 0 UE 11.40 sec, 0 UE 11.65 sec, 0 UE     TUG 22.75 sec 15.91 sec 13.70 sec 12.33 sec     10 meter 0.49 m/s TBA- time constraints 0.68 m/s 0.63 m/s     LACEY 38/56 43/56 45/56 47/56     FGA 9/30 15/30 16/30 17/30     DGI 8/24 14/24 14/24 15/24     MiniBEST 16/28 TBA- time constraints  19/28     2MWT 140 ft 230 ft (limited by back and right hip pain) 200 ft NT     6MWT   515 ft 600 ft       Access Code: [email or web address]  URL: [email or web address]  Date: 11/06/2024  Prepared by: Celestina Valderrama    Exercises  - Sit to Stand  - 1 x daily - 7 x weekly - 2 sets - 10 reps  - Side Stepping with Counter Support  - 1 x daily - 7 x weekly - 3 sets - 10 reps  - Standing March with Counter Support  - 1 x daily - 7 x weekly - 2 sets - 10 reps - 3 sec hold

## 2025-02-20 ENCOUNTER — OFFICE VISIT (OUTPATIENT)
Facility: CLINIC | Age: 65
End: 2025-02-20
Payer: COMMERCIAL

## 2025-02-20 DIAGNOSIS — I73.9 PERIPHERAL ARTERIAL DISEASE (HCC): ICD-10-CM

## 2025-02-20 DIAGNOSIS — I63.9 CEREBROVASCULAR ACCIDENT (CVA), UNSPECIFIED MECHANISM (HCC): Primary | ICD-10-CM

## 2025-02-20 DIAGNOSIS — G81.10 SPASTIC HEMIPARESIS AFFECTING NONDOMINANT SIDE (HCC): ICD-10-CM

## 2025-02-20 DIAGNOSIS — Z86.73 H/O: CVA (CEREBROVASCULAR ACCIDENT): ICD-10-CM

## 2025-02-20 PROCEDURE — 97112 NEUROMUSCULAR REEDUCATION: CPT

## 2025-02-20 RX ORDER — ELECTROLYTES/DEXTROSE
1 SOLUTION, ORAL ORAL DAILY
Qty: 30 TABLET | Refills: 1 | Status: SHIPPED | OUTPATIENT
Start: 2025-02-20

## 2025-02-20 NOTE — PROGRESS NOTES
Daily Note     Today's date: 2025  Patient name: Demetrio Villalobos  : 1960  MRN: [id number]  Referring provider: César Malone DO  Dx:   Encounter Diagnoses   Name Primary?    Cerebrovascular accident (CVA), unspecified mechanism (HCC) Yes    H/O: CVA (cerebrovascular accident)     Spastic hemiparesis affecting nondominant side (HCC)        Start Time: 1630  Stop Time: 1715  Total time in clinic (min): 45 minutes    PLAN OF CARE START: 24  PLAN OF CARE END: 25  FREQUENCY: 2 times per week  PRECAUTIONS no restrictions with RUE as per PT vicky, HTN    POC expires Auth Status Total   Visits  Start date  Expiration date PT/OT + Visit Limit? Co-Pay    approved   $0    approved                                       Visit/Unit Tracking  AUTH Status:  Date            Visits  Authed: 12 Used 1 1 1 1            Remaining  11 10 9 8                Subjective: Pt reports he was able to iron his shirt with L UE for the first time since CVA.    Objective: See treatment below.    NMR:  -Foam cube transfer initially with use of resistive gripper 15lb resistance and then downgraded to use of tongs.  Retrieval from table anteriorly and placement into container 90* to L.    -Multimatrix rotation and stacking activity performed with focus on pincer grasp, rotation of item in hand, dexterity, FMC.  Requires significantly inc time and compensatory use of table ~75% of reps.  2 items dropped      Assessment: Tolerated treatment well. Demonstrating improving dexterity skills but still limited.  Pt would benefit from continued OT services to address NMR L UE s/p chronic CVA.      Plan: Continued skilled OT per POC.

## 2025-02-22 PROBLEM — Z12.12 ENCOUNTER FOR COLORECTAL CANCER SCREENING: Status: RESOLVED | Noted: 2025-01-23 | Resolved: 2025-02-22

## 2025-02-22 PROBLEM — Z12.11 ENCOUNTER FOR COLORECTAL CANCER SCREENING: Status: RESOLVED | Noted: 2025-01-23 | Resolved: 2025-02-22

## 2025-02-24 ENCOUNTER — EVALUATION (OUTPATIENT)
Facility: CLINIC | Age: 65
End: 2025-02-24
Payer: COMMERCIAL

## 2025-02-24 ENCOUNTER — OFFICE VISIT (OUTPATIENT)
Facility: CLINIC | Age: 65
End: 2025-02-24
Payer: COMMERCIAL

## 2025-02-24 DIAGNOSIS — I77.810 AORTIC ROOT DILATION (HCC): Primary | ICD-10-CM

## 2025-02-24 DIAGNOSIS — I63.9 CEREBROVASCULAR ACCIDENT (CVA), UNSPECIFIED MECHANISM (HCC): Primary | ICD-10-CM

## 2025-02-24 DIAGNOSIS — Z86.73 CHRONIC CEREBROVASCULAR ACCIDENT (CVA): ICD-10-CM

## 2025-02-24 DIAGNOSIS — R26.89 OTHER ABNORMALITIES OF GAIT AND MOBILITY: Primary | ICD-10-CM

## 2025-02-24 PROCEDURE — 97530 THERAPEUTIC ACTIVITIES: CPT

## 2025-02-24 PROCEDURE — 97112 NEUROMUSCULAR REEDUCATION: CPT

## 2025-02-24 NOTE — PROGRESS NOTES
PT Re-Evaluation          25 12 visits (sharing with ortho) 24 BOMN No    12 visits 3/24                            Visit/Unit Tracking  AUTH Status:  Date 12/30 1/13 1/15 1/27 1/29 2/5 2/12 2/14 2/19 2/24     Approved Used 1 1 1 1 1 1 1 1 1 1      Remaining  11 10 9 8 7 6 5 4 3 2                Today's date: 2025  Patient name: Demetrio Villalobos  : 1960  MRN: [id number]  Referring provider: Lisa Blount DO   Dx:   Encounter Diagnosis     ICD-10-CM    1. Other abnormalities of gait and mobility  R26.89       2. Chronic cerebrovascular accident (CVA)  Z86.73             Assessment  Assessment details: Patient is a 64 year old presenting to skilled OPPT for reassessment with complaints of gait, balance, and strength deficits s/p CVA that in turn has limited safe functional mobility at home and in the community. PMH significant for: HTN, CKD, GERD, and CVA. Patient suffered CVA approximately 10 years ago and reports residual weakness on left side. Patient demonstrated improvements in the following outcome measures since last reassessment: 5 x STS, TUG (regular), FGA/DGI, miniBEST, 6 MWT, and 10 MWT, likely indicating overall gains in functional LE strength, safe functional mobility, dynamic balance, reactive balance/postural stability, cardiovascular endurance, and ambulation speed, respectively. Despite improvements, per cutoff scores for the FGA, DGI, and miniBEST he is classified as HIGH risk for falls. He continues to demonstrate the following gait deficits: decreased hip/knee flexion during swing with associated hip circumduction, slowed samantha, Trendelenburg. Due to persistent LBP, plan to refer to ortho PT for reassessment for proper management. Plan to continue to focus on gait training, balance, and strengthen within high intensity parameters. He has met one additional long term goal at this time. Patient will  continue to benefit from skilled OPPT services in order to maximize safe functional mobility in the home and community setting.       Impairments: Abnormal coordination, Abnormal gait, Abnormal muscle tone, Abnormal or restricted ROM, Activity intolerance, Impaired balance, Impaired physical strength, Lacks appropriate HEP, Poor posture, Poor body mechanics, Pain with function, Safety issue, Weight-bearing intolerance, Abnormal movement, Difficulty understanding, Abnormal muscle firing  Understanding of Dx/Px/POC: Excellent  Prognosis: Excellent      Patient verbalized understanding of POC.         Please contact me if you have any questions or recommendations. Thank you for the referral and the opportunity to share in Demetrio Villalobos's care.        Plan  Plan details: FGA, DGI, miniBEST, 6 MWT; HIGT and HIIT to tolerance  Patient would benefit from: PT Eval, Skilled PT, and OT Eval  Planned modality interventions: Biofeedback, Cryotherapy, TENS, Thermotherapy  Planned therapy interventions: Abdominal trunk stabilization, ADL training, Balance, Balance/WB training, Breathing training, Body mechanics training, Coordination, Functional ROM exercises, Gait training, HEP, Joint Mobilization, Manual Therapy, White taping, Motor coordination training, Neuromuscular re-education, Patient education, Postural training, Strengthening, Stretching, Therapeutic activities, Therapeutic exercises, Therapeutic training, Transfer training, Activity modification, Work reintegration  Frequency: 1-2x/wk  Duration in weeks: 12 weeks  Plan of Care beginning date: 1/13/2025  Plan of Care expiration date: 12 weeks - 4/7/2025  Treatment plan discussed with: Patient         Goals  Short Term Goals (4 weeks):    - Patient will improve time on TUG by 2.9 seconds to facilitate improved safety in all ambulation- MET  - Patient will improve scoring on DGI by 2.6 points to progress safety- MET  - Patient will be independent in basic HEP 2-3  weeks - MET  - Patient will improve 5xSTS score by 2.3 seconds to promote improved LE functional strength needed for ADLs- MET    Long Term Goals (12 weeks):  - Patient will be independent in a comprehensive home exercise program - NOT MET  - Patient will improve gait speed by 0.18 m/s to improve safety with community ambulation - MET  - Patient will improve LACEY by 6 points to facilitate return to safe independent ambulation - MET  - Patient will improve scoring on FGA by 4 points to progress safety with dynamic tasks- MET  - Patient will improve FGA score to >/= 22/30 to classify as decreased risk for falls - NOT MET  - Patient will be able to demonstrate HT in gait without veering - NOT MET  - Patient will improve 6 Minute Walk Test score by 190 feet to promote improved cardiovascular endurance - MET  - Patient will report 50% reduction in near falls in order to improve safety with functional tasks and reduce his risk for falls - NOT MET  - Patient will report going on walks at least 3 days per week to promote independence and improved cardiovascular endurance - NOT MET  - Patient will be able to ascend/descend stairs reciprocally without UE assist to promote independence and safety with ADLs - NOT MET  - Patient will report 50% reduction in near falls when ambulating on uneven terrain - NOT MET      Cut off score    All date taken from [facility] Neuro Section or Rehab Measures      [place]acey:  Álvaro et al., 2018  MDC: 2.7 pts    Amanda enrique al., 2011  Cut-off score: 45/56    Chronic CVA  < 44/56 high risk for falls (Álavro et al., 2018)  < 47.5/56 slow walker status (Amanda et al., 2011) 5xSTS: Rupal 2010  MDC: 2.3 sec  Age Norms:  60-69: 11.1 sec  70-79: 12.6 sec  80-89: 14.8 sec    Andrea 2010, Chronic Stroke  Chronic CVA: 12 sec   TUG  Addison enrique al., 2005  MDC: 2.9 sec    Cut off score:  >13.5 sec community dwelling adults  >32.2 frail elderly  <20 I for basic transfers  >30 dependent on transfers 10 Meter  Walk Test:   Shawanda et al., 2006  Small meaningful change: 0.06 m/s  Substantial meaningful change: 0.14 m/s  MCID: 0.16 m/s    < 0.4 m/s household ambulators  0.4 - 0.8 m/s limited community ambulators  > 0.8 m/s community ambulators   FGA: Rossi et al., 2010  MCID: 4.2 pts  Geriatrics/community < 22/30 fall risk  Geriatrics/community < 20/30 unexplained falls DGI  MDC: vestibular - 4 pts  MDC: geriatric/community - 3 pts  Falls risk <19/24   6 Minute Walk Test  Shawanda et al., 2006  MDC: 60.98 m (200.01 ft)    Farhat Desai, & Ruthie, 2012  MCID: 34.4 m    Age Norms  60-69: M - 1876 ft   F - 1765 ft  70-79: M - 1729 ft   F - 1545 ft  80-89: M - 1368 ft   F - 1286 ft Modified Rosetta  0: No increase in tone  1: Catch and release or min resistance at end range  1+: Catch f/b min resistance throughout remainder (< half ROM)  2: Easily moved, but more marked tone throughout most ROM  3: Significant tone, PROM difficult  4: Rigid   MiniBest: Juanjose et al., 2013  CVA < 17.5 fall risk Pass (Acute CVA)  MDC: 1.8 points (acute), 3.2 points (chronic)         Subjective    History of Present Illness  Mechanism of injury: Patient is a 64 year old presenting to skilled OPPT for reassessment with complaints of gait, balance, and strength deficits s/p CVA. PMH significant for: HTN, CKD, GERD, and CVA. Patient suffered CVA approximately 10 years ago and reports residual weakness on left side (UE and LE), but cannot recall exact location of stroke. He reports he participated in PT/OT/ST services immediately following the stroke, but has not participated in much rehab since. Patient recently underwent (R) reverse total shoulder arthroplasty on 8/26 and is currently following shoulder precautions (no flexion/abduction > 90 degrees). He has HHA that come 25 hours/week (ever day in the AM) that assist with ADL/IADL's.     Update 11/26/24: Patient participated in 1xs/week neuro PT for ~ 1 month and reports noting small improvements in balance  "and endurance; no falls since SOC. Reports back pain is primary barrier to intensity/duration of gait training (still sees ortho PT for back/shoulder)    Updated (2024): Patient reports for reassessment stating he has not yet noted significant improvements in his walking. He is interested in increasing frequency of neuro PT services to further drive improvements. His primary goal is to improve his walking speed and standing tolerance.    Updated (2025): Patient reports for reassessment with no new issues or complaints, states he had to take some time off from PT/OT services over the holidays. Has an upcoming neurology appointment on .    Updated (2025): Patient reports to PT for reassessment with overall satisfaction in PT services thus far. He notes improvements in his control of the left leg. Continues to be limited functional by low back pain. He has not noticed any positive benefits since starting the Baclofen.    Primary AD: none  Assist level at home: assist for ADL's/IADL's  WC usage: NA    Patient goal: \"I want to get better with walking\"    Pain  Current pain ratin-3/10  Location: R shoulder  Aggravating factors: movement    Social Support  Steps to enter house: none  Stairs in house: none   Lives in: 4 story, 1 floor set up  Lives with: alone    Employment status: retired  Hand dominance: R    Treatments  Previous treatment: PT/OT/ST immediately following stroke  Current treatment: ortho PT, balance eval  Diagnostic Testing: see chart for details      Objective     Vitals  - HR: 74 bpm  - BP: 158/86  - SPO2: 97%    HR Max  - 207 - (0.7x62)  = 164 bpm    LE MMT  - R Hip Flexion: 3/5  - L Hip Flexion: 3-/5  - R Hip Extension: 3/5  - L Hip Extension: 3-/5  - R Hip Abduction: 4+/5 - L Hip abduction: 4-/5  - R Hip adduction: 4+/5 - L Hip adduction: 4-/5  - R Knee Extension: 3/5 - L Knee Extension: 2+/5  - R Knee Flexion: 3/5  - L Knee Flexion: 2+/5  - R Ankle DF: 4/5  - L Ankle DF: " 1/5  - R Ankle PF: 3/5  - L Ankle PF: 1/5    Sensation  - Light touch: intact  - Deep pressure: intact    Coordination  - Dysmetria: unable 2/2 LUE hemiplegia  - Dysdiadochokinesia: unable 2/2 LUE hemiplegia  - Alternating Toe Taps: unable 2/2 LLE hemiplegia    Modified Rosetta  - R knee extension: 0 - no increase in tone  - L knee extension: 1+ - catch followed by minimum resistance throughout remainder (< half ROM)  - R knee flexion: 0 - no increase in tone  - L knee flexion: 1-   - R ankle DF: 0 - no increase in tone   - L ankle DF: 3 - significant tone, PROM difficulty  - R ankle inversion: 0 - no increase in tone  - L ankle inversion: 3 - significant tone, PROM difficulty  - R ankle eversion: 0 - no increase in tone  - L ankle eversion: 3 - significant tone, PROM difficulty    Clonus  - L: No  - R: No  - Fatiguing: NA  - Number of beats: NA    Vision  - Glasses: No  - Abnormalities prior to CVA: No, NA  - Abnormalities post CVA: No, NA    Neglect  - R sided: No  - L sided: No    Pusher's Syndrome  - R sided: No  - L sided: No        Outcome Measures Initial Eval  10/30/2024 & DN 11/6/24 RE  11/26/24 PN  12/11/2024 PN  1/13/2025 PN  2/24/2025    5xSTS 17.36 sec, no UE 14.25 sec, 0 UE 11.40 sec, 0 UE 11.65 sec, 0 UE 9.26 sec, 0 UE    TUG  Regular  Cognitive   22.75 sec   15.91 sec   13.70 sec   12.33 sec no AD  10.88 sec  18.40 sec    10 meter 0.49 m/s TBA- time constraints 0.68 m/s 0.63 m/s 0.81 m/s    LACEY 38/56 43/56 45/56 47/56 NA    FGA 9/30 15/30 16/30 17/30 20/30    DGI 8/24 14/24 14/24 15/24 18/24    MiniBEST 16/28 TBA- time constraints  19/28 21/28    2MWT 140 ft 230 ft (limited by back and right hip pain) 200 ft NT NT    6MWT   515 ft 600 ft 715 ft        Precautions: CKD, GERD, HTN, PVD, history CVA 10 years ago with L sided hemiplegia  Past Medical History:   Diagnosis Date    Chronic kidney disease     Edema     GERD (gastroesophageal reflux disease)     Gout     hospitalized    High cholesterol      Hypertension     PVD (peripheral vascular disease) (HCC)     Renal disorder     Rotator cuff tear     right    Shortness of breath     Stroke ([facility]) 2014    Ventral hernia without obstruction or gangrene     Walker as ambulation aid

## 2025-02-24 NOTE — PROGRESS NOTES
Daily Note     Today's date: 2025  Patient name: Demterio Villalobos  : 1960  MRN: [id number]  Referring provider: Lisa Blount DO  Dx:   Encounter Diagnosis   Name Primary?    Cerebrovascular accident (CVA), unspecified mechanism (HCC) Yes       Start Time: 1545  Stop Time: 1625  Total time in clinic (min): 40 minutes    PLAN OF CARE START: 24  PLAN OF CARE END: 25  FREQUENCY: 2 times per week  PRECAUTIONS no restrictions with RUE as per PT vicky, HTN    POC expires Auth Status Total   Visits  Start date  Expiration date PT/OT + Visit Limit? Co-Pay    approved   $0    approved                                       Visit/Unit Tracking  AUTH Status:  Date           Visits  Authed: 12 Used 1 1 1 1 1           Remaining  11 10 9 8 7               Subjective: Pt reports he was able to hold and catch items with his LUE ie. Twizzlers.     Objective: See treatment below.    NMR:  -completed x 25 reps of modifed pushups in standing for  weight bearing- FES on triceps   - completed 15 minutes of mathcing tile task with FES on wrist extensors using 1lb wrist weight for proprioceptive feebdack. Pt demonstrating difficulty with dexterity and manipulation requiring use of table 75% of the time  Completed 10-15 minutes of brainometry copying 3 images focusing on FMC, dexterity using 1lb wrist weight, and FES on wrist extensors       Assessment: Tolerated treatment well. Demonstrating improving dexterity slowly.  Pt would benefit from continued OT services to address NMR L UE s/p chronic CVA.      Plan: Continued skilled OT per POC.

## 2025-02-25 ENCOUNTER — APPOINTMENT (OUTPATIENT)
Dept: RADIOLOGY | Facility: CLINIC | Age: 65
End: 2025-02-25
Payer: COMMERCIAL

## 2025-02-25 ENCOUNTER — OFFICE VISIT (OUTPATIENT)
Dept: OBGYN CLINIC | Facility: CLINIC | Age: 65
End: 2025-02-25
Payer: COMMERCIAL

## 2025-02-25 VITALS — HEIGHT: 67 IN | WEIGHT: 214 LBS | BODY MASS INDEX: 33.59 KG/M2

## 2025-02-25 DIAGNOSIS — Z96.611 STATUS POST REVERSE TOTAL REPLACEMENT OF RIGHT SHOULDER: ICD-10-CM

## 2025-02-25 DIAGNOSIS — M89.8X9 HETEROTOPIC OSSIFICATION: ICD-10-CM

## 2025-02-25 DIAGNOSIS — Z96.611 STATUS POST REVERSE TOTAL REPLACEMENT OF RIGHT SHOULDER: Primary | ICD-10-CM

## 2025-02-25 PROCEDURE — 73030 X-RAY EXAM OF SHOULDER: CPT

## 2025-02-25 PROCEDURE — 99213 OFFICE O/P EST LOW 20 MIN: CPT | Performed by: ORTHOPAEDIC SURGERY

## 2025-02-25 NOTE — PROGRESS NOTES
Patient Name: Demetrio Villalobos      : 1960       MRN: [id number]   Encounter Provider: Sina Randle MD   Encounter Date: 25  Encounter department: [facility]         Assessment & Plan  Status post reverse total replacement of right shoulder    Orders:  •  XR shoulder 2+ vw right; Future  •  CT upper extremity wo contrast right; Future    Heterotopic ossification            Plan:  Demetrio is a pleasant 65 y.o male  presents 6 months status post Arthroplasty Shoulder Reverse - Right on 2024.  He has progressed well however he has been experiencing discomfort for the past few months.  He has difficulty laying on his right side.  He denies any recent trauma or injury to his shoulder.  He demonstrates good strength about his shoulder.  Upon examination and x-ray review I am concerned of possible heterotopic ossification near the glenoid of his right shoulder.  I have ordered a CT scan to further evaluate this.  I will follow-up with Demetrio after results of the CT scan.    Follow-up:  Return after results of CT scan.     _____________________________________________________  CHIEF COMPLAINT:  Chief Complaint   Patient presents with   • Right Shoulder - Post-op         SUBJECTIVE:  Demetrio Villalobos is a 65 y.o. male who presents 6 months status post Arthroplasty Shoulder Reverse - Right on 2024.  He has progressed well however his main complaint is that he has discomfort of his right shoulder which has been bothering him for the last few months.  He has been done with physical therapy since November and has reported he has good strength.  He reports his discomfort is when he is sleeping and laying on his right side.  He denies any recent trauma or injury to his right shoulder.      PAST MEDICAL HISTORY:  Past Medical History:   Diagnosis Date   • Chronic kidney disease    • Edema    • GERD (gastroesophageal reflux disease)    • Gout     hospitalized   •  High cholesterol    • Hypertension    • PVD (peripheral vascular disease) ([facility])    • Renal disorder    • Rotator cuff tear     right   • Shortness of breath    • Stroke ([facility]) 2014   • Ventral hernia without obstruction or gangrene    • Walker as ambulation aid        PAST SURGICAL HISTORY:  Past Surgical History:   Procedure Laterality Date   • APPENDECTOMY     • VT ARTHROPLASTY GLENOHUMERAL JOINT TOTAL SHOULDER Right 8/26/2024    Procedure: ARTHROPLASTY SHOULDER REVERSE;  Surgeon: Sina Randle MD;  Location: [facility];  Service: Orthopedics       FAMILY HISTORY:  No family history on file.    SOCIAL HISTORY:  Social History     Tobacco Use   • Smoking status: Former     Types: Cigarettes     Passive exposure: Never   • Smokeless tobacco: Never   • Tobacco comments:     Smoker 30 yrs   Vaping Use   • Vaping status: Never Used   Substance Use Topics   • Alcohol use: Never   • Drug use: Never       MEDICATIONS:    Current Outpatient Medications:   •  allopurinol (ZYLOPRIM) 100 mg tablet, Take 0.5 tablets (50 mg total) by mouth daily, Disp: 30 tablet, Rfl: 1  •  atorvastatin (LIPITOR) 40 mg tablet, Take 1 tablet (40 mg total) by mouth daily with dinner, Disp: 30 tablet, Rfl: 1  •  esomeprazole (NexIUM) 40 MG capsule, Take 1 capsule (40 mg total) by mouth every morning, Disp: 30 capsule, Rfl: 1  •  furosemide (LASIX) 40 mg tablet, Take 1 tablet (40 mg total) by mouth daily, Disp: 90 tablet, Rfl: 3  •  metoprolol succinate (TOPROL-XL) 50 mg 24 hr tablet, Take 1 tablet (50 mg total) by mouth daily with dinner, Disp: 30 tablet, Rfl: 2  •  Multiple Vitamin (Multivitamin Adult) TABS, ONE TAB DAILY, Disp: 30 tablet, Rfl: 1  •  NIFEdipine (PROCARDIA XL) 60 mg 24 hr tablet, Take 1 tablet (60 mg total) by mouth daily, Disp: 30 tablet, Rfl: 5  •  spironolactone (ALDACTONE) 25 mg tablet, Take 0.5 tablets (12.5 mg total) by mouth daily, Disp: 15 tablet, Rfl: 1  •  aspirin 81 mg chewable tablet, Chew 1 tablet (81 mg total)  "daily for 28 days Last dose per surgeon to be 8/20/24, Disp: , Rfl:     ALLERGIES:  No Known Allergies    LABS:  HgA1c:   Lab Results   Component Value Date    HGBA1C 5.9 01/24/2025     BMP:   Lab Results   Component Value Date    CALCIUM 9.6 01/23/2025    K 4.0 01/23/2025    CO2 28 01/23/2025     01/23/2025    BUN 27 (H) 01/23/2025    CREATININE 2.16 (H) 01/23/2025     CBC: No components found for: \"CBC\"    _____________________________________________________  Review of Systems   Constitutional:  Negative for chills and fever.   HENT:  Negative for ear pain and sore throat.    Eyes:  Negative for pain and visual disturbance.   Respiratory:  Negative for cough and shortness of breath.    Cardiovascular:  Negative for chest pain and palpitations.   Gastrointestinal:  Negative for abdominal pain and vomiting.   Genitourinary:  Negative for dysuria and hematuria.   Musculoskeletal:  Negative for arthralgias and back pain.   Skin:  Negative for color change and rash.   Neurological:  Negative for seizures and syncope.   All other systems reviewed and are negative.       Right Shoulder Exam     Range of Motion   Active abduction:  120   Forward flexion:  140     Muscle Strength   Abduction: 5/5   Supraspinatus: 5/5   Subscapularis: 5/5     Other   Erythema: absent  Scars: present  Sensation: normal  Pulse: present             Physical Exam  Vitals and nursing note reviewed.   Constitutional:       Appearance: Normal appearance.   HENT:      Head: Normocephalic and atraumatic.      Right Ear: External ear normal.      Left Ear: External ear normal.      Nose: Nose normal.   Eyes:      General: No scleral icterus.     Extraocular Movements: Extraocular movements intact.      Conjunctiva/sclera: Conjunctivae normal.   Cardiovascular:      Rate and Rhythm: Normal rate.   Pulmonary:      Effort: Pulmonary effort is normal. No respiratory distress.   Musculoskeletal:      Cervical back: Normal range of motion and neck " supple.      Comments: See ortho exam   Skin:     General: Skin is warm and dry.   Neurological:      Mental Status: He is alert and oriented to person, place, and time.   Psychiatric:         Mood and Affect: Mood normal.         Behavior: Behavior normal.        _____________________________________________________  STUDIES REVIEWED:  I personally reviewed the pertinent images and my independent interpretation is as follows: Stable alignment of RTSA hardware.  Heterotrophic ossification or bony fragment Near glenoid.      PROCEDURES PERFORMED:  No Procedures performed today    Scribe Attestation    I,:  Royce Colón am acting as a scribe while in the presence of the attending physician.:       I,:  Sina Randle MD personally performed the services described in this documentation    as scribed in my presence.:

## 2025-02-26 ENCOUNTER — OFFICE VISIT (OUTPATIENT)
Facility: CLINIC | Age: 65
End: 2025-02-26
Payer: COMMERCIAL

## 2025-02-26 ENCOUNTER — EVALUATION (OUTPATIENT)
Facility: CLINIC | Age: 65
End: 2025-02-26
Payer: COMMERCIAL

## 2025-02-26 DIAGNOSIS — I63.9 CEREBROVASCULAR ACCIDENT (CVA), UNSPECIFIED MECHANISM (HCC): Primary | ICD-10-CM

## 2025-02-26 DIAGNOSIS — Z86.73 CHRONIC CEREBROVASCULAR ACCIDENT (CVA): ICD-10-CM

## 2025-02-26 DIAGNOSIS — R26.89 OTHER ABNORMALITIES OF GAIT AND MOBILITY: Primary | ICD-10-CM

## 2025-02-26 PROCEDURE — 97110 THERAPEUTIC EXERCISES: CPT

## 2025-02-26 PROCEDURE — 97112 NEUROMUSCULAR REEDUCATION: CPT

## 2025-02-26 NOTE — PROGRESS NOTES
"Daily Note     Today's date: 2025  Patient name: Demetrio Villalobos  : 1960  MRN: [id number]  Referring provider: César Malone DO  Dx:   Encounter Diagnosis   Name Primary?    Cerebrovascular accident (CVA), unspecified mechanism (HCC) Yes       Start Time: 1415  Stop Time: 1500  Total time in clinic (min): 45 minutes    PLAN OF CARE START: 24  PLAN OF CARE END: 25  FREQUENCY: 2 times per week  PRECAUTIONS no restrictions with RUE as per PT vicky, HTN    POC expires Auth Status Total   Visits  Start date  Expiration date PT/OT + Visit Limit? Co-Pay    approved   $0    approved                                       Visit/Unit Tracking  AUTH Status:  Date          Visits  Authed: 12 Used 1 1 1 1 1 1          Remaining  11 10 9 8 7 6              Subjective: Pt reports no changes     Objective: See treatment below.    NMR:  -completed x 25 reps of modifed pushups in standing for  weight bearing- FES on triceps   - completed functional reaching in cabinet approximately shoulder height while reaching for 1\" blocks  with FES on wrist extensors   Attempted and performed x 3 and discontinued of jenga however demonstrating difficulty in cabinet and trialed task table top   Completed 4 x 5 block stack of jenga pieces with FES on wrist/digt extensors with shoulder abduction to reach for pieces and scapular facilitation to prevent shoulder hiking using 1lb wrist weight.  Performed x 25 repsof taking out pegs from peg board focusing on pinch     Assessment: Tolerated treatment well. Demonstrating improved shoulder flexion.  Pt would benefit from continued OT services to address NMR L UE s/p chronic CVA.      Plan: Continued skilled OT per POC.       "

## 2025-02-26 NOTE — PROGRESS NOTES
"Daily Note     Today's date: 2025  Patient name: Demetrio Villalobos  : 1960  MRN: [id number]  Referring provider: César Malone DO  Dx:   Encounter Diagnosis     ICD-10-CM    1. Other abnormalities of gait and mobility  R26.89       2. Chronic cerebrovascular accident (CVA)  Z86.73             GOES BY \"WILL\"             Subjective: Patient reports to PT session with no new issues, complaints, or falls.       Objective: See treatment diary below    NMR/TE:  - Prolonged hip flexor stretch in modified Evert test position x 5 minutes  - Supine SLR (LLE only), 5# R ankle weight: 3 sets x 10 reps, 3 second iso hold  - Supine bridges (isometric ball hold between knees for isolated glute recruitment) 3 sets x 10 reps, 3 second iso hold  - Single leg bridges (LLE only) 3 sets x 10 reps, 3 second iso hold  - Isolated hamstring curl (LLE only) in R sidelying 3 sets x 10 reps  - Isolated hip flexor activation (LLE only - leg 90-90 on stool) 2 sets x 10 reps    Assessment: Patient tolerated today's session well today with increased focus on functional and isolated strengthening via mat program. Patient expressed significant relief with modified Evetr test stretch, likely indicative of muscle mobility restrictions in (R) rectus femoris and iliopsoas. Difficulty maintaining terminal knee extension with SLR's, particularly as he fatigued with increasing repetitions. Patient will continue to benefit from skilled OPPT services in order to maximize safe functional mobility and reduce risk for falls post-CVA.      Plan: Continue per plan of care.  Progress treatment as tolerated.      *PATIENT ON BETA BLOCKER, USE RPE*     POC expires Unit limit Auth Expiration date PT/OT + Visit Limit? Co-Insurance   25 12 visits (sharing with ortho) 24 BOMN No    12 visits 3/24                            Visit/Unit Tracking  AUTH Status:  Date 12/30 1/13 1/15 1/27 1/29 2/5 2/12 2/14 2/19 2/26     Approved Used 1 1 1 1 1 1 1 1 " 1 1      Remaining  11 10 9 8 7 6 5 4 3 2         Outcome Measures Initial Eval  10/30/2024 & DN 11/6/24 RE  11/26/24 PN  12/11/2024 PN  1/13/2025 PN  2/24/2025    5xSTS 17.36 sec, no UE 14.25 sec, 0 UE 11.40 sec, 0 UE 11.65 sec, 0 UE 9.26 sec, 0 UE    TUG  Regular  Cognitive   22.75 sec   15.91 sec   13.70 sec   12.33 sec no AD  10.88 sec  18.40 sec    10 meter 0.49 m/s TBA- time constraints 0.68 m/s 0.63 m/s 0.81 m/s    LACEY 38/56 43/56 45/56 47/56 NA    FGA 9/30 15/30 16/30 17/30 20/30    DGI 8/24 14/24 14/24 15/24 18/24    MiniBEST 16/28 TBA- time constraints  19/28 21/28    2MWT 140 ft 230 ft (limited by back and right hip pain) 200 ft NT NT    6MWT   515 ft 600 ft 715 ft      Access Code: [email or web address]  URL: [email or web address]  Date: 11/06/2024  Prepared by: Celestina Valderrama    Exercises  - Sit to Stand  - 1 x daily - 7 x weekly - 2 sets - 10 reps  - Side Stepping with Counter Support  - 1 x daily - 7 x weekly - 3 sets - 10 reps  - Standing March with Counter Support  - 1 x daily - 7 x weekly - 2 sets - 10 reps - 3 sec hold

## 2025-03-04 ENCOUNTER — HOSPITAL ENCOUNTER (OUTPATIENT)
Dept: RADIOLOGY | Facility: HOSPITAL | Age: 65
Discharge: HOME | End: 2025-03-04
Attending: ORTHOPAEDIC SURGERY
Payer: COMMERCIAL

## 2025-03-04 ENCOUNTER — HOSPITAL ENCOUNTER (OUTPATIENT)
Dept: RADIOLOGY | Facility: HOSPITAL | Age: 65
Discharge: HOME | End: 2025-03-04
Payer: COMMERCIAL

## 2025-03-04 ENCOUNTER — TRANSCRIBE ORDERS (OUTPATIENT)
Dept: LAB | Facility: CLINIC | Age: 65
End: 2025-03-04

## 2025-03-04 ENCOUNTER — APPOINTMENT (OUTPATIENT)
Dept: LAB | Facility: CLINIC | Age: 65
End: 2025-03-04
Payer: COMMERCIAL

## 2025-03-04 DIAGNOSIS — I73.9 PERIPHERAL ARTERIAL DISEASE (HCC): ICD-10-CM

## 2025-03-04 DIAGNOSIS — I77.810 AORTIC ROOT DILATION (HCC): ICD-10-CM

## 2025-03-04 DIAGNOSIS — N18.32 STAGE 3B CHRONIC KIDNEY DISEASE (HCC): ICD-10-CM

## 2025-03-04 DIAGNOSIS — E55.9 VITAMIN D DEFICIENCY: ICD-10-CM

## 2025-03-04 DIAGNOSIS — Z96.611 STATUS POST REVERSE TOTAL REPLACEMENT OF RIGHT SHOULDER: ICD-10-CM

## 2025-03-04 DIAGNOSIS — E87.6 HYPOKALEMIA: ICD-10-CM

## 2025-03-04 PROCEDURE — 82570 ASSAY OF URINE CREATININE: CPT

## 2025-03-04 PROCEDURE — 85027 COMPLETE CBC AUTOMATED: CPT

## 2025-03-04 PROCEDURE — 36415 COLL VENOUS BLD VENIPUNCTURE: CPT

## 2025-03-04 PROCEDURE — 71046 X-RAY EXAM CHEST 2 VIEWS: CPT

## 2025-03-04 PROCEDURE — 82306 VITAMIN D 25 HYDROXY: CPT

## 2025-03-04 PROCEDURE — 84156 ASSAY OF PROTEIN URINE: CPT

## 2025-03-04 PROCEDURE — 73200 CT UPPER EXTREMITY W/O DYE: CPT

## 2025-03-04 PROCEDURE — 83735 ASSAY OF MAGNESIUM: CPT

## 2025-03-05 ENCOUNTER — RESULTS FOLLOW-UP (OUTPATIENT)
Dept: OTHER | Facility: HOSPITAL | Age: 65
End: 2025-03-05

## 2025-03-05 ENCOUNTER — EVALUATION (OUTPATIENT)
Dept: PHYSICAL THERAPY | Facility: CLINIC | Age: 65
End: 2025-03-05
Payer: COMMERCIAL

## 2025-03-05 ENCOUNTER — OFFICE VISIT (OUTPATIENT)
Facility: CLINIC | Age: 65
End: 2025-03-05
Payer: COMMERCIAL

## 2025-03-05 DIAGNOSIS — Z96.611 S/P SHOULDER REPLACEMENT, RIGHT: Primary | ICD-10-CM

## 2025-03-05 DIAGNOSIS — Z86.73 CHRONIC CEREBROVASCULAR ACCIDENT (CVA): ICD-10-CM

## 2025-03-05 DIAGNOSIS — I63.9 CEREBROVASCULAR ACCIDENT (CVA), UNSPECIFIED MECHANISM (HCC): Primary | ICD-10-CM

## 2025-03-05 DIAGNOSIS — R26.89 OTHER ABNORMALITIES OF GAIT AND MOBILITY: Primary | ICD-10-CM

## 2025-03-05 LAB
25(OH)D3 SERPL-MCNC: 32.9 NG/ML (ref 30–100)
ANION GAP SERPL CALCULATED.3IONS-SCNC: 10 MMOL/L (ref 4–13)
BACTERIA UR QL AUTO: ABNORMAL /HPF
BILIRUB UR QL STRIP: NEGATIVE
BUN SERPL-MCNC: 35 MG/DL (ref 5–25)
CALCIUM SERPL-MCNC: 10 MG/DL (ref 8.4–10.2)
CHLORIDE SERPL-SCNC: 102 MMOL/L (ref 96–108)
CLARITY UR: CLEAR
CO2 SERPL-SCNC: 25 MMOL/L (ref 21–32)
COLOR UR: ABNORMAL
CREAT SERPL-MCNC: 2.21 MG/DL (ref 0.6–1.3)
CREAT UR-MCNC: 80.2 MG/DL
CREAT UR-MCNC: 80.2 MG/DL
ERYTHROCYTE [DISTWIDTH] IN BLOOD BY AUTOMATED COUNT: 14.4 % (ref 11.6–15.1)
FERRITIN SERPL-MCNC: 153 NG/ML (ref 24–336)
GFR SERPL CREATININE-BSD FRML MDRD: 30 ML/MIN/1.73SQ M
GLUCOSE SERPL-MCNC: 109 MG/DL (ref 65–140)
GLUCOSE UR STRIP-MCNC: NEGATIVE MG/DL
HCT VFR BLD AUTO: 44.6 % (ref 36.5–49.3)
HGB BLD-MCNC: 14.7 G/DL (ref 12–17)
HGB UR QL STRIP.AUTO: NEGATIVE
IRON SATN MFR SERPL: 18 % (ref 15–50)
IRON SERPL-MCNC: 61 UG/DL (ref 50–212)
KETONES UR STRIP-MCNC: NEGATIVE MG/DL
LEUKOCYTE ESTERASE UR QL STRIP: NEGATIVE
MAGNESIUM SERPL-MCNC: 2.1 MG/DL (ref 1.9–2.7)
MCH RBC QN AUTO: 28.5 PG (ref 26.8–34.3)
MCHC RBC AUTO-ENTMCNC: 33 G/DL (ref 31.4–37.4)
MCV RBC AUTO: 86 FL (ref 82–98)
MICROALBUMIN UR-MCNC: 59.1 MG/L
MICROALBUMIN/CREAT 24H UR: 74 MG/G CREATININE (ref 0–30)
NITRITE UR QL STRIP: NEGATIVE
NON-SQ EPI CELLS URNS QL MICRO: ABNORMAL /HPF
PH UR STRIP.AUTO: 5.5 [PH]
PHOSPHATE SERPL-MCNC: 3.3 MG/DL (ref 2.3–4.1)
PLATELET # BLD AUTO: 250 THOUSANDS/UL (ref 149–390)
PMV BLD AUTO: 10.9 FL (ref 8.9–12.7)
POTASSIUM SERPL-SCNC: 4.4 MMOL/L (ref 3.5–5.3)
PROT UR STRIP-MCNC: ABNORMAL MG/DL
PROT UR-MCNC: 9 MG/DL
PROT/CREAT UR: 0.1 MG/G{CREAT} (ref 0–0.1)
PTH-INTACT SERPL-MCNC: 55 PG/ML (ref 12–88)
RBC # BLD AUTO: 5.16 MILLION/UL (ref 3.88–5.62)
RBC #/AREA URNS AUTO: ABNORMAL /HPF
SODIUM SERPL-SCNC: 137 MMOL/L (ref 135–147)
SP GR UR STRIP.AUTO: 1.01 (ref 1–1.03)
TIBC SERPL-MCNC: 337.4 UG/DL (ref 250–450)
TRANSFERRIN SERPL-MCNC: 241 MG/DL (ref 203–362)
UIBC SERPL-MCNC: 276 UG/DL (ref 155–355)
UROBILINOGEN UR STRIP-ACNC: <2 MG/DL
WBC # BLD AUTO: 5.8 THOUSAND/UL (ref 4.31–10.16)
WBC #/AREA URNS AUTO: ABNORMAL /HPF

## 2025-03-05 PROCEDURE — 97112 NEUROMUSCULAR REEDUCATION: CPT

## 2025-03-05 PROCEDURE — 97110 THERAPEUTIC EXERCISES: CPT

## 2025-03-05 PROCEDURE — 97530 THERAPEUTIC ACTIVITIES: CPT

## 2025-03-05 PROCEDURE — 97164 PT RE-EVAL EST PLAN CARE: CPT

## 2025-03-05 NOTE — PROGRESS NOTES
Daily Note     Today's date: 3/5/2025  Patient name: Demetrio Villalobos  : 1960  MRN: [id number]  Referring provider: César Malone DO  Dx:   Encounter Diagnosis   Name Primary?    Cerebrovascular accident (CVA), unspecified mechanism (HCC) Yes       Start Time: 1500  Stop Time: 1543  Total time in clinic (min): 43 minutes    PLAN OF CARE START: 24  PLAN OF CARE END: 25  FREQUENCY: 2 times per week  PRECAUTIONS no restrictions with RUE as per PT vicky, HTN    POC expires Auth Status Total   Visits  Start date  Expiration date PT/OT + Visit Limit? Co-Pay    approved   $0    approved                                       Visit/Unit Tracking  AUTH Status:  Date 2/12 2/14 2/19 2/20 2/24 3/5         Visits  Authed: 12 Used 1 1 1 1 1 1          Remaining  11 10 9 8 7 6              Subjective: Pt reports he had CT scan of R shoulder and awaiting results    Objective: See treatment below.    NMR:  -completed 5 minutes on nustep focusing on repetitive motion of L UE, LUE strength, proprioceptive feedback, and coordination  Completed scapular strengthening exercises 2 x 10 reps of shoulder extension usin 5lb, and scapular rows using 7.5 lbs   - completed  2 x 15 reps with FES donned on wrist extensors of ring task with facility dog where pt had to complete shoulder flexion to place rings and pincer grasp focusing on LUE coordination, FMC coordination and strength  Completed x 30 reps of magentic rings while removing from foam with pincer grasp, using FES on wrist/digit extensors focusing on FMC, dexterity, UE coordination      Assessment: Tolerated treatment well. Demonstrating improving dexterity and able to complete pincer grasp grossly approximately 75% of the time.  Pt would benefit from continued OT services to address NMR L UE s/p chronic CVA.      Plan: Continued skilled OT per POC.

## 2025-03-05 NOTE — PROGRESS NOTES
"Daily Note     Today's date: 3/5/2025  Patient name: Demetrio Villalobos  : 1960  MRN: [id number]  Referring provider: César Malone DO  Dx:   Encounter Diagnosis     ICD-10-CM    1. Other abnormalities of gait and mobility  R26.89       2. Chronic cerebrovascular accident (CVA)  Z86.73             GOES BY \"WILL\"             Subjective: Patient reports to PT session with no new issues, complaints, or falls.       Objective: See treatment diary below    BP start of session: 140/96 mmHg (seated, RUE), 139/98 mmHg (automatic)  BP after 3 minutes seated rest: 134/94 mmHg      NMR/TE:  - Prolonged hip flexor stretch in modified Evert test position x 5 minutes  - Supine SLR (LLE only), 5# R ankle weight: 3 sets x 10 reps, 3 second iso hold  - Supine bridges (isometric ball hold between knees for isolated glute recruitment) 2 sets x 10 reps, 3 second iso hold  - Single leg bridges (LLE only) 3 sets x 10 reps, 3 second iso hold  - Isolated hamstring curl (LLE only) in R sidelying 3 sets x 10 reps    Assessment: Patient tolerated today's session well today with increased focus on functional and isolated strengthening via mat program. Due to initial high BP readings at start of session, opted to perform mat exercises to prevent further increases in BP. Significant onset of fatigue with later repetitions of SLR's as evidenced by increasing degree of knee flexion/difficulty maintaining TKE. Able to achieve approximately 70-80 degrees active knee flexion with hip in neutral in gravity eliminated. Patient will continue to benefit from skilled OPPT services in order to maximize safe functional mobility and reduce risk for falls post-CVA.      Plan: Continue per plan of care.  Progress treatment as tolerated.      *PATIENT ON BETA BLOCKER, USE RPE*     POC expires Unit limit Auth Expiration date PT/OT + Visit Limit? Co-Insurance   25 12 visits (sharing with ortho) 25 BOMN No                             "     Visit/Unit Tracking  AUTH Status:  Date 3/5 2/5             Approved Used                Remaining                    Outcome Measures Initial Eval  10/30/2024 & DN 11/6/24 RE  11/26/24 PN  12/11/2024 PN  1/13/2025 PN  2/24/2025    5xSTS 17.36 sec, no UE 14.25 sec, 0 UE 11.40 sec, 0 UE 11.65 sec, 0 UE 9.26 sec, 0 UE    TUG  Regular  Cognitive   22.75 sec   15.91 sec   13.70 sec   12.33 sec no AD  10.88 sec  18.40 sec    10 meter 0.49 m/s TBA- time constraints 0.68 m/s 0.63 m/s 0.81 m/s    LACEY 38/56 43/56 45/56 47/56 NA    FGA 9/30 15/30 16/30 17/30 20/30    DGI 8/24 14/24 14/24 15/24 18/24    MiniBEST 16/28 TBA- time constraints  19/28 21/28    2MWT 140 ft 230 ft (limited by back and right hip pain) 200 ft NT NT    6MWT   515 ft 600 ft 715 ft      Access Code: [email or web address]  URL: [email or web address]  Date: 11/06/2024  Prepared by: Celestina Valderrama    Exercises  - Sit to Stand  - 1 x daily - 7 x weekly - 2 sets - 10 reps  - Side Stepping with Counter Support  - 1 x daily - 7 x weekly - 3 sets - 10 reps  - Standing March with Counter Support  - 1 x daily - 7 x weekly - 2 sets - 10 reps - 3 sec hold

## 2025-03-05 NOTE — TELEPHONE ENCOUNTER
Message left on patient's VM that CR slightly higher with increased dose of Lasix.  Other labs acceptable per Kelley.  Pt has f/u visit with  Dr. Sousa 3/10/25.    ----- Message from Kelley Jewell PA-C sent at 3/5/2025  9:21 AM EST -----  Creatinine slightly higher with increased lasix dose.  Otherwise, blood work acceptable.  May need to tolerate higher creatinine for euvolemia.  Follow up with Dr. Sousa next week.

## 2025-03-05 NOTE — PROGRESS NOTES
Daily Note     Today's date: 3/5/2025  Patient name: Demetrio Villalobos  : 1960  MRN: [id number]  Referring provider: César Malone DO  Dx:   Encounter Diagnosis     ICD-10-CM    1. S/P shoulder replacement, right  Z96.611                      Subjective: Patient was D/C from ortho PT to focus on neuro PT and optimizing function and he seemed to have achieved all mobilities goal expected from this surgery. He reports continued pain with laying on his Rt shoulder. He followed up with ortho who has concerns for heterotrophic ossificans. He reports that he get pain with sleeping and he get continued numbness into his Lt hand and fingers.      Objective: See treatment diary below    UE PROM   10/24/2024 3/5/25    LEFT RIGHT  RIGHT    Shoulder Flexion 90 160 160    Shoulder Abduction 45 90 110    Shoulder ER @ 0 0 10 30    Shoulder IR @ 0 To belly To belly To belly     UE AROM   3/5/25    LEFT RIGHT     Shoulder Flexion     Shoulder Abduction     Shoulder ER NT C7    Shoulder IR NT To gluetal fold*     UE MMT   10/24/2024 3/5/25    LEFT RIGHT  RIGHT    Shoulder Flexion 2+/5 2+/5 4/5    Shoulder Abduction 2/5 2/5 3+/5    Shoulder Extension 3/5 3/5 4/5    Shoulder ER  2/5 2/5 3/5    Shoulder IR  2/5 3/5 3/5        Elbow Flexion 3+/5 4/5 5/5    Elbow Extension 3/5 4/5 5/5        Wrist Extension 2/5 5/5 5/5    Wrist Flexion 2/5 5/5 5/5         25# 45# NT         Assessment: Demetrio Villalobos was D/C from ortho PT with a transition to neuro PT in 2024. Since then, he has developed more pain when laying on his shoulder and is concerned with worsening numbness in his fingers on the Rt side. Pending the results of his CT exam, patient would benefit from continued skilled physical therapy 1x per week for  6 weeks  to address these impairments and functional limitations in order to return to PLOF.      Plan: Continue 1x per week pending CT exam results. Possibly trial dry needling.         POC  expires Unit limit Auth Expiration date PT/OT + Visit Limit? Co-Insurance   1/22/25 12 visits (sharing with ortho) 1/21/24 BOMN No    12 visits 3/24                            Visit/Unit Tracking  AUTH Status:  Date 12/30 1/13 1/15 1/27 1/29 2/5 2/12 2/14 2/19 2/26     Approved Used 1 1 1 1 1 1 1 1 1 1      Remaining  11 10 9 8 7 6 5 4 3 2           Date 10/24/24 11/06/24 11/13/24 11/19/24 11/26/24 3/5/25   Visit Number 2 3 4 5 6 7   FOTO Tracking     Follow-up #1    Manual         Thoracic mobs         Lumbar mobs         Shoulder ROM Stretching in all directions x 8 min Stretching in all directions - 10 min IM  Stretching in all directions x 8 min Stretching in all directions x 10 min Stretching in all directions x 10 min Stretching in all directions x 10 min     IASTM UT and bicep   Cupping x 8 min STM to pec and biceps proximal insertion            TherEx         Active HENDERSON NS x 8 min (arms and legs for range)        LTR x20        SKTC/DKTC         Seated ball roll outs Hand over hand on ball 10x w/ LF        Active shoulder flexion 2x10 2 x 10  2x10 w/ contract relax PNF resistance 2x10 w/ contract relax PNF resistance 2x10 w/ contract relax PNF resistance      Scap ret 5s x 20  Banded flexion/ext from OH GTB x10    Banded horizontal abd GTB x10 Banded flexion/ext from OH GTB 3x10    Banded horizontal abd GTB 3x10       Iso ER and IR and flexion x 15 5s hold ea.  Flexion, ext, abd 10 x 5 sec        Table slides flexion x 20  Table slides flexion x 20     Into abd x20      Circuit    Circuit: 3 rounds x 10 each (Rt only)  - flexion 3#  - abd 3#  - bent over row 5# Circuit: 3 rounds x 10 each (Rt only)  - flexion 3#  - abd 3#  - bent over row 5#             Neuro Re-Ed         TrA isos         SOC x20        Scap squeezes    x30                                                  TherAct         Patient education      reassess   Posture education         Lifting mechanics                           Gait Training                                     Modalities

## 2025-03-06 ENCOUNTER — RESULTS FOLLOW-UP (OUTPATIENT)
Age: 65
End: 2025-03-06

## 2025-03-06 DIAGNOSIS — E87.6 HYPOKALEMIA: ICD-10-CM

## 2025-03-06 DIAGNOSIS — R60.0 BILATERAL LOWER EXTREMITY EDEMA: ICD-10-CM

## 2025-03-06 DIAGNOSIS — K21.00 GASTROESOPHAGEAL REFLUX DISEASE WITH ESOPHAGITIS, UNSPECIFIED WHETHER HEMORRHAGE: ICD-10-CM

## 2025-03-06 DIAGNOSIS — E78.49 OTHER HYPERLIPIDEMIA: ICD-10-CM

## 2025-03-06 RX ORDER — SPIRONOLACTONE 25 MG/1
TABLET ORAL
Qty: 15 TABLET | Refills: 1 | Status: SHIPPED | OUTPATIENT
Start: 2025-03-06

## 2025-03-06 RX ORDER — ESOMEPRAZOLE MAGNESIUM 40 MG/1
40 CAPSULE, DELAYED RELEASE ORAL EVERY MORNING
Qty: 30 CAPSULE | Refills: 1 | Status: SHIPPED | OUTPATIENT
Start: 2025-03-06

## 2025-03-06 RX ORDER — ATORVASTATIN CALCIUM 40 MG/1
TABLET, FILM COATED ORAL
Qty: 30 TABLET | Refills: 1 | Status: SHIPPED | OUTPATIENT
Start: 2025-03-06

## 2025-03-06 NOTE — RESULT ENCOUNTER NOTE
Please call the patient regarding results.  Colon polyp was a benign adenoma.  Repeat colonoscopy in 3 years

## 2025-03-09 ENCOUNTER — RESULTS FOLLOW-UP (OUTPATIENT)
Age: 65
End: 2025-03-09

## 2025-03-12 ENCOUNTER — OFFICE VISIT (OUTPATIENT)
Facility: CLINIC | Age: 65
End: 2025-03-12
Payer: COMMERCIAL

## 2025-03-12 ENCOUNTER — OFFICE VISIT (OUTPATIENT)
Dept: PHYSICAL THERAPY | Facility: CLINIC | Age: 65
End: 2025-03-12
Payer: COMMERCIAL

## 2025-03-12 DIAGNOSIS — I63.9 CEREBROVASCULAR ACCIDENT (CVA), UNSPECIFIED MECHANISM (HCC): ICD-10-CM

## 2025-03-12 DIAGNOSIS — Z86.73 H/O: CVA (CEREBROVASCULAR ACCIDENT): Primary | ICD-10-CM

## 2025-03-12 DIAGNOSIS — Z86.73 CHRONIC CEREBROVASCULAR ACCIDENT (CVA): Primary | ICD-10-CM

## 2025-03-12 DIAGNOSIS — Z96.611 S/P SHOULDER REPLACEMENT, RIGHT: Primary | ICD-10-CM

## 2025-03-12 DIAGNOSIS — G81.10 SPASTIC HEMIPARESIS AFFECTING NONDOMINANT SIDE (HCC): ICD-10-CM

## 2025-03-12 DIAGNOSIS — R26.89 OTHER ABNORMALITIES OF GAIT AND MOBILITY: ICD-10-CM

## 2025-03-12 PROCEDURE — 97112 NEUROMUSCULAR REEDUCATION: CPT

## 2025-03-12 NOTE — PROGRESS NOTES
Daily Note     Today's date: 3/12/2025  Patient name: Demetrio Villalobos  : 1960  MRN: [id number]  Referring provider: César Malone DO  Dx:   Encounter Diagnoses   Name Primary?    H/O: CVA (cerebrovascular accident) Yes    Spastic hemiparesis affecting nondominant side (HCC)     Cerebrovascular accident (CVA), unspecified mechanism (HCC)        Start Time: 1610  Stop Time: 1655  Total time in clinic (min): 45 minutes    PLAN OF CARE START: 24  PLAN OF CARE END: 25  FREQUENCY: 2 times per week  PRECAUTIONS no restrictions with RUE as per PT vicky, HTN    POC expires Auth Status Total   Visits  Start date  Expiration date PT/OT + Visit Limit? Co-Pay    approved   $0    approved                                       Visit/Unit Tracking  AUTH Status:  Date 2/12 2/14 2/19 2/20 2/24 3/5 3/10        Visits  Authed: 12 Used 1 1 1 1 1 1 1         Remaining  11 10 9 8 7 6 4             Subjective: Pt reports he is doing a lot with his arm at home    Objective: See treatment below.    NMR:  -Simulated drinking with 1lb wrist weight 20x then 20x without weight.  Wrist weight donned for error augmentation.  Focus on massed practice elbow flexion/extension, maintaining cylindrical grasp, motor planning and coordination    -Jenga piece stacking 3x3 15x with finger weights donned and 15x with weights removed.  Focus on 2pt vs lateral pinch, dynamic reaching, FMC and dexterity.  Drops 2.     Assessment: Tolerated treatment well. Demonstrating improving dexterity and inc ability to attain effective pincer grasp. Pt would benefit from continued OT services to address NMR L UE s/p chronic CVA.      Plan: Continued skilled OT per POC.

## 2025-03-12 NOTE — PROGRESS NOTES
"Daily Note     Today's date: 3/12/2025  Patient name: Demetrio Villalobos  : 1960  MRN: [id number]  Referring provider: César Malone DO  Dx:   Encounter Diagnosis     ICD-10-CM    1. Chronic cerebrovascular accident (CVA)  Z86.73       2. Other abnormalities of gait and mobility  R26.89               GOES BY \"WILL\"             Subjective: Patient reports to PT session with no new issues, complaints, or falls.       Objective: See treatment diary below    BP start of session: 134/86 mmHg (seated, RUE)  HR: 78 bpm  SpO2: 96%    NMR (5# L ankle weight, 3# R ankle weight):  -L SL leg curl machine, 22#: 15x  -L SL leg curl machine, 33#: 10x, Candice  -L SL leg press: 65#, 10x, 2 sets  - STS from standard chair with medium RR under RLE x 1 minute (2 sets) - RPE 3/10  - FWD mel negotiation (6\") x 2 minutes - RPE 4/10  - LAT mel negotiation (6\") x 2 minutes - RPE 4/10    Assessment: Patient tolerated today's session well today with increased focus on functional and isolated strengthening. Introduced single leg machine based exercises to further emphasize hamstring motor recruitment to facilitate improve knee flexion moment during ambulation. He displayed good carry over into overground ambulation following hamstring focused exercises. Patient continues to display significant L sided circumduction as he fatigues suggesting remaining limitations in muscular endurance. Patient will continue to benefit from skilled OPPT services in order to maximize safe functional mobility and reduce risk for falls post-CVA.      Plan: Continue per plan of care.  Progress treatment as tolerated.      *PATIENT ON BETA BLOCKER, USE RPE*     POC expires Unit limit Auth Expiration date PT/OT + Visit Limit? Co-Insurance   25 12 visits (sharing with ortho) 25 BOMN No                                 Visit/Unit Tracking  AUTH Status:  Date 3/5 2/5             Approved Used                Remaining                    Outcome " Measures Initial Eval  10/30/2024 & DN 11/6/24 RE  11/26/24 PN  12/11/2024 PN  1/13/2025 PN  2/24/2025    5xSTS 17.36 sec, no UE 14.25 sec, 0 UE 11.40 sec, 0 UE 11.65 sec, 0 UE 9.26 sec, 0 UE    TUG  Regular  Cognitive   22.75 sec   15.91 sec   13.70 sec   12.33 sec no AD  10.88 sec  18.40 sec    10 meter 0.49 m/s TBA- time constraints 0.68 m/s 0.63 m/s 0.81 m/s    LACEY 38/56 43/56 45/56 47/56 NA    FGA 9/30 15/30 16/30 17/30 20/30    DGI 8/24 14/24 14/24 15/24 18/24    MiniBEST 16/28 TBA- time constraints  19/28 21/28    2MWT 140 ft 230 ft (limited by back and right hip pain) 200 ft NT NT    6MWT   515 ft 600 ft 715 ft      Access Code: [email or web address]  URL: [email or web address]  Date: 11/06/2024  Prepared by: Celestina Valderrama    Exercises  - Sit to Stand  - 1 x daily - 7 x weekly - 2 sets - 10 reps  - Side Stepping with Counter Support  - 1 x daily - 7 x weekly - 3 sets - 10 reps  - Standing March with Counter Support  - 1 x daily - 7 x weekly - 2 sets - 10 reps - 3 sec hold

## 2025-03-12 NOTE — PROGRESS NOTES
Daily Note     Today's date: 3/12/2025  Patient name: Demetrio Villalobos  : 1960  MRN: [id number]  Referring provider: César Malone DO  Dx:   Encounter Diagnosis     ICD-10-CM    1. S/P shoulder replacement, right  Z96.611                      Subjective: Patient has not been contacted by ortho to review CT results.      Objective: See treatment diary below      Assessment: Tolerated treatment fair. Patient will see ortho prior to next session so further plan will be established at that time. Based on unknown of HO, focused today on submax functional mobility tasks to optimize function versus strength. Patient demonstrated fatigue post treatment and would benefit from continued PT to address functional mobility deficits and return to PLOF.      Plan: Continue POC pending ortho review.          POC expires Unit limit Auth Expiration date PT/OT + Visit Limit? Co-Insurance   25 12 visits (sharing with ortho) 25 BOMN No                                 Visit/Unit Tracking  AUTH Status:  Date 3/5 2/5             Approved Used                Remaining                    Date 11/13/24 11/19/24 11/26/24 3/5/25 3/12/25   Visit Number 4 5 6 7 8   FOTO Tracking   Follow-up #1     Manual        Thoracic mobs        Lumbar mobs        Shoulder ROM Stretching in all directions x 8 min Stretching in all directions x 10 min Stretching in all directions x 10 min Stretching in all directions x 10 min Stretching in all directions x 10 min      Cupping x 8 min STM to pec and biceps proximal insertion STM to pec and biceps proximal insertion           TherEx        Active HENDERSON        LTR        SKTC/DKTC        Seated ball roll outs        Active shoulder flexion 2x10 w/ contract relax PNF resistance 2x10 w/ contract relax PNF resistance 2x10 w/ contract relax PNF resistance      Banded flexion/ext from OH GTB x10    Banded horizontal abd GTB x10 Banded flexion/ext from OH GTB 3x10    Banded horizontal abd GTB 3x10        Flexion, ext, abd 10 x 5 sec        Table slides flexion x 20     Into abd x20    Rt arm row 12.5# 2x10    Rt arm ext 9# 2x10   Circuit  Circuit: 3 rounds x 10 each (Rt only)  - flexion 3#  - abd 3#  - bent over row 5# Circuit: 3 rounds x 10 each (Rt only)  - flexion 3#  - abd 3#  - bent over row 5#  Functional OH reaching 3# x20    4# x20           Neuro Re-Ed        TrA isos        SOC        Scap squeezes  x30                                              TherAct        Patient education    reassess    Posture education        Lifting mechanics                        Gait Training                                Modalities                   - Standing March with Counter Support  - 1 x daily - 7 x weekly - 2 sets - 10 reps - 3 sec hold

## 2025-03-13 LAB
ALDOST SERPL-MCNC: 63 NG/DL (ref 0–30)
ALDOST/RENIN PLAS-RTO: 16.1 {RATIO} (ref 0–30)
RENIN PLAS-CCNC: 3.9 NG/ML/HR (ref 0.17–5.38)

## 2025-03-18 ENCOUNTER — OFFICE VISIT (OUTPATIENT)
Dept: OBGYN CLINIC | Facility: CLINIC | Age: 65
End: 2025-03-18
Payer: COMMERCIAL

## 2025-03-18 VITALS — WEIGHT: 214 LBS | HEIGHT: 67 IN | BODY MASS INDEX: 33.59 KG/M2

## 2025-03-18 DIAGNOSIS — M89.8X9 HETEROTOPIC OSSIFICATION: ICD-10-CM

## 2025-03-18 DIAGNOSIS — Z96.611 STATUS POST REVERSE TOTAL REPLACEMENT OF RIGHT SHOULDER: Primary | ICD-10-CM

## 2025-03-18 PROCEDURE — 99214 OFFICE O/P EST MOD 30 MIN: CPT | Performed by: ORTHOPAEDIC SURGERY

## 2025-03-18 NOTE — PROGRESS NOTES
"Patient Name: Demetrio Villalobos      : 1960       MRN: [id number]   Encounter Provider: Sina Randle MD   Encounter Date: 25  Encounter department: [facility] [place]         Assessment & Plan  Status post reverse total replacement of right shoulder  Demetrio is a pleasant 65 y.o male  presents now nearly 7 months status post right Arthroplasty Shoulder Reverse on 2024. We reviewed his right shoulder CT today, which does demonstrate heterotopic ossification of the glenoid neck. Otherwise, there is no fracture and the hardware all is in appropriate position. We discussed that HO can cause stiffness, however, I remain pleased with his range of motion today. I would not recommend any specific treatment for the HO at this time, but we will continue to monitor this. We will follow up with Demetrio for his 1 year anniversary from surgery with repeat right shoulder x-rays upon arrival. In the meantime, he can continue physical therapy as long as he obtains benefit from sessions; a new referral was placed today. He can use OTC medications and ice/heat as needed as well.    Orders:  •  Ambulatory Referral to Physical Therapy; Future           _____________________________________________________  CHIEF COMPLAINT:  Chief Complaint   Patient presents with   • Right Shoulder - Post-op         SUBJECTIVE:  Demetrio Villalobos is a 65 y.o. male who presents now nearly 7 months status post Arthroplasty Shoulder Reverse - Right on 2024. He also presents for right shoulder CT scan review. Today, the patient notes similar symptoms as last visit with persistent discomfort with lying on the right side. The patient reports completing formal PT last week, stating his strength has continued to make good progression. Demetrio states that PT told him he had \"knots\" around his shoulder and he feels \"tight\". He denies new injury/trauma or numbness/tingling.      PAST MEDICAL HISTORY:  Past " Medical History:   Diagnosis Date   • Chronic kidney disease    • Edema    • GERD (gastroesophageal reflux disease)    • Gout     hospitalized   • High cholesterol    • Hypertension    • PVD (peripheral vascular disease) ([facility])    • Renal disorder    • Rotator cuff tear     right   • Shortness of breath    • Stroke ([facility]) 2014   • Ventral hernia without obstruction or gangrene    • Walker as ambulation aid        PAST SURGICAL HISTORY:  Past Surgical History:   Procedure Laterality Date   • APPENDECTOMY     • IN ARTHROPLASTY GLENOHUMERAL JOINT TOTAL SHOULDER Right 8/26/2024    Procedure: ARTHROPLASTY SHOULDER REVERSE;  Surgeon: Sina Randle MD;  Location: [facility];  Service: Orthopedics       FAMILY HISTORY:  History reviewed. No pertinent family history.    SOCIAL HISTORY:  Social History     Tobacco Use   • Smoking status: Former     Types: Cigarettes     Passive exposure: Never   • Smokeless tobacco: Never   • Tobacco comments:     Smoker 30 yrs   Vaping Use   • Vaping status: Never Used   Substance Use Topics   • Alcohol use: Never   • Drug use: Never       MEDICATIONS:    Current Outpatient Medications:   •  allopurinol (ZYLOPRIM) 100 mg tablet, Take 0.5 tablets (50 mg total) by mouth daily, Disp: 30 tablet, Rfl: 1  •  atorvastatin (LIPITOR) 40 mg tablet, TAKE ONE TABLET BY MOUTH DAILY WITH DINNER, Disp: 30 tablet, Rfl: 1  •  esomeprazole (NexIUM) 40 MG capsule, TAKE ONE CAPSULE BY MOUTH EVERY MORNING, Disp: 30 capsule, Rfl: 1  •  furosemide (LASIX) 40 mg tablet, Take 1 tablet (40 mg total) by mouth daily, Disp: 90 tablet, Rfl: 3  •  metoprolol succinate (TOPROL-XL) 50 mg 24 hr tablet, Take 1 tablet (50 mg total) by mouth daily with dinner, Disp: 30 tablet, Rfl: 2  •  Multiple Vitamin (Multivitamin Adult) TABS, ONE TAB DAILY, Disp: 30 tablet, Rfl: 1  •  NIFEdipine (PROCARDIA XL) 60 mg 24 hr tablet, Take 1 tablet (60 mg total) by mouth daily, Disp: 30 tablet, Rfl: 5  •  spironolactone (ALDACTONE) 25 mg  "tablet, TAKE A HALF TABLET BY MOUTH DAILY, Disp: 15 tablet, Rfl: 1  •  aspirin 81 mg chewable tablet, Chew 1 tablet (81 mg total) daily for 28 days Last dose per surgeon to be 8/20/24, Disp: , Rfl:   •  Baclofen 5 MG TABS, TAKE A HALF TABLET BY MOUTH 3 TIMES DAILY, Disp: 30 tablet, Rfl: 1    ALLERGIES:  No Known Allergies    LABS:  HgA1c:   Lab Results   Component Value Date    HGBA1C 5.9 01/24/2025     BMP:   Lab Results   Component Value Date    CALCIUM 10.0 03/04/2025    K 4.4 03/04/2025    CO2 25 03/04/2025     03/04/2025    BUN 35 (H) 03/04/2025    CREATININE 2.21 (H) 03/04/2025     CBC: No components found for: \"CBC\"    _____________________________________________________  Review of Systems   Constitutional:  Negative for chills and fever.   HENT:  Negative for ear pain and sore throat.    Eyes:  Negative for pain and visual disturbance.   Respiratory:  Negative for cough and shortness of breath.    Cardiovascular:  Negative for chest pain and palpitations.   Gastrointestinal:  Negative for abdominal pain and vomiting.   Genitourinary:  Negative for dysuria and hematuria.   Musculoskeletal:  Negative for arthralgias and back pain.   Skin:  Negative for color change and rash.   Neurological:  Negative for seizures and syncope.   All other systems reviewed and are negative.       Shoulder Exam:     Inspection: No ecchymosis, edema, or deformity. No visualized wounds or skin lesions   Active Motion:       FF: 160        ER: 10       IR: T12  Strength: 5/5 empty can, 5/5 ER,  5/5 IR   Sensory - SILT in the Radial / Ulnar / Median / Axillary nerve distributions  Motor - AIN / PIN / Radial / Ulnar / Median / Axillary motor nerves in tact  Palpable Radial pulse  Cap refill <2secs in all digits      Physical Exam  Vitals and nursing note reviewed.   Constitutional:       Appearance: Normal appearance.   HENT:      Head: Normocephalic and atraumatic.      Right Ear: External ear normal.      Left Ear: External " ear normal.      Nose: Nose normal.   Eyes:      General: No scleral icterus.     Extraocular Movements: Extraocular movements intact.      Conjunctiva/sclera: Conjunctivae normal.   Cardiovascular:      Rate and Rhythm: Normal rate.   Pulmonary:      Effort: Pulmonary effort is normal. No respiratory distress.   Musculoskeletal:      Cervical back: Normal range of motion and neck supple.      Comments: See ortho exam   Skin:     General: Skin is warm and dry.   Neurological:      Mental Status: He is alert and oriented to person, place, and time.   Psychiatric:         Mood and Affect: Mood normal.         Behavior: Behavior normal.        _____________________________________________________  STUDIES REVIEWED:  I personally reviewed the pertinent images and my independent interpretation is as follows: Stable alignment of RTSA hardware.  Heterotrophic ossification or bony fragment near glenoid.    I reviewed and interpreted right shoulder CT today, which demonstrates FINDINGS:     OSSEOUS STRUCTURES:  No fracture, dislocation or destructive osseous lesion. Reverse shoulder arthroplasty without evidence of periprosthetic fracture or hardware loosening. There is a well circumscribed ossific density along the inferior aspect of the   glenoid neck which may represent heterotopic ossification.     VISUALIZED MUSCULATURE: Grossly intact but poorly evaluated     SOFT TISSUES:  Unremarkable.     OTHER PERTINENT FINDINGS:  None.      PROCEDURES PERFORMED:  No Procedures performed today    Scribe Attestation    I,:  Patti Miller PA-C am acting as a scribe while in the presence of the attending physician.:       I,:  Sina Randle MD personally performed the services described in this documentation    as scribed in my presence.:

## 2025-03-19 ENCOUNTER — OFFICE VISIT (OUTPATIENT)
Facility: CLINIC | Age: 65
End: 2025-03-19
Payer: COMMERCIAL

## 2025-03-19 DIAGNOSIS — Z86.73 H/O: CVA (CEREBROVASCULAR ACCIDENT): Primary | ICD-10-CM

## 2025-03-19 DIAGNOSIS — R25.2 SPASTICITY: ICD-10-CM

## 2025-03-19 DIAGNOSIS — G81.10 SPASTIC HEMIPARESIS AFFECTING NONDOMINANT SIDE (HCC): ICD-10-CM

## 2025-03-19 DIAGNOSIS — I63.9 CEREBROVASCULAR ACCIDENT (CVA), UNSPECIFIED MECHANISM (HCC): ICD-10-CM

## 2025-03-19 DIAGNOSIS — Z86.73 CHRONIC CEREBROVASCULAR ACCIDENT (CVA): ICD-10-CM

## 2025-03-19 DIAGNOSIS — R26.89 OTHER ABNORMALITIES OF GAIT AND MOBILITY: Primary | ICD-10-CM

## 2025-03-19 PROCEDURE — 97112 NEUROMUSCULAR REEDUCATION: CPT

## 2025-03-19 RX ORDER — BACLOFEN 5 MG/1
TABLET ORAL
Qty: 30 TABLET | Refills: 1 | Status: SHIPPED | OUTPATIENT
Start: 2025-03-19

## 2025-03-19 NOTE — PROGRESS NOTES
Daily Note     Today's date: 3/19/2025  Patient name: Demetrio Villalobos  : 1960  MRN: [id number]  Referring provider: César Malone DO  Dx:   Encounter Diagnoses   Name Primary?    H/O: CVA (cerebrovascular accident) Yes    Spastic hemiparesis affecting nondominant side (HCC)     Cerebrovascular accident (CVA), unspecified mechanism (HCC)          Start Time: 1500  Stop Time: 1545  Total time in clinic (min): 45 minutes    PLAN OF CARE START: 24  PLAN OF CARE END: 25  FREQUENCY: 2 times per week  PRECAUTIONS no restrictions with RUE as per PT vicky, HTN    POC expires Auth Status Total   Visits  Start date  Expiration date PT/OT + Visit Limit? Co-Pay    approved   $0    approved                                       Visit/Unit Tracking  AUTH Status:  Date 2/12 2/14 2/19 2/20 2/24 3/5 3/10 3/19       Visits  Authed: 12 Used 1 1 1 1 1 1 1 1        Remaining  11 10 9 8 7 6 4 3            Subjective: Pt reports he is surprised how well his arm is doing today.    Objective: See treatment below.    NMR:  -Golf ball retrieval from table anteriorly and placement into container 90* to R.  FES to wrist/digit extensors and finger weights donned for error augmentation.  Focus on spherical grasp vs 3 point pinch, shoulder rotation, FMC.  Drops 1.  30x with weights, 30x without.  1 rest break to stretch digits into extension    -[place] block rotation performed with focus on rotation of items in hand.  Mild compensatory use of table      Assessment: Tolerated treatment well. Demonstrating improving dexterity and inc ability to attain effective grasp and dec difficulty with digit extension.  Pt would benefit from continued OT services to address NMR L UE s/p chronic CVA.      Plan: Continued skilled OT per POC.

## 2025-03-19 NOTE — PROGRESS NOTES
"Daily Note     Today's date: 3/19/2025  Patient name: Demetrio Villalobos  : 1960  MRN: [id number]  Referring provider: César Malone DO  Dx:   Encounter Diagnosis     ICD-10-CM    1. Other abnormalities of gait and mobility  R26.89       2. Chronic cerebrovascular accident (CVA)  Z86.73               GOES BY \"WILL\"             Subjective: Patient reports to PT session with no new issues, complaints, or falls.       Objective: See treatment diary below    BP start of session: 134/86 mmHg (seated, RUE)    NMR (5# L ankle weight, 3# R ankle weight):  - Ambulation speed with posterior resistance (red) x 200 ft (mixed tile + turf) - 2 sets  - Stair negotiation (8\"), single UE x 5 reps  - Single leg (LLE only) hamstring curl machine, 22#: 3 sets x 10 reps  - STS from standard chair w/ medium RR under RLE x 2 minutes    Updated HEP  Access Code: [email or web address]  URL: [email or web address]  Date: 2025  Prepared by: Celestina Valderrama    Exercises  - Prone Knee Flexion  - 1 x daily - 7 x weekly - 3 sets - 10 reps - 3 sec hold  - Supine Bridge  - 1 x daily - 7 x weekly - 3 sets - 10 reps - 3 sec hold  - Single Leg Bridge  - 1 x daily - 7 x weekly - 3 sets - 10 reps - 3 sec hold  - Supine Active Straight Leg Raise  - 1 x daily - 7 x weekly - 3 sets - 10 reps - 3 sec hold    Assessment: Patient tolerated today's session well today with increased focus on functional and isolated strengthening. During stair training, provided verbal cues along with modeling for hamstring recruitment prior to hip flexion to assist with LLE limb clearance with good results. Noted good motor control of (L) hamstrings during isolated hamstring curls on machine. Provided patient with updated mat HEP. Patient will continue to benefit from skilled OPPT services in order to maximize safe functional mobility and reduce risk for falls post-CVA.      Plan: Continue per plan of care.  Progress treatment as tolerated.      *PATIENT ON BETA " BLOCKER, USE RPE*     POC expires Unit limit Auth Expiration date PT/OT + Visit Limit? Co-Insurance   1/22/25 12 visits (sharing with ortho) 6/2/25 BOMN No                                 Visit/Unit Tracking  AUTH Status:  Date 3/5 3/12 3/19            Approved Used 2 2 1             Remaining  10 8 7                Outcome Measures Initial Eval  10/30/2024 & DN 11/6/24 RE  11/26/24 PN  12/11/2024 PN  1/13/2025 PN  2/24/2025    5xSTS 17.36 sec, no UE 14.25 sec, 0 UE 11.40 sec, 0 UE 11.65 sec, 0 UE 9.26 sec, 0 UE    TUG  Regular  Cognitive   22.75 sec   15.91 sec   13.70 sec   12.33 sec no AD  10.88 sec  18.40 sec    10 meter 0.49 m/s TBA- time constraints 0.68 m/s 0.63 m/s 0.81 m/s    LACEY 38/56 43/56 45/56 47/56 NA    FGA 9/30 15/30 16/30 17/30 20/30    DGI 8/24 14/24 14/24 15/24 18/24    MiniBEST 16/28 TBA- time constraints  19/28 21/28    2MWT 140 ft 230 ft (limited by back and right hip pain) 200 ft NT NT    6MWT   515 ft 600 ft 715 ft      Access Code: [email or web address]  URL: [email or web address]  Date: 11/06/2024  Prepared by: Celestina Valderrama    Exercises  - Sit to Stand  - 1 x daily - 7 x weekly - 2 sets - 10 reps  - Side Stepping with Counter Support  - 1 x daily - 7 x weekly - 3 sets - 10 reps  - Standing March with Counter Support  - 1 x daily - 7 x weekly - 2 sets - 10 reps - 3 sec hold

## 2025-03-24 ENCOUNTER — OFFICE VISIT (OUTPATIENT)
Facility: CLINIC | Age: 65
End: 2025-03-24
Payer: COMMERCIAL

## 2025-03-24 DIAGNOSIS — Z86.73 H/O: CVA (CEREBROVASCULAR ACCIDENT): Primary | ICD-10-CM

## 2025-03-24 DIAGNOSIS — Z86.73 CHRONIC CEREBROVASCULAR ACCIDENT (CVA): ICD-10-CM

## 2025-03-24 DIAGNOSIS — R26.89 OTHER ABNORMALITIES OF GAIT AND MOBILITY: Primary | ICD-10-CM

## 2025-03-24 PROCEDURE — 97112 NEUROMUSCULAR REEDUCATION: CPT

## 2025-03-24 NOTE — ASSESSMENT & PLAN NOTE
stable  continue statin  low-cholesterol and low-fat diet, aerobic exercise  management per PCP

## 2025-03-24 NOTE — ASSESSMENT & PLAN NOTE
Aldosterone elevated at 65, renin 3.9  normal.  Aldosterone/renin ratio normal at 16.1  BP slightly above goal  volume status euvolemic  Current medications: Toprol-XL 50 mg daily, nifedipine 60 mg daily, Lasix 40 mg daily, spironolactone 12.5 mg daily.  Lasix increased in November and spironolactone added in Dec. '24  Changes: d/c spironolactone. Start lisinopril 10 mg daily  Check BMP in 1 week after starting lisinopril  Avoid hypotension or large fluctuations in blood pressure.  Avoid NSAIDs  low sodium (2 gm) diet  continue to monitor BP at home daily.  Please send in 1 weeks BP readings in 4 weeks after starting lisinopril  Orders:    Albumin / creatinine urine ratio; Future    Renal function panel; Future    lisinopril (ZESTRIL) 10 mg tablet; Take 1 tablet (10 mg total) by mouth daily

## 2025-03-24 NOTE — PROGRESS NOTES
Name: Demetrio Villalobos      : 1960      MRN: [id number]  Encounter Provider: Ashley Wharton PA-C  Encounter Date: 3/25/2025   Encounter department: [facility]  :  Assessment & Plan  Stage 3b chronic kidney disease (HCC)  Etiology: Likely due to nephrosclerosis, concern for PKD on imaging  Baseline creatinine previously 1.7-1.9 since  but more recently appears 1.9-2.2 since Aug '24  Renal ultrasound 2024 with innumerable bilateral small renal cysts, most of which are simple some are minimally complicated.  No masses or hydronephrosis.  Right kidney 16.4 x 11.4 x 9.1 cm, left kidney 20 x 11.2 x 9.3 cm  Follows with Dr. Reyes  Most recent creatinine 2.21, eGFR 30  UACr 74 mg/g, at goal  UA with trace protein, no hematuria  Electrolytes and acid-base stable  Renal function stable and likely at new baseline with progression of disease over the last year since addition of diuretics last .  Treat modifiable risk factors  Avoid nephrotoxins, NSAIDs, hypotension and IV contrast if possible.  Maintain adequate hydration to avoid grown of renal cysts due to release of ADH with dehydration.  Genetic testing ([facility]) today.  Discussed with patient at length.  Discussed potential of out-of-pocket expenses.  Patient is agreeable to proceed and genetic testing sample obtained today.  Paperwork completed.  D/c spironolactone.  Start lisinopril 10 mg daily recheck BMP in 1 week for starting lisinopril.  Kidney Smart/CKD education:  referral placed today  Follow-up with Dr. Reyes in 4 months with repeat blood and urine studies.  Orders:    Ambulatory referral to ckd education program; Future    Albumin / creatinine urine ratio; Future    CBC; Future    Magnesium; Future    Renal function panel; Future    PTH, intact; Future    Vitamin D 25 hydroxy; Future    Basic metabolic panel; Future    lisinopril (ZESTRIL) 10 mg tablet; Take 1 tablet (10 mg total) by mouth daily    Benign  hypertension with chronic kidney disease, stage III (HCC)  Aldosterone elevated at 63, renin 3.9  normal.  Aldosterone/renin ratio normal at 16.1  BP slightly above goal  volume status euvolemic  Current medications: Toprol-XL 50 mg daily, nifedipine 60 mg daily, Lasix 40 mg daily, spironolactone 12.5 mg daily.  Lasix increased in November and spironolactone added in Dec. '24  Changes: d/c spironolactone. Start lisinopril 10 mg daily  Check BMP in 1 week after starting lisinopril  Avoid hypotension or large fluctuations in blood pressure.  Avoid NSAIDs  low sodium (2 gm) diet  continue to monitor BP at home daily.  Please send in 1 weeks BP readings in 4 weeks after starting lisinopril  Orders:    Albumin / creatinine urine ratio; Future    Renal function panel; Future    lisinopril (ZESTRIL) 10 mg tablet; Take 1 tablet (10 mg total) by mouth daily    Chronic kidney disease-mineral and bone disorder  Calcium and magnesium stable  Phosphorus 3.3, at goal  PTH 55, at goal  Vitamin D 25 32.9, at goal  continue to monitor  Orders:    Magnesium; Future    Renal function panel; Future    PTH, intact; Future    Vitamin D 25 hydroxy; Future    Hypokalemia  Resolved  K+ 4.4  D/c spironolactone as above.  Start lisinopril.  Check BMP in 1 week after change.  Continue to monitor       Bilateral lower extremity edema  Stable and resolved  Continue compression and elevation of BLE for symptomatic edema relief  Orders:    Albumin / creatinine urine ratio; Future    Renal function panel; Future    Moderate mixed hyperlipidemia not requiring statin therapy  stable  continue statin  low-cholesterol and low-fat diet, aerobic exercise  management per PCP       Class 1 obesity due to excess calories with serious comorbidity and body mass index (BMI) of 34.0 to 34.9 in adult  BMI 33.2  continue to encourage weight management, lifestyle modifications and diet modifications to slow progession of CKD and proteinuria  continue follow-up and  "management with PCP  Orders:    Albumin / creatinine urine ratio; Future    Renal function panel; Future    Anemia due to stage 3b chronic kidney disease  (HCC)  Hgb 14.7, at goal. Goal Hgb >10.0  Iron panel 3/4/25: Iron saturation 18%, ferritin 153  Will hold on oral iron given Hgb well above goal  check CBC prior to next appointment  continue to monitor       Age related screening:   Your primary caregiver may do yearly screening for colorectal cancer. It is recommended in all men and women over 50 years old. You may have screening earlier if you have colon disease or a family history of colorectal cancer      Plan Summary:  -D/w Dr Reyes today  -Renal function stable and likely new baseline  -renal ultrasound concerning for underlying PKD.  Family Hx of cousin having renal transplant and father  due to unknown \"kidney disease\"  -[facility] genetic testing.  Specimen obtained in office today  -d/c spironolactone and start lisinopril 10 mg daily  -check BMP in 1 week after starting  -home BP monitoring  -referral to Kidney Smart  -Follow up with Dr. Reyes in 4 months with repeat blood and urine studies.  Please call the office with any questions or concerns.        Reason for Visit: Follow-up (CKD.3/)    HPI: Demetrio Villalobos is a 65 y.o. male with CKD 3B, HTN, s/p R total shoulder reverse arthroplasty 2024, hx CVA, obesity and hx chronic BLE edema  who presents for follow up of CKD. Patient followed by Dr. Reyes, last seen 2024 by Kelley.  Most recent creatinine 2.21, eGFR 30, stable since Aug '24.  Patient denies recent hospitalizations or ER visits.  Patient denies NSAID use.  Patient denies nausea, vomiting, diarrhea, dyspnea, orthopnea, edema, hematuria or foamy urine. Pt's cousin had a renal transplant 10-15 years ago but pt unaware of underlying disease.  Patient states father also  due to \"kidney disease\" but on clear details.  Renal ultrasound 2024 with bilateral innumerable " small simple renal cysts.      ROS: A complete review of systems was performed and was negative unless otherwise noted in the history of present illness.    Allergies:   Patient has no known allergies.    Medications:     Current Outpatient Medications:     allopurinol (ZYLOPRIM) 100 mg tablet, Take 0.5 tablets (50 mg total) by mouth daily, Disp: 30 tablet, Rfl: 1    aspirin 81 mg chewable tablet, Chew 1 tablet (81 mg total) daily for 28 days Last dose per surgeon to be 8/20/24, Disp: , Rfl:     atorvastatin (LIPITOR) 40 mg tablet, TAKE ONE TABLET BY MOUTH DAILY WITH DINNER, Disp: 30 tablet, Rfl: 1    Baclofen 5 MG TABS, TAKE A HALF TABLET BY MOUTH 3 TIMES DAILY, Disp: 30 tablet, Rfl: 1    esomeprazole (NexIUM) 40 MG capsule, TAKE ONE CAPSULE BY MOUTH EVERY MORNING, Disp: 30 capsule, Rfl: 1    furosemide (LASIX) 40 mg tablet, Take 1 tablet (40 mg total) by mouth daily, Disp: 90 tablet, Rfl: 3    lisinopril (ZESTRIL) 10 mg tablet, Take 1 tablet (10 mg total) by mouth daily, Disp: 30 tablet, Rfl: 4    metoprolol succinate (TOPROL-XL) 50 mg 24 hr tablet, Take 1 tablet (50 mg total) by mouth daily with dinner, Disp: 30 tablet, Rfl: 2    Multiple Vitamin (Multivitamin Adult) TABS, ONE TAB DAILY, Disp: 30 tablet, Rfl: 1    NIFEdipine (PROCARDIA XL) 60 mg 24 hr tablet, Take 1 tablet (60 mg total) by mouth daily, Disp: 30 tablet, Rfl: 5    Past Medical History:   Diagnosis Date    Chronic kidney disease     Edema     GERD (gastroesophageal reflux disease)     Gout     hospitalized    High cholesterol     Hypertension     PVD (peripheral vascular disease) (HCC)     Renal disorder     Rotator cuff tear     right    Shortness of breath     Stroke ([facility]) 2014    Ventral hernia without obstruction or gangrene     Walker as ambulation aid      Past Surgical History:   Procedure Laterality Date    APPENDECTOMY      IL ARTHROPLASTY GLENOHUMERAL JOINT TOTAL SHOULDER Right 8/26/2024    Procedure: ARTHROPLASTY SHOULDER REVERSE;   "Surgeon: Sina Randle MD;  Location: [facility];  Service: Orthopedics     History reviewed. No pertinent family history.   reports that he has quit smoking. His smoking use included cigarettes. He has never been exposed to tobacco smoke. He has never used smokeless tobacco. He reports that he does not drink alcohol and does not use drugs.    Physical Exam:   Vitals:    03/25/25 0948   BP: 138/90   BP Location: Left arm   Patient Position: Sitting   Cuff Size: Large   Pulse: 76   SpO2: 98%   Weight: 96.2 kg (212 lb)   Height: 5' 7\" (1.702 m)     Body mass index is 33.2 kg/m².    General:  Awake, alert, appears comfortable and in no acute distress.  Nontoxic.  Skin:  No rash, warm, good skin turgor   Eyes:  PERRL, EOMI, sclerae nonicteric.  no periorbital edema   ENT:  Moist mucous membranes  Neck:  Trachea midline, symmetric.  No JVD.  No carotid bruits.  Chest:  Clear to auscultation bilaterally without wheezes, crackles or rhonchi  CVS:  Regular rate and rhythm without murmur, gallop or rub.  S1 and S2 identified and normal.  No S3, S4.   Abdomen:  Soft, nontender, nondistended without masses.  Normal bowel sounds x 4 quadrants.  No bruit.  Extremities:  Warm, pink, motor and sensory intact and well perfused.  No cyanosis, pallor.  No BLE edema.  Neuro:  Awake, alert, oriented x3.  Grossly intact  Psych:  Appropriate affect.  Mentating appropriately.  Normal mental status exam      Procedure:  No results found for this or any previous visit.    Lab Results   Component Value Date    CALCIUM 10.0 03/04/2025    K 4.4 03/04/2025    CO2 25 03/04/2025     03/04/2025    BUN 35 (H) 03/04/2025    CREATININE 2.21 (H) 03/04/2025             Invalid input(s): \"ALBUMIN\"    EMR, including Epic, Care Everywhere and outside scanned documents reviewed.  I have personally reviewed the blood work as stated above and in my note.  I have personally reviewed renal ultrasound imaging studies.  I have personally reviewed " PCP, consultants and prior nephrology notes.    I have spent a total time of 37 minutes in caring for this patient on the day of the visit/encounter including Diagnostic results, Prognosis, Risks and benefits of tx options, Instructions for management, Patient and family education, Importance of tx compliance, Risk factor reductions, Impressions, Counseling / Coordination of care, Documenting in the medical record, Reviewing/placing orders in the medical record (including tests, medications, and/or procedures), and Obtaining or reviewing history  .

## 2025-03-24 NOTE — PROGRESS NOTES
"Daily Note     Today's date: 3/24/2025  Patient name: Demetrio Villalobos  : 1960  MRN: [id number]  Referring provider: César Malone DO  Dx:   Encounter Diagnosis   Name Primary?    H/O: CVA (cerebrovascular accident) Yes           Start Time: 1506  Stop Time: 1545  Total time in clinic (min): 39 minutes    PLAN OF CARE START: 24  PLAN OF CARE END: 25  FREQUENCY: 2 times per week  PRECAUTIONS no restrictions with RUE as per PT vicky, HTN    POC expires Auth Status Total   Visits  Start date  Expiration date PT/OT + Visit Limit? Co-Pay    approved   $0    approved                                       Visit/Unit Tracking  AUTH Status:  Date 2/12 2/14 2/19 2/20 2/24 3/5 3/10 3/19 3/24      Visits  Authed: 12 Used 1 1 1 1 1 1 1 1 1       Remaining  11 10 9 8 7 6 4 3 2           Subjective: Pt reports he is doing well.     Objective: See treatment below.    NMR:  Completed weight bearing and priming task to increase HR of step up to 8\" with trunk rotation and PNF D2 with RUE arm for 3 minutes   Completed latissimus dorsi stretch with towel slide x 15 reps with 5-10 second holds and educated to complete for HEP  Completed weight bearing and strengthening task of latissimus dorsi of pushing down table task x 30 reps  Completed with red theraband to increase lower trap strength x 25 reps for error augmentation of punching cone;   Competed functional reaching task with FES donned on digit/wrist extensors of ring task focusing on UE corodination, FMC pincer grasp- required facilitation of scap to prevent scapular hiking   Educated on self stretching and manual of forearm for spasticity management     Assessment: Tolerated treatment well. Demonstrating scapular hiking during task however with cues and facilitation pt demonstrating less.  Pt would benefit from continued OT services to address NMR L UE s/p chronic CVA.      Plan: Continued skilled OT per POC.       "

## 2025-03-24 NOTE — ASSESSMENT & PLAN NOTE
BMI 33.2  continue to encourage weight management, lifestyle modifications and diet modifications to slow progession of CKD and proteinuria  continue follow-up and management with PCP  Orders:    Albumin / creatinine urine ratio; Future    Renal function panel; Future

## 2025-03-24 NOTE — ASSESSMENT & PLAN NOTE
Stable and resolved  Continue compression and elevation of BLE for symptomatic edema relief  Orders:    Albumin / creatinine urine ratio; Future    Renal function panel; Future

## 2025-03-24 NOTE — ASSESSMENT & PLAN NOTE
Etiology: Likely due to nephrosclerosis, concern for PKD on imaging  Baseline creatinine previously 1.7-1.9 since 2020 but more recently appears 1.9-2.2 since Aug '24  Renal ultrasound 11/21/2024 with innumerable bilateral small renal cysts, most of which are simple some are minimally complicated.  No masses or hydronephrosis.  Right kidney 16.4 x 11.4 x 9.1 cm, left kidney 20 x 11.2 x 9.3 cm  Follows with Dr. Reyes  Most recent creatinine 2.21, eGFR 30  UACr 74 mg/g, at goal  UA with trace protein, no hematuria  Electrolytes and acid-base stable  Renal function stable and likely at new baseline with progression of disease over the last year since addition of diuretics last Fall.  Treat modifiable risk factors  Avoid nephrotoxins, NSAIDs, hypotension and IV contrast if possible.  Maintain adequate hydration to avoid grown of renal cysts due to release of ADH with dehydration.  Genetic testing ([facility]) today.  Discussed with patient at length.  Discussed potential of out-of-pocket expenses.  Patient is agreeable to proceed and genetic testing sample obtained today.  Paperwork completed.  D/c spironolactone.  Start lisinopril 10 mg daily recheck BMP in 1 week for starting lisinopril.  Kidney Smart/CKD education:  referral placed today  Follow-up with Dr. Reyes in 4 months with repeat blood and urine studies.  Orders:    Ambulatory referral to ckd education program; Future    Albumin / creatinine urine ratio; Future    CBC; Future    Magnesium; Future    Renal function panel; Future    PTH, intact; Future    Vitamin D 25 hydroxy; Future    Basic metabolic panel; Future    lisinopril (ZESTRIL) 10 mg tablet; Take 1 tablet (10 mg total) by mouth daily

## 2025-03-24 NOTE — PATIENT INSTRUCTIONS
Your kidney function remains stable.  Most recent creatinine 2.21, new baseline.  We will continue routine surveillance as outlined below.  The ultrasound of your kidneys in November '24 is concerning for underlying Polycystic Kidney Disease.  We recommend genetic testing today to determine if gene present for disease/trait  Stop spironolactone  Start lisinopril 10 mg daily  Check labs in 1 week after starting lisinopril  Send a weeks worth of blood pressure readings into office in 3-4 weeks for review.  Call the office immediately if low blood pressure.   Avoid all NSAIDs to include ibuprofen, Motrin, Aleve, Advil, Naproxen, Celebrex, Indomethacin, Toradol.  Stay well hydrated.   Continue all prescribed medications.  Call if blood pressure consistently more than 140/90 or less than 110/50s.  High and low blood pressures may affect your kidney function.  Recommend low salt diet (2 gm sodium diet).  Please let us know if you are scheduled for any studies with IV contrast (ex: CT scan, arteriogram or cardiac catheterization)   Follow up in 3-4 months with Dr. Reyes with repeat labs prior to appointment.  Kidney Smart education program recommended for general education regarding your known chronic kidney disease.  Referral placed  Please contact the office with new symptoms or concerns.

## 2025-03-24 NOTE — ASSESSMENT & PLAN NOTE
Calcium and magnesium stable  Phosphorus 3.3, at goal  PTH 55, at goal  Vitamin D 25 32.9, at goal  continue to monitor  Orders:    Magnesium; Future    Renal function panel; Future    PTH, intact; Future    Vitamin D 25 hydroxy; Future

## 2025-03-24 NOTE — PROGRESS NOTES
"Daily Note     Today's date: 3/24/2025  Patient name: Demetrio Villalobos  : 1960  MRN: [id number]  Referring provider: César Malone DO  Dx:   Encounter Diagnosis     ICD-10-CM    1. Other abnormalities of gait and mobility  R26.89       2. Chronic cerebrovascular accident (CVA)  Z86.73               GOES BY \"WILL\"             Subjective: Patient reports to PT session with no new issues, complaints, or falls.       Objective: See treatment diary below    NMR:   (L) heel lift trial (for duration of session)  - Ambulation x 100 ft with observation for changes in gait mechanics  - Step-through's (LLE on 8\" step + RLE from floor to 2nd step) with towel slide for L hand: 30 seconds AMRAP   1st set: 11 reps   2nd set: 12 reps   3rd set: 13 reps (blue TB as posterior resistance at waist)  - STS from low mat table: 3 sets x 1 minute burnouts   1st set: (blue TB as posterior resistance at waist)   2nd set: (blue TB directed right)   3rd set: (blue TB directed left + medium RR under RLE) > patient reported most activation on LLE    > performed second set with focus on slower eccentric return to sit  - Static stance on LLE + RLE into abduction via slider (red Tb around thighs for additional resistance): 2 sets x 30 seconds  - Ambulation with contralateral ankle weight (5#) x 250 ft (mixed tile + turf)   > emphasis on increased heel strike on unaffected side for mirror neurons/overflow    Assessment: Patient tolerated today's session well today with increased focus on functional and isolated strengthening. Focus of this session with trialing (L) heel lift to allow for increased tibial translation during gait and other functional activities, including STS. After several trials after multi-directional resistance during STS, patient achieved most increased activation of (L) quads and glutes with resistance directed to left as well as RLE elevated. Noted good control of (L) knee during step-through's. Patient will continue " to benefit from skilled OPPT services in order to maximize safe functional mobility and reduce risk for falls post-CVA.      Plan: Continue per plan of care.  Progress treatment as tolerated.      *PATIENT ON BETA BLOCKER, USE RPE*     POC expires Unit limit Auth Expiration date PT/OT + Visit Limit? Co-Insurance   1/22/25 12 visits (sharing with ortho) 6/2/25 BOMN No                                 Visit/Unit Tracking  AUTH Status:  Date 3/5 3/12 3/19 3/24           Approved Used 2 2 1 1            Remaining  10 8 7 6               Outcome Measures Initial Eval  10/30/2024 & DN 11/6/24 RE  11/26/24 PN  12/11/2024 PN  1/13/2025 PN  2/24/2025    5xSTS 17.36 sec, no UE 14.25 sec, 0 UE 11.40 sec, 0 UE 11.65 sec, 0 UE 9.26 sec, 0 UE    TUG  Regular  Cognitive   22.75 sec   15.91 sec   13.70 sec   12.33 sec no AD  10.88 sec  18.40 sec    10 meter 0.49 m/s TBA- time constraints 0.68 m/s 0.63 m/s 0.81 m/s    LACEY 38/56 43/56 45/56 47/56 NA    FGA 9/30 15/30 16/30 17/30 20/30    DGI 8/24 14/24 14/24 15/24 18/24    MiniBEST 16/28 TBA- time constraints  19/28 21/28    2MWT 140 ft 230 ft (limited by back and right hip pain) 200 ft NT NT    6MWT   515 ft 600 ft 715 ft      Access Code: [email or web address]  URL: [email or web address]  Date: 11/06/2024  Prepared by: Celestina Valderrama    Exercises  - Sit to Stand  - 1 x daily - 7 x weekly - 2 sets - 10 reps  - Side Stepping with Counter Support  - 1 x daily - 7 x weekly - 3 sets - 10 reps  - Standing March with Counter Support  - 1 x daily - 7 x weekly - 2 sets - 10 reps - 3 sec hold    Updated HEP  Access Code: [email or web address]  URL: [email or web address]  Date: 03/19/2025  Prepared by: Celestina Valderrama    Exercises  - Prone Knee Flexion  - 1 x daily - 7 x weekly - 3 sets - 10 reps - 3 sec hold  - Supine Bridge  - 1 x daily - 7 x weekly - 3 sets - 10 reps - 3 sec hold  - Single Leg Bridge  - 1 x daily - 7 x weekly - 3 sets - 10 reps - 3 sec hold  - Supine Active Straight Leg Raise   - 1 x daily - 7 x weekly - 3 sets - 10 reps - 3 sec hold

## 2025-03-24 NOTE — ASSESSMENT & PLAN NOTE
Resolved  K+ 4.4  D/c spironolactone as above.  Start lisinopril.  Check BMP in 1 week after change.  Continue to monitor

## 2025-03-25 ENCOUNTER — OFFICE VISIT (OUTPATIENT)
Dept: NEPHROLOGY | Facility: CLINIC | Age: 65
End: 2025-03-25
Payer: COMMERCIAL

## 2025-03-25 VITALS
SYSTOLIC BLOOD PRESSURE: 138 MMHG | OXYGEN SATURATION: 98 % | BODY MASS INDEX: 33.27 KG/M2 | DIASTOLIC BLOOD PRESSURE: 90 MMHG | WEIGHT: 212 LBS | HEART RATE: 76 BPM | HEIGHT: 67 IN

## 2025-03-25 DIAGNOSIS — R60.0 BILATERAL LOWER EXTREMITY EDEMA: ICD-10-CM

## 2025-03-25 DIAGNOSIS — I12.9 BENIGN HYPERTENSION WITH CHRONIC KIDNEY DISEASE, STAGE III (HCC): ICD-10-CM

## 2025-03-25 DIAGNOSIS — M89.9 CHRONIC KIDNEY DISEASE-MINERAL AND BONE DISORDER: ICD-10-CM

## 2025-03-25 DIAGNOSIS — N18.30 BENIGN HYPERTENSION WITH CHRONIC KIDNEY DISEASE, STAGE III (HCC): ICD-10-CM

## 2025-03-25 DIAGNOSIS — E78.2 MODERATE MIXED HYPERLIPIDEMIA NOT REQUIRING STATIN THERAPY: ICD-10-CM

## 2025-03-25 DIAGNOSIS — E66.09 CLASS 1 OBESITY DUE TO EXCESS CALORIES WITH SERIOUS COMORBIDITY AND BODY MASS INDEX (BMI) OF 34.0 TO 34.9 IN ADULT: ICD-10-CM

## 2025-03-25 DIAGNOSIS — N18.9 CHRONIC KIDNEY DISEASE-MINERAL AND BONE DISORDER: ICD-10-CM

## 2025-03-25 DIAGNOSIS — D63.1 ANEMIA DUE TO STAGE 3B CHRONIC KIDNEY DISEASE  (HCC): ICD-10-CM

## 2025-03-25 DIAGNOSIS — Q61.3 PKD (POLYCYSTIC KIDNEY DISEASE): Primary | ICD-10-CM

## 2025-03-25 DIAGNOSIS — N18.32 STAGE 3B CHRONIC KIDNEY DISEASE (HCC): Primary | ICD-10-CM

## 2025-03-25 DIAGNOSIS — E83.9 CHRONIC KIDNEY DISEASE-MINERAL AND BONE DISORDER: ICD-10-CM

## 2025-03-25 DIAGNOSIS — N18.32 ANEMIA DUE TO STAGE 3B CHRONIC KIDNEY DISEASE  (HCC): ICD-10-CM

## 2025-03-25 DIAGNOSIS — E87.6 HYPOKALEMIA: ICD-10-CM

## 2025-03-25 DIAGNOSIS — E66.811 CLASS 1 OBESITY DUE TO EXCESS CALORIES WITH SERIOUS COMORBIDITY AND BODY MASS INDEX (BMI) OF 34.0 TO 34.9 IN ADULT: ICD-10-CM

## 2025-03-25 PROCEDURE — PBNCHG PB NO CHARGE PLACEHOLDER

## 2025-03-25 PROCEDURE — 99214 OFFICE O/P EST MOD 30 MIN: CPT | Performed by: PHYSICIAN ASSISTANT

## 2025-03-25 RX ORDER — LISINOPRIL 10 MG/1
10 TABLET ORAL DAILY
Qty: 30 TABLET | Refills: 4 | Status: SHIPPED | OUTPATIENT
Start: 2025-03-25

## 2025-03-25 NOTE — PATIENT INSTRUCTIONS
Patient briefed on genetic testing process  Patient swabbed 10x on each inner bottom cheek/gum  Patient signed acknowledged form  Patient verbally acknowledged understanding and all questions answered.

## 2025-03-26 ENCOUNTER — OFFICE VISIT (OUTPATIENT)
Facility: CLINIC | Age: 65
End: 2025-03-26
Payer: COMMERCIAL

## 2025-03-26 ENCOUNTER — TELEPHONE (OUTPATIENT)
Dept: NEPHROLOGY | Facility: CLINIC | Age: 65
End: 2025-03-26

## 2025-03-26 ENCOUNTER — EVALUATION (OUTPATIENT)
Facility: CLINIC | Age: 65
End: 2025-03-26
Payer: COMMERCIAL

## 2025-03-26 DIAGNOSIS — R26.89 OTHER ABNORMALITIES OF GAIT AND MOBILITY: Primary | ICD-10-CM

## 2025-03-26 DIAGNOSIS — Z86.73 CHRONIC CEREBROVASCULAR ACCIDENT (CVA): ICD-10-CM

## 2025-03-26 DIAGNOSIS — G81.10 SPASTIC HEMIPARESIS AFFECTING NONDOMINANT SIDE (HCC): ICD-10-CM

## 2025-03-26 DIAGNOSIS — Z86.73 H/O: CVA (CEREBROVASCULAR ACCIDENT): Primary | ICD-10-CM

## 2025-03-26 DIAGNOSIS — I63.9 CEREBROVASCULAR ACCIDENT (CVA), UNSPECIFIED MECHANISM (HCC): ICD-10-CM

## 2025-03-26 PROCEDURE — 97112 NEUROMUSCULAR REEDUCATION: CPT

## 2025-03-26 PROCEDURE — 97168 OT RE-EVAL EST PLAN CARE: CPT

## 2025-03-26 NOTE — PROGRESS NOTES
"Daily Note     Today's date: 3/26/2025  Patient name: Demetrio Villalobos  : 1960  MRN: [id number]  Referring provider: César Malone DO  Dx:   Encounter Diagnosis     ICD-10-CM    1. Other abnormalities of gait and mobility  R26.89       2. Chronic cerebrovascular accident (CVA)  Z86.73               GOES BY \"WILL\"             Subjective: Patient reports to PT session with no new issues, complaints, or falls.       Objective: See treatment diary below    NMR:   (L) heel lift trial (for duration of session)  - Ambulation x 100 ft with observation for changes in gait mechanics  - Step-through's (LLE on 8\" step + RLE from floor to 2nd step) with towel slide for L hand with posterior resistance (green): 30 seconds AMRAP   1st set: 9 reps   2nd set: 15 reps  - Step-through's (LLE on 8\" step + RLE from floor to 2nd step) with towel slide for L hand with posterior resistance (green) while grabbing ring from posterior diagonal and placing with Luis Alberto on top step - and reverse 10 reps each  - Static stance on LLE + RLE into abduction via slider (green TB around thighs for additional resistance): 3 sets x 30 seconds  - FWD/BWD ambulation with knee flexion facilitation via green bungee (attached at ankle and waist): 2 sets x 40 ft each  - STS from standard chair: 2 sets x 1 minute burnouts   1st set: (blue TB directed left + medium RR under RLE)   2nd set:  (blue TB directed left + medium RR under RLE)    > performed second set with focus on slower eccentric return to sit    Assessment: Patient tolerated today's session well today with increased focus on functional mobility, strength, and gait training. Patient with increased motivation with addition of facility dog within functional mobility tasks. Fatigue quickly in LLE with isolated stance time during glider task activity. Increased toe catching at end of session likely a result of cumulative fatigue at end of session. Patient will continue to benefit from skilled " OPPT services in order to maximize safe functional mobility and reduce risk for falls post-CVA.      Plan: Continue per plan of care.  Progress treatment as tolerated.      *PATIENT ON BETA BLOCKER, USE RPE*     POC expires Unit limit Auth Expiration date PT/OT + Visit Limit? Co-Insurance   1/22/25 12 visits (sharing with ortho) 6/2/25 BOMN No                                 Visit/Unit Tracking  AUTH Status:  Date 3/5 3/12 3/19 3/24 3/26          Approved Used 2 2 1 1 1           Remaining  10 8 7 6 5              Outcome Measures Initial Eval  10/30/2024 & DN 11/6/24 RE  11/26/24 PN  12/11/2024 PN  1/13/2025 PN  2/24/2025    5xSTS 17.36 sec, no UE 14.25 sec, 0 UE 11.40 sec, 0 UE 11.65 sec, 0 UE 9.26 sec, 0 UE    TUG  Regular  Cognitive   22.75 sec   15.91 sec   13.70 sec   12.33 sec no AD  10.88 sec  18.40 sec    10 meter 0.49 m/s TBA- time constraints 0.68 m/s 0.63 m/s 0.81 m/s    LACEY 38/56 43/56 45/56 47/56 NA    FGA 9/30 15/30 16/30 17/30 20/30    DGI 8/24 14/24 14/24 15/24 18/24    MiniBEST 16/28 TBA- time constraints  19/28 21/28    2MWT 140 ft 230 ft (limited by back and right hip pain) 200 ft NT NT    6MWT   515 ft 600 ft 715 ft      Access Code: [email or web address]  URL: [email or web address]  Date: 11/06/2024  Prepared by: Celestina Valderrama    Exercises  - Sit to Stand  - 1 x daily - 7 x weekly - 2 sets - 10 reps  - Side Stepping with Counter Support  - 1 x daily - 7 x weekly - 3 sets - 10 reps  - Standing March with Counter Support  - 1 x daily - 7 x weekly - 2 sets - 10 reps - 3 sec hold    Updated HEP  Access Code: [email or web address]  URL: [email or web address]  Date: 03/19/2025  Prepared by: Celestina Valderrama    Exercises  - Prone Knee Flexion  - 1 x daily - 7 x weekly - 3 sets - 10 reps - 3 sec hold  - Supine Bridge  - 1 x daily - 7 x weekly - 3 sets - 10 reps - 3 sec hold  - Single Leg Bridge  - 1 x daily - 7 x weekly - 3 sets - 10 reps - 3 sec hold  - Supine Active Straight Leg Raise  - 1 x daily - 7  x weekly - 3 sets - 10 reps - 3 sec hold

## 2025-03-26 NOTE — PROGRESS NOTES
OCCUPATIONAL THERAPY RE-EVALUATION    Today's Date: 3/26/2025  Patient Name: Demetrio Villalobos  : 1960  MRN: [id number]  Referring Provider: César Malone DO  Dx: H/O: CVA (cerebrovascular accident) [Z86.73]     Date               Units:  Used 2              Authed:  Remaining  10                  SKILLED ANALYSIS:  Pt presents to re-evaluation on 3/26/25 after ~5 months of OP OT s/p CVA in .  Pt reports continued improvement with functional use of L UE in the past 2 months, and is now able to hold toothpaste while putting paste onto toothbrush, but not strong enough yet to squeeze tube effectively.  Pt also now able to grasp and rotate door handles with L UE.  Pt reports he notes that he dropped something recently and was able to catch with L UE.  Pt's goal for the next month is to trial using a zipper with L UE and use a key in a doorknob.  Based on assessments, pt demonstrating significant improvements with overall functioning of L UE on FMA, increasing 8 points, demonstrating improved L UE strength on MMT.  His  strength measurement declined upon testing today, however anticipate this is 2* thumb nail being excessively long and digging into other fingers during measurement.  Pt's spasticity has mildly increased, and pt reports he'll speak with neuro about possible botox.  Recommend continued OP OT 2x/wk for an additional 4 weeks, then likely will extend POC.  Discussed with pt and he is in agreement.    Per FD, pt's insurance expires at the end of March and we are unable to request further visits.  Pt reports he got new insurance, but is unsure of the provider and didn't bring the card today.  Reports he will bring it to [facility] Monday.    PLAN OF CARE START: 24  PLAN OF CARE END: 25 (extended 3/26)  FREQUENCY: 2 times per week  PRECAUTIONS no restrictions with RUE as per PT vicyk, HTN    Subjective  25: Pt reports he sees a little improvement since last RE. He can walk  "further. He states sometimes he can move his hand certain ways and sometimes he can't. Since SOC he has starting using his toothbrush with his L hand. He holds dishes that he's washing in his L hand, and can self feed with a spoon using his L hand. He's turning on light switches with his L hand. He has started using estim on his LUE at home, every morning. He rates his LUE function at 60-70% and would like it to get to 80%.     3/26: Pt reports he feels he has 40% UE recovery s/p CVA.  He is able to stabilize the tooth paste with L UE, but unable to squeeze paste out.  Now able to open door handles with L UE.    Mechanism of Injury  CVA approximately 10 years ago, went to [facility] for outpatient rehabilitation; pt denies seizures and is not on anti-seizure medication    Occupational Profile    \"Pt lives alone in an apartment with no stairs to get in. Pt reports difficulty with ADLs (UBD, shaving head, LBD ) however was able to put on with independence. Pt reports he has an aide who assists 5 hours a day and does cleaning, cooking, and bathing pt. Pt manages own medication. Pt reports he has not been driving. Pt reports no pain in LUE. Pt is a retired .Pt report no diplopia and no issues with cognition. Pt enjoys reading bible, watching TV. \"    PATIENT GOAL: \"as much use of my L hand and walking as possible.\"    HISTORY OF PRESENT ILLNESS:     PMH:   Past Medical History:   Diagnosis Date    Chronic kidney disease     Edema     GERD (gastroesophageal reflux disease)     Gout     hospitalized    High cholesterol     Hypertension     PVD (peripheral vascular disease) ([facility])     Renal disorder     Rotator cuff tear     right    Shortness of breath     Stroke ([facility]) 2014    Ventral hernia without obstruction or gangrene     Walker as ambulation aid        Past Surgical Hx:   Past Surgical History:   Procedure Laterality Date    APPENDECTOMY      MD ARTHROPLASTY GLENOHUMERAL JOINT TOTAL SHOULDER Right 8/26/2024    " Procedure: ARTHROPLASTY SHOULDER REVERSE;  Surgeon: Sina Randle MD;  Location: [facility];  Service: Orthopedics        Pain: none    Objective    Upper Extremities:  Pt is R hand dominant  Did not assess RUE strength due to rotator cuff sx.                 JANA: RUE: lb LUE: 21lbs but limited by length of thumb nail (prior 26.3lb)  The age norm is approximately 76.8 lbs, indicating decreased  strength.    Lateral pinch: RUE: , LUE: 4.5                Range of Motion:  AROM:   R UE   Did not assess due to sx     L UE   - Shoulder flexion: 100* with compensatory shoulder abduction and elbow flexion  - Shoulder scaption/abduction: approximately 80 degrees  - Shoulder extension: approximately 100%  - Elbow flexion:100%  - Elbow extension: 100%  - Wrist flexion: 50*  - Wrist extension:approximately 40*   - Pronation: 100%  - Supination: 75%  - Finger extension: 20%  - thumb extension approximately 25%  - Finger flexion: 100%     Manual Muscle Testing:  R UE:  -Did not assess due to sx   L UE:  - Shoulder flexors: 3-   - Shoulder abductors: 3-/5  - Shoulder extensors: 4/5  - Shoulder internal rotators: 4-/5  - Shoulder external rotators: 2-/5  - Elbow flexors: 4+ (prior 4-/5)  - Elbow extensors: 4+ (prior 3+/5)  - Wrist flexors: 4-/5 (prior 2+/5)  - Wrist extensors: 2+ (prior 2/5)    FUGL LOVE ASSESSMENT OF MOTOR RECOVERY AFTER STROKE:  L UE:  UPPER EXTREMITY SEATED: 23/36  WRIST: 7/10   HAND: 7/14  COORDINATION/SPEED: 3/6  OVERALL SCORE: 40/66, significant change from 32/66     (Clinical change= 5 points, severe impairment <19, and mild impairment is >50)          Modified Rosetta Scale: measures spasticity in patients with lesions in the Central Nervous System  L UE:    Shoulder external rotators: 0/4  Shoulder internal rotators: 3/4  Elbow flexors: 1+/4  Elbow extensors: 1/4  Wrist flexors: 1+/4  Wrist extensors 1+/4  Pronators 2/4  Finger flexors: 1+/4  Finger extensors: 0/4    0: No increase in  muscle tone  1: Slight increase in muscle tone, manifested by a catch and release or by minimal resistance at the end of the range of motion when the affected part(s) is moved in flexion or extension  1+: Slight increase in muscle tone, manifested by a catch, followed by minimal resistance throughout the remainder (less than half) of the ROM  2: More marked increase in muscle tone through most of the ROM, but affected part(s) easily moved  3: Considerable increase in muscle tone, passive movement difficult  4: Affected part(s) rigid in flexion or extension    Subluxation: none    Scapular winging: slight    Spasticity: slight     Finger to Nose Testing with Visual Occlusion (proprioception):  mild proprioception deficits    Finger to Nose Testing without Visual Occlusion: mild dysmetria    Monofilaments/sensory testing: NOT tested    Functional Cognition:  Highest level of education: 9th grade    Tulio Cognitive Assessment Version 8.1 (MoCA V8.1) NOT ASSESSED  Visuospatial/executive functioning:  3/5  Naming:  3/3  Memory: 1st trial:  , 2nd trial:    Attention/concentration:   List of letters:   Serial Seven Subtraction:  3 w/  errors  Language/sentence repetition:    Language Fluency:     Abstract/Correlational Thinkin/2  Delayed Recall:    Orientation:     Memory Index Score: 13/15   Raw Score:  25/30, MIS:  /15, indicative of mild neurocognitive impairments.    MoCA Scoring        Normal: 26+         Mild Cognitive Impairment: 18-25          Moderate Cognitive Impairment: 10-17         Severe Cognitive Impairment: <10    Trail making Part A and Part B: NOT ASSESSED  Part A: 31.28 with errors however able to self correct   Part B: 1 minute 49 with 1 cue (prior 1 minute 34 seconds with independence )  Indicating deficits: Part A > 33.12 seconds and Part B > 74.55 seconds            GOALS:   Short Term Goals:  Pt will increase UE  strength by 2-3 lbs to complete gardening and  IADLs (19) ACHIEVED   Pt will increase divided attention by completing trail making part B in 1 minute 20 seconds to complete IADLs AHCIEVED  Pt will demo with G understanding and carryover of tone reduction strategies for improved AROM ACHIEVED   Pt will demo with decreased L  UE hypertonicity to 1+/4 on modified jonna scale for improved grasp release, termination of flexors on command of time  Pt will increase  L UE cooordination to complete 19/36 of upper extremity section of fugyl abraham for tabletop tasks ACHIEVED  Pt will increase  L UE coordination to complete 5/10 of wrist section of fugyl abraham for tabletop tasks ACHIEVED             Long Term Goals: 8-12 weeks  Pt will increase UE  strength by 4-5 lbs to complete gardening and IADLs (19) ACHIEVED 1/30  Pt will increase divided attention by completing trail making part B in 1 minute 10 seconds to complete IADLs  Pt will demo with G understanding and carryover of tone reduction strategies for improved AROM ACHIEVED 1/30  Pt will demo with decreased L  UE hypertonicity to 1/4 on modified jonna scale for improved grasp release, termination of flexors on command of time  Pt will increase  L UE coordination to complete 21/36 of upper extremity section of fugyl abraham for tabletop tasks  Pt will increase  L UE coordination to complete fugyl abraham in 32 for tabletop tasks ACHIEVED 1/30    New Goals Established 1/30/25:  Pt will demonstrate improved L gross grasp strength to 29lbs in order to improve ADL/IADL performance in 12 weeks.  Pt will demonstrate improved LUE function based on Fugl Abraham score of 35/66 in 12 weeks.      OTHER PLANNED THERAPY INTERVENTIONS:   Supine, seated, and in stance neuro re-ed  Tricep AG  NMES/FES  FMC/prehension  Timed Trials  Manual tx  Hand to target  Sensory re-ed  Seated functional reach: crossing midline  Supine place and hold  WBearing strategies   Closed chain activities  Open chain activities  Internal and external  memory aides  Multimatrix for saccades/ visual clutter/attention  Hypersensitivity strategies education  Multi-modal environment  Sustained/alternating/divided attention  Work stations with timed transitions  Temporal Awareness  Memory and mental manipulation  Auditory processing with immediate recall  Memory retention with immediate and delayed recall  Edu on cog/vision apps      Objective Measurement Tracker:    IE (11/13) 1st Re-eval: () 2nd Re-eval: (]) 3rd Re-eval: ()    MoCA 25/30 /30 /30 /30 /30   TM Test A 31       TM Test B 1:35       CMT        Nine Hole Peg Test        Functional Dexterity Test                                                Manual Muscle Testing             Fugl Antunez UE: 17/36  Wrist: 4/10  Hand: 5/14  Coordination: 1/6

## 2025-03-31 ENCOUNTER — OFFICE VISIT (OUTPATIENT)
Facility: CLINIC | Age: 65
End: 2025-03-31
Payer: COMMERCIAL

## 2025-03-31 DIAGNOSIS — G81.10 SPASTIC HEMIPARESIS AFFECTING NONDOMINANT SIDE (HCC): ICD-10-CM

## 2025-03-31 DIAGNOSIS — Z86.73 CHRONIC CEREBROVASCULAR ACCIDENT (CVA): ICD-10-CM

## 2025-03-31 DIAGNOSIS — M25.531 RIGHT WRIST PAIN: ICD-10-CM

## 2025-03-31 DIAGNOSIS — I63.9 CEREBROVASCULAR ACCIDENT (CVA), UNSPECIFIED MECHANISM (HCC): ICD-10-CM

## 2025-03-31 DIAGNOSIS — Z86.73 H/O: CVA (CEREBROVASCULAR ACCIDENT): Primary | ICD-10-CM

## 2025-03-31 DIAGNOSIS — R26.89 OTHER ABNORMALITIES OF GAIT AND MOBILITY: Primary | ICD-10-CM

## 2025-03-31 PROCEDURE — 97112 NEUROMUSCULAR REEDUCATION: CPT

## 2025-03-31 NOTE — PROGRESS NOTES
"Daily Note     Today's date: 3/31/2025  Patient name: Demetrio Villalobos  : 1960  MRN: [id number]  Referring provider: Patti Miller PA-C  Dx:   Encounter Diagnosis     ICD-10-CM    1. Other abnormalities of gait and mobility  R26.89       2. Chronic cerebrovascular accident (CVA)  Z86.73               GOES BY \"WILL\"             Subjective: Patient reports to PT session with no new issues, complaints, or falls.       Objective: See treatment diary below    Vitals:   - BP: 132/92 mmHg   - HR: 68 bpm   - SpO2: 95%      NMR:   - Posterior resisted walking (green) x 200 feet   - STS from standard chair with medium [place] under R LE: 2 sets x 1 minute AMRAP   1st set: 22 reps   2nd set: 20 reps   - Step up 6\" > tap 3rd  step with contralateral LE, 1 UE support, 10 reps B/L   - Static stance on LLE + RLE into abduction via slider (green TB around thighs for additional resistance): 3 sets x 30 seconds  - Stepping FWD/BCK over 9\" mel, 0-1 UE, 20 reps   - Walking FWD/BCK, no UE, 10 feet, 8 cycles     Assessment: Patient tolerated today's session well today with focus on functional mobility, strength, and gait training. Quick fatigue with bursts of activity (e.g. AMRAP STS, hip abduction sliders), suggestive of decreased LE power and muscular endurance. Increased UE support to clear mel with successive reps, indicative of fatigue and decreased SLS stability. Patient will continue to benefit from skilled OPPT services in order to maximize safe functional mobility and reduce risk for falls post-CVA.      Plan: Continue per plan of care.  Progress treatment as tolerated.      *PATIENT ON BETA BLOCKER, USE RPE*     POC expires Unit limit Auth Expiration date PT/OT + Visit Limit? Co-Insurance   25 12 visits (sharing with ortho) 25 BOMN No                                 Visit/Unit Tracking  AUTH Status:  Date 3/5 3/12 3/19 3/24 3/26 3/31         Approved Used 2 2 1 1 1 1          Remaining  13 11 10 " 9 8 7             Outcome Measures Initial Eval  10/30/2024 & DN 11/6/24 RE  11/26/24 PN  12/11/2024 PN  1/13/2025 PN  2/24/2025    5xSTS 17.36 sec, no UE 14.25 sec, 0 UE 11.40 sec, 0 UE 11.65 sec, 0 UE 9.26 sec, 0 UE    TUG  Regular  Cognitive   22.75 sec   15.91 sec   13.70 sec   12.33 sec no AD  10.88 sec  18.40 sec    10 meter 0.49 m/s TBA- time constraints 0.68 m/s 0.63 m/s 0.81 m/s    LACEY 38/56 43/56 45/56 47/56 NA    FGA 9/30 15/30 16/30 17/30 20/30    DGI 8/24 14/24 14/24 15/24 18/24    MiniBEST 16/28 TBA- time constraints  19/28 21/28    2MWT 140 ft 230 ft (limited by back and right hip pain) 200 ft NT NT    6MWT   515 ft 600 ft 715 ft      Access Code: [email or web address]  URL: [email or web address]  Date: 11/06/2024  Prepared by: Celestina Valderrama    Exercises  - Sit to Stand  - 1 x daily - 7 x weekly - 2 sets - 10 reps  - Side Stepping with Counter Support  - 1 x daily - 7 x weekly - 3 sets - 10 reps  - Standing March with Counter Support  - 1 x daily - 7 x weekly - 2 sets - 10 reps - 3 sec hold    Updated HEP  Access Code: [email or web address]  URL: [email or web address]  Date: 03/19/2025  Prepared by: Celestina Valderrama    Exercises  - Prone Knee Flexion  - 1 x daily - 7 x weekly - 3 sets - 10 reps - 3 sec hold  - Supine Bridge  - 1 x daily - 7 x weekly - 3 sets - 10 reps - 3 sec hold  - Single Leg Bridge  - 1 x daily - 7 x weekly - 3 sets - 10 reps - 3 sec hold  - Supine Active Straight Leg Raise  - 1 x daily - 7 x weekly - 3 sets - 10 reps - 3 sec hold

## 2025-03-31 NOTE — PROGRESS NOTES
Daily Note     Today's date: 3/31/2025  Patient name: Demetrio Villalobos  : 1960  MRN: [id number]  Referring provider: César Malone DO  Dx:   Encounter Diagnoses   Name Primary?    H/O: CVA (cerebrovascular accident) Yes    Cerebrovascular accident (CVA), unspecified mechanism (HCC)     Spastic hemiparesis affecting nondominant side (HCC)     Right wrist pain                 PLAN OF CARE START: 24  PLAN OF CARE END: 25  FREQUENCY: 2 times per week  PRECAUTIONS no restrictions with RUE as per PT vicky, HTN    POC expires Auth Status Total   Visits  Start date  Expiration date PT/OT + Visit Limit? Co-Pay    approved   $0    approved                                       Visit/Unit Tracking  AUTH Status:  Date 2/12 2/14 2/19 2/20 2/24 3/5 3/10 3/19 3/24 3/31     Visits  Authed: 12 Used 1 1 1 1 1 1 1 1 1 1      Remaining  11 10 9 8 7 6 4 3 2 1          Subjective: Pt reports he has been able to do some ironing with L UE.  Got new insurance and reports he gave FD the info.    Objective: See treatment below.    NMR:  -Shoulder flexion with 2lb b/l dumbbells with use of visual feedback from mirror 20x2 with focus on b/l UE coordination, proximal stabilization for improved distal control    -Simulated iron use horizontal abduction with error augmentation for shoulder internal rotation.  20x2 with red theraband for error augmentation and 30x without    -Simulated ironing shoulder flexion with error augmentation for shoulder flexion and elbow extension 15x2 and then completes without theraband.  20x2 with red theraband for error augmentation and 30x without.  Requires ~30% assist with extension from therapist with theraband, only ~15% without    -Sock pairing performed with use of b/l UE with focus on bimanual coordination, L UE reaching, grasp/release.  Requires assist of unaffected UE for reaching and release of items 2* fatigue and spasticity      Assessment: Tolerated treatment  well. Demonstrating good after effect from error augmentation strategies during today's session.  Pt would benefit from continued OT services to address NMR L UE s/p chronic CVA.      Plan: Continued skilled OT per POC.

## 2025-04-02 ENCOUNTER — OFFICE VISIT (OUTPATIENT)
Facility: CLINIC | Age: 65
End: 2025-04-02
Payer: COMMERCIAL

## 2025-04-02 ENCOUNTER — EVALUATION (OUTPATIENT)
Facility: CLINIC | Age: 65
End: 2025-04-02
Payer: COMMERCIAL

## 2025-04-02 DIAGNOSIS — Z86.73 CHRONIC CEREBROVASCULAR ACCIDENT (CVA): ICD-10-CM

## 2025-04-02 DIAGNOSIS — I63.9 CEREBROVASCULAR ACCIDENT (CVA), UNSPECIFIED MECHANISM (HCC): ICD-10-CM

## 2025-04-02 DIAGNOSIS — G81.10 SPASTIC HEMIPARESIS AFFECTING NONDOMINANT SIDE (HCC): ICD-10-CM

## 2025-04-02 DIAGNOSIS — R26.89 OTHER ABNORMALITIES OF GAIT AND MOBILITY: Primary | ICD-10-CM

## 2025-04-02 DIAGNOSIS — Z86.73 H/O: CVA (CEREBROVASCULAR ACCIDENT): Primary | ICD-10-CM

## 2025-04-02 PROCEDURE — 97530 THERAPEUTIC ACTIVITIES: CPT

## 2025-04-02 PROCEDURE — 97112 NEUROMUSCULAR REEDUCATION: CPT

## 2025-04-02 NOTE — PROGRESS NOTES
PT Re-Evaluation          POC expires Unit limit Auth Expiration date PT/OT + Visit Limit? Co-Insurance   25 12 visits (sharing with ortho) 25 BOMN No                                 Visit/Unit Tracking  AUTH Status:  Date 3/5 3/12 3/19 3/24 3/26 3/31 4/2        Approved Used 2 2 1 1 1 1 1         Remaining  13 11 10 9 8 7 6                   Today's date: 2025  Patient name: Demetrio Villalobos  : 1960  MRN: [id number]  Referring provider: Lisa Blount DO   Dx:   Encounter Diagnosis     ICD-10-CM    1. Other abnormalities of gait and mobility  R26.89       2. Chronic cerebrovascular accident (CVA)  Z86.73             Assessment  Assessment details: Patient is a 64 year old presenting to skilled OPPT for reassessment with complaints of gait, balance, and strength deficits s/p CVA that in turn has limited safe functional mobility at home and in the community. PMH significant for: HTN, CKD, GERD, and CVA. Patient suffered CVA approximately 10 years ago and reports residual weakness on left side. Patient demonstrated improvements in the following outcome measures since last reassessment: 5 x STS, TUG (both variations), and FGA, likely indicating overall gains in functional LE strength, safe mobility, and dynamic balance, respectively. Despite these improvements, patient appears to be trending towards a plateau in his outcome measures at this time. Patient's LE tone did appear to be impeding on overall mobility during testing today. Educated patient on potential therapeutic effects of Botox to which he states he will consider. Per cutoff scores for the FGA and DGI he continues to be classified as HIGH risk for falls. Plan to continue to focus on gait training, balance, and strengthen within high intensity parameters. He has met one additional long term goal at this time. Patient will continue to benefit from skilled OPPT services  in order to maximize safe functional mobility in the home and community setting.       Impairments: Abnormal coordination, Abnormal gait, Abnormal muscle tone, Abnormal or restricted ROM, Activity intolerance, Impaired balance, Impaired physical strength, Lacks appropriate HEP, Poor posture, Poor body mechanics, Pain with function, Safety issue, Weight-bearing intolerance, Abnormal movement, Difficulty understanding, Abnormal muscle firing  Understanding of Dx/Px/POC: Excellent  Prognosis: Excellent      Patient verbalized understanding of POC.         Please contact me if you have any questions or recommendations. Thank you for the referral and the opportunity to share in Demetrio Villalobos's care.        Plan  Plan details: FGA, DGI, miniBEST, 6 MWT; HIGT and HIIT to tolerance  Patient would benefit from: PT Eval, Skilled PT, and OT Eval  Planned modality interventions: Biofeedback, Cryotherapy, TENS, Thermotherapy  Planned therapy interventions: Abdominal trunk stabilization, ADL training, Balance, Balance/WB training, Breathing training, Body mechanics training, Coordination, Functional ROM exercises, Gait training, HEP, Joint Mobilization, Manual Therapy, White taping, Motor coordination training, Neuromuscular re-education, Patient education, Postural training, Strengthening, Stretching, Therapeutic activities, Therapeutic exercises, Therapeutic training, Transfer training, Activity modification, Work reintegration  Frequency: 1-2x/wk  Duration in weeks: 12 weeks  Plan of Care beginning date: 4/2/2025  Plan of Care expiration date: 12 weeks - 6/25/25  Treatment plan discussed with: Patient         Goals  Short Term Goals (4 weeks):    - Patient will improve time on TUG by 2.9 seconds to facilitate improved safety in all ambulation- MET  - Patient will improve scoring on DGI by 2.6 points to progress safety- MET  - Patient will be independent in basic HEP 2-3 weeks - MET  - Patient will improve 5xSTS score by  2.3 seconds to promote improved LE functional strength needed for ADLs- MET    Long Term Goals (12 weeks):  - Patient will be independent in a comprehensive home exercise program - NOT MET  - Patient will improve gait speed by 0.18 m/s to improve safety with community ambulation - MET  - Patient will improve LACEY by 6 points to facilitate return to safe independent ambulation - MET  - Patient will improve scoring on FGA by 4 points to progress safety with dynamic tasks- MET  - Patient will improve FGA score to >/= 22/30 to classify as decreased risk for falls - NEARLY MET  - Patient will be able to demonstrate HT in gait without veering - NOT MET  - Patient will improve 6 Minute Walk Test score by 190 feet to promote improved cardiovascular endurance - MET  - Patient will report 50% reduction in near falls in order to improve safety with functional tasks and reduce his risk for falls - NOT MET  - Patient will report going on walks at least 3 days per week to promote independence and improved cardiovascular endurance - NOT MET  - Patient will be able to ascend/descend stairs reciprocally without UE assist to promote independence and safety with ADLs - NOT MET  - Patient will report 50% reduction in near falls when ambulating on uneven terrain - NOT MET      Cut off score    All date taken from APTA Neuro Section or Rehab Measures      Lacey:  Álvaro et al., 2018  MDC: 2.7 pts    Amanda et al., 2011  Cut-off score: 45/56    Chronic CVA  < 44/56 high risk for falls (Álvaro et al., 2018)  < 47.5/56 slow walker status (Amanda et al., 2011) 5xSTS: Rupal 2010  MDC: 2.3 sec  Age Norms:  60-69: 11.1 sec  70-79: 12.6 sec  80-89: 14.8 sec    Andrea 2010, Chronic Stroke  Chronic CVA: 12 sec   TUG  Addison et al., 2005  MDC: 2.9 sec    Cut off score:  >13.5 sec community dwelling adults  >32.2 frail elderly  <20 I for basic transfers  >30 dependent on transfers 10 Meter Walk Test:   Shawanda enrique al., 2006  Small  meaningful change: 0.06 m/s  Substantial meaningful change: 0.14 m/s  MCID: 0.16 m/s    < 0.4 m/s household ambulators  0.4 - 0.8 m/s limited community ambulators  > 0.8 m/s community ambulators   FGA: Rossi et al., 2010  MCID: 4.2 pts  Geriatrics/community < 22/30 fall risk  Geriatrics/community < 20/30 unexplained falls DGI  MDC: vestibular - 4 pts  MDC: geriatric/community - 3 pts  Falls risk <19/24   6 Minute Walk Test  Shawanda et al., 2006  MDC: 60.98 m (200.01 ft)    Lloyd, Farhat, & Ruthie, 2012  MCID: 34.4 m    Age Norms  60-69: M - 1876 ft   F - 1765 ft  70-79: M - 1729 ft   F - 1545 ft  80-89: M - 1368 ft   F - 1286 ft Modified Rosetta  0: No increase in tone  1: Catch and release or min resistance at end range  1+: Catch f/b min resistance throughout remainder (< half ROM)  2: Easily moved, but more marked tone throughout most ROM  3: Significant tone, PROM difficult  4: Rigid   MiniBest: Juanjose et al., 2013  CVA < 17.5 fall risk Pass (Acute CVA)  MDC: 1.8 points (acute), 3.2 points (chronic)         Subjective    History of Present Illness  Mechanism of injury: Patient is a 64 year old presenting to skilled OPPT for reassessment with complaints of gait, balance, and strength deficits s/p CVA. PMH significant for: HTN, CKD, GERD, and CVA. Patient suffered CVA approximately 10 years ago and reports residual weakness on left side (UE and LE), but cannot recall exact location of stroke. He reports he participated in PT/OT/ST services immediately following the stroke, but has not participated in much rehab since. Patient recently underwent (R) reverse total shoulder arthroplasty on 8/26 and is currently following shoulder precautions (no flexion/abduction > 90 degrees). He has HHA that come 25 hours/week (ever day in the AM) that assist with ADL/IADL's.     Update 11/26/24: Patient participated in 1xs/week neuro PT for ~ 1 month and reports noting small improvements in balance and endurance; no falls since SOC.  "Reports back pain is primary barrier to intensity/duration of gait training (still sees ortho PT for back/shoulder)    Updated (2024): Patient reports for reassessment stating he has not yet noted significant improvements in his walking. He is interested in increasing frequency of neuro PT services to further drive improvements. His primary goal is to improve his walking speed and standing tolerance.    Updated (2025): Patient reports for reassessment with no new issues or complaints, states he had to take some time off from PT/OT services over the holidays. Has an upcoming neurology appointment on .    Updated (2025): Patient reports to PT for reassessment with overall satisfaction in PT services thus far. He notes improvements in his control of the left leg. Continues to be limited functional by low back pain. He has not noticed any positive benefits since starting the Baclofen.    Updated (2025): Patient reports for reassessment stating he continues to be happy with the progress he has made thus far. He notes that he feels the ease of his walking has improved and feels less limited by his LBP, although he has good and bad days. He is also looking to do dry needling for his shoulder.    Primary AD: none  Assist level at home: assist for ADL's/IADL's  WC usage: NA    Patient goal: \"I want to get better with walking\"    Pain  Current pain ratin-3/10  Location: R shoulder  Aggravating factors: movement    Social Support  Steps to enter house: none  Stairs in house: none   Lives in: 4 story, 1 floor set up  Lives with: alone    Employment status: retired  Hand dominance: R    Treatments  Previous treatment: PT/OT/ST immediately following stroke  Current treatment: ortho PT, balance eval  Diagnostic Testing: see chart for details      Objective     Vitals  - HR: 74 bpm  - BP: 158/86  - SPO2: 97%    HR Max  - 207 - (0.7x62)  = 164 bpm    LE MMT  - R Hip Flexion: 3/5  - L Hip Flexion: 3-/5  - " R Hip Extension: 3/5  - L Hip Extension: 3-/5  - R Hip Abduction: 4+/5 - L Hip abduction: 4-/5  - R Hip adduction: 4+/5 - L Hip adduction: 4-/5  - R Knee Extension: 3/5 - L Knee Extension: 2+/5  - R Knee Flexion: 3/5  - L Knee Flexion: 2+/5  - R Ankle DF: 4/5  - L Ankle DF: 1/5  - R Ankle PF: 3/5  - L Ankle PF: 1/5    Sensation  - Light touch: intact  - Deep pressure: intact    Coordination  - Dysmetria: unable 2/2 LUE hemiplegia  - Dysdiadochokinesia: unable 2/2 LUE hemiplegia  - Alternating Toe Taps: unable 2/2 LLE hemiplegia    Modified Rosetta  - R knee extension: 0 - no increase in tone  - L knee extension: 1+ - catch followed by minimum resistance throughout remainder (< half ROM)  - R knee flexion: 0 - no increase in tone  - L knee flexion: 1-   - R ankle DF: 0 - no increase in tone   - L ankle DF: 3 - significant tone, PROM difficulty  - R ankle inversion: 0 - no increase in tone  - L ankle inversion: 3 - significant tone, PROM difficulty  - R ankle eversion: 0 - no increase in tone  - L ankle eversion: 3 - significant tone, PROM difficulty    Clonus  - L: No  - R: No  - Fatiguing: NA  - Number of beats: NA    Vision  - Glasses: No  - Abnormalities prior to CVA: No, NA  - Abnormalities post CVA: No, NA    Neglect  - R sided: No  - L sided: No    Pusher's Syndrome  - R sided: No  - L sided: No        Outcome Measures Initial Eval  10/30/2024 & DN 11/6/24 RE  11/26/24 PN  12/11/2024 PN  1/13/2025 PN  2/24/2025 PN  4/2/2025   5xSTS 17.36 sec, no UE 14.25 sec, 0 UE 11.40 sec, 0 UE 11.65 sec, 0 UE 9.26 sec, 0 UE 9.21 sec, 0 UE   TUG  Regular  Cognitive   22.75 sec   15.91 sec   13.70 sec   12.33 sec no AD  10.88 sec  18.40 sec no AD  10.13 sec  11.64 sec   10 meter 0.49 m/s TBA- time constraints 0.68 m/s 0.63 m/s 0.81 m/s 0.83 m/s   LACEY 38/56 43/56 45/56 47/56 NA NA   FGA 9/30 15/30 16/30 17/30 20/30 21/30   DGI 8/24 14/24 14/24 15/24 18/24 18/24   MiniBEST 16/28 TBA- time constraints  19/28 21/28 21/28   6MWT    515 ft 600 ft 715 ft 680 ft       Precautions: CKD, GERD, HTN, PVD, history CVA 10 years ago with L sided hemiplegia  Past Medical History:   Diagnosis Date    Chronic kidney disease     Edema     GERD (gastroesophageal reflux disease)     Gout     hospitalized    High cholesterol     Hypertension     PVD (peripheral vascular disease) (HCC)     Renal disorder     Rotator cuff tear     right    Shortness of breath     Stroke (HCC) 2014    Ventral hernia without obstruction or gangrene     Walker as ambulation aid

## 2025-04-02 NOTE — PROGRESS NOTES
Daily Note     Today's date: 2025  Patient name: Demetrio Villalobos  : 1960  MRN: [id number]  Referring provider: César Malone DO  Dx:   Encounter Diagnoses   Name Primary?    H/O: CVA (cerebrovascular accident) Yes    Cerebrovascular accident (CVA), unspecified mechanism (HCC)     Spastic hemiparesis affecting nondominant side (HCC)                 PLAN OF CARE START: 24  PLAN OF CARE END: 25  FREQUENCY: 2 times per week  PRECAUTIONS no restrictions with RUE as per PT vicky, HTN    POC expires Auth Status Total   Visits  Start date  Expiration date PT/OT + Visit Limit? Co-Pay    approved   $0    approved                                       Visit/Unit Tracking  AUTH Status:  Date 2/12 2/14 2/19 2/20 2/24 3/5 3/10 3/19 3/24 3/31 4/2    Visits  Authed: 12 Used 1 1 1 1 1 1 1 1 1 1 1     Remaining  11 10 9 8 7 6 4 3 2 1 Requesting new auth with new insurance         Subjective: Pt reports he got new insurance    Objective: See treatment below.    NMR:  -Elbow flexion with 10lb tidal tank with use of b/l UE and tactile cue from door for appropriate positioning and to decrease compensatory shoulder abduction 15x3  -Shoulder flexion with 2lb b/l dumbbells 10x3 with use of doorway to provide tactile cues for decreased shoulder abduction.  Fatigues quickly  -Weiser retrieval and rotation to place them on their sides with PRN assist from unaffected UE for shoulder flexion 2* fatigue.  Requires inc time for manipulation of cholo pieces.    Assessment: Tolerated treatment well. Demonstrating good after effect from error augmentation strategies during today's session.  Pt would benefit from continued OT services to address NMR L UE s/p chronic CVA.      Plan: Continued skilled OT per POC.

## 2025-04-09 ENCOUNTER — APPOINTMENT (OUTPATIENT)
Facility: CLINIC | Age: 65
End: 2025-04-09
Payer: COMMERCIAL

## 2025-04-10 ENCOUNTER — OFFICE VISIT (OUTPATIENT)
Dept: INTERNAL MEDICINE CLINIC | Facility: CLINIC | Age: 65
End: 2025-04-10
Payer: COMMERCIAL

## 2025-04-10 VITALS
HEIGHT: 67 IN | SYSTOLIC BLOOD PRESSURE: 132 MMHG | BODY MASS INDEX: 34.21 KG/M2 | HEART RATE: 71 BPM | DIASTOLIC BLOOD PRESSURE: 86 MMHG | OXYGEN SATURATION: 98 % | WEIGHT: 218 LBS

## 2025-04-10 DIAGNOSIS — E78.2 MODERATE MIXED HYPERLIPIDEMIA NOT REQUIRING STATIN THERAPY: ICD-10-CM

## 2025-04-10 DIAGNOSIS — Z00.00 ANNUAL PHYSICAL EXAM: Primary | ICD-10-CM

## 2025-04-10 DIAGNOSIS — Z86.73 H/O: CVA (CEREBROVASCULAR ACCIDENT): ICD-10-CM

## 2025-04-10 DIAGNOSIS — M75.101 ROTATOR CUFF TEAR ARTHROPATHY OF RIGHT SHOULDER: ICD-10-CM

## 2025-04-10 DIAGNOSIS — R25.2 SPASTICITY: ICD-10-CM

## 2025-04-10 DIAGNOSIS — N18.30 BENIGN HYPERTENSION WITH CHRONIC KIDNEY DISEASE, STAGE III (HCC): ICD-10-CM

## 2025-04-10 DIAGNOSIS — I12.9 BENIGN HYPERTENSION WITH CHRONIC KIDNEY DISEASE, STAGE III (HCC): ICD-10-CM

## 2025-04-10 DIAGNOSIS — M12.811 ROTATOR CUFF TEAR ARTHROPATHY OF RIGHT SHOULDER: ICD-10-CM

## 2025-04-10 DIAGNOSIS — R60.0 BILATERAL LOWER EXTREMITY EDEMA: ICD-10-CM

## 2025-04-10 DIAGNOSIS — M54.50 LUMBAR BACK PAIN: ICD-10-CM

## 2025-04-10 DIAGNOSIS — Z12.5 SCREENING FOR PROSTATE CANCER: ICD-10-CM

## 2025-04-10 PROCEDURE — 99396 PREV VISIT EST AGE 40-64: CPT

## 2025-04-10 PROCEDURE — 99214 OFFICE O/P EST MOD 30 MIN: CPT

## 2025-04-10 RX ORDER — CYCLOBENZAPRINE HCL 10 MG
10 TABLET ORAL
Qty: 30 TABLET | Refills: 0 | Status: SHIPPED | OUTPATIENT
Start: 2025-04-10

## 2025-04-10 RX ORDER — ACETAMINOPHEN 500 MG
1000 TABLET ORAL EVERY 6 HOURS PRN
Qty: 120 TABLET | Refills: 0 | Status: SHIPPED | OUTPATIENT
Start: 2025-04-10 | End: 2025-05-10

## 2025-04-10 NOTE — PATIENT INSTRUCTIONS
"Patient Education     Routine physical for adults   The Basics   Written by the doctors and editors at [facility]   What is a physical? -- A physical is a routine visit, or \"check-up,\" with your doctor. You might also hear it called a \"wellness visit\" or \"preventive visit.\"  During each visit, the doctor will:   Ask about your physical and mental health   Ask about your habits, behaviors, and lifestyle   Do an exam   Give you vaccines if needed   Talk to you about any medicines you take   Give advice about your health   Answer your questions  Getting regular check-ups is an important part of taking care of your health. It can help your doctor find and treat any problems you have. But it's also important for preventing health problems.  A routine physical is different from a \"sick visit.\" A sick visit is when you see a doctor because of a health concern or problem. Since physicals are scheduled ahead of time, you can think about what you want to ask the doctor.  How often should I get a physical? -- It depends on your age and health. In general, for people age 21 years and older:   If you are younger than 50 years, you might be able to get a physical every 3 years.   If you are 50 years or older, your doctor might recommend a physical every year.  If you have an ongoing health condition, like diabetes or high blood pressure, your doctor will probably want to see you more often.  What happens during a physical? -- In general, each visit will include:   Physical exam - The doctor or nurse will check your height, weight, heart rate, and blood pressure. They will also look at your eyes and ears. They will ask about how you are feeling and whether you have any symptoms that bother you.   Medicines - It's a good idea to bring a list of all the medicines you take to each doctor visit. Your doctor will talk to you about your medicines and answer any questions. Tell them if you are having any side effects that bother you. You " "should also tell them if you are having trouble paying for any of your medicines.   Habits and behaviors - This includes:   Your diet   Your exercise habits   Whether you smoke, drink alcohol, or use drugs   Whether you are sexually active   Whether you feel safe at home  Your doctor will talk to you about things you can do to improve your health and lower your risk of health problems. They will also offer help and support. For example, if you want to quit smoking, they can give you advice and might prescribe medicines. If you want to improve your diet or get more physical activity, they can help you with this, too.   Lab tests, if needed - The tests you get will depend on your age and situation. For example, your doctor might want to check your:   Cholesterol   Blood sugar   Iron level   Vaccines - The recommended vaccines will depend on your age, health, and what vaccines you already had. Vaccines are very important because they can prevent certain serious or deadly infections.   Discussion of screening - \"Screening\" means checking for diseases or other health problems before they cause symptoms. Your doctor can recommend screening based on your age, risk, and preferences. This might include tests to check for:   Cancer, such as breast, prostate, cervical, ovarian, colorectal, prostate, lung, or skin cancer   Sexually transmitted infections, such as chlamydia and gonorrhea   Mental health conditions like depression and anxiety  Your doctor will talk to you about the different types of screening tests. They can help you decide which screenings to have. They can also explain what the results might mean.   Answering questions - The physical is a good time to ask the doctor or nurse questions about your health. If needed, they can refer you to other doctors or specialists, too.  Adults older than 65 years often need other care, too. As you get older, your doctor will talk to you about:   How to prevent falling at " home   Hearing or vision tests   Memory testing   How to take your medicines safely   Making sure that you have the help and support you need at home  All topics are updated as new evidence becomes available and our peer review process is complete.  This topic retrieved from [employer] on: May 02, 2024.  Topic 682016 Version 1.0  Release: 32.4.3 - C32.122  © 2024 UpToDate, Inc. and/or its affiliates. All rights reserved.  Consumer Information Use and Disclaimer   Disclaimer: This generalized information is a limited summary of diagnosis, treatment, and/or medication information. It is not meant to be comprehensive and should be used as a tool to help the user understand and/or assess potential diagnostic and treatment options. It does NOT include all information about conditions, treatments, medications, side effects, or risks that may apply to a specific patient. It is not intended to be medical advice or a substitute for the medical advice, diagnosis, or treatment of a health care provider based on the health care provider's examination and assessment of a patient's specific and unique circumstances. Patients must speak with a health care provider for complete information about their health, medical questions, and treatment options, including any risks or benefits regarding use of medications. This information does not endorse any treatments or medications as safe, effective, or approved for treating a specific patient. UpToDate, Inc. and its affiliates disclaim any warranty or liability relating to this information or the use thereof.The use of this information is governed by the Terms of Use, available at https://www.woltersHubPagesuwer.com/en/know/clinical-effectiveness-terms. 2024© UpToDate, Inc. and its affiliates and/or licensors. All rights reserved.  Copyright   © 2024 UpToDate, Inc. and/or its affiliates. All rights reserved.

## 2025-04-10 NOTE — ASSESSMENT & PLAN NOTE
Lab Results   Component Value Date    EGFR 30 03/04/2025    EGFR 31 01/23/2025    EGFR 36 12/10/2024    CREATININE 2.21 (H) 03/04/2025    CREATININE 2.16 (H) 01/23/2025    CREATININE 1.94 (H) 12/10/2024     Follows with Nephrology  Stable on current regimen: Toprol-XL 50 mg daily, nifedipine 60 mg daily, Lasix 40 mg daily and lisinopril  Concern for PKD, pt undergoing genetic testing

## 2025-04-10 NOTE — ASSESSMENT & PLAN NOTE
Lab Results   Component Value Date    CHOLESTEROL 152 12/10/2024    TRIG 201 (H) 12/10/2024    HDL 37 (L) 12/10/2024    LDLCALC 75 12/10/2024     Continues with atorvastatin 40 mg daily  Repeat lipid panel  If TGs remain elevated, consider adding fish oil supplements  Orders:  •  Lipid panel; Future

## 2025-04-10 NOTE — PROGRESS NOTES
Adult Annual Physical  Name: Demetrio Villalobos      : 1960      MRN: [id number]  Encounter Provider: Daysi Richard MD  Encounter Date: 4/10/2025   Encounter department: [facility]    :  Assessment & Plan  Annual physical exam  Colonoscopy UTD       Spasticity  Recommend switching to alternate MSK relaxant --- Flexeril  Trial of Tylenol  NSAIDS C/I with CKD  Keep follow up appointment with Neurology in 1 week for other recommendations  Orders:  •  acetaminophen (TYLENOL) 500 mg tablet; Take 2 tablets (1,000 mg total) by mouth every 6 (six) hours as needed for mild pain or moderate pain  •  cyclobenzaprine (FLEXERIL) 10 mg tablet; Take 1 tablet (10 mg total) by mouth daily at bedtime    Lumbar back pain  Plan noted above  Orders:  •  acetaminophen (TYLENOL) 500 mg tablet; Take 2 tablets (1,000 mg total) by mouth every 6 (six) hours as needed for mild pain or moderate pain  •  cyclobenzaprine (FLEXERIL) 10 mg tablet; Take 1 tablet (10 mg total) by mouth daily at bedtime    Moderate mixed hyperlipidemia not requiring statin therapy  Lab Results   Component Value Date    CHOLESTEROL 152 12/10/2024    TRIG 201 (H) 12/10/2024    HDL 37 (L) 12/10/2024    LDLCALC 75 12/10/2024     Continues with atorvastatin 40 mg daily  Repeat lipid panel  If TGs remain elevated, consider adding fish oil supplements  Orders:  •  Lipid panel; Future    Rotator cuff tear arthropathy of right shoulder  S/P repair in 2024  S/P PT and rehab       Benign hypertension with chronic kidney disease, stage III (HCC)  Lab Results   Component Value Date    EGFR 30 2025    EGFR 31 2025    EGFR 36 12/10/2024    CREATININE 2.21 (H) 2025    CREATININE 2.16 (H) 2025    CREATININE 1.94 (H) 12/10/2024     Follows with Nephrology  Stable on current regimen: Toprol-XL 50 mg daily, nifedipine 60 mg daily, Lasix 40 mg daily and lisinopril  Concern for PKD, pt undergoing genetic testing        Bilateral lower extremity edema  Stable       H/O: CVA (cerebrovascular accident)  2014 w/ residual left hemiparesis  Follows with neurology  Continues with PT/OT       Screening for prostate cancer    Orders:  •  PSA, Total Screen; Future        Preventive Screenings:  - Diabetes Screening: screening up-to-date  - Cholesterol Screening: has hyperlipidemia and screening up-to-date   - Hepatitis C screening: screening up-to-date   - HIV screening: screening up-to-date   - Colon cancer screening: screening up-to-date   - Lung cancer screening: screening not indicated   - Prostate cancer screening: risks/benefits discussed and orders placed     Immunizations:    - The patient declines recommended vaccines currently despite my recommendations      Counseling/Anticipatory Guidance:    - Dental health: discussed importance of regular tooth brushing, flossing, and dental visits.   - Diet: discussed recommendations for a healthy/well-balanced diet.   - Exercise: the importance of regular exercise/physical activity was discussed. Recommend exercise 3-5 times per week for at least 30 minutes.      Return in about 3 months (around 7/10/2025) for Recheck chronic medical conditions (OVL).    History of Present Illness       Adult Annual Physical:  Patient presents for annual physical. Demetrio Villalobos ia a 65 year old retired male patient with a history of stroke and chronic kidney disease who presents for an initial visit with a new primary care provider, reporting ongoing lower back pain not improving on current pharmacotherapy regimen. He has recently relocated from [place] and does not have family in the area.    The patient's primary concern is lower back pain, which he has experienced since his stroke in 2014 w/ residual hemiparesis of the left. The pain occurs when he stands for prolonged periods or walks long distances, and is localized to the lower back without radiation to the legs. He was previously prescribed  baclofen in January for this issue, but reports it has not been effective in managing his symptoms. Prior treatments have included Voltaren Gel, lidocaine patches and Robaxin, which were also unsuccessful. The patient denies trying any other interventions for his back pain.    Mr. Villaolbos is currently following with neurology for spasticity related to his stroke, with a follow up appointment scheduled for April 17th. For hi  hemiparesis, he notes a slight improvement over time with continued PT/OT. This has not  been helpful for his back pain.    Regarding his overall health status, Mr. Villalobos reports improved sleep patterns, now able to return to sleep after nighttime bathroom visits. He denies any recent gout flares or urinary symptoms beyond frequent nighttime urination. The patient states his leg swelling is currently under control. He adheres to a well-balanced diet and performs daily home exercises. The patient reports compliance with his current medications, including lisinopril for blood pressure control and kidney function..     Diet and Physical Activity:  - Diet/Nutrition: well balanced diet.  - Exercise: no formal exercise, strength training exercises and moderate cardiovascular exercise. has left hemiparesis following stroke in 2014, follows with PT/OT    General Health:  - Sleep: sleeps well.  - Hearing: normal hearing bilateral ears.  - Vision: vision problems and wears glasses.  - Dental: no dental visits for > 1 year, brushes teeth twice daily and does not floss.     Health:  - History of STDs: yes.   - Urinary symptoms: nocturia.     Advanced Care Planning:  - Has an advanced directive?: no    - Has a durable medical POA?: no    - ACP document given to patient?: yes      Review of Systems   Constitutional:  Negative for chills and fever.   HENT:  Negative for congestion.    Eyes:  Negative for visual disturbance.   Respiratory:  Negative for shortness of breath.    Cardiovascular:  Negative for  chest pain, palpitations and leg swelling.   Gastrointestinal:  Negative for abdominal pain.   Genitourinary:  Negative for difficulty urinating and dysuria.   Musculoskeletal:  Positive for arthralgias, back pain and gait problem. Negative for neck pain and neck stiffness.   Neurological:  Positive for weakness. Negative for dizziness, light-headedness and headaches.   Psychiatric/Behavioral:  Negative for confusion and sleep disturbance.      Pertinent Medical History         Medical History Reviewed by provider this encounter:  Tobacco  Allergies  Meds  Problems  Med Hx  Surg Hx  Fam Hx     .  Past Medical History   Past Medical History:   Diagnosis Date   • Chronic kidney disease    • Edema    • GERD (gastroesophageal reflux disease)    • Gout     hospitalized   • High cholesterol    • Hypertension    • PVD (peripheral vascular disease) ([facility])    • Renal disorder    • Rotator cuff tear     right   • Shortness of breath    • Stroke ([facility]) 2014   • Ventral hernia without obstruction or gangrene    • Walker as ambulation aid      Past Surgical History:   Procedure Laterality Date   • APPENDECTOMY     • NH ARTHROPLASTY GLENOHUMERAL JOINT TOTAL SHOULDER Right 8/26/2024    Procedure: ARTHROPLASTY SHOULDER REVERSE;  Surgeon: Sina Randle MD;  Location: [facility];  Service: Orthopedics     History reviewed. No pertinent family history.   reports that he has quit smoking. His smoking use included cigarettes. He has never been exposed to tobacco smoke. He has never used smokeless tobacco. He reports that he does not drink alcohol and does not use drugs.  Current Outpatient Medications   Medication Instructions   • acetaminophen (TYLENOL) 1,000 mg, Oral, Every 6 hours PRN   • allopurinol (ZYLOPRIM) 50 mg, Oral, Daily   • aspirin 81 mg, Oral, Daily, Last dose per surgeon to be 8/20/24   • atorvastatin (LIPITOR) 40 mg tablet TAKE ONE TABLET BY MOUTH DAILY WITH DINNER   • cyclobenzaprine (FLEXERIL) 10 mg, Oral,  "Daily at bedtime   • esomeprazole (NEXIUM) 40 mg, Oral, Every morning   • furosemide (LASIX) 40 mg, Oral, Daily   • lisinopril (ZESTRIL) 10 mg, Oral, Daily   • metoprolol succinate (TOPROL-XL) 50 mg, Oral, Daily with dinner   • Multiple Vitamin (Multivitamin Adult) TABS 1 tablet, Oral, Daily   • NIFEdipine (PROCARDIA XL) 60 mg, Oral, Daily   No Known Allergies   Current Outpatient Medications on File Prior to Visit   Medication Sig Dispense Refill   • allopurinol (ZYLOPRIM) 100 mg tablet Take 0.5 tablets (50 mg total) by mouth daily 30 tablet 1   • aspirin 81 mg chewable tablet Chew 1 tablet (81 mg total) daily for 28 days Last dose per surgeon to be 8/20/24     • atorvastatin (LIPITOR) 40 mg tablet TAKE ONE TABLET BY MOUTH DAILY WITH DINNER 30 tablet 1   • esomeprazole (NexIUM) 40 MG capsule TAKE ONE CAPSULE BY MOUTH EVERY MORNING 30 capsule 1   • furosemide (LASIX) 40 mg tablet Take 1 tablet (40 mg total) by mouth daily 90 tablet 3   • lisinopril (ZESTRIL) 10 mg tablet Take 1 tablet (10 mg total) by mouth daily 30 tablet 4   • metoprolol succinate (TOPROL-XL) 50 mg 24 hr tablet Take 1 tablet (50 mg total) by mouth daily with dinner 30 tablet 2   • Multiple Vitamin (Multivitamin Adult) TABS ONE TAB DAILY 30 tablet 1   • NIFEdipine (PROCARDIA XL) 60 mg 24 hr tablet Take 1 tablet (60 mg total) by mouth daily 30 tablet 5   • [DISCONTINUED] Baclofen 5 MG TABS TAKE A HALF TABLET BY MOUTH 3 TIMES DAILY 30 tablet 1     No current facility-administered medications on file prior to visit.      Social History     Tobacco Use   • Smoking status: Former     Types: Cigarettes     Passive exposure: Never   • Smokeless tobacco: Never   • Tobacco comments:     Smoker 30 yrs   Vaping Use   • Vaping status: Never Used   Substance and Sexual Activity   • Alcohol use: Never   • Drug use: Never   • Sexual activity: Not Currently     Partners: Female       Objective   /86   Pulse 71   Ht 5' 7\" (1.702 m)   Wt 98.9 kg (218 lb)   " SpO2 98%   BMI 34.14 kg/m²     Physical Exam  Vitals and nursing note reviewed.   Constitutional:       General: He is not in acute distress.     Appearance: Normal appearance. He is obese. He is not ill-appearing or toxic-appearing.   HENT:      Head: Normocephalic and atraumatic.      Right Ear: Tympanic membrane, ear canal and external ear normal. There is no impacted cerumen.      Left Ear: Tympanic membrane, ear canal and external ear normal. There is no impacted cerumen.      Nose: Nose normal. No congestion or rhinorrhea.      Mouth/Throat:      Mouth: Mucous membranes are moist.      Pharynx: Oropharynx is clear. No posterior oropharyngeal erythema.   Eyes:      General: No scleral icterus.        Right eye: No discharge.         Left eye: No discharge.      Extraocular Movements: Extraocular movements intact.      Conjunctiva/sclera: Conjunctivae normal.   Cardiovascular:      Rate and Rhythm: Normal rate and regular rhythm.      Pulses: Normal pulses.      Heart sounds: Normal heart sounds. No murmur heard.  Pulmonary:      Effort: Pulmonary effort is normal. No respiratory distress.      Breath sounds: Normal breath sounds.   Abdominal:      General: Bowel sounds are normal.      Palpations: Abdomen is soft.      Tenderness: There is no abdominal tenderness.      Hernia: A hernia (umbilical; reducible) is present.   Musculoskeletal:         General: No tenderness.      Cervical back: Normal range of motion and neck supple. No tenderness.      Right lower leg: No edema.      Left lower leg: No edema.   Lymphadenopathy:      Cervical: No cervical adenopathy.   Neurological:      Mental Status: He is alert and oriented to person, place, and time.      Motor: Weakness present.      Gait: Gait abnormal.      Comments: Left hemiparesis s/p CVA 11 years ago   Psychiatric:         Mood and Affect: Mood normal.         Behavior: Behavior normal.

## 2025-04-10 NOTE — ASSESSMENT & PLAN NOTE
Plan noted above  Orders:  •  acetaminophen (TYLENOL) 500 mg tablet; Take 2 tablets (1,000 mg total) by mouth every 6 (six) hours as needed for mild pain or moderate pain  •  cyclobenzaprine (FLEXERIL) 10 mg tablet; Take 1 tablet (10 mg total) by mouth daily at bedtime

## 2025-04-10 NOTE — ASSESSMENT & PLAN NOTE
Recommend switching to alternate MSK relaxant --- Flexeril  Trial of Tylenol  NSAIDS C/I with CKD  Keep follow up appointment with Neurology in 1 week for other recommendations  Orders:  •  acetaminophen (TYLENOL) 500 mg tablet; Take 2 tablets (1,000 mg total) by mouth every 6 (six) hours as needed for mild pain or moderate pain  •  cyclobenzaprine (FLEXERIL) 10 mg tablet; Take 1 tablet (10 mg total) by mouth daily at bedtime

## 2025-04-14 ENCOUNTER — APPOINTMENT (OUTPATIENT)
Facility: CLINIC | Age: 65
End: 2025-04-14
Payer: COMMERCIAL

## 2025-04-21 ENCOUNTER — OFFICE VISIT (OUTPATIENT)
Facility: CLINIC | Age: 65
End: 2025-04-21
Attending: FAMILY MEDICINE
Payer: COMMERCIAL

## 2025-04-21 ENCOUNTER — OFFICE VISIT (OUTPATIENT)
Facility: CLINIC | Age: 65
End: 2025-04-21
Payer: COMMERCIAL

## 2025-04-21 DIAGNOSIS — Z86.73 CHRONIC CEREBROVASCULAR ACCIDENT (CVA): ICD-10-CM

## 2025-04-21 DIAGNOSIS — Z86.73 H/O: CVA (CEREBROVASCULAR ACCIDENT): Primary | ICD-10-CM

## 2025-04-21 DIAGNOSIS — R26.89 OTHER ABNORMALITIES OF GAIT AND MOBILITY: Primary | ICD-10-CM

## 2025-04-21 PROCEDURE — 97112 NEUROMUSCULAR REEDUCATION: CPT

## 2025-04-21 NOTE — PROGRESS NOTES
"Daily Note     Today's date: 2025  Patient name: Demetrio Villalobos  : 1960  MRN: [id number]  Referring provider: César Malone DO  Dx:   Encounter Diagnosis     ICD-10-CM    1. Other abnormalities of gait and mobility  R26.89       2. Chronic cerebrovascular accident (CVA)  Z86.73               GOES BY \"WILL\"             Subjective: Patient reports to PT session stating he had some business to take care of and that is why he hasn't been here over the past few weeks.      Objective: See treatment diary below    NMR:   - Ambulation with 7# counterweight on RLE x 150 ft (mixed tile + turf)  - STS from standard chair with medium river [place] under R LE: 2 sets x 1 minute AMRAP   1st set: 28 reps   2nd set: 25 reps   - Ambulation with 7# counterweight on RLE + posterior TB pull at L ankle (yellow) x 150 ft (mixed tile + turf)  - R hand reach for ring > R step up to 8\" step > anterior WS to place on cone: 8 reps (and reverse)  - RLE step through's (LLE on 8\" step, RLE from floor to 2nd step) x 1 minute burnout  - Hip extensions (LLE) via glider 3 x 30 second burnouts      Assessment: Patient tolerated today's session well today with focus on functional mobility, strength, and gait training. Patient demonstrated improved WS to involved LLE during ambulation with use of (R) counterweight. Significant fatigue with hip extension burnouts, likely due to weakness and decreased motor recruitment of posterior chain during functional mobility tasks. Patient will continue to benefit from skilled OPPT services in order to maximize safe functional mobility and reduce risk for falls post-CVA.      Plan: Continue per plan of care.  Progress treatment as tolerated.      *PATIENT ON BETA BLOCKER, USE RPE*     POC expires Unit limit Auth Expiration date PT/OT + Visit Limit? Co-Insurance   25 12 visits (sharing with ortho) 25 BOMN No                                 Visit/Unit Tracking  AUTH Status:  Date 3/5 3/12 3/19 " 3/24 3/26 3/31 4/2 4/21       Approved Used 2 2 1 1 1 1 1 1        Remaining  13 11 10 9 8 7 6 5           Outcome Measures Initial Eval  10/30/2024 & DN 11/6/24 RE  11/26/24 PN  12/11/2024 PN  1/13/2025 PN  2/24/2025    5xSTS 17.36 sec, no UE 14.25 sec, 0 UE 11.40 sec, 0 UE 11.65 sec, 0 UE 9.26 sec, 0 UE    TUG  Regular  Cognitive   22.75 sec   15.91 sec   13.70 sec   12.33 sec no AD  10.88 sec  18.40 sec    10 meter 0.49 m/s TBA- time constraints 0.68 m/s 0.63 m/s 0.81 m/s    LACEY 38/56 43/56 45/56 47/56 NA    FGA 9/30 15/30 16/30 17/30 20/30    DGI 8/24 14/24 14/24 15/24 18/24    MiniBEST 16/28 TBA- time constraints  19/28 21/28    2MWT 140 ft 230 ft (limited by back and right hip pain) 200 ft NT NT    6MWT   515 ft 600 ft 715 ft      Access Code: [email or web address]  URL: [email or web address]  Date: 11/06/2024  Prepared by: Celestina Valderrama    Exercises  - Sit to Stand  - 1 x daily - 7 x weekly - 2 sets - 10 reps  - Side Stepping with Counter Support  - 1 x daily - 7 x weekly - 3 sets - 10 reps  - Standing March with Counter Support  - 1 x daily - 7 x weekly - 2 sets - 10 reps - 3 sec hold    Updated HEP  Access Code: [email or web address]  URL: [email or web address]  Date: 03/19/2025  Prepared by: Celestina Valderrama    Exercises  - Prone Knee Flexion  - 1 x daily - 7 x weekly - 3 sets - 10 reps - 3 sec hold  - Supine Bridge  - 1 x daily - 7 x weekly - 3 sets - 10 reps - 3 sec hold  - Single Leg Bridge  - 1 x daily - 7 x weekly - 3 sets - 10 reps - 3 sec hold  - Supine Active Straight Leg Raise  - 1 x daily - 7 x weekly - 3 sets - 10 reps - 3 sec hold

## 2025-04-21 NOTE — PROGRESS NOTES
"Daily Note     Today's date: 2025  Patient name: Demetrio Villalobos  : 1960  MRN: [id number]  Referring provider: César Malone DO  Dx:   Encounter Diagnosis   Name Primary?    H/O: CVA (cerebrovascular accident) Yes     Start Time: 1505  Stop Time: 1545  Total time in clinic (min): 40 minutes    PLAN OF CARE START: 24  PLAN OF CARE END: 25  FREQUENCY: 2 times per week  PRECAUTIONS no restrictions with RUE as per PT vicky, HTN    POC expires Auth Status Total   Visits  Start date  Expiration date PT/OT + Visit Limit? Co-Pay    approved   $0    approved                                       Visit/Unit Tracking  AUTH Status:  Date               Visits  Authed: 12 Used 1 1              Remaining  11 10                  Subjective: Pt reports he \"I am out of shape, I haven't been here in a few weeks and I feel it\".    Objective: See treatment below.    NMR:  -Completed Primer of NuStep for LUE at level 2 for 5 minutes. Pt required multiple rest breaks to complete    Completed using green proprioceptive wrap:  - Completed simulation of drinking from can using FES at forearm extensors focused on shoulder flexion, elbow flexion/extension, and grasp/release. Pt completed for 5-8 minutes independently.  - Completed sweeping beans into dust pan focused on shoulder horizontal ab/adduction, crossing midline, and sustained grasp. Pt completed with 2 times with rest break in between.  - Completed pincer grasp foam cube transfer using FES at forearm extensors to facilitate finger extension focused on pincer grasp and grasp/release.Pt completed for ~5-8 minutes with min A to release cubes.      Assessment: Tolerated treatment well. Pt demonstrated increased shoulder ROM and motor planning in second trial of sweeping beans finishing in 50% less time than first trial. Pt educated on cancellation policy and benefits of attending therapy regularly as he had missed ~3 weeks.  Pt would " benefit from continued OT services to address NMR L UE s/p chronic CVA.      Plan: Continued skilled OT per POC.

## 2025-04-22 DIAGNOSIS — I73.9 PERIPHERAL ARTERIAL DISEASE (HCC): ICD-10-CM

## 2025-04-22 RX ORDER — ELECTROLYTES/DEXTROSE
1 SOLUTION, ORAL ORAL DAILY
Qty: 30 TABLET | Refills: 2 | Status: SHIPPED | OUTPATIENT
Start: 2025-04-22

## 2025-04-23 ENCOUNTER — OFFICE VISIT (OUTPATIENT)
Facility: CLINIC | Age: 65
End: 2025-04-23
Payer: COMMERCIAL

## 2025-04-23 ENCOUNTER — EVALUATION (OUTPATIENT)
Dept: PHYSICAL THERAPY | Facility: CLINIC | Age: 65
End: 2025-04-23
Payer: COMMERCIAL

## 2025-04-23 DIAGNOSIS — Z86.73 CHRONIC CEREBROVASCULAR ACCIDENT (CVA): ICD-10-CM

## 2025-04-23 DIAGNOSIS — R26.89 OTHER ABNORMALITIES OF GAIT AND MOBILITY: ICD-10-CM

## 2025-04-23 DIAGNOSIS — R26.89 OTHER ABNORMALITIES OF GAIT AND MOBILITY: Primary | ICD-10-CM

## 2025-04-23 DIAGNOSIS — G81.10 SPASTIC HEMIPARESIS AFFECTING NONDOMINANT SIDE (HCC): ICD-10-CM

## 2025-04-23 DIAGNOSIS — M25.511 CHRONIC RIGHT SHOULDER PAIN: Primary | ICD-10-CM

## 2025-04-23 DIAGNOSIS — I63.9 CEREBROVASCULAR ACCIDENT (CVA), UNSPECIFIED MECHANISM (HCC): Primary | ICD-10-CM

## 2025-04-23 DIAGNOSIS — G89.29 CHRONIC RIGHT SHOULDER PAIN: Primary | ICD-10-CM

## 2025-04-23 DIAGNOSIS — Z86.73 H/O: CVA (CEREBROVASCULAR ACCIDENT): ICD-10-CM

## 2025-04-23 DIAGNOSIS — Z96.611 STATUS POST REPLACEMENT OF RIGHT SHOULDER JOINT: ICD-10-CM

## 2025-04-23 PROCEDURE — 97140 MANUAL THERAPY 1/> REGIONS: CPT | Performed by: PHYSICAL THERAPIST

## 2025-04-23 PROCEDURE — 97112 NEUROMUSCULAR REEDUCATION: CPT

## 2025-04-23 NOTE — PROGRESS NOTES
Progress Note     Today's date: 2025  Patient name: Demetrio Villalobos  : 1960  MRN: [id number]  Referring provider: Patti Miller PA-C  Dx:   Encounter Diagnosis     ICD-10-CM    1. Chronic right shoulder pain  M25.511     G89.29       2. Status post replacement of right shoulder joint  Z96.611             Start Time: 1354  Stop Time: 1438  Total time in clinic (min): 44 minutes    Subjective: Patient had follow up with the surgeon who recommends dry needling at this time. He continues to experience numbness of his arm after sleeping on his R side. He has minimal pain throughout the day.       Objective: See treatment diary below    UE PROM   10/24/2024 3/5/25 4/23/25    LEFT RIGHT  RIGHT RIGHT    Shoulder Flexion 90 160 160 160    Shoulder Abduction 45 90 110 110    Shoulder ER @ 0 0 10 30 30     Shoulder IR @ 0 To belly To belly To belly To belly     UE AROM   3/5/25 4/23/25    LEFT RIGHT  RIGHT    Shoulder Flexion  160    Shoulder Abduction  120    Shoulder ER NT C7 C7    Shoulder IR NT To gluetal fold* To gluteal fold*     UE MMT   10/24/2024 3/5/25 4/23/25    LEFT RIGHT  RIGHT RIGHT    Shoulder Flexion 2+/5 2+/5 4/5 4/5    Shoulder Abduction 2/5 2/5 3+/5 3+/5    Shoulder Extension 3/5 3/5 4/5 4/5    Shoulder ER  2/5 2/5 3/5 3+/5    Shoulder IR  2/5 3/5 3/5 3+/5         Elbow Flexion 3+/5 4/5 5/5 5/5    Elbow Extension 3/5 4/5 5/5 5/5         Wrist Extension 2/5 5/5 5/5 5/5    Wrist Flexion 2/5 5/5 5/5 5/5          25# 45# NT NT         Assessment:  Dry needling consent for reviewed and signed by patient. Pt educated on dry needling to include but not limited to: risks/benefits/aftercare instructions. Provided with educational handout on DN. All questions and concerns answered and addressed. Pt verbally consented to dry needling treatment session. Risks/benefits/aftercare instructions reviewed. All questions/concerns addressed.  Pt in supine position. Hand hygiene performed pre and  post DN treatment session. Placement of needles in pathological tissue.   R biceps tendon, R AC joint, R deltoid (needle location).  Total treatment duration to include set up/break down/in situ: 40 minutes. Patient was supervised by clinician throughout entirety of treatment session to include when DN in situ; clinician next to patient. All needles counted upon insertion and removal-- 20 needles. All accounted for and disposed in appropriate sharp container.     No adverse reaction to treatment. Pt advised may experience bruising, soreness, fatigue, bleeding from needle removal site. Pt verbalized understanding.          Plan: Continue 1x/week for the next 6 weeks for dry needling of his shoulder for improved mobility, decreased pain, and improve overall function.          POC expires Unit limit Auth Expiration date PT/OT + Visit Limit? Co-Insurance   1/22/25 12 visits (sharing with ortho) 1/21/24 BOMN No    12 visits 3/24                            Visit/Unit Tracking  AUTH Status:  Date 12/30 1/13 1/15 1/27 1/29 2/5 2/12 2/14 2/19 2/26     Approved Used 1 1 1 1 1 1 1 1 1 1      Remaining  11 10 9 8 7 6 5 4 3 2           Date 10/24/24 11/06/24 11/13/24 11/19/24 11/26/24 3/5/25 4/23/25   Visit Number 2 3 4 5 6 7 8   FOTO Tracking     Follow-up #1     Manual          Thoracic mobs          Lumbar mobs          Shoulder ROM Stretching in all directions x 8 min Stretching in all directions - 10 min IM  Stretching in all directions x 8 min Stretching in all directions x 10 min Stretching in all directions x 10 min Stretching in all directions x 10 min      IASTM UT and bicep   Cupping x 8 min STM to pec and biceps proximal insertion           Dry needling x40'    TherEx          Active HENDERSON NS x 8 min (arms and legs for range)         LTR x20         SKTC/DKTC          Seated ball roll outs Hand over hand on ball 10x w/ LF         Active shoulder flexion 2x10 2 x 10  2x10 w/ contract relax PNF resistance 2x10 w/ contract  relax PNF resistance 2x10 w/ contract relax PNF resistance       Scap ret 5s x 20  Banded flexion/ext from OH GTB x10    Banded horizontal abd GTB x10 Banded flexion/ext from OH GTB 3x10    Banded horizontal abd GTB 3x10        Iso ER and IR and flexion x 15 5s hold ea.  Flexion, ext, abd 10 x 5 sec         Table slides flexion x 20  Table slides flexion x 20     Into abd x20       Circuit    Circuit: 3 rounds x 10 each (Rt only)  - flexion 3#  - abd 3#  - bent over row 5# Circuit: 3 rounds x 10 each (Rt only)  - flexion 3#  - abd 3#  - bent over row 5#               Neuro Re-Ed          TrA isos          SOC x20         Scap squeezes    x30                                                        TherAct          Patient education      reassess    Posture education          Lifting mechanics                              Gait Training                                        Modalities                       PLEASE OFFER DRY NEEDLING     REVERSE SHOULDER ARTHROPLASTY REHABILITATION GUIDELINES   These guidelines do NOT apply to RTSA for proximal humeral fracture - rehabilitation following PHFx will commence 4-6 weeks after surgery as deemed appropriate to protect tuberosity healing   PHASE PRECAUTIONS AND GUIDELINES GOALS EXERCISES CRITERIA TO ADVANCE EXAMINATION   1   (Post-operative day 1 to post-operative week 3) Sling 24/7 (remove for grooming and home exercise program 3-5x/day)     Avoid combined IR/EXT/ADD (hand behind the back) and IR/ADD (reaching across chest) for dislocation precautions     Pillow behind the upper arm while reclining with sling on     Patient should always be able to see the elbow     Avoid WBing - discuss WBing need with physician and PT     No submersion in water until after 4 weeks     Ice after HEP as needed   Maintain integrity of joint replacement; protect soft tissue healing     ROM for elevation to 130 and ER to 30     Optimize distal UE circulation and muscle activity (elbow, wrist and  hand)     Instruct in use of sling for proper fit     Educate regarding signs/symptoms of infection       Active elbow, wrist and hand     Pendulum     Scapular retraction with arms resting in neutral position     Forward elevation in scapular plane to 130 deg max motion (table slides, step backs, supine well arm assisted)     ER in scapular plane to 30 deg (seated or supine)     ROM within precautionary range limits may be active or passive   Pain less than 3/10 with ROM     Healing incision without signs of infection     Clearance by surgeon to advance after 2 week post-operative visit Wound assessment     Swelling assessment of upper extremity     Neurovascular assessment of upper extremity     Sling fit and ability to don/doff properly     Patient reported outcome measure     Pain level     Range of motion for elevation and ER   PHASE PRECAUTIONS AND GUIDELINES GOALS EXERCISES CRITERIA TO ADVANCE EXAMINATION   2   (Post-operative week 3 to 6) May discontinue sling use at 3 weeks; after 2 weeks can remove the sling at home and just use the sling at night and in community for 3rd week     May use arm for basic activities of daily living (such as feeding, brushing teeth, dressing...)     May submerge in water after 4 weeks     Avoid combined IR/EXT/ADD (hand behind the back) and IR/ADD (reaching across chest) for dislocation precautions     Avoid acromial or scapular spine pain as increase deltoid loading - decrease load if this occurs   Elevation to 130 deg and ER to 30 deg - passive, active assisted or active     Low (less than 3/10) to no pain     Ability to fire all heads of the deltoid May discontinue , and active elbow and wrist exercises since using the arm in ADLs with sling removed around the home     Continue elevation to 130 and ER to 30, both in scapular plane     Submaximal isometrics (pain-free effort) for all functional heads of deltoid (anterior, posterior, middle).     Active exercise as able:    Supine forward punch     Place in balanced position with circumduction and progressive arcs in sagittal plane     Side-lying abduction to 90     Lateral raise with bent elbow     Prone extension to hip   Elevation in scapular plane to 130; ER in scapular plane to 30     Ability to fire isometrically all heads of the deltoid muscle without pain     Ability to place and hold the arm in balanced position (90 deg elevation in supine) Wound assessment     Neurovascular assessment     Swelling assessment     ROM shoulder elevation and ER(0)     Patient reported outcome measure     Pain level     PHASE PRECAUTIONS AND GUIDELINES GOALS EXERCISES CRITERIA TO ADVANCE EXAMINATION   3   (Post-operative week 6 to 12) Avoid forceful end-range motion in any direction     Progress active use of the arm in ADLs without being restricted to arm by the side of the body;     No heavy lifting or carrying     Initiate functional IR behind the back gently without forceful overpressure     Avoid acromial or scapular spine pain as increase deltoid loading - decrease load if this occurs     NO UPPER BODY ERGOMETER   Optimize ROM for elevation and ER in scapular plane     Expected PROM: Elevation - 145-160; ER - 40-50 ; functional IR to L1     Recover AROM to approach as close to PROM available as possible     Establish dynamic stability of the shoulder Forward elevation in scapular plane active progression: supine to incline to vertical; short to long lever arm     Lateral raise with bent elbow; side-lying abduction     Active ER/IR with arm at side     Scapular retraction with light band resistance     Serratus anterior punches in supine; avoid wall, incline or prone press-ups for serratus anterior     Functional IR with hand slide up back - very gentle and gradual     AROM equals/approaches PROM with good mechanics for elevation     No pain     Higher level demand on shoulder than ADL functions PROM for elevation, ER(0)     AROM for  elevation, ER(0) and functional IR     Patient reported outcome measure     Pain         ---         PHASE PRECAUTIONS AND GUIDELINES GOALS EXERCISES CRITERIA TO ADVANCE EXAMINATION   4   (Post-operative week 12+) No heavy lifting and no overhead sports     Weight lifting limit 25.lb     No heavy pushing activity     Gradually increase strength     NO UPPER BODY ERGOMETER   Optimize functional use of operative UE to patient specific goals     Gradual increase in deltoid, scapular muscle and rotator cuff strength     Pain-free functional activities Light hand weights for deltoid up to and not to exceed 3 lbs for anterior and posterior with long arm lift against gravity; elbow bent to 90 deg for abduction in scapular plane     Band progression for extension to hip with scapular depression/retraction     Band progression for serratus anterior punches in supine; avoid wall, incline or prone press-ups for serratus anterior     End-range stretching gently without forceful overpressure in all planes (elevation in scapular plane, ER in scapular plane, functional IR) with stretching done for life as part of daily routine     NO UPPER BODY ERGOMETER   Pain-free AROM for shoulder elevation (expect around 135-150 deg)     Functional strength for all ADLs, work tasks, and hobbies approved by surgeon     [place] with home maintenance program PROM for elevation, ER(0); ER(90)     AROM for elevation, ER(0) and functional IR     Scapulohumeral rhythm/biomechanics of active movement strategies     Strength testing for deltoid, RTC, scapular muscles     Patient reported outcome measure     Pain

## 2025-04-23 NOTE — PROGRESS NOTES
"Daily Note     Today's date: 2025  Patient name: Demetrio Villalobos  : 1960  MRN: [id number]  Referring provider: César Malone DO  Dx:   Encounter Diagnoses   Name Primary?    Cerebrovascular accident (CVA), unspecified mechanism (HCC) Yes    Spastic hemiparesis affecting nondominant side (HCC)     H/O: CVA (cerebrovascular accident)        Start Time: 0300  Stop Time: 0345  Total time in clinic (min): 45 minutes    PLAN OF CARE START: 24  PLAN OF CARE END: 25  FREQUENCY: 2 times per week  PRECAUTIONS no restrictions with RUE as per PT vicky, HTN    POC expires Auth Status Total   Visits  Start date  Expiration date PT/OT + Visit Limit? Co-Pay    approved   $0    approved                                       Visit/Unit Tracking  AUTH Status:  Date              Visits  Authed: 12 Used 1 1 1             Remaining  11 10 9                 Subjective: Pt reports he \"I am trying to get back in the swing and be here\".    Objective: See treatment below.    NMR:  -Completed Primer of NuStep for LUE at level 2 for 5 minutes.   -Completed modified push ups on table for 15 reps x 2  -Completed thoracic rotation with LUE weight bearing on table for 15 reps x 2  - Completed simulation of painting on wall with FES at anterior deltoid  focused on shoulder flexion/extension, gross grasp, and elbow extension. Pt completed with therapist support to keep roller flat on wall for 25 reps x 2  - Completed simulation of painting on table with FES at anterior deltoid focused on shoulder flexion/extension, elbow flexion/extension, and gross grasp. Added 2lb wrist weight for proprioceptive feedback for 2nd trial. Pt completed with independence for 25 reps x 2  - Completed picking up connect 4 pieces and transferring to box with FES at wrist/digit extensors to facilitate digit extension focused on shoulder flexion, 2 point pinch, and grasp/release. Pt completed with min A from " therapist to position pieces and AAROM of shoulder flexion ie. 10%.     Assessment: Tolerated treatment well. Pt demonstrated increased motor planning with connect 4 pieces positioned upright to facilitate 2 point pinch. Pt would benefit from continued OT services to address NMR L UE s/p chronic CVA.      Plan: Continued skilled OT per POC.

## 2025-04-23 NOTE — PROGRESS NOTES
"Daily Note     Today's date: 2025  Patient name: Demetrio Villalobos  : 1960  MRN: [id number]  Referring provider: Lisa Blount DO  Dx:   Encounter Diagnosis     ICD-10-CM    1. Other abnormalities of gait and mobility  R26.89       2. Chronic cerebrovascular accident (CVA)  Z86.73               GOES BY \"WILL\"             Subjective: Patient reports to PT session with no new issues or complaints.      Objective: See treatment diary below    NMR:   - Hip abduction slides (RLE only) via glider: 30 seconds x 3 sets AMRAP   1st set: 30 reps   2nd set: 25 reps   3rd set: 20 reps  - Hip extension slides (LLE only) via glider: 30 seconds x 3 sets AMRAP   1st set: 8 reps   2nd set: 18 reps   3rd set: 18 reps  - Blaze pod taps (RLE only) at multiple heights x 2 minutes; 1 UE  - Blaze pod taps (RLE only) at multiple heights x 2 minutes; 0 UE  - Hamstring curls (LLE only) to place L foot on 4\" box x 8 reps (to fatigue)  - Ambulation with 7# counterweight on RLE x 150 ft (mixed tile + turf)  - Ambulation with no AD (observation for carryover) x 150 ft (mixed tile + turf)    Assessment: Patient tolerated today's session well today with focus on functional mobility, strength, and gait training. Patient with quick onset of fatiguewith prolonged stance tie of LLE. Challenged patient to assume SLS on LLE for increased motor unit recruitment during blaze pod taps activity. Overall increase speed of ambulation noted with gait trials at end of session. Patient will continue to benefit from skilled OPPT services in order to maximize safe functional mobility and reduce risk for falls post-CVA.      Plan: Continue per plan of care.  Progress treatment as tolerated.      *PATIENT ON BETA BLOCKER, USE RPE*     POC expires Unit limit Auth Expiration date PT/OT + Visit Limit? Co-Insurance   25 Pending  BOMN No                                 Visit/Unit Tracking  AUTH Status:  Date 3/5 3/12 3/19 3/24 3/26 3/31 4/2 4/21 " 4/23      Approved Used 2 2 1 1 1 1 1 1 1       Remaining  13 11 10 9 8 7 6 5 4          Outcome Measures Initial Eval  10/30/2024 & DN 11/6/24 RE  11/26/24 PN  12/11/2024 PN  1/13/2025 PN  2/24/2025    5xSTS 17.36 sec, no UE 14.25 sec, 0 UE 11.40 sec, 0 UE 11.65 sec, 0 UE 9.26 sec, 0 UE    TUG  Regular  Cognitive   22.75 sec   15.91 sec   13.70 sec   12.33 sec no AD  10.88 sec  18.40 sec    10 meter 0.49 m/s TBA- time constraints 0.68 m/s 0.63 m/s 0.81 m/s    LACEY 38/56 43/56 45/56 47/56 NA    FGA 9/30 15/30 16/30 17/30 20/30    DGI 8/24 14/24 14/24 15/24 18/24    MiniBEST 16/28 TBA- time constraints  19/28 21/28    2MWT 140 ft 230 ft (limited by back and right hip pain) 200 ft NT NT    6MWT   515 ft 600 ft 715 ft      Access Code: [email or web address]  URL: [email or web address]  Date: 11/06/2024  Prepared by: Celestina Valderrama    Exercises  - Sit to Stand  - 1 x daily - 7 x weekly - 2 sets - 10 reps  - Side Stepping with Counter Support  - 1 x daily - 7 x weekly - 3 sets - 10 reps  - Standing March with Counter Support  - 1 x daily - 7 x weekly - 2 sets - 10 reps - 3 sec hold    Updated HEP  Access Code: [email or web address]  URL: [email or web address]  Date: 03/19/2025  Prepared by: Celestina Valderrama    Exercises  - Prone Knee Flexion  - 1 x daily - 7 x weekly - 3 sets - 10 reps - 3 sec hold  - Supine Bridge  - 1 x daily - 7 x weekly - 3 sets - 10 reps - 3 sec hold  - Single Leg Bridge  - 1 x daily - 7 x weekly - 3 sets - 10 reps - 3 sec hold  - Supine Active Straight Leg Raise  - 1 x daily - 7 x weekly - 3 sets - 10 reps - 3 sec hold

## 2025-04-28 ENCOUNTER — OFFICE VISIT (OUTPATIENT)
Dept: PHYSICAL THERAPY | Facility: CLINIC | Age: 65
End: 2025-04-28
Payer: COMMERCIAL

## 2025-04-28 ENCOUNTER — OFFICE VISIT (OUTPATIENT)
Facility: CLINIC | Age: 65
End: 2025-04-28
Payer: COMMERCIAL

## 2025-04-28 DIAGNOSIS — R26.89 OTHER ABNORMALITIES OF GAIT AND MOBILITY: Primary | ICD-10-CM

## 2025-04-28 DIAGNOSIS — M25.511 CHRONIC RIGHT SHOULDER PAIN: Primary | ICD-10-CM

## 2025-04-28 DIAGNOSIS — I63.9 CEREBROVASCULAR ACCIDENT (CVA), UNSPECIFIED MECHANISM (HCC): Primary | ICD-10-CM

## 2025-04-28 DIAGNOSIS — Z96.611 STATUS POST REPLACEMENT OF RIGHT SHOULDER JOINT: ICD-10-CM

## 2025-04-28 DIAGNOSIS — G81.10 SPASTIC HEMIPARESIS AFFECTING NONDOMINANT SIDE (HCC): ICD-10-CM

## 2025-04-28 DIAGNOSIS — R60.0 BILATERAL LOWER EXTREMITY EDEMA: ICD-10-CM

## 2025-04-28 DIAGNOSIS — Z86.73 CHRONIC CEREBROVASCULAR ACCIDENT (CVA): ICD-10-CM

## 2025-04-28 DIAGNOSIS — G89.29 CHRONIC RIGHT SHOULDER PAIN: Primary | ICD-10-CM

## 2025-04-28 DIAGNOSIS — Z86.73 H/O: CVA (CEREBROVASCULAR ACCIDENT): ICD-10-CM

## 2025-04-28 PROCEDURE — 97140 MANUAL THERAPY 1/> REGIONS: CPT | Performed by: PHYSICAL THERAPIST

## 2025-04-28 PROCEDURE — 97112 NEUROMUSCULAR REEDUCATION: CPT

## 2025-04-28 NOTE — PROGRESS NOTES
"Daily Note     Today's date: 2025  Patient name: Demterio Villalobos  : 1960  MRN: [id number]  Referring provider: Patti Miller PA-C  Dx:   Encounter Diagnosis     ICD-10-CM    1. Chronic right shoulder pain  M25.511     G89.29       2. Status post replacement of right shoulder joint  Z96.611                 GOES BY \"WILL\"             Subjective: Patient reports to PT session with no new issues or complaints.      Objective: See treatment diary below      Assessment: Dry needling consent for reviewed and signed by patient. Pt educated on dry needling to include but not limited to: risks/benefits/aftercare instructions. Provided with educational handout on DN. All questions and concerns answered and addressed. Pt verbally consented to dry needling treatment session. Risks/benefits/aftercare instructions reviewed. All questions/concerns addressed.  Pt in supine position. Hand hygiene performed pre and post DN treatment session. Placement of needles in pathological tissue.   R biceps tendon, R AC joint, R deltoid (needle location).  Total treatment duration to include set up/break down/in situ: 40 minutes. Patient was supervised by clinician throughout entirety of treatment session to include when DN in situ; clinician next to patient. All needles counted upon insertion and removal-- 20 needles. All accounted for and disposed in appropriate sharp container.     No adverse reaction to treatment. Pt advised may experience bruising, soreness, fatigue, bleeding from needle removal site. Pt verbalized understanding.       Plan: Continue per plan of care.  Progress treatment as tolerated.      *PATIENT ON BETA BLOCKER, USE RPE*     POC expires Unit limit Auth Expiration date PT/OT + Visit Limit? Co-Insurance   25 Pending  BOMN No                                 Visit/Unit Tracking  AUTH Status:  Date 3/5 3/12 3/19 3/24 3/26 3/31 4/2 4/21 4/23      Approved Used 2 2 1 1 1 1 1 1 1       Remaining  13 11 10 " 9 8 7 6 5 4          Outcome Measures Initial Eval  10/30/2024 & DN 11/6/24 RE  11/26/24 PN  12/11/2024 PN  1/13/2025 PN  2/24/2025    5xSTS 17.36 sec, no UE 14.25 sec, 0 UE 11.40 sec, 0 UE 11.65 sec, 0 UE 9.26 sec, 0 UE    TUG  Regular  Cognitive   22.75 sec   15.91 sec   13.70 sec   12.33 sec no AD  10.88 sec  18.40 sec    10 meter 0.49 m/s TBA- time constraints 0.68 m/s 0.63 m/s 0.81 m/s    LACEY 38/56 43/56 45/56 47/56 NA    FGA 9/30 15/30 16/30 17/30 20/30    DGI 8/24 14/24 14/24 15/24 18/24    MiniBEST 16/28 TBA- time constraints  19/28 21/28    2MWT 140 ft 230 ft (limited by back and right hip pain) 200 ft NT NT    6MWT   515 ft 600 ft 715 ft      Access Code: [email or web address]  URL: [email or web address]  Date: 11/06/2024  Prepared by: Celestina Valderrama    Exercises  - Sit to Stand  - 1 x daily - 7 x weekly - 2 sets - 10 reps  - Side Stepping with Counter Support  - 1 x daily - 7 x weekly - 3 sets - 10 reps  - Standing March with Counter Support  - 1 x daily - 7 x weekly - 2 sets - 10 reps - 3 sec hold    Updated HEP  Access Code: [email or web address]  URL: [email or web address]  Date: 03/19/2025  Prepared by: Celestina Valderrama    Exercises  - Prone Knee Flexion  - 1 x daily - 7 x weekly - 3 sets - 10 reps - 3 sec hold  - Supine Bridge  - 1 x daily - 7 x weekly - 3 sets - 10 reps - 3 sec hold  - Single Leg Bridge  - 1 x daily - 7 x weekly - 3 sets - 10 reps - 3 sec hold  - Supine Active Straight Leg Raise  - 1 x daily - 7 x weekly - 3 sets - 10 reps - 3 sec hold

## 2025-04-28 NOTE — PROGRESS NOTES
"Daily Note     Today's date: 2025  Patient name: Demetrio Villalobos  : 1960  MRN: [id number]  Referring provider: César Malone DO  Dx:   Encounter Diagnoses   Name Primary?    Cerebrovascular accident (CVA), unspecified mechanism (HCC) Yes    Spastic hemiparesis affecting nondominant side (HCC)     H/O: CVA (cerebrovascular accident)     Bilateral lower extremity edema          Start Time: 1500  Stop Time: 1543  Total time in clinic (min): 43 minutes    PLAN OF CARE START: 24  PLAN OF CARE END: 25  FREQUENCY: 2 times per week  PRECAUTIONS no restrictions with RUE as per PT vicky, HTN    POC expires Auth Status Total   Visits  Start date  Expiration date PT/OT + Visit Limit? Co-Pay    approved   $0    approved                                       Visit/Unit Tracking  AUTH Status:  Date             Visits  Authed: 12 Used 1 1 1 1            Remaining  11 10 9 8                Subjective: Pt reports he \"I am trying to get back in the swing and be here\".    Objective: See treatment below.    NMR:  Completed Primer:  - STS with 5 lb tidal tank 2 sets x 1 min  - Bicep curls with 5 lb tidal tank 2 sets, 1st: 6, 2nd: 15  - Rows at parallel bars with 5 lb tidal tank 2 sets x 10 reps  - Push ups at parallel bars 2 sets x 10 reps    Completed with red proprioceptive wrap:  - Completed simulation of cleaning counter with FES at triceps to facilitate elbow extension. Focused on shoulder flexion and elbow extension. Pt completed with independence x 25 reps.  - Completed simulation of cleaning counter with FES at triceps to facilitate elbow extension. Error augmentation with red proprioceptive wrap for shoulder abduction and internal rotation. Focused on shoulder abduction, shoulder internal/external rotation, and elbow extension. Provided error augmentation in internal rotation to promote shoulder abduction with green theraband. Pt completed with independence x 25 " reps.  - Completed functional reach for door handle practicing turning handle, opening/closing door with FES at wrist/digit extensors to facilitate grasp/release. Focused on shoulder flexion, shoulder internal rotation, elbow extension, wrist extension, and grasp/release. Pt completed with independence x 25 reps.  - Completed peg into board with pincer grasp with FES at wrist/digit extensors to facilitate grasp/release. Focused on shoulder flexion, horizontal ab/adduction, pincer grasp/release. Pt completed ~5 pegs in 4-5 minutes.       Assessment: Tolerated treatment well. Pt demonstrated difficulties with wrist/digit extension to grasp door handle while in shoulder flexion. Demonstrated good results post error augmentation in shoulder abduction. Pt would benefit from continued OT services to address NMR L UE s/p chronic CVA.      Plan: Continued skilled OT per POC.

## 2025-04-28 NOTE — PROGRESS NOTES
"Daily Note     Today's date: 2025  Patient name: Demetrio Villalobos  : 1960  MRN: [id number]  Referring provider: César Malone DO  Dx:   Encounter Diagnosis     ICD-10-CM    1. Other abnormalities of gait and mobility  R26.89       2. Chronic cerebrovascular accident (CVA)  Z86.73               GOES BY \"WILL\"             Subjective: Patient reports to PT session with no new issues or complaints.      Objective: See treatment diary below    NMR:   - Step taps to tall [place]: 20 reps each LE   Tapping with L LE: 1 UE support    Tapping with R LE: 0 UE support  - Hip abduction slides (RLE only) via glider: 30 seconds x 3 sets AMRAP   1st set: 26 reps   2nd set: 25 reps   3rd set: 25 reps  - Hip extension slides (LLE only) via glider: 30 seconds x 3 sets AMRAP   1st set: 18 reps   2nd set: 18 reps   3rd set: 17 reps  - FWD stepping over (3) 6\" hurdles, 0 UE, 8 laps leading with L LE   - Ambulation with 7# counterweight on RLE x 150 ft (mixed tile + turf)  - Hamstring curls (LLE only) to place L foot on 4\" box x 20 reps  - Ambulation with changing gait speeds (fast/slow) x 150 feet (mixed tile + turf)    Assessment: Patient tolerated today's session well today with focus on functional mobility, strength, and gait training. Able to clear hurdles without UE support with 6\" height; trialed at 9\" height but unable without UE support. Appropriate fatigue with isolated strengthening activities. Patient continues to demo decreased L stance time with all activities. Patient will continue to benefit from skilled OPPT services in order to maximize safe functional mobility and reduce risk for falls post-CVA.      Plan: Continue per plan of care.  Progress treatment as tolerated.      *PATIENT ON BETA BLOCKER, USE RPE*     POC expires Unit limit Auth Expiration date PT/OT + Visit Limit? Co-Insurance   25 12 - BOMN No                                 Visit/Unit Tracking  AUTH Status:  Date "    Approved 12 visits 4/24 - 7/24/25 Used 2               Remaining  10                  Outcome Measures Initial Eval  10/30/2024 & DN 11/6/24 RE  11/26/24 PN  12/11/2024 PN  1/13/2025 PN  2/24/2025    5xSTS 17.36 sec, no UE 14.25 sec, 0 UE 11.40 sec, 0 UE 11.65 sec, 0 UE 9.26 sec, 0 UE    TUG  Regular  Cognitive   22.75 sec   15.91 sec   13.70 sec   12.33 sec no AD  10.88 sec  18.40 sec    10 meter 0.49 m/s TBA- time constraints 0.68 m/s 0.63 m/s 0.81 m/s    LACEY 38/56 43/56 45/56 47/56 NA    FGA 9/30 15/30 16/30 17/30 20/30    DGI 8/24 14/24 14/24 15/24 18/24    MiniBEST 16/28 TBA- time constraints  19/28 21/28    2MWT 140 ft 230 ft (limited by back and right hip pain) 200 ft NT NT    6MWT   515 ft 600 ft 715 ft      Access Code: [email or web address]  URL: [email or web address]  Date: 11/06/2024  Prepared by: Celestina Valderrama    Exercises  - Sit to Stand  - 1 x daily - 7 x weekly - 2 sets - 10 reps  - Side Stepping with Counter Support  - 1 x daily - 7 x weekly - 3 sets - 10 reps  - Standing March with Counter Support  - 1 x daily - 7 x weekly - 2 sets - 10 reps - 3 sec hold    Updated HEP  Access Code: [email or web address]  URL: [email or web address]  Date: 03/19/2025  Prepared by: Celestina Valderrama    Exercises  - Prone Knee Flexion  - 1 x daily - 7 x weekly - 3 sets - 10 reps - 3 sec hold  - Supine Bridge  - 1 x daily - 7 x weekly - 3 sets - 10 reps - 3 sec hold  - Single Leg Bridge  - 1 x daily - 7 x weekly - 3 sets - 10 reps - 3 sec hold  - Supine Active Straight Leg Raise  - 1 x daily - 7 x weekly - 3 sets - 10 reps - 3 sec hold

## 2025-04-29 DIAGNOSIS — K21.00 GASTROESOPHAGEAL REFLUX DISEASE WITH ESOPHAGITIS, UNSPECIFIED WHETHER HEMORRHAGE: ICD-10-CM

## 2025-04-29 RX ORDER — ESOMEPRAZOLE MAGNESIUM 40 MG/1
40 CAPSULE, DELAYED RELEASE ORAL EVERY MORNING
Qty: 30 CAPSULE | Refills: 2 | Status: SHIPPED | OUTPATIENT
Start: 2025-04-29

## 2025-04-30 ENCOUNTER — OFFICE VISIT (OUTPATIENT)
Facility: CLINIC | Age: 65
End: 2025-04-30
Payer: COMMERCIAL

## 2025-04-30 DIAGNOSIS — I63.9 CEREBROVASCULAR ACCIDENT (CVA), UNSPECIFIED MECHANISM (HCC): Primary | ICD-10-CM

## 2025-04-30 DIAGNOSIS — G89.29 CHRONIC RIGHT SHOULDER PAIN: Primary | ICD-10-CM

## 2025-04-30 DIAGNOSIS — R26.89 OTHER ABNORMALITIES OF GAIT AND MOBILITY: ICD-10-CM

## 2025-04-30 DIAGNOSIS — M25.511 CHRONIC RIGHT SHOULDER PAIN: Primary | ICD-10-CM

## 2025-04-30 DIAGNOSIS — Z96.611 STATUS POST REPLACEMENT OF RIGHT SHOULDER JOINT: ICD-10-CM

## 2025-04-30 DIAGNOSIS — G81.10 SPASTIC HEMIPARESIS AFFECTING NONDOMINANT SIDE (HCC): ICD-10-CM

## 2025-04-30 DIAGNOSIS — Z86.73 CHRONIC CEREBROVASCULAR ACCIDENT (CVA): ICD-10-CM

## 2025-04-30 PROCEDURE — 97112 NEUROMUSCULAR REEDUCATION: CPT

## 2025-04-30 NOTE — PROGRESS NOTES
"Daily Note     Today's date: 2025  Patient name: Demetrio Villalobos  : 1960  MRN: [id number]  Referring provider: Lisa Blount DO  Dx:   Encounter Diagnosis     ICD-10-CM    1. Chronic right shoulder pain  M25.511     G89.29       2. Status post replacement of right shoulder joint  Z96.611       3. Other abnormalities of gait and mobility  R26.89       4. Chronic cerebrovascular accident (CVA)  Z86.73             GOES BY \"WILL\"             Subjective: Patient reports to PT session with no new issues or complaints.      Objective: See treatment diary below    NMR:   - STS with posterior resistance (red), 6\" box under RLE, emphasis on eccentric lower to sit: 2 minutes (2 sets - 2nd set only 1 minute)  - Ascending  stairs, 8\" (mixed reciprocal, step-to) x 5 cycles, 1 handrail  - BWD ambulation (resistance at ankle via yellow TB anteriorly) 4 x 40 ft  - Agility ladder negotiation (in/outs) with RLE counterweight (7#) x 2 minutes  - Sidestepping with opposite sided resistance (red) x 5 cycles each way      Assessment: Patient tolerated today's session well today with focus on functional mobility, strength, and gait training. Patient demonstrated difficulty with adequate eccentric control and associated motor unit recruitment with return to sit. Incorporated sidestepping to increase activation of proximal hip stabilizers while forcing increased stance time on left. Patient will continue to benefit from skilled OPPT services in order to maximize safe functional mobility and reduce risk for falls post-CVA.      Plan: Continue per plan of care.  Progress treatment as tolerated.      *PATIENT ON BETA BLOCKER, USE RPE*     POC expires Unit limit Auth Expiration date PT/OT + Visit Limit? Co-Insurance   25 12 - BOMN No                                 Visit/Unit Tracking  AUTH Status:  Date              Approved 12 visits  - 25 Used 2 1              Remaining  10 9           "       Outcome Measures Initial Eval  10/30/2024 & DN 11/6/24 RE  11/26/24 PN  12/11/2024 PN  1/13/2025 PN  2/24/2025    5xSTS 17.36 sec, no UE 14.25 sec, 0 UE 11.40 sec, 0 UE 11.65 sec, 0 UE 9.26 sec, 0 UE    TUG  Regular  Cognitive   22.75 sec   15.91 sec   13.70 sec   12.33 sec no AD  10.88 sec  18.40 sec    10 meter 0.49 m/s TBA- time constraints 0.68 m/s 0.63 m/s 0.81 m/s    LACEY 38/56 43/56 45/56 47/56 NA    FGA 9/30 15/30 16/30 17/30 20/30    DGI 8/24 14/24 14/24 15/24 18/24    MiniBEST 16/28 TBA- time constraints  19/28 21/28    2MWT 140 ft 230 ft (limited by back and right hip pain) 200 ft NT NT    6MWT   515 ft 600 ft 715 ft      Access Code: [email or web address]  URL: [email or web address]/  Date: 11/06/2024  Prepared by: Celestina Valderrama    Exercises  - Sit to Stand  - 1 x daily - 7 x weekly - 2 sets - 10 reps  - Side Stepping with Counter Support  - 1 x daily - 7 x weekly - 3 sets - 10 reps  - Standing March with Counter Support  - 1 x daily - 7 x weekly - 2 sets - 10 reps - 3 sec hold    Updated HEP  Access Code: [email or web address]  URL: [email or web address]/  Date: 03/19/2025  Prepared by: Celestina Valderrama    Exercises  - Prone Knee Flexion  - 1 x daily - 7 x weekly - 3 sets - 10 reps - 3 sec hold  - Supine Bridge  - 1 x daily - 7 x weekly - 3 sets - 10 reps - 3 sec hold  - Single Leg Bridge  - 1 x daily - 7 x weekly - 3 sets - 10 reps - 3 sec hold  - Supine Active Straight Leg Raise  - 1 x daily - 7 x weekly - 3 sets - 10 reps - 3 sec hold

## 2025-05-05 ENCOUNTER — OFFICE VISIT (OUTPATIENT)
Facility: CLINIC | Age: 65
End: 2025-05-05
Attending: FAMILY MEDICINE
Payer: COMMERCIAL

## 2025-05-05 ENCOUNTER — OFFICE VISIT (OUTPATIENT)
Facility: CLINIC | Age: 65
End: 2025-05-05
Payer: COMMERCIAL

## 2025-05-05 DIAGNOSIS — R26.89 OTHER ABNORMALITIES OF GAIT AND MOBILITY: Primary | ICD-10-CM

## 2025-05-05 DIAGNOSIS — G81.10 SPASTIC HEMIPARESIS AFFECTING NONDOMINANT SIDE (HCC): ICD-10-CM

## 2025-05-05 DIAGNOSIS — Z86.73 CHRONIC CEREBROVASCULAR ACCIDENT (CVA): ICD-10-CM

## 2025-05-05 DIAGNOSIS — I63.9 CEREBROVASCULAR ACCIDENT (CVA), UNSPECIFIED MECHANISM (HCC): Primary | ICD-10-CM

## 2025-05-05 PROCEDURE — 97112 NEUROMUSCULAR REEDUCATION: CPT

## 2025-05-05 PROCEDURE — 97112 NEUROMUSCULAR REEDUCATION: CPT | Performed by: OCCUPATIONAL THERAPIST

## 2025-05-05 NOTE — PROGRESS NOTES
Daily Note     Today's date: 2025  Patient name: Demetrio Villalobos  : 1960  MRN: [id number]  Referring provider: César Malone DO  Dx:   Encounter Diagnoses   Name Primary?    Cerebrovascular accident (CVA), unspecified mechanism (HCC) Yes    Spastic hemiparesis affecting nondominant side (HCC)              Start Time: 1635  Stop Time: 1714  Total time in clinic (min): 39 minutes    PLAN OF CARE START: 24  PLAN OF CARE END: 25  FREQUENCY: 2 times per week  PRECAUTIONS no restrictions with RUE as per PT vicky, HTN    POC expires Auth Status Total   Visits  Start date  Expiration date PT/OT + Visit Limit? Co-Pay    approved   $0    approved                                       Visit/Unit Tracking  AUTH Status:  Date           Visits  Authed: 12 Used 1 1 1 1 1 1          Remaining  11 10 9 8 7 6              Subjective: Pt reports difficulty opening his L hand when reaching.    Objective: See treatment below.    NMR:  -Matching talent block  from low surface and placement on countertop while standing with FES to digit extensors, min-modA for digit extension at times to achieve grasp.   -Book stacking using BUE to place books on tabletop focusing on L gross/fine motor coordination. FES on digit extensors however pt needed facilitation of release 2/2 flexor tone.   -Ring grasp and place over cone with focus on achieving neutral FA positioning during pregrasp. FES on digit extensors. Required manual facilitation of neutral FA positioning, verbal cues for elbow extension and back of chair as tactile cue to limit compensatory trunk movement.       Assessment: Tolerated treatment well. Pt demonstrated difficulties with FMC and dexterity requiring min-mod assistance for release of objects. Pt would benefit from continued OT services to address NMR L UE s/p chronic CVA.      Plan: Continued skilled OT per POC.

## 2025-05-05 NOTE — PROGRESS NOTES
"Daily Note     Today's date: 2025  Patient name: Demetrio Villalobos  : 1960  MRN: [id number]  Referring provider: César Malone DO  Dx:   Encounter Diagnosis     ICD-10-CM    1. Other abnormalities of gait and mobility  R26.89       2. Chronic cerebrovascular accident (CVA)  Z86.73             GOES BY \"WILL\"             Subjective: Patient reports to PT session with no new issues or complaints.      Objective: See treatment diary below    NMR:   - STS with posterior resistance (green), 6\" box under RLE, emphasis on eccentric lower to sit to fatigue (2 sets)   1st set: 25 reps   2nd rep: 26 reps  - Sled push/pull (20# added) 2 x 40 ft each (2 sets)  - Sidestepping with opposite sided resistance (red) + 10# TT hold x 5 cycles each way  - BWD ambulation with anterior resistance (green) 2 x 40 ft    Assessment: Patient tolerated today's session well today with focus on functional mobility, strength, and gait training. Increased degree of resistance and weighted exercise this date to further drive intensity. Reported increased fatigue on RLE, likely indicative of increased compensation on this unaffected side. Patient will continue to benefit from skilled OPPT services in order to maximize safe functional mobility and reduce risk for falls post-CVA.      Plan: Continue per plan of care.  Progress treatment as tolerated.      *PATIENT ON BETA BLOCKER, USE RPE*     POC expires Unit limit Auth Expiration date PT/OT + Visit Limit? Co-Insurance   25 12 - BOMN No                                 Visit/Unit Tracking  AUTH Status:  Date             Approved 12 visits  - 25 Used 2 1 1             Remaining  10 9 8                Outcome Measures Initial Eval  10/30/2024 & DN 24 RE  24 PN  2024 PN  2025 PN  2025 PN  2025   5xSTS 17.36 sec, no UE 14.25 sec, 0 UE 11.40 sec, 0 UE 11.65 sec, 0 UE 9.26 sec, 0 UE 9.21 sec, 0 UE   TUG  Regular  Cognitive   22.75 " sec   15.91 sec   13.70 sec   12.33 sec no AD  10.88 sec  18.40 sec no AD  10.13 sec  11.64 sec   10 meter 0.49 m/s TBA- time constraints 0.68 m/s 0.63 m/s 0.81 m/s 0.83 m/s   LACEY 38/56 43/56 45/56 47/56 NA NA   FGA 9/30 15/30 16/30 17/30 20/30 21/30   DGI 8/24 14/24 14/24 15/24 18/24 18/24   MiniBEST 16/28 TBA- time constraints  19/28 21/28 21/28   6MWT   515 ft 600 ft 715 ft 680 ft     Access Code: [email or web address]  URL: [email or web address]  Date: 11/06/2024  Prepared by: Celestina Valderrama    Exercises  - Sit to Stand  - 1 x daily - 7 x weekly - 2 sets - 10 reps  - Side Stepping with Counter Support  - 1 x daily - 7 x weekly - 3 sets - 10 reps  - Standing March with Counter Support  - 1 x daily - 7 x weekly - 2 sets - 10 reps - 3 sec hold    Updated HEP  Access Code: [email or web address]  URL: [email or web address]  Date: 03/19/2025  Prepared by: Celestina Valderrama    Exercises  - Prone Knee Flexion  - 1 x daily - 7 x weekly - 3 sets - 10 reps - 3 sec hold  - Supine Bridge  - 1 x daily - 7 x weekly - 3 sets - 10 reps - 3 sec hold  - Single Leg Bridge  - 1 x daily - 7 x weekly - 3 sets - 10 reps - 3 sec hold  - Supine Active Straight Leg Raise  - 1 x daily - 7 x weekly - 3 sets - 10 reps - 3 sec hold

## 2025-05-07 ENCOUNTER — OFFICE VISIT (OUTPATIENT)
Facility: CLINIC | Age: 65
End: 2025-05-07
Attending: FAMILY MEDICINE
Payer: COMMERCIAL

## 2025-05-07 ENCOUNTER — OFFICE VISIT (OUTPATIENT)
Facility: CLINIC | Age: 65
End: 2025-05-07
Payer: COMMERCIAL

## 2025-05-07 DIAGNOSIS — G81.10 SPASTIC HEMIPARESIS AFFECTING NONDOMINANT SIDE (HCC): ICD-10-CM

## 2025-05-07 DIAGNOSIS — Z86.73 CHRONIC CEREBROVASCULAR ACCIDENT (CVA): ICD-10-CM

## 2025-05-07 DIAGNOSIS — R26.89 OTHER ABNORMALITIES OF GAIT AND MOBILITY: Primary | ICD-10-CM

## 2025-05-07 DIAGNOSIS — R60.0 BILATERAL LOWER EXTREMITY EDEMA: ICD-10-CM

## 2025-05-07 DIAGNOSIS — I63.9 CEREBROVASCULAR ACCIDENT (CVA), UNSPECIFIED MECHANISM (HCC): Primary | ICD-10-CM

## 2025-05-07 DIAGNOSIS — Z86.73 H/O: CVA (CEREBROVASCULAR ACCIDENT): ICD-10-CM

## 2025-05-07 PROCEDURE — 97112 NEUROMUSCULAR REEDUCATION: CPT

## 2025-05-07 NOTE — PROGRESS NOTES
"Daily Note     Today's date: 2025  Patient name: Demetrio Villalobos  : 1960  MRN: [id number]  Referring provider: César Malone DO  Dx:   Encounter Diagnosis     ICD-10-CM    1. Other abnormalities of gait and mobility  R26.89       2. Chronic cerebrovascular accident (CVA)  Z86.73               GOES BY \"WILL\"             Subjective: Patient reports to PT session with no new issues or complaints.      Objective: See treatment diary below    BP start of session: 130/80    NMR (7# counterweight RLE):   - STS with 20#TT to fatigue, 2 sets   1st set: 25 reps   2nd rep: 22 reps  - Ambulation with 20# TT carry x 150 ft (mixed tile + turf)  - Hip abduction sliders via glider (RLE only) + blue TB around thighs: 3 sets x 30 reps  - Hip abduction sliders via glider (RLE only) + blue TB around thighs: 3 sets x 30 reps  - BWD ambulation with anterior resistance (green) 2 x 50 ft - weight removed midway through    Assessment: Patient tolerated today's session well today with focus on functional mobility, strength, and gait training. Removed ankle weight from RLE for backwards ambulation as he was reporting excessive hamstring activation on right and was not feeling adequate recruitment on left. This improved with removal of ankle weight. Difficulty maintaining  on tidal tank during ambulation. Patient will continue to benefit from skilled OPPT services in order to maximize safe functional mobility and reduce risk for falls post-CVA.      Plan: Continue per plan of care.  Progress treatment as tolerated.      *PATIENT ON BETA BLOCKER, USE RPE*     POC expires Unit limit Auth Expiration date PT/OT + Visit Limit? Co-Insurance   25 12 - BOMN No                                 Visit/Unit Tracking  AUTH Status:  Date  5/           Approved 12 visits  - 25 Used 2 1 1 1            Remaining  10 9 8 7               Outcome Measures Initial Eval  10/30/2024 & DN 24 RE  24 " PN  12/11/2024 PN  1/13/2025 PN  2/24/2025 PN  4/2/2025   5xSTS 17.36 sec, no UE 14.25 sec, 0 UE 11.40 sec, 0 UE 11.65 sec, 0 UE 9.26 sec, 0 UE 9.21 sec, 0 UE   TUG  Regular  Cognitive   22.75 sec   15.91 sec   13.70 sec   12.33 sec no AD  10.88 sec  18.40 sec no AD  10.13 sec  11.64 sec   10 meter 0.49 m/s TBA- time constraints 0.68 m/s 0.63 m/s 0.81 m/s 0.83 m/s   LACEY 38/56 43/56 45/56 47/56 NA NA   FGA 9/30 15/30 16/30 17/30 20/30 21/30   DGI 8/24 14/24 14/24 15/24 18/24 18/24   MiniBEST 16/28 TBA- time constraints  19/28 21/28 21/28   6MWT   515 ft 600 ft 715 ft 680 ft     Access Code: [email or web address]  URL: [email or web address]  Date: 11/06/2024  Prepared by: Celestina Valderrama    Exercises  - Sit to Stand  - 1 x daily - 7 x weekly - 2 sets - 10 reps  - Side Stepping with Counter Support  - 1 x daily - 7 x weekly - 3 sets - 10 reps  - Standing March with Counter Support  - 1 x daily - 7 x weekly - 2 sets - 10 reps - 3 sec hold    Updated HEP  Access Code: [email or web address]  URL: [email or web address]  Date: 03/19/2025  Prepared by: Celestina Valderrama    Exercises  - Prone Knee Flexion  - 1 x daily - 7 x weekly - 3 sets - 10 reps - 3 sec hold  - Supine Bridge  - 1 x daily - 7 x weekly - 3 sets - 10 reps - 3 sec hold  - Single Leg Bridge  - 1 x daily - 7 x weekly - 3 sets - 10 reps - 3 sec hold  - Supine Active Straight Leg Raise  - 1 x daily - 7 x weekly - 3 sets - 10 reps - 3 sec hold

## 2025-05-07 NOTE — PROGRESS NOTES
Daily Note     Today's date: 2025  Patient name: Demetrio Villalobos  : 1960  MRN: [id number]  Referring provider: César Malone DO  Dx:   Encounter Diagnoses   Name Primary?    Cerebrovascular accident (CVA), unspecified mechanism (HCC) Yes    Spastic hemiparesis affecting nondominant side (HCC)     H/O: CVA (cerebrovascular accident)     Bilateral lower extremity edema                         PLAN OF CARE START: 24  PLAN OF CARE END: 25  FREQUENCY: 2 times per week  PRECAUTIONS no restrictions with RUE as per PT vicky, HTN    POC expires Auth Status Total   Visits  Start date  Expiration date PT/OT + Visit Limit? Co-Pay    approved   $0    approved                                       Visit/Unit Tracking  AUTH Status:  Date          Visits  Authed: 12 Used 1 1 1 1 1 1 1         Remaining  11 10 9 8 7 6 5             Subjective: Pt reports difficulty opening his L hand when reaching.    Objective: See treatment below.    NMR:  -20x2 pushups in stance at sink with focus on Wbing for proprioceptive input, massed practice elbow flexion/extension.  Requires seated rest break between sets  -Shoulder taps in stance at counter 10x2 each UE with focus on Wbing for proprioceptive deficits for spasticity, reaching across midline, elbow flexion/extension.  Requires significantly inc time for L UE and achieves ~75% ROM  -Hammer rotation performed with hand 1/3 up shaft with focus on forearm rotation, inc supination AROM 25x with 5 second isometric holds in supination  -Cup retrieval and stacking performed with focus on forearm supination, cylindrical grasp/release, coordination and reaching.  FES to wrist/digit extensors.  Drops 2, requires inc time for digit extension in preparation for grasp and for release of items    Assessment: Tolerated treatment well. Has been 1 month since last re-eval, but will hold off on re-eval until visit 10/12 authorized.  Pt would benefit from continued OT services to address NMR L UE s/p chronic CVA.      Plan: Continued skilled OT per POC.

## 2025-05-08 DIAGNOSIS — E78.49 OTHER HYPERLIPIDEMIA: ICD-10-CM

## 2025-05-08 DIAGNOSIS — M54.50 LUMBAR BACK PAIN: ICD-10-CM

## 2025-05-08 DIAGNOSIS — R25.2 SPASTICITY: ICD-10-CM

## 2025-05-08 RX ORDER — ATORVASTATIN CALCIUM 40 MG/1
TABLET, FILM COATED ORAL
Qty: 30 TABLET | Refills: 2 | Status: SHIPPED | OUTPATIENT
Start: 2025-05-08

## 2025-05-08 RX ORDER — CYCLOBENZAPRINE HCL 10 MG
10 TABLET ORAL
Qty: 30 TABLET | Refills: 1 | Status: SHIPPED | OUTPATIENT
Start: 2025-05-08

## 2025-05-12 ENCOUNTER — OFFICE VISIT (OUTPATIENT)
Facility: CLINIC | Age: 65
End: 2025-05-12
Attending: FAMILY MEDICINE
Payer: COMMERCIAL

## 2025-05-12 DIAGNOSIS — Z86.73 CHRONIC CEREBROVASCULAR ACCIDENT (CVA): ICD-10-CM

## 2025-05-12 DIAGNOSIS — Z86.73 H/O: CVA (CEREBROVASCULAR ACCIDENT): ICD-10-CM

## 2025-05-12 DIAGNOSIS — G81.10 SPASTIC HEMIPARESIS AFFECTING NONDOMINANT SIDE (HCC): ICD-10-CM

## 2025-05-12 DIAGNOSIS — R26.89 OTHER ABNORMALITIES OF GAIT AND MOBILITY: Primary | ICD-10-CM

## 2025-05-12 DIAGNOSIS — I63.9 CEREBROVASCULAR ACCIDENT (CVA), UNSPECIFIED MECHANISM (HCC): Primary | ICD-10-CM

## 2025-05-12 PROCEDURE — 97112 NEUROMUSCULAR REEDUCATION: CPT | Performed by: PHYSICAL THERAPIST

## 2025-05-12 PROCEDURE — 97112 NEUROMUSCULAR REEDUCATION: CPT

## 2025-05-12 NOTE — PROGRESS NOTES
Daily Note     Today's date: 2025  Patient name: Demetrio Villalobos  : 1960  MRN: [id number]  Referring provider: César Malone DO  Dx:   Encounter Diagnoses   Name Primary?    Cerebrovascular accident (CVA), unspecified mechanism (HCC) Yes    Spastic hemiparesis affecting nondominant side (HCC)     H/O: CVA (cerebrovascular accident)                           PLAN OF CARE START: 24  PLAN OF CARE END: 25  FREQUENCY: 2 times per week  PRECAUTIONS no restrictions with RUE as per PT vicky, HTN    POC expires Auth Status Total   Visits  Start date  Expiration date PT/OT + Visit Limit? Co-Pay    approved   $0    approved                                    [id number]*-+      Visit/Unit Tracking  AUTH Status:  Date         Visits  Authed: 12 Used 1 1 1 1 1 1 1 1        Remaining  11 10 9 8 7 6 5 4            Subjective: Pt reports he has trying to do more exercises at home.    Objective: See treatment below.    NMR:  -Needlethreads 15x2 each UE at parallel bars with focus on Wbing for spasticity mgmt, scapular retraction/protraction, reaching across midline.    -Pushups in stance at parallel bars 20x2 with focus on massed practice elbow flexion/extension, Wbing for proprioceptive input and spasticity mgmt.  -Large peg placement into foam board anteriorly with focus on rotation of item in hand, pincer grasp, reaching.  Requires assist of unaffected R UE for rotation but demonstrates initiation of movement.  Pt then places marbles on top of each.  Initiates consistently with lateral pinch.  Drops/knocks over multiple items    Assessment: Tolerated treatment well. Re-assess next session.  Discussed possible break from OT for the summer while his daughter visits.  Pt would benefit from continued OT services to address NMR L UE s/p chronic CVA.      Plan: Continued skilled OT per POC.

## 2025-05-12 NOTE — PROGRESS NOTES
"Daily Note     Today's date: 2025  Patient name: Demetrio Villalobos  : 1960  MRN: [id number]  Referring provider: Lisa Blount DO  Dx:   Encounter Diagnosis     ICD-10-CM    1. Other abnormalities of gait and mobility  R26.89       2. Chronic cerebrovascular accident (CVA)  Z86.73               GOES BY \"WILL\"             Subjective: Patient reports to PT session with no new issues or complaints.      Objective: See treatment diary below    BP start of session: 130/82    NMR (7# counterweight RLE):   - STS with 20#TT to fatigue, 2 sets   1st set: 26 reps   2nd rep: 22 reps  - Ambulation with 20# TT carry x 150 ft (mixed tile + turf)  - Step up: 10x ea, 6\" step  - Step up/though: 10x ea, 6-12\" step  - Sled push: 3 laps x 20 ft, 20# sled      Assessment: Patient tolerated treatment well. Challenged patient dynamic balance and mobility, using tidal tank to provide perturbations and ankle weight to increase resistance. During step up/through patient displays appropriate LE strength for support when stepping and minor circumduction observed to assist with foot clearance when stepping up to 12\" step. Patient will continue to benefit from skilled OPPT services in order to maximize safe functional mobility and reduce risk for falls post-CVA.      Plan: Continue per plan of care.  Progress treatment as tolerated.      *PATIENT ON BETA BLOCKER, USE RPE*     POC expires Unit limit Auth Expiration date PT/OT + Visit Limit? Co-Insurance   25 12 - BOMN No                                 Visit/Unit Tracking  AUTH Status:  Date           Approved 12 visits  - 25 Used 2 1 1 1 1           Remaining  10 9 8 7 6              Outcome Measures Initial Eval  10/30/2024 & DN 24 RE  24 PN  2024 PN  2025 PN  2025 PN  2025   5xSTS 17.36 sec, no UE 14.25 sec, 0 UE 11.40 sec, 0 UE 11.65 sec, 0 UE 9.26 sec, 0 UE 9.21 sec, 0 UE   TUG  Regular  Cognitive "   22.75 sec   15.91 sec   13.70 sec   12.33 sec no AD  10.88 sec  18.40 sec no AD  10.13 sec  11.64 sec   10 meter 0.49 m/s TBA- time constraints 0.68 m/s 0.63 m/s 0.81 m/s 0.83 m/s   LACEY 38/56 43/56 45/56 47/56 NA NA   FGA 9/30 15/30 16/30 17/30 20/30 21/30   DGI 8/24 14/24 14/24 15/24 18/24 18/24   MiniBEST 16/28 TBA- time constraints  19/28 21/28 21/28   6MWT   515 ft 600 ft 715 ft 680 ft     Access Code: [email or web address]  URL: [email or web address]  Date: 11/06/2024  Prepared by: Celestina Valderrama    Exercises  - Sit to Stand  - 1 x daily - 7 x weekly - 2 sets - 10 reps  - Side Stepping with Counter Support  - 1 x daily - 7 x weekly - 3 sets - 10 reps  - Standing March with Counter Support  - 1 x daily - 7 x weekly - 2 sets - 10 reps - 3 sec hold    Updated HEP  Access Code: [email or web address]  URL: [email or web address]  Date: 03/19/2025  Prepared by: Celestina Valderrama    Exercises  - Prone Knee Flexion  - 1 x daily - 7 x weekly - 3 sets - 10 reps - 3 sec hold  - Supine Bridge  - 1 x daily - 7 x weekly - 3 sets - 10 reps - 3 sec hold  - Single Leg Bridge  - 1 x daily - 7 x weekly - 3 sets - 10 reps - 3 sec hold  - Supine Active Straight Leg Raise  - 1 x daily - 7 x weekly - 3 sets - 10 reps - 3 sec hold

## 2025-05-14 ENCOUNTER — OFFICE VISIT (OUTPATIENT)
Facility: CLINIC | Age: 65
End: 2025-05-14
Payer: COMMERCIAL

## 2025-05-14 ENCOUNTER — EVALUATION (OUTPATIENT)
Facility: CLINIC | Age: 65
End: 2025-05-14
Attending: FAMILY MEDICINE
Payer: COMMERCIAL

## 2025-05-14 DIAGNOSIS — Z86.73 H/O: CVA (CEREBROVASCULAR ACCIDENT): ICD-10-CM

## 2025-05-14 DIAGNOSIS — G81.10 SPASTIC HEMIPARESIS AFFECTING NONDOMINANT SIDE (HCC): ICD-10-CM

## 2025-05-14 DIAGNOSIS — Z86.73 CHRONIC CEREBROVASCULAR ACCIDENT (CVA): ICD-10-CM

## 2025-05-14 DIAGNOSIS — I63.9 CEREBROVASCULAR ACCIDENT (CVA), UNSPECIFIED MECHANISM (HCC): Primary | ICD-10-CM

## 2025-05-14 DIAGNOSIS — R26.89 OTHER ABNORMALITIES OF GAIT AND MOBILITY: Primary | ICD-10-CM

## 2025-05-14 PROCEDURE — 97530 THERAPEUTIC ACTIVITIES: CPT

## 2025-05-14 PROCEDURE — 97112 NEUROMUSCULAR REEDUCATION: CPT

## 2025-05-14 NOTE — PROGRESS NOTES
"Daily Note     Today's date: 2025  Patient name: Demetrio Villalobos  : 1960  MRN: [id number]  Referring provider: César Malone DO  Dx:   Encounter Diagnosis     ICD-10-CM    1. Other abnormalities of gait and mobility  R26.89       2. Chronic cerebrovascular accident (CVA)  Z86.73               GOES BY \"WILL\"             Subjective: Patient reports to PT session with no new issues or complaints.      Objective: See treatment diary below    BP start of session: 130/82    NMR:   - STS with 20#TT to fatigue, 2 sets   1st set: 26 reps   2nd rep: 23 reps  - Ambulation with 20# TT carry x 150 ft (mixed tile + turf)  - Hip abduction burnouts via glider (RLE only) w/ blue TB around distal thighs 3 sets x 30 reps  - Hip extension burnouts via glider (LLE only) w/ blue TB around distal thighs 2 sets x 30 reps  - Treadmill training @ 1.5 mph, 5% incline x 3 minutes    Assessment: Patient tolerated today's session well today with focus on functional mobility, strength, and gait training. Trialed incline treadmill training this session to further drive (L) foot clearance and swing within sagittal plane. Challenged with hip extension on left particularly when cued to push through heel to maximize glute activation. Patient will continue to benefit from skilled OPPT services in order to maximize safe functional mobility and reduce risk for falls post-CVA.      Plan: Continue per plan of care.  Progress treatment as tolerated.      *PATIENT ON BETA BLOCKER, USE RPE*     POC expires Unit limit Auth Expiration date PT/OT + Visit Limit? Co-Insurance   25 12 - BOMN No                                 Visit/Unit Tracking  AUTH Status:  Date  5 5/         Approved 12 visits  - 25 Used 2 1 1 1 1 1          Remaining  10 9 8 7 6 5             Outcome Measures Initial Eval  10/30/2024 & DN 24 RE  24 PN  2024 PN  2025 PN  2025 PN  2025   5xSTS 17.36 sec, " no UE 14.25 sec, 0 UE 11.40 sec, 0 UE 11.65 sec, 0 UE 9.26 sec, 0 UE 9.21 sec, 0 UE   TUG  Regular  Cognitive   22.75 sec   15.91 sec   13.70 sec   12.33 sec no AD  10.88 sec  18.40 sec no AD  10.13 sec  11.64 sec   10 meter 0.49 m/s TBA- time constraints 0.68 m/s 0.63 m/s 0.81 m/s 0.83 m/s   LACEY 38/56 43/56 45/56 47/56 NA NA   FGA 9/30 15/30 16/30 17/30 20/30 21/30   DGI 8/24 14/24 14/24 15/24 18/24 18/24   MiniBEST 16/28 TBA- time constraints  19/28 21/28 21/28   6MWT   515 ft 600 ft 715 ft 680 ft     Access Code: [email or web address]  URL: [email or web address]  Date: 11/06/2024  Prepared by: Celestina Valderrama    Exercises  - Sit to Stand  - 1 x daily - 7 x weekly - 2 sets - 10 reps  - Side Stepping with Counter Support  - 1 x daily - 7 x weekly - 3 sets - 10 reps  - Standing March with Counter Support  - 1 x daily - 7 x weekly - 2 sets - 10 reps - 3 sec hold    Updated [facility]  Access Code: [email or web address]  URL: [email or web address]  Date: 03/19/2025  Prepared by: Celestina Valderrama    Exercises  - Prone Knee Flexion  - 1 x daily - 7 x weekly - 3 sets - 10 reps - 3 sec hold  - Supine Bridge  - 1 x daily - 7 x weekly - 3 sets - 10 reps - 3 sec hold  - Single Leg Bridge  - 1 x daily - 7 x weekly - 3 sets - 10 reps - 3 sec hold  - Supine Active Straight Leg Raise  - 1 x daily - 7 x weekly - 3 sets - 10 reps - 3 sec hold

## 2025-05-14 NOTE — PROGRESS NOTES
OCCUPATIONAL THERAPY RE-EVALUATION    Today's Date: 2025  Patient Name: Demetrio Villalobos  : 1960  MRN: [id number]  Referring Provider: César Malone DO  Dx: Cerebrovascular accident (CVA), unspecified mechanism (HCC) [I63.9]     Date 25             Units:  Used 2 1             Authed:  Remaining  10 3                 SKILLED ANALYSIS:  Pt presents to re-evaluation on 25 after ~6.5 months of OP OT s/p CVA in .  Pt reports significant improvements with functional use of L UE in the past 6 weeks, especially with use of doorknobs with L UE, feeding himself with L UE, using zippers, inc ability to squeeze the toothpaste (can get paste out but messy), applying electrodes at home, and opening medicine containers with rotation with use of L UE.  Based on assessments, pt demonstrating significant improvements with grasp strength, lateral pinch strength, FMC, overall functional use of L UE, improved UE strength.  The only area that didn't markedly improve is AROM L UE, which is likely due to increasing spasticity. He achieved 3 additional OT goals, and upgraded goals added below.  Continue to recommend pt be evaluated by PMR/neuro for botox injections for spasticity mgmt.  Considering pt's significant progress, recommend continued OP OT 2x/wk for an additional 8-12 weeks with continued focus on NMR L UE for increased independence and QOL s/p CVA.  Discussed progress and recommendations with pt, and he is in agreement.  Pt reports he may need to take a break from therapy for July and August if his young daughter comes to live with him, but he will keep us informed of his plan.      PLAN OF CARE START: 25  PLAN OF CARE END: 25  FREQUENCY: 2x/wk week  PRECAUTIONS: HTN    Subjective  25: Pt reports he sees a little improvement since last RE. He can walk further. He states sometimes he can move his hand certain ways and sometimes he can't. Since SOC he has starting using his  "toothbrush with his L hand. He holds dishes that he's washing in his L hand, and can self feed with a spoon using his L hand. He's turning on light switches with his L hand. He has started using estim on his LUE at home, every morning. He rates his LUE function at 60-70% and would like it to get to 80%.     3/26: Pt reports he feels he has 40% UE recovery s/p CVA.  He is able to stabilize the tooth paste with L UE, but unable to squeeze paste out.  Now able to open door handles with L UE.    5/14/25: Able to use doorknobs with L UE, feed himself with L UE, squeeze toothpaste hard enough that paste comes out but often very messy, rotating caps of med containers to open with L UE, using zippers with L UE    Mechanism of Injury  CVA approximately 10 years ago, went to [facility] for outpatient rehabilitation; pt denies seizures and is not on anti-seizure medication    Occupational Profile  Pt lives alone in an apartment with no stairs to get in. Pt reports difficulty with ADLs (UBD, shaving head, LBD ) however was able to put on with independence. Pt reports he has an aide who assists 5 hours a day and does cleaning, cooking, and bathing pt. Pt manages own medication. Pt reports he has not been driving. Pt reports no pain in LUE. Pt is a retired . Pt report no diplopia and no issues with cognition. Pt enjoys reading bible, watching TV.     PATIENT GOAL: \"as much use of my L hand and walking as possible.\"    HISTORY OF PRESENT ILLNESS:     PMH:   Past Medical History:   Diagnosis Date    Chronic kidney disease     Edema     GERD (gastroesophageal reflux disease)     Gout     hospitalized    High cholesterol     Hypertension     PVD (peripheral vascular disease) ([facility])     Renal disorder     Rotator cuff tear     right    Shortness of breath     Stroke ([facility]) 2014    Ventral hernia without obstruction or gangrene     Walker as ambulation aid        Past Surgical Hx:   Past Surgical History:   Procedure Laterality Date    " APPENDECTOMY      DE ARTHROPLASTY GLENOHUMERAL JOINT TOTAL SHOULDER Right 8/26/2024    Procedure: ARTHROPLASTY SHOULDER REVERSE;  Surgeon: Sina Randle MD;  Location: [facility];  Service: Orthopedics        Pain: none    Objective    Upper Extremities:  Pt is R hand dominant  Did not assess RUE strength due to rotator cuff sx.                  Strength:     Date 11/13/24 3/26/25 5/14/25     Right UE        Left UE 19 21 30lb       The age norm is approximately 76.8 lbs, indicating decreased  strength.    Lateral pinch:    Date 3/26/25 5/14/25     Right UE       Left UE 4.5 6.5                     Range of Motion:    Left UE:  Date: 3/26/25 5/14/25       Shoulder flexion 100* with compensatory shoulder abduction and elbow flexion 80*       Shoulder abduction 80* 71*       Shoulder IR         Shoulder ER  Able to achieve -50* from neutral       Elbow flexion 100% 100%       Elbow extension 100% 100%       Forearm supination 75% 75%       Forearm pronation 100% 100%       Wrist flexion 50* 66*       Wrist extension 40* 23*       Radial deviation         Thumb extension 25% 15%       Digit flexion 100%        Digit extension 20% 20%              Manual Muscle Testing L UE:  Date: 3/26/25 5/14/25       Shoulder extension  4/5       Shoulder flexion 3-/5 3/5       Shoulder abduction 3-/5 3/5       Shoulder IR 4-/5 4-/5       Shoulder ER 2-/5 2-/5       Elbow flexion 4+/5 5/5       Elbow extension 4+/5 5/5       Wrist flexion 4-/5 4-/5       Wrist extension 2+/5 3-/5         FUGL LOVE ASSESSMENT OF MOTOR RECOVERY AFTER STROKE:    L UE:  Date: 3/26/25 5/14/25       Upper Extremity Seated 23/36 24       Wrist 7/10 7/10       Hand 7/14 8/14       Coordination/Speed 3/6 3/6       Overall score 40/66 42/66         (Clinical change= 5 points, severe impairment <19, and mild impairment is >50)     NINE-HOLE PEG TEST: assesses dexterity/fine motor coordination. Alternative scoring: the number of pegs placed in 50  or 100 seconds can be recorded.    COMPLETED MODIFIED VERSION.  PT TRANSFERS 4 FDT PEGS FROM LID (SPREAD EVERY OTHER HOLE) TO FDT BOARD     Date: 5/14/25        Right UE 4 seconds        Left UE 34 seconds          Norms for age/sex: Pt demonstrates decreased FMC compared to norms for age/sex     Norms:   Sex Age Average R Average L   Male 21-25 16.41 17.53   Male 26-30 16.88 17.84   Male  31-35 17.54 18.47   Male 36-40 17.71 18.62   Male 41-45 18.54 18.49   Male 46-50 18.35 19.57   Male 51-55 18.93 19.84   Male 56-60 20.90 21.64   Male 61-65 20.87 21.60   Male 66-70 21.23 22.29   Male 71+ 25.79 25.95   Female 21-25 16.04 17.21   Female 26-30 15.90 16.97   Female 31-35 16.69 17.47   Female 36-40 16.74 18.16   Female 41-45 16.54 17.64   Female 46-50 17.36 17.96   Female 51-55 17.38 18.92   Female 56-60 17.86 19.48   Female 61-65 18.99 20.33   Female 66-70 19.90 21.44   Female 71+ 22.49 24.11       Functional Dexterity Test: trialed with L UE, able to rotate 1 peg in 54 seconds with 1 drop, compensatory use of R UE and placement into top of FDT lid instead of board.  Performance demonstrating non-functional L UE.      Modified Rosetta Scale: measures spasticity in patients with lesions in the Central Nervous System  L UE:    Shoulder external rotators: 0/4  Shoulder internal rotators: 3/4  Elbow flexors: 2/4  Elbow extensors: 1+/4  Wrist flexors: 1/4  Wrist extensors 1/4  Pronators 2/4  Finger flexors: 2//4  Finger extensors: 0/4    0: No increase in muscle tone  1: Slight increase in muscle tone, manifested by a catch and release or by minimal resistance at the end of the range of motion when the affected part(s) is moved in flexion or extension  1+: Slight increase in muscle tone, manifested by a catch, followed by minimal resistance throughout the remainder (less than half) of the ROM  2: More marked increase in muscle tone through most of the ROM, but affected part(s) easily moved  3: Considerable increase in  muscle tone, passive movement difficult  4: Affected part(s) rigid in flexion or extension    Subluxation: none    Scapular winging: slight    Spasticity: slight     Finger to Nose Testing with Visual Occlusion (proprioception):  mild proprioception deficits    Finger to Nose Testing without Visual Occlusion: mild dysmetria    Monofilaments/sensory testing: NOT tested    Functional Cognition:  Highest level of education: 9th grade    Tulio Cognitive Assessment Version 8.1 (MoCA V8.1) NOT ASSESSED  Visuospatial/executive functioning:  3/5  Naming:  3/3  Memory: 1st trial:  , 2nd trial:    Attention/concentration:   List of letters:   Serial Seven Subtraction:  2/3 w/  errors  Language/sentence repetition:    Language Fluency:     Abstract/Correlational Thinkin/2  Delayed Recall:    Orientation:     Memory Index Score: 13/15   Raw Score:  25/30, MIS:  13/15, indicative of mild neurocognitive impairments.    MoCA Scoring        Normal: 26+         Mild Cognitive Impairment: 18-25          Moderate Cognitive Impairment: 10-17         Severe Cognitive Impairment: <10    Trail making Part A and Part B: NOT ASSESSED  Part A: 31.28 with errors however able to self correct   Part B: 1:45 with 1 cue (previously 1 minute 49 with 1 cue  Indicating deficits: Part A > 33.12 seconds and Part B > 74.55 seconds            GOALS:   Short Term Goals:  Pt will increase UE  strength by 2-3 lbs to complete gardening and IADLs (19) ACHIEVED   Pt will increase divided attention by completing trail making part B in 1 minute 20 seconds to complete IADLs AHCIEVED  Pt will demo with G understanding and carryover of tone reduction strategies for improved AROM ACHIEVED   Pt will demo with decreased L  UE hypertonicity to 1+/4 on modified jonna scale for improved grasp release, termination of flexors on command of time  Pt will increase  L UE cooordination to complete  of upper extremity section of  fugyl abraham for tabletop tasks ACHIEVED  Pt will increase  L UE coordination to complete 5/10 of wrist section of fugyl abraham for tabletop tasks ACHIEVED             Long Term Goals: 8-12 weeks  Pt will increase UE  strength by 4-5 lbs to complete gardening and IADLs (19) ACHIEVED 1/30  Pt will increase divided attention by completing trail making part B in 1 minute 10 seconds to complete IADLs  Pt will demo with G understanding and carryover of tone reduction strategies for improved AROM ACHIEVED 1/30  Pt will demo with decreased L  UE hypertonicity to 1/4 on modified jonna scale for improved grasp release, termination of flexors on command of time  Pt will increase  L UE coordination to complete 21/36 of upper extremity section of fugyl abraham for tabletop tasks ACHIEVED  Pt will increase  L UE coordination to complete fugyl abraham in 32 for tabletop tasks ACHIEVED 1/30    New Goals Established 1/30/25:  Pt will demonstrate improved L gross grasp strength to 29lbs in order to improve ADL/IADL performance in 12 weeks. ACHIEVED  Pt will demonstrate improved LUE function based on Fugl Abraham score of 35/66 in 12 weeks. ACHIEVED      New goals 5/14/25:  Pt will demonstrate improved L UE function by scoring at least 48 on FMA.  Pt will inc L grasp strength to at least 35lb for carry over with IADLs.        OTHER PLANNED THERAPY INTERVENTIONS:   Supine, seated, and in stance neuro re-ed  Tricep AG  NMES/FES  FMC/prehension  Timed Trials  Manual tx  Hand to target  Sensory re-ed  Seated functional reach: crossing midline  Supine place and hold  WBearing strategies   Closed chain activities  Open chain activities  Internal and external memory aides  Multimatrix for saccades/ visual clutter/attention  Hypersensitivity strategies education  Multi-modal environment  Sustained/alternating/divided attention  Work stations with timed transitions  Temporal Awareness  Memory and mental manipulation  Auditory processing with  immediate recall  Memory retention with immediate and delayed recall  Edu on cog/vision apps      Objective Measurement Tracker:    IE (11/13) 1st Re-eval: () 2nd Re-eval: (]) 3rd Re-eval: ()    MoCA 25/30 /30 /30 /30 /30   TM Test A 31       TM Test B 1:35       CMT        Nine Hole Peg Test        Functional Dexterity Test                                                Manual Muscle Testing             Fugl Antunez UE: 17/36  Wrist: 4/10  Hand: 5/14  Coordination: 1/6

## 2025-05-19 ENCOUNTER — OFFICE VISIT (OUTPATIENT)
Facility: CLINIC | Age: 65
End: 2025-05-19
Attending: FAMILY MEDICINE
Payer: COMMERCIAL

## 2025-05-19 DIAGNOSIS — G81.10 SPASTIC HEMIPARESIS AFFECTING NONDOMINANT SIDE (HCC): ICD-10-CM

## 2025-05-19 DIAGNOSIS — Z86.73 CHRONIC CEREBROVASCULAR ACCIDENT (CVA): ICD-10-CM

## 2025-05-19 DIAGNOSIS — R26.89 OTHER ABNORMALITIES OF GAIT AND MOBILITY: Primary | ICD-10-CM

## 2025-05-19 DIAGNOSIS — Z86.73 H/O: CVA (CEREBROVASCULAR ACCIDENT): ICD-10-CM

## 2025-05-19 DIAGNOSIS — Z87.39 HISTORY OF GOUT: ICD-10-CM

## 2025-05-19 DIAGNOSIS — I63.9 CEREBROVASCULAR ACCIDENT (CVA), UNSPECIFIED MECHANISM (HCC): Primary | ICD-10-CM

## 2025-05-19 PROCEDURE — 97112 NEUROMUSCULAR REEDUCATION: CPT

## 2025-05-19 RX ORDER — ALLOPURINOL 100 MG/1
TABLET ORAL
Qty: 30 TABLET | Refills: 2 | Status: SHIPPED | OUTPATIENT
Start: 2025-05-19

## 2025-05-19 NOTE — PROGRESS NOTES
"Daily Note     Today's date: 2025  Patient name: Demetrio Villalobos  : 1960  MRN: [id number]  Referring provider: César Malone DO  Dx:   Encounter Diagnoses   Name Primary?    Cerebrovascular accident (CVA), unspecified mechanism (HCC) Yes    Spastic hemiparesis affecting nondominant side (HCC)     H/O: CVA (cerebrovascular accident)                         PLAN OF CARE START: 25  PLAN OF CARE END: 25  FREQUENCY: 2x/wk week  PRECAUTIONS: HTN    POC expires Auth Status Total   Visits  Start date  Expiration date PT/OT + Visit Limit? Co-Pay    approved   $0    approved                                       Visit/Unit Tracking  AUTH Status:  Date        Visits  Authed: 12 Used 1 1 1 1 1 1 1 1 1       Remaining  11 10 9 8 7 6 5 4 2           Subjective: Pt denies changes since last session    Objective: See treatment below.    NMR:  -Alternating shoulder taps into pushup 10x with CGA for balance.  Focus on reaching across midline, Wbing L UE, proximal strengthening, motor planning.  -Small tong use to  foam cubes and place into 12\" tall container on tabletop.  Focus on massed practice elbow flexion/extension, functional reaching, shoulder flexion.  Requires multiple rest breaks 2* fatigue of hand and shoulder.  Drops ~5  -Stirring pot with whisk 50x each direction with focus on UE coordination and motor planning, maintaining effective cylindrical grasp.  Requires 1 seated rest break 2* fatigue and back pain  -Stacking foam cubes with use of L UE with focus on pincer grasp/release, FMC, reaching.  Requires inc time, knocks over multiple items. Rest breaks 2* fatigue.          Assessment: Tolerated treatment well.  Continues to make progress with functional use of L UE. Provided pt with built up foam handles for eating utensils at home. Pt would benefit from continued OT services to address NMR L UE s/p chronic CVA.      Plan: " Continued skilled OT per POC.

## 2025-05-19 NOTE — PROGRESS NOTES
"Daily Note     Today's date: 2025  Patient name: Demetrio Villalobos  : 1960  MRN: [id number]  Referring provider: Daysi Richard MD  Dx:   Encounter Diagnosis     ICD-10-CM    1. Other abnormalities of gait and mobility  R26.89       2. Chronic cerebrovascular accident (CVA)  Z86.73               GOES BY \"WILL\"             Subjective: Patient reports to PT session with no new issues or complaints.      Objective: See treatment diary below    BP start of session: 130/82    NMR:   - STS + 4\" step under RLE with 15#TT to fatigue, 2 sets   1st set: 20 reps   2nd rep: 25 reps  - Hip abduction burnouts via glider (RLE only) w/ blue TB around distal thighs 3 sets x 30 reps  - Walking lunges in // bars x 3 minutes (2 sets)  - Ambulation with 15# TT carry x 150 ft (mixed tile + turf)    Assessment: Patient tolerated today's session well today with focus on functional mobility, strength, and gait training. Difficulty maintaining upright trunk when performing alternating walking lunges, likely due to weakness of posterior chain. Difficulty holding tidal tank upright, thus step length and reciprocity impeded as a result. Patient will continue to benefit from skilled OPPT services in order to maximize safe functional mobility and reduce risk for falls post-CVA.      Plan: Continue per plan of care.  Progress treatment as tolerated.      *PATIENT ON BETA BLOCKER, USE RPE*     POC expires Unit limit Auth Expiration date PT/OT + Visit Limit? Co-Insurance   25 12 - BOMN No                                 Visit/Unit Tracking  AUTH Status:  Date  5 5        Approved 12 visits  - 25 Used 2 1 1 1 1 1 1         Remaining  10 9 8 7 6 5 4            Outcome Measures Initial Eval  10/30/2024 & DN 24 RE  24 PN  2024 PN  2025 PN  2025 PN  2025   5xSTS 17.36 sec, no UE 14.25 sec, 0 UE 11.40 sec, 0 UE 11.65 sec, 0 UE 9.26 sec, 0 UE 9.21 sec, 0 UE "   TUG  Regular  Cognitive   22.75 sec   15.91 sec   13.70 sec   12.33 sec no AD  10.88 sec  18.40 sec no AD  10.13 sec  11.64 sec   10 meter 0.49 m/s TBA- time constraints 0.68 m/s 0.63 m/s 0.81 m/s 0.83 m/s   LACEY 38/56 43/56 45/56 47/56 NA NA   FGA 9/30 15/30 16/30 17/30 20/30 21/30   DGI 8/24 14/24 14/24 15/24 18/24 18/24   MiniBEST 16/28 TBA- time constraints  19/28 21/28 21/28   6MWT   515 ft 600 ft 715 ft 680 ft     Access Code: [email or web address]  URL: [email or web address]  Date: 11/06/2024  Prepared by: Celestina Valderrama    Exercises  - Sit to Stand  - 1 x daily - 7 x weekly - 2 sets - 10 reps  - Side Stepping with Counter Support  - 1 x daily - 7 x weekly - 3 sets - 10 reps  - Standing March with Counter Support  - 1 x daily - 7 x weekly - 2 sets - 10 reps - 3 sec hold    Updated [facility]  Access Code: [email or web address]  URL: [email or web address]  Date: 03/19/2025  Prepared by: Celestina Valderrama    Exercises  - Prone Knee Flexion  - 1 x daily - 7 x weekly - 3 sets - 10 reps - 3 sec hold  - Supine Bridge  - 1 x daily - 7 x weekly - 3 sets - 10 reps - 3 sec hold  - Single Leg Bridge  - 1 x daily - 7 x weekly - 3 sets - 10 reps - 3 sec hold  - Supine Active Straight Leg Raise  - 1 x daily - 7 x weekly - 3 sets - 10 reps - 3 sec hold

## 2025-05-21 ENCOUNTER — OFFICE VISIT (OUTPATIENT)
Facility: CLINIC | Age: 65
End: 2025-05-21
Payer: COMMERCIAL

## 2025-05-21 ENCOUNTER — OFFICE VISIT (OUTPATIENT)
Facility: CLINIC | Age: 65
End: 2025-05-21
Attending: FAMILY MEDICINE
Payer: COMMERCIAL

## 2025-05-21 DIAGNOSIS — Z86.73 H/O: CVA (CEREBROVASCULAR ACCIDENT): ICD-10-CM

## 2025-05-21 DIAGNOSIS — I63.9 CEREBROVASCULAR ACCIDENT (CVA), UNSPECIFIED MECHANISM (HCC): Primary | ICD-10-CM

## 2025-05-21 DIAGNOSIS — R26.89 OTHER ABNORMALITIES OF GAIT AND MOBILITY: Primary | ICD-10-CM

## 2025-05-21 DIAGNOSIS — G81.10 SPASTIC HEMIPARESIS AFFECTING NONDOMINANT SIDE (HCC): ICD-10-CM

## 2025-05-21 DIAGNOSIS — Z86.73 CHRONIC CEREBROVASCULAR ACCIDENT (CVA): ICD-10-CM

## 2025-05-21 PROCEDURE — 97112 NEUROMUSCULAR REEDUCATION: CPT

## 2025-05-21 NOTE — PROGRESS NOTES
"Daily Note     Today's date: 2025  Patient name: Demetrio Villalobos  : 1960  MRN: [id number]  Referring provider: Sina Randle MD  Dx:   Encounter Diagnosis     ICD-10-CM    1. Other abnormalities of gait and mobility  R26.89       2. Chronic cerebrovascular accident (CVA)  Z86.73                 GOES BY \"WILL\"             Subjective: Patient reports to PT session stating he is having difficulty finding a provider who can do Botox in [place].     Objective: See treatment diary below    BP start of session: 138/86    NMR:   - STS large RR under RLE: 5x, Candice  - STS + 4\" step under RLE with 15#TT to fatigue, 2 sets   1st set: 20 reps   2nd rep: 25 reps  - Ambulation with 15# TT carry w/ perturbations x 150 ft (mixed tile + turf)  - Posteriorly resisted LLE step taps to 2nd step on 6\" stair to fatigue: 30x   - Walking lunges in // bars x 3 minutes (2 sets)    Assessment: Patient tolerated today's session well today with focus on functional mobility, strength, and gait training. Trialed higher uneven surface during STS however, patient had significant difficulty performing thus regressed to 4\" step. Included single leg stance activity to further promote RLE weight bearing and motor recruitment. Patient will continue to benefit from skilled OPPT services in order to maximize safe functional mobility and reduce risk for falls post-CVA.      Plan: Continue per plan of care.  Progress treatment as tolerated.      *PATIENT ON BETA BLOCKER, USE RPE*     POC expires Unit limit Auth Expiration date PT/OT + Visit Limit? Co-Insurance   25 12 - BOMN No                                 Visit/Unit Tracking  AUTH Status:  Date  5/ 5/       Approved 12 visits  - 25 Used 2 1 1 1 1 1 1 1        Remaining  10 9 8 7 6 5 4 3           Outcome Measures Initial Eval  10/30/2024 & DN 24 RE  24 PN  2024 PN  2025 PN  2025 PN  2025   5xSTS 17.36 " sec, no UE 14.25 sec, 0 UE 11.40 sec, 0 UE 11.65 sec, 0 UE 9.26 sec, 0 UE 9.21 sec, 0 UE   TUG  Regular  Cognitive   22.75 sec   15.91 sec   13.70 sec   12.33 sec no AD  10.88 sec  18.40 sec no AD  10.13 sec  11.64 sec   10 meter 0.49 m/s TBA- time constraints 0.68 m/s 0.63 m/s 0.81 m/s 0.83 m/s   LACEY 38/56 43/56 45/56 47/56 NA NA   FGA 9/30 15/30 16/30 17/30 20/30 21/30   DGI 8/24 14/24 14/24 15/24 18/24 18/24   MiniBEST 16/28 TBA- time constraints  19/28 21/28 21/28   6MWT   515 ft 600 ft 715 ft 680 ft     Access Code: [email or web address]  URL: [email or web address]  Date: 11/06/2024  Prepared by: Celestina Valderrama    Exercises  - Sit to Stand  - 1 x daily - 7 x weekly - 2 sets - 10 reps  - Side Stepping with Counter Support  - 1 x daily - 7 x weekly - 3 sets - 10 reps  - Standing March with Counter Support  - 1 x daily - 7 x weekly - 2 sets - 10 reps - 3 sec hold    Updated [facility]  Access Code: [email or web address]  URL: [email or web address]  Date: 03/19/2025  Prepared by: Celestina Valderrama    Exercises  - Prone Knee Flexion  - 1 x daily - 7 x weekly - 3 sets - 10 reps - 3 sec hold  - Supine Bridge  - 1 x daily - 7 x weekly - 3 sets - 10 reps - 3 sec hold  - Single Leg Bridge  - 1 x daily - 7 x weekly - 3 sets - 10 reps - 3 sec hold  - Supine Active Straight Leg Raise  - 1 x daily - 7 x weekly - 3 sets - 10 reps - 3 sec hold

## 2025-05-21 NOTE — PROGRESS NOTES
Daily Note     Today's date: 2025  Patient name: Demetrio Villalobos  : 1960  MRN: [id number]  Referring provider: César Malone DO  Dx:   Encounter Diagnoses   Name Primary?    Cerebrovascular accident (CVA), unspecified mechanism (HCC) Yes    Spastic hemiparesis affecting nondominant side (HCC)     H/O: CVA (cerebrovascular accident)                Start Time: 1630  Stop Time: 1715  Total time in clinic (min): 45 minutes  PLAN OF CARE START: 25  PLAN OF CARE END: 25  FREQUENCY: 2x/wk week  PRECAUTIONS: HTN    POC expires Auth Status Total   Visits  Start date  Expiration date PT/OT + Visit Limit? Co-Pay    approved   $0    approved                                       Visit/Unit Tracking  AUTH Status:  Date       Visits  Authed: 12 Used 1 1 1 1 1 1 1 1 1 1      Remaining  11 10 9 8 7 6 5 4 2 1          Subjective: Pt denies changes since last session    Objective: See treatment below.    NMR:  Primer:  - Completed push ups at raised mat focused on UE strengthening and WB x30  - Completed thread the needle at raised mat focused on WB through LUE x30    - Completed functional reach with cone transfers with LUE 90* to right side with red proprioceptive wrap and FES at wrist and finger extensors focused on cylindrical grasp, shoulder horizontal ab/adduction and flexion, and grasp/release. Pt completed with min assist to place cones for ~20 minutes  - Completed jenga task moving with LUE from 90* to right side with FEs at wrist and finger extensors focused on pincer , FMC, dexterity, grasp/release, and shoulder horizontal ab/adduction and flexion. Pt completed with min assist to place pieces for ~15 minutes. Pt required increased time to complete task.           Assessment: Tolerated treatment well.  Pt demonstrating increased spasticity which affected grasping items today. Pt demonstrating difficulties with FMC tasks.    Continues to make progress with functional use of L UE. Provided pt with built up foam handles for eating utensils at home. Pt would benefit from continued OT services to address NMR L UE s/p chronic CVA.      Plan: Continued skilled OT per POC.

## 2025-05-22 ENCOUNTER — TELEPHONE (OUTPATIENT)
Dept: INTERNAL MEDICINE CLINIC | Facility: CLINIC | Age: 65
End: 2025-05-22

## 2025-05-27 ENCOUNTER — HOSPITAL ENCOUNTER (OUTPATIENT)
Dept: RADIOLOGY | Facility: HOSPITAL | Age: 65
Discharge: HOME | End: 2025-05-27
Payer: COMMERCIAL

## 2025-05-27 DIAGNOSIS — I73.9 PERIPHERAL ARTERIAL DISEASE (HCC): ICD-10-CM

## 2025-05-27 PROCEDURE — 93923 UPR/LXTR ART STDY 3+ LVLS: CPT

## 2025-05-27 PROCEDURE — 93925 LOWER EXTREMITY STUDY: CPT

## 2025-05-27 PROCEDURE — 93922 UPR/L XTREMITY ART 2 LEVELS: CPT | Performed by: SURGERY

## 2025-05-27 PROCEDURE — 93925 LOWER EXTREMITY STUDY: CPT | Performed by: SURGERY

## 2025-05-28 ENCOUNTER — EVALUATION (OUTPATIENT)
Facility: CLINIC | Age: 65
End: 2025-05-28
Attending: FAMILY MEDICINE
Payer: COMMERCIAL

## 2025-05-28 ENCOUNTER — RESULTS FOLLOW-UP (OUTPATIENT)
Dept: VASCULAR SURGERY | Facility: CLINIC | Age: 65
End: 2025-05-28

## 2025-05-28 ENCOUNTER — OFFICE VISIT (OUTPATIENT)
Facility: CLINIC | Age: 65
End: 2025-05-28
Attending: FAMILY MEDICINE
Payer: COMMERCIAL

## 2025-05-28 DIAGNOSIS — R26.89 OTHER ABNORMALITIES OF GAIT AND MOBILITY: Primary | ICD-10-CM

## 2025-05-28 DIAGNOSIS — Z86.73 H/O: CVA (CEREBROVASCULAR ACCIDENT): ICD-10-CM

## 2025-05-28 DIAGNOSIS — Z86.73 CHRONIC CEREBROVASCULAR ACCIDENT (CVA): ICD-10-CM

## 2025-05-28 DIAGNOSIS — G81.10 SPASTIC HEMIPARESIS AFFECTING NONDOMINANT SIDE (HCC): ICD-10-CM

## 2025-05-28 DIAGNOSIS — I63.9 CEREBROVASCULAR ACCIDENT (CVA), UNSPECIFIED MECHANISM (HCC): Primary | ICD-10-CM

## 2025-05-28 PROCEDURE — 97530 THERAPEUTIC ACTIVITIES: CPT

## 2025-05-28 PROCEDURE — 97112 NEUROMUSCULAR REEDUCATION: CPT

## 2025-05-28 NOTE — PROGRESS NOTES
Daily Note     Today's date: 2025  Patient name: Demetrio Villalobos  : 1960  MRN: [id number]  Referring provider: César Malone DO  Dx:   Encounter Diagnoses   Name Primary?    Cerebrovascular accident (CVA), unspecified mechanism (HCC) Yes    Spastic hemiparesis affecting nondominant side (HCC)     H/O: CVA (cerebrovascular accident)                         PLAN OF CARE START: 25  PLAN OF CARE END: 25  FREQUENCY: 2x/wk week  PRECAUTIONS: HTN    POC expires Auth Status Total   Visits  Start date  Expiration date PT/OT + Visit Limit? Co-Pay    approved   $0    approved                                       Visit/Unit Tracking  AUTH Status:  Date  4/21 4/23 4/28 4/30 5/5 5/7 5/12 5/19 5/21     Visits  Authed: 12 Used 1 1 1 1 1 1 1 1 1 1      Remaining  11 10 9 8 7 6 5 4 2 1          Subjective: Pt denies changes since last session    Objective: See treatment below.    NMR:  Primer:  - Pt performed STS with alternating punches 2x each UE per rep at high intensity for 1x3 minutes in order to increase heart rate to drive neuroplastic changes following CVA. ~10 reps per set        -Tilton retrieval with items presented vertically in foam board and placement into basin 90* to b/l sides.  Focus on pincer grasp/release, forward reaching, shoulder rotation.     Assessment: Tolerated treatment well.  Pt demonstrating increased spasticity which affected grasping items today. Pt demonstrating difficulties with FMC tasks.   Continues to make progress with functional use of L UE. Provided pt with built up foam handles for eating utensils at home. Pt would benefit from continued OT services to address NMR L UE s/p chronic CVA.      Plan: Continued skilled OT per POC.

## 2025-05-28 NOTE — PROGRESS NOTES
PT Re-Evaluation          POC expires Unit limit Auth Expiration date PT/OT + Visit Limit? Co-Insurance   25 12 - BOMN No                                 Visit/Unit Tracking  AUTH Status:  Date  5      Approved 12 visits  - 25 Used 2 1 1 1 1 1 1 1 1       Remaining  10 9 8 7 6 5 4 3 2                 Today's date: 2025  Patient name: Demetrio Villalobos  : 1960  MRN: [id number]  Referring provider: Lisa Blount DO   Dx:   Encounter Diagnosis     ICD-10-CM    1. Other abnormalities of gait and mobility  R26.89       2. Chronic cerebrovascular accident (CVA)  Z86.73             Assessment  Assessment details: Patient is a 64 year old presenting to skilled OPPT for reassessment with complaints of gait, balance, and strength deficits s/p CVA that in turn has limited safe functional mobility at home and in the community. PMH significant for: HTN, CKD, GERD, and CVA. Patient suffered CVA approximately 10 years ago and reports residual weakness on left side. Patient demonstrated overall improvements in the following outcome measures since last reassessment: FGA, DGI, and miniBEST, likely indicating overall gains in dynamic balance and reactive balance/postural stability, respectively. He is otherwise demonstrating trend towards plateau in remaining outcome measures. Per cutoff scores for the FGA, DGI, and miniBEST he is classified as low risk for falls. Patient additionally reports overall more fatigue this date, and notes he is not wearing his usual AFO. Plan to focus on gait, balance, and strength training within high intensity parameters for one more month before discharging to comprehensive home exercise program; patient agreeable. Patient will continue to benefit from skilled OPPT services in order to maximize safe functional mobility in the home and community setting.        Impairments: Abnormal coordination, Abnormal gait, Abnormal muscle tone, Abnormal or restricted ROM, Activity intolerance, Impaired balance, Impaired physical strength, Lacks appropriate HEP, Poor posture, Poor body mechanics, Pain with function, Safety issue, Weight-bearing intolerance, Abnormal movement, Difficulty understanding, Abnormal muscle firing  Understanding of Dx/Px/POC: Excellent  Prognosis: Excellent      Patient verbalized understanding of POC.         Please contact me if you have any questions or recommendations. Thank you for the referral and the opportunity to share in Demetrio Villalobos's care.        Plan  Plan details: FGA, DGI, miniBEST, 6 MWT; HIGT and HIIT to tolerance  Patient would benefit from: PT Eval, Skilled PT, and OT Eval  Planned modality interventions: Biofeedback, Cryotherapy, TENS, Thermotherapy  Planned therapy interventions: Abdominal trunk stabilization, ADL training, Balance, Balance/WB training, Breathing training, Body mechanics training, Coordination, Functional ROM exercises, Gait training, HEP, Joint Mobilization, Manual Therapy, White taping, Motor coordination training, Neuromuscular re-education, Patient education, Postural training, Strengthening, Stretching, Therapeutic activities, Therapeutic exercises, Therapeutic training, Transfer training, Activity modification, Work reintegration  Frequency: 1-2x/wk  Duration in weeks: 12 weeks  Plan of Care beginning date: 4/2/2025  Plan of Care expiration date: 12 weeks - 6/25/25  Treatment plan discussed with: Patient         Goals  Short Term Goals (4 weeks):    - Patient will improve time on TUG by 2.9 seconds to facilitate improved safety in all ambulation- MET  - Patient will improve scoring on DGI by 2.6 points to progress safety- MET  - Patient will be independent in basic HEP 2-3 weeks - MET  - Patient will improve 5xSTS score by 2.3 seconds to promote improved LE functional strength needed for ADLs-  MET    Long Term Goals (12 weeks):  - Patient will be independent in a comprehensive home exercise program - NOT MET  - Patient will improve gait speed by 0.18 m/s to improve safety with community ambulation - MET  - Patient will improve LACEY by 6 points to facilitate return to safe independent ambulation - MET  - Patient will improve scoring on FGA by 4 points to progress safety with dynamic tasks- MET  - Patient will improve FGA score to >/= 22/30 to classify as decreased risk for falls - MET  - Patient will be able to demonstrate HT in gait without veering - MET  - Patient will improve 6 Minute Walk Test score by 190 feet to promote improved cardiovascular endurance - MET  - Patient will report 50% reduction in near falls in order to improve safety with functional tasks and reduce his risk for falls - NOT MET  - Patient will report going on walks at least 3 days per week to promote independence and improved cardiovascular endurance - NOT MET  - Patient will be able to ascend/descend stairs reciprocally without UE assist to promote independence and safety with ADLs - NOT MET  - Patient will report 50% reduction in near falls when ambulating on uneven terrain - NOT MET      Cut off score    All date taken from APTA Neuro Section or Rehab Measures      [place]acey:  Álvaro et al., 2018  MDC: 2.7 pts    Amanda et al., 2011  Cut-off score: 45/56    Chronic CVA  < 44/56 high risk for falls (Álvaro et al., 2018)  < 47.5/56 slow walker status (Amanda et al., 2011) 5xSTS: Rupal 2010  MDC: 2.3 sec  Age Norms:  60-69: 11.1 sec  70-79: 12.6 sec  80-89: 14.8 sec    Andrea 2010, Chronic Stroke  Chronic CVA: 12 sec   TUG  Addison enrique al., 2005  MDC: 2.9 sec    Cut off score:  >13.5 sec community dwelling adults  >32.2 frail elderly  <20 I for basic transfers  >30 dependent on transfers 10 Meter Walk Test:   Shawanda et al., 2006  Small meaningful change: 0.06 m/s  Substantial meaningful change: 0.14 m/s  MCID: 0.16 m/s    <  0.4 m/s household ambulators  0.4 - 0.8 m/s limited community ambulators  > 0.8 m/s community ambulators   FGA: Rossi et al., 2010  MCID: 4.2 pts  Geriatrics/community < 22/30 fall risk  Geriatrics/community < 20/30 unexplained falls DGI  MDC: vestibular - 4 pts  MDC: geriatric/community - 3 pts  Falls risk <19/24   6 Minute Walk Test  Shawanda et al., 2006  MDC: 60.98 m (200.01 ft)    Farhat Desai, & Ruthie, 2012  MCID: 34.4 m    Age Norms  60-69: M - 1876 ft   F - 1765 ft  70-79: M - 1729 ft   F - 1545 ft  80-89: M - 1368 ft   F - 1286 ft Modified Rosetta  0: No increase in tone  1: Catch and release or min resistance at end range  1+: Catch f/b min resistance throughout remainder (< half ROM)  2: Easily moved, but more marked tone throughout most ROM  3: Significant tone, PROM difficult  4: Rigid   MiniBest: Juanjose et al., 2013  CVA < 17.5 fall risk Pass (Acute CVA)  MDC: 1.8 points (acute), 3.2 points (chronic)         Subjective    History of Present Illness  Mechanism of injury: Patient is a 64 year old presenting to skilled OPPT for reassessment with complaints of gait, balance, and strength deficits s/p CVA. PMH significant for: HTN, CKD, GERD, and CVA. Patient suffered CVA approximately 10 years ago and reports residual weakness on left side (UE and LE), but cannot recall exact location of stroke. He reports he participated in PT/OT/ST services immediately following the stroke, but has not participated in much rehab since. Patient recently underwent (R) reverse total shoulder arthroplasty on 8/26 and is currently following shoulder precautions (no flexion/abduction > 90 degrees). He has HHA that come 25 hours/week (ever day in the AM) that assist with ADL/IADL's.     Update 11/26/24: Patient participated in 1xs/week neuro PT for ~ 1 month and reports noting small improvements in balance and endurance; no falls since SOC. Reports back pain is primary barrier to intensity/duration of gait training (still sees ortho PT  "for back/shoulder)    Updated (2024): Patient reports for reassessment stating he has not yet noted significant improvements in his walking. He is interested in increasing frequency of neuro PT services to further drive improvements. His primary goal is to improve his walking speed and standing tolerance.    Updated (2025): Patient reports for reassessment with no new issues or complaints, states he had to take some time off from PT/OT services over the holidays. Has an upcoming neurology appointment on .    Updated (2025): Patient reports to PT for reassessment with overall satisfaction in PT services thus far. He notes improvements in his control of the left leg. Continues to be limited functional by low back pain. He has not noticed any positive benefits since starting the Baclofen.    Updated (2025): Patient reports for reassessment stating he continues to be happy with the progress he has made thus far. He notes that he feels the ease of his walking has improved and feels less limited by his LBP, although he has good and bad days. He is also looking to do dry needling for his shoulder.    Updated (2025): Patient reports for reassessment stating he continues to notice small improvements in his walking and overall tolerance to activity. He is agreeable with plan to discharge to home program in approximately 1 month.    Primary AD: none  Assist level at home: assist for ADL's/IADL's  WC usage: NA    Patient goal: \"I want to get better with walking\"    Pain  Current pain ratin-3/10  Location: R shoulder  Aggravating factors: movement    Social Support  Steps to enter house: none  Stairs in house: none   Lives in: 4 story, 1 floor set up  Lives with: alone    Employment status: retired  Hand dominance: R    Treatments  Previous treatment: PT/OT/ST immediately following stroke  Current treatment: ortho PT, balance eval  Diagnostic Testing: see chart for details      Objective "     Vitals  - HR: 74 bpm  - BP: 158/86  - SPO2: 97%    HR Max  - 207 - (0.7x62)  = 164 bpm    LE MMT  - R Hip Flexion: 3/5  - L Hip Flexion: 3-/5  - R Hip Extension: 3/5  - L Hip Extension: 3-/5  - R Hip Abduction: 4+/5 - L Hip abduction: 4-/5  - R Hip adduction: 4+/5 - L Hip adduction: 4-/5  - R Knee Extension: 3/5 - L Knee Extension: 2+/5  - R Knee Flexion: 3/5  - L Knee Flexion: 2+/5  - R Ankle DF: 4/5  - L Ankle DF: 1/5  - R Ankle PF: 3/5  - L Ankle PF: 1/5    Sensation  - Light touch: intact  - Deep pressure: intact    Coordination  - Dysmetria: unable 2/2 LUE hemiplegia  - Dysdiadochokinesia: unable 2/2 LUE hemiplegia  - Alternating Toe Taps: unable 2/2 LLE hemiplegia    Modified Rosetta  - R knee extension: 0 - no increase in tone  - L knee extension: 1+ - catch followed by minimum resistance throughout remainder (< half ROM)  - R knee flexion: 0 - no increase in tone  - L knee flexion: 1-   - R ankle DF: 0 - no increase in tone   - L ankle DF: 3 - significant tone, PROM difficulty  - R ankle inversion: 0 - no increase in tone  - L ankle inversion: 3 - significant tone, PROM difficulty  - R ankle eversion: 0 - no increase in tone  - L ankle eversion: 3 - significant tone, PROM difficulty    Clonus  - L: No  - R: No  - Fatiguing: NA  - Number of beats: NA    Vision  - Glasses: No  - Abnormalities prior to CVA: No, NA  - Abnormalities post CVA: No, NA    Neglect  - R sided: No  - L sided: No    Pusher's Syndrome  - R sided: No  - L sided: No        Outcome Measures Initial Eval  10/30/2024 & DN 11/6/24 RE  11/26/24 PN  12/11/2024 PN  1/13/2025 PN  2/24/2025 PN  4/2/2025 PN  5/28/2025   5xSTS 17.36 sec, no UE 14.25 sec, 0 UE 11.40 sec, 0 UE 11.65 sec, 0 UE 9.26 sec, 0 UE 9.21 sec, 0 UE 9.66 sec, 0 UE   TUG  Regular  Cognitive   22.75 sec   15.91 sec   13.70 sec   12.33 sec no AD  10.88 sec  18.40 sec no AD  10.13 sec  11.64 sec no AD  11.73 sec  12.14 sec   10 meter 0.49 m/s TBA- time constraints 0.68 m/s 0.63  m/s 0.81 m/s 0.83 m/s 0.76 m/s   LACEY 38/56 43/56 45/56 47/56 NA NA NA   FGA 9/30 15/30 16/30 17/30 20/30 21/30 23/30   DGI 8/24 14/24 14/24 15/24 18/24 18/24 21/24   MiniBEST 16/28 TBA- time constraints  19/28 21/28 21/28 22/28   6MWT   515 ft 600 ft 715 ft 680 ft 645 ft       Precautions: CKD, GERD, HTN, PVD, history CVA 10 years ago with L sided hemiplegia  Past Medical History:   Diagnosis Date    Chronic kidney disease     Edema     GERD (gastroesophageal reflux disease)     Gout     hospitalized    High cholesterol     Hypertension     PVD (peripheral vascular disease) (HCC)     Renal disorder     Rotator cuff tear     right    Shortness of breath     Stroke (HCC) 2014    Ventral hernia without obstruction or gangrene     Walker as ambulation aid

## 2025-05-29 ENCOUNTER — TELEPHONE (OUTPATIENT)
Dept: VASCULAR SURGERY | Facility: CLINIC | Age: 65
End: 2025-05-29

## 2025-05-29 NOTE — TELEPHONE ENCOUNTER
Attempted to contact patient to schedule appointment(s) listed below.  Requested patient call [phone] to schedule appointment(s).    Patient's appointment(s) are due on or after 7/1/25.    Dopplers  [] Abdominal Aorta Iliac (AOIL)  [] Carotid (CV)   [] Celiac and/or Mesenteric  [] Endovascular Aortic Repair (EVAR)   [] Hemodialysis Access (HD)   [x] Lower Limb Arterial (LENORE)  [] Lower Limb Venous (LEV)  [] Lower Limb Venous Duplex with Reflux (LEVDR)  [] Renal Artery  [] Upper Limb Arterial (UEA)    [] Upper Limb Venous (UEV)              [] YVAN and Waveform analysis     Advanced Imaging   [] CTA head/neck    [] CTA abdomen    [] CTA abdomen & pelvis    [] CT abdomen with/ without contrast  [] CT abdomen with contrast  [] CT abdomen without contrast    [] CT abdomen & pelvis with/ without contrast  [] CT abdomen & pelvis with contrast  [] CT abdomen & pelvis without contrast    Office Visit   [] New patient, patient last seen over 3 years ago  [x] Risk factor modification (RFM)   [x] Follow up   [] Lost to follow up (LTFU)   Called patient & LMOM to schedule 1 yr ov /RR LENORE 5/27/25

## 2025-06-02 ENCOUNTER — OFFICE VISIT (OUTPATIENT)
Facility: CLINIC | Age: 65
End: 2025-06-02
Attending: FAMILY MEDICINE
Payer: COMMERCIAL

## 2025-06-02 DIAGNOSIS — R26.89 OTHER ABNORMALITIES OF GAIT AND MOBILITY: Primary | ICD-10-CM

## 2025-06-02 DIAGNOSIS — G81.10 SPASTIC HEMIPARESIS AFFECTING NONDOMINANT SIDE (HCC): ICD-10-CM

## 2025-06-02 DIAGNOSIS — N18.30 BENIGN HYPERTENSION WITH CHRONIC KIDNEY DISEASE, STAGE III (HCC): ICD-10-CM

## 2025-06-02 DIAGNOSIS — I63.9 CEREBROVASCULAR ACCIDENT (CVA), UNSPECIFIED MECHANISM (HCC): Primary | ICD-10-CM

## 2025-06-02 DIAGNOSIS — Z86.73 H/O: CVA (CEREBROVASCULAR ACCIDENT): ICD-10-CM

## 2025-06-02 DIAGNOSIS — I12.9 BENIGN HYPERTENSION WITH CHRONIC KIDNEY DISEASE, STAGE III (HCC): ICD-10-CM

## 2025-06-02 DIAGNOSIS — Z86.73 CHRONIC CEREBROVASCULAR ACCIDENT (CVA): ICD-10-CM

## 2025-06-02 PROCEDURE — 97112 NEUROMUSCULAR REEDUCATION: CPT | Performed by: PHYSICAL THERAPIST

## 2025-06-02 PROCEDURE — 97112 NEUROMUSCULAR REEDUCATION: CPT

## 2025-06-02 RX ORDER — METOPROLOL SUCCINATE 50 MG/1
TABLET, EXTENDED RELEASE ORAL
Qty: 30 TABLET | Refills: 2 | Status: SHIPPED | OUTPATIENT
Start: 2025-06-02

## 2025-06-02 NOTE — PROGRESS NOTES
Daily Note     Today's date: 2025  Patient name: Demetrio Villalobos  : 1960  MRN: [id number]  Referring provider: César Malone DO  Dx:   Encounter Diagnoses   Name Primary?    Cerebrovascular accident (CVA), unspecified mechanism (HCC) Yes    Spastic hemiparesis affecting nondominant side (HCC)     H/O: CVA (cerebrovascular accident)        Start Time: 1545  Stop Time: 1630  Total time in clinic (min): 45 minutes    PLAN OF CARE START: 25  PLAN OF CARE END: 25  FREQUENCY: 2x/wk week  PRECAUTIONS: HTN    POC expires Auth Status Total   Visits  Start date  Expiration date PT/OT + Visit Limit? Co-Pay    approved   $0    approved                                       Visit/Unit Tracking  AUTH Status:  Date      Visits  Authed: 12 Used 1 1 1 1 1 1 1 1 1 1 1     Remaining  11 10 9 8 7 6 5 4 2 1 11         Subjective: Pt denies changes since last session    Objective: See treatment below.    NMR:  Primer:  -STS with 3x elbow flexion in stance for 2 min x3 at high intensity in order to increase heart rate to drive neuroplastic changes following CVA.  First set with 2lb b/l dumbbells, 2nd and 3rd set downgraded to 2lb with L UE.  ~20 reps each set.    -Transfer of foam cubes with use of tongs with L UE with retrieval from tabletop anteriorly and placement into conainter 90* to L.  Focus on graded grasp/release, shoulder rotation, reaching.  Requires few brief rest breaks 2* fatigue at the shoulder.  Drops 2    -Scooping and transfer of beans with use of built up foam handle with focus on maintaining effective grasp on eating utensil, L UE coordination, motor planning, forearm rotation.  Spills ~10% of beans    Assessment: Tolerated treatment well.  Continues to make progress with functional use of L UE. Pt would benefit from continued OT services to address NMR L UE s/p chronic CVA.      Plan: Continued skilled OT per POC.  Cimzia Counseling:  I discussed with the patient the risks of Cimzia including but not limited to immunosuppression, allergic reactions and infections.  The patient understands that monitoring is required including a PPD at baseline and must alert us or the primary physician if symptoms of infection or other concerning signs are noted.

## 2025-06-02 NOTE — PROGRESS NOTES
"Daily Note     Today's date: 2025  Patient name: Demetrio Villalobos  : 1960  MRN: [id number]  Referring provider: César Malone DO  Dx:   Encounter Diagnosis     ICD-10-CM    1. Other abnormalities of gait and mobility  R26.89       2. Chronic cerebrovascular accident (CVA)  Z86.73                   GOES BY \"WILL\"             Subjective: Patient reports to PT from OT, reports no new changes    Objective: See treatment diary below      NMR:   - STS w/ 10# TT: 30x 2 sets  - FWD step over mel on foam beam: 10x ea  - Overground ambulation w/ 10# TT: 150 ft (2 sets)  - Step up/through on -\" step: 20x ea  - FWD lunge on step: 10x 10 sec holds        Assessment: Patient tolerated treatment well. Attempted BWD stepping over mel on foam beam however patient unable to complete today. When on foam beam patient able to maintain upright with occasional posterior LOB observed. During step up/through patient utilizes occasional UE support to maintain balance, but able to complete majority without assistance. Patient will continue to benefit from skilled OPPT services in order to maximize safe functional mobility and reduce risk for falls post-CVA.      Plan: Continue per plan of care.  Progress treatment as tolerated.      *PATIENT ON BETA BLOCKER, USE RPE*     POC expires Unit limit Auth Expiration date PT/OT + Visit Limit? Co-Insurance   25 12 - BOMN No                                 Visit/Unit Tracking  AUTH Status:  Date  5 5     Approved 12 visits  - 25 Used 2 1 1 1 1 1 1 1 1 1      Remaining  10 9 8 7 6 5 4 3 2 1         Outcome Measures Initial Eval  10/30/2024 & DN 24 RE  24 PN  2024 PN  2025 PN  2025 PN  2025   5xSTS 17.36 sec, no UE 14.25 sec, 0 UE 11.40 sec, 0 UE 11.65 sec, 0 UE 9.26 sec, 0 UE 9.21 sec, 0 UE   TUG  Regular  Cognitive   22.75 sec   15.91 sec   13.70 sec   12.33 sec no AD  10.88 sec  18.40 " sec no AD  10.13 sec  11.64 sec   10 meter 0.49 m/s TBA- time constraints 0.68 m/s 0.63 m/s 0.81 m/s 0.83 m/s   LACEY 38/56 43/56 45/56 47/56 NA NA   FGA 9/30 15/30 16/30 17/30 20/30 21/30   DGI 8/24 14/24 14/24 15/24 18/24 18/24   MiniBEST 16/28 TBA- time constraints  19/28 21/28 21/28   6MWT   515 ft 600 ft 715 ft 680 ft     Access Code: [email or web address]  URL: [email or web address]  Date: 11/06/2024  Prepared by: Celestina Valderrama    Exercises  - Sit to Stand  - 1 x daily - 7 x weekly - 2 sets - 10 reps  - Side Stepping with Counter Support  - 1 x daily - 7 x weekly - 3 sets - 10 reps  - Standing March with Counter Support  - 1 x daily - 7 x weekly - 2 sets - 10 reps - 3 sec hold    Updated HEP  Access Code: [email or web address]  URL: [email or web address]  Date: 03/19/2025  Prepared by: Celestina Valderrama    Exercises  - Prone Knee Flexion  - 1 x daily - 7 x weekly - 3 sets - 10 reps - 3 sec hold  - Supine Bridge  - 1 x daily - 7 x weekly - 3 sets - 10 reps - 3 sec hold  - Single Leg Bridge  - 1 x daily - 7 x weekly - 3 sets - 10 reps - 3 sec hold  - Supine Active Straight Leg Raise  - 1 x daily - 7 x weekly - 3 sets - 10 reps - 3 sec hold

## 2025-06-04 ENCOUNTER — OFFICE VISIT (OUTPATIENT)
Facility: CLINIC | Age: 65
End: 2025-06-04
Attending: FAMILY MEDICINE
Payer: COMMERCIAL

## 2025-06-04 DIAGNOSIS — R26.89 OTHER ABNORMALITIES OF GAIT AND MOBILITY: Primary | ICD-10-CM

## 2025-06-04 DIAGNOSIS — Z86.73 CHRONIC CEREBROVASCULAR ACCIDENT (CVA): ICD-10-CM

## 2025-06-04 DIAGNOSIS — Z86.73 H/O: CVA (CEREBROVASCULAR ACCIDENT): ICD-10-CM

## 2025-06-04 DIAGNOSIS — I63.9 CEREBROVASCULAR ACCIDENT (CVA), UNSPECIFIED MECHANISM (HCC): Primary | ICD-10-CM

## 2025-06-04 DIAGNOSIS — G81.10 SPASTIC HEMIPARESIS AFFECTING NONDOMINANT SIDE (HCC): ICD-10-CM

## 2025-06-04 PROCEDURE — 97112 NEUROMUSCULAR REEDUCATION: CPT

## 2025-06-04 NOTE — PROGRESS NOTES
"Daily Note     Today's date: 2025  Patient name: Demetrio Villalobos  : 1960  MRN: [id number]  Referring provider: César Malone DO  Dx:   Encounter Diagnosis     ICD-10-CM    1. Other abnormalities of gait and mobility  R26.89       2. Chronic cerebrovascular accident (CVA)  Z86.73             GOES BY \"WILL\"   BP:130/70 mmHg   Subjective: Patient reports to PT from OT, reports no new changes    Objective: See treatment diary below      NMR:   - STS w/ 10# TT: 28x, 29x, 2 sets, 1 min   - Overground ambulation w/ 10# TT: 150 ft (2 sets)  - High knee drive on stairs w/1 UE support: 2 sets, 10x   - Eccentric gastrocniemus heel raises: 10x, 10x   - Lateral step ups on stairs: 10 L, 20 R      Assessment: Patient tolerated treatment well. Patient illustrates hip circumduction and limited toe clearance with overground ambulation, especially when fatigued likely due to decreased hip flexor, ant tib and gluteus med strength. Incorporated eccentric gastrocnemius heel raises to improve propulsion and high knee drive to improve hip flexor power for ease in ambulation. Patient will continue to benefit from skilled OPPT services in order to maximize safe functional mobility and reduce risk for falls post-CVA.      Plan: Continue per plan of care.  Progress treatment as tolerated.      *PATIENT ON BETA BLOCKER, USE RPE*     POC expires Unit limit Auth Expiration date PT/OT + Visit Limit? Co-Insurance   25 12 - BOMN No                                 Visit/Unit Tracking  AUTH Status:  Date  5/ 5/    Approved 12 visits  - 25 Used 2 1 1 1 1 1 1 1 1 1 1     Remaining  10 9 8 7 6 5 4 3 2 1 0        Outcome Measures Initial Eval  10/30/2024 & DN 24 RE  24 PN  2024 PN  2025 PN  2025 PN  2025   5xSTS 17.36 sec, no UE 14.25 sec, 0 UE 11.40 sec, 0 UE 11.65 sec, 0 UE 9.26 sec, 0 UE 9.21 sec, 0 UE   TUG  Regular  Cognitive   22.75 sec "   15.91 sec   13.70 sec   12.33 sec no AD  10.88 sec  18.40 sec no AD  10.13 sec  11.64 sec   10 meter 0.49 m/s TBA- time constraints 0.68 m/s 0.63 m/s 0.81 m/s 0.83 m/s   LACEY 38/56 43/56 45/56 47/56 NA NA   FGA 9/30 15/30 16/30 17/30 20/30 21/30   DGI 8/24 14/24 14/24 15/24 18/24 18/24   MiniBEST 16/28 TBA- time constraints  19/28 21/28 21/28   6MWT   515 ft 600 ft 715 ft 680 ft     Access Code: [email or web address]  URL: [email or web address]  Date: 11/06/2024  Prepared by: Celestina Valderrama    Exercises  - Sit to Stand  - 1 x daily - 7 x weekly - 2 sets - 10 reps  - Side Stepping with Counter Support  - 1 x daily - 7 x weekly - 3 sets - 10 reps  - Standing March with Counter Support  - 1 x daily - 7 x weekly - 2 sets - 10 reps - 3 sec hold    Updated HEP  Access Code: [email or web address]  URL: [email or web address]  Date: 03/19/2025  Prepared by: Celestina Valderrama    Exercises  - Prone Knee Flexion  - 1 x daily - 7 x weekly - 3 sets - 10 reps - 3 sec hold  - Supine Bridge  - 1 x daily - 7 x weekly - 3 sets - 10 reps - 3 sec hold  - Single Leg Bridge  - 1 x daily - 7 x weekly - 3 sets - 10 reps - 3 sec hold  - Supine Active Straight Leg Raise  - 1 x daily - 7 x weekly - 3 sets - 10 reps - 3 sec hold

## 2025-06-04 NOTE — PROGRESS NOTES
"Daily Note     Today's date: 2025  Patient name: Demetrio Villalobos  : 1960  MRN: [id number]  Referring provider: César Malone DO  Dx:   No diagnosis found.      Start Time: 1545  Stop Time: 1630  Total time in clinic (min): 45 minutes    PLAN OF CARE START: 25  PLAN OF CARE END: 25  FREQUENCY: 2x/wk week  PRECAUTIONS: HTN    POC expires Auth Status Total   Visits  Start date  Expiration date PT/OT + Visit Limit? Co-Pay    approved   $0    approved                                       Visit/Unit Tracking  AUTH Status:  Date     Visits  Authed: 12 Used 1 1 1 1 1 1 1 1 1 1 1 1    Remaining  11 10 9 8 7 6 5 4 2 1 11 10        Subjective: Pt reports \"my back is a little sore today.\"    Objective: See treatment below.    NMR:  Primer:  - Completed NuStep for 5 minutes focused on LUE strength and endurance.  - Completed push ups at raised mat focused on UE strengthening and WB x30    - Completed simulation of painting on wall with FES at anterior deltoid  focused on shoulder flexion/extension, gross grasp, and elbow extension. Pt completed with therapist support to keep roller flat on wall for 25 reps. Pt completed with wall to block abduction of shoulder and keep roller flat on wall for 25 reps  - Completed simulation of painting on table with FES at anterior deltoid focused on shoulder flexion/extension, elbow flexion/extension, and gross grasp. Pt completed with independence for 30 reps.  - Completed pouring beans from coffee pot with red theraband for error augmentation of pronation focused on pronation/supination and gross grasp. Pt completed with independence for 30 reps.  - Completed simulated feeding activity scooping beans onto built up foam handled spoon, bringing to mouth while following with bowl to promote bilateral coordination, maintaining grasp, and motor planning. Pt completed with ~10-15% of beans " spilling.        Assessment: Tolerated treatment well.  Pt demonstrated difficulties with controlling spoon spilling ~10-15% of beans. Continues to make progress with functional use of L UE. Pt would benefit from continued OT services to address NMR L UE s/p chronic CVA.      Plan: Continued skilled OT per POC.

## 2025-06-09 ENCOUNTER — OFFICE VISIT (OUTPATIENT)
Facility: CLINIC | Age: 65
End: 2025-06-09
Attending: FAMILY MEDICINE
Payer: COMMERCIAL

## 2025-06-09 DIAGNOSIS — Z86.73 CHRONIC CEREBROVASCULAR ACCIDENT (CVA): ICD-10-CM

## 2025-06-09 DIAGNOSIS — G81.10 SPASTIC HEMIPARESIS AFFECTING NONDOMINANT SIDE (HCC): ICD-10-CM

## 2025-06-09 DIAGNOSIS — R26.89 OTHER ABNORMALITIES OF GAIT AND MOBILITY: Primary | ICD-10-CM

## 2025-06-09 DIAGNOSIS — I63.9 CEREBROVASCULAR ACCIDENT (CVA), UNSPECIFIED MECHANISM (HCC): Primary | ICD-10-CM

## 2025-06-09 DIAGNOSIS — Z86.73 H/O: CVA (CEREBROVASCULAR ACCIDENT): ICD-10-CM

## 2025-06-09 PROCEDURE — 97112 NEUROMUSCULAR REEDUCATION: CPT

## 2025-06-09 PROCEDURE — 97112 NEUROMUSCULAR REEDUCATION: CPT | Performed by: PHYSICAL THERAPIST

## 2025-06-09 NOTE — PROGRESS NOTES
"Daily Note     Today's date: 2025  Patient name: Demetrio Villalobos  : 1960  MRN: [id number]  Referring provider: César Malone DO  Dx:   Encounter Diagnosis     ICD-10-CM    1. Other abnormalities of gait and mobility  R26.89       2. Chronic cerebrovascular accident (CVA)  Z86.73             GOES BY \"WILL\"     Subjective: Patient reports to PT from OT, reports no new changes    Objective: See treatment diary below      NMR:   - STS w/ 10# TT: 25x   - Overground ambulation w/ 10# TT: 150 ft (2 sets)  - FWD cone serpentine w/ 10# TT: 6 laps  - BWD cone serpentine w/ 10# TT: 6 laps  - Step up/through w/ 12-18\" step: 20x      Assessment: Patient tolerated treatment well. Challenged patient dynamic balance with cone serpentine in forward and backwards directions, adding tidal tank to provide perturbations and progress difficulty. During step ups patient utilizes 1 UE support to maintain balance with assist with foot clearance up to 18\" step. Plan to progress as tolerated. Patient will continue to benefit from skilled OPPT services in order to maximize safe functional mobility and reduce risk for falls post-CVA.      Plan: Continue per plan of care.  Progress treatment as tolerated.      *PATIENT ON BETA BLOCKER, USE RPE*     POC expires Unit limit Auth Expiration date PT/OT + Visit Limit? Co-Insurance   25 12 - BOMN No                                 Visit/Unit Tracking  AUTH Status:  Date  5 5    Approved 12 visits  - 25 Used 2 1 1 1 1 1 1 1 1 1 1     Remaining  10 9 8 7 6 5 4 3 2 1 0        Outcome Measures Initial Eval  10/30/2024 & DN 24 RE  24 PN  2024 PN  2025 PN  2025 PN  2025   5xSTS 17.36 sec, no UE 14.25 sec, 0 UE 11.40 sec, 0 UE 11.65 sec, 0 UE 9.26 sec, 0 UE 9.21 sec, 0 UE   TUG  Regular  Cognitive   22.75 sec   15.91 sec   13.70 sec   12.33 sec no AD  10.88 sec  18.40 sec no AD  10.13 sec  11.64 " sec   10 meter 0.49 m/s TBA- time constraints 0.68 m/s 0.63 m/s 0.81 m/s 0.83 m/s   LACEY 38/56 43/56 45/56 47/56 NA NA   FGA 9/30 15/30 16/30 17/30 20/30 21/30   DGI 8/24 14/24 14/24 15/24 18/24 18/24   MiniBEST 16/28 TBA- time constraints  19/28 21/28 21/28   6MWT   515 ft 600 ft 715 ft 680 ft     Access Code: [email or web address]  URL: [email or web address]  Date: 11/06/2024  Prepared by: Celestina Valderrama    Exercises  - Sit to Stand  - 1 x daily - 7 x weekly - 2 sets - 10 reps  - Side Stepping with Counter Support  - 1 x daily - 7 x weekly - 3 sets - 10 reps  - Standing March with Counter Support  - 1 x daily - 7 x weekly - 2 sets - 10 reps - 3 sec hold    Updated HEP  Access Code: [email or web address]  URL: [email or web address]  Date: 03/19/2025  Prepared by: Celestina Valderrama    Exercises  - Prone Knee Flexion  - 1 x daily - 7 x weekly - 3 sets - 10 reps - 3 sec hold  - Supine Bridge  - 1 x daily - 7 x weekly - 3 sets - 10 reps - 3 sec hold  - Single Leg Bridge  - 1 x daily - 7 x weekly - 3 sets - 10 reps - 3 sec hold  - Supine Active Straight Leg Raise  - 1 x daily - 7 x weekly - 3 sets - 10 reps - 3 sec hold

## 2025-06-09 NOTE — PROGRESS NOTES
Daily Note     Today's date: 2025  Patient name: Demetrio Villalobos  : 1960  MRN: [id number]  Referring provider: César Malone DO  Dx:   Encounter Diagnoses   Name Primary?    Cerebrovascular accident (CVA), unspecified mechanism (HCC) Yes    Spastic hemiparesis affecting nondominant side (HCC)     H/O: CVA (cerebrovascular accident)          Start Time: 1630  Stop Time: 1715  Total time in clinic (min): 45 minutes    PLAN OF CARE START: 25  PLAN OF CARE END: 25  FREQUENCY: 2x/wk week  PRECAUTIONS: HTN    POC expires Auth Status Total   Visits  Start date  Expiration date PT/OT + Visit Limit? Co-Pay    approved   $0    approved                                       Visit/Unit Tracking  AUTH Status:  Date     Visits  Authed: 12 Used 1 1 1 1 1 1 1 1 1 1 1 1 1    Remaining  11 10 9 8 7 6 5 4 2 1 11 10 9        Subjective: Pt reports no changes over the weekend    Objective: See treatment below.    NMR:  Primer:  - Completed push ups on parallel bars focused on UE strengthening and WB x30  - Completed flat back latissimus dorsi stretch with hands on parallel bars x 15  - Completed thread the needle for UE strengthening and WB x15      - Completed pegs into board on table top with FES at anterior deltoid focused on shoulder flexion/extension, elbow flexion/extension, FMC, and dexterity. Pt completed 20 x, demonstrating moderate difficulties placing the pieces without dropping.  - Completed hammer rotations for supination/pronation. Pt completed 20 x with independence.  - Completed foam block transfer with tongs focused on supination/pronation and gross grasp. Pt completed 35x with independence.   - Completed stacking foam blocks with tongs focused on shoulder flexion/extension, shoulder horizontal ab/adduction, gross grasp, and coordination. Pt completed stacking 4 blocks 3x with minimal drops.        Assessment:  Tolerated treatment well.  Pt demonstrated difficulties with FMC/dexterity to place pegs into board. Pt demonstrated good control of foam blocks while stacking up to 4 at a time. Continues to make progress with functional use of L UE. Pt would benefit from continued OT services to address NMR L UE s/p chronic CVA.      Plan: Continued skilled OT per POC.

## 2025-06-11 ENCOUNTER — OFFICE VISIT (OUTPATIENT)
Facility: CLINIC | Age: 65
End: 2025-06-11
Attending: FAMILY MEDICINE
Payer: COMMERCIAL

## 2025-06-11 DIAGNOSIS — Z86.73 H/O: CVA (CEREBROVASCULAR ACCIDENT): ICD-10-CM

## 2025-06-11 DIAGNOSIS — I63.9 CEREBROVASCULAR ACCIDENT (CVA), UNSPECIFIED MECHANISM (HCC): Primary | ICD-10-CM

## 2025-06-11 DIAGNOSIS — Z86.73 CHRONIC CEREBROVASCULAR ACCIDENT (CVA): ICD-10-CM

## 2025-06-11 DIAGNOSIS — R26.89 OTHER ABNORMALITIES OF GAIT AND MOBILITY: Primary | ICD-10-CM

## 2025-06-11 DIAGNOSIS — G81.10 SPASTIC HEMIPARESIS AFFECTING NONDOMINANT SIDE (HCC): ICD-10-CM

## 2025-06-11 PROCEDURE — 97112 NEUROMUSCULAR REEDUCATION: CPT

## 2025-06-11 NOTE — PROGRESS NOTES
Daily Note     Today's date: 2025  Patient name: Demetrio Villalobos  : 1960  MRN: [id number]  Referring provider: César Malone DO  Dx:   Encounter Diagnoses   Name Primary?    Cerebrovascular accident (CVA), unspecified mechanism (HCC) Yes    Spastic hemiparesis affecting nondominant side (HCC)     H/O: CVA (cerebrovascular accident)                       PLAN OF CARE START: 25  PLAN OF CARE END: 25  FREQUENCY: 2x/wk week  PRECAUTIONS: HTN    POC expires Auth Status Total   Visits  Start date  Expiration date PT/OT + Visit Limit? Co-Pay    approved   $0    approved                                       Visit/Unit Tracking  AUTH Status:  Date     Visits  Authed: 12 Used 1 1 1 1 1 1 1 1 1 1 1 1 1 1    Remaining  11 10 9 8 7 6 5 4 2 1 11 10 9 8        Subjective: Pt reports no changes    Objective: See treatment below.    NMR:  Primer:  -STS with alternating punches 1.25 min x3 with 2lb dumbbells at high intensity in order to increase heart rate to drive neuroplastic changes following CVA. Completes 7-11 reps per set      -Completed push ups on parallel bars focused on UE strengthening and WB 20x and then 30x.  Focus on Wbing for proprioceptive input for spasticity mgmt and massed practice elbow flexion/extension    -Shoulder taps with Wbing on parallel bars with 2lb wrist weights 15x each UE 2x    -Rotation of multi matrix cubes and stacking trialed, however pt completes 1 stack with significantly inc time and compensatory use of table, significant frustration.  Downgraded to stacking pairs on tabletop.  Focus on rotation, pincer grasp/release.      -Removal of spice jar lids with use of b/l UE with stabilization of items in R hand, and rotation of lids with L UE.  Focus on bimanual coordination, rotation of items in L hand.  Requires significantly inc time, container rotated on its side to achieve effective  positioning with L UE      Assessment: Tolerated treatment well.  Pt demonstrated difficulties with rotation of items in hand. L UE fatigues quickly today. Continues to make progress with functional use of L UE. Pt would benefit from continued OT services to address NMR L UE s/p chronic CVA.      Plan: Continued skilled OT per POC.

## 2025-06-11 NOTE — PROGRESS NOTES
"Daily Note     Today's date: 2025  Patient name: Demetrio Villalobos  : 1960  MRN: [id number]  Referring provider: César Malone DO  Dx:   Encounter Diagnosis     ICD-10-CM    1. Other abnormalities of gait and mobility  R26.89       2. Chronic cerebrovascular accident (CVA)  Z86.73             GOES BY \"WILL\"     Subjective: Patient reports to PT session with no new issues or complaints.     Objective: See treatment diary below      NMR:   - STS w/ 20# TT to fatigue + medium RR under RLE:   1st set: 25 reps   2nd set: 20 reps  - Agility ladder negotiation (in/outs) + 5# RLE counterweight x 2.5 minutes  - Hip abduction repeats via glider to fatigue: 40 reps, 1 UE  - Ambulation with unilateral 9# KB hold (RUE) 4 x 40 ft  - Sidestepping on foam beam x 3 cycles down/back  - Sled push/pull (20# added) x 3 cycles down/back      Assessment: Patient tolerated treatment session well today with focus on functional mobility, strength, and gait training. Overall increasing number of hip abduction repeats with glider suggests increased sustained motor recruitment of proximal hip stabilizers on involved LLE. Significantly challenged on compliant surfaces such as foam beam. Patient will continue to benefit from skilled OPPT services in order to maximize safe functional mobility and reduce risk for falls post-CVA.      Plan: Continue per plan of care.  Progress treatment as tolerated.      *PATIENT ON BETA BLOCKER, USE RPE*     POC expires Unit limit Auth Expiration date PT/OT + Visit Limit? Co-Insurance   25 12 visits  BOMN No                                 Visit/Unit Tracking  AUTH Status:  Date                Used 1               Remaining  11                  Outcome Measures Initial Eval  10/30/2024 & DN 24 RE  24 PN  2024 PN  2025 PN  2025 PN  2025 PN  2025   5xSTS 17.36 sec, no UE 14.25 sec, 0 UE 11.40 sec, 0 UE 11.65 sec, 0 UE 9.26 sec, 0 UE 9.21 sec, 0 UE 9.66 " sec, 0 UE   TUG  Regular  Cognitive   22.75 sec   15.91 sec   13.70 sec   12.33 sec no AD  10.88 sec  18.40 sec no AD  10.13 sec  11.64 sec no AD  11.73 sec  12.14 sec   10 meter 0.49 m/s TBA- time constraints 0.68 m/s 0.63 m/s 0.81 m/s 0.83 m/s 0.76 m/s   LACEY 38/56 43/56 45/56 47/56 NA NA NA   FGA 9/30 15/30 16/30 17/30 20/30 21/30 23/30   DGI 8/24 14/24 14/24 15/24 18/24 18/24 21/24   MiniBEST 16/28 TBA- time constraints  19/28 21/28 21/28 22/28   6MWT   515 ft 600 ft 715 ft 680 ft 645 ft     Access Code: [email or web address]  URL: [email or web address]  Date: 11/06/2024  Prepared by: Celestina Valderrama    Exercises  - Sit to Stand  - 1 x daily - 7 x weekly - 2 sets - 10 reps  - Side Stepping with Counter Support  - 1 x daily - 7 x weekly - 3 sets - 10 reps  - Standing March with Counter Support  - 1 x daily - 7 x weekly - 2 sets - 10 reps - 3 sec hold    Updated HEP  Access Code: [email or web address]  URL: [email or web address]  Date: 03/19/2025  Prepared by: Celestina Valderrama    Exercises  - Prone Knee Flexion  - 1 x daily - 7 x weekly - 3 sets - 10 reps - 3 sec hold  - Supine Bridge  - 1 x daily - 7 x weekly - 3 sets - 10 reps - 3 sec hold  - Single Leg Bridge  - 1 x daily - 7 x weekly - 3 sets - 10 reps - 3 sec hold  - Supine Active Straight Leg Raise  - 1 x daily - 7 x weekly - 3 sets - 10 reps - 3 sec hold

## 2025-06-12 DIAGNOSIS — M54.50 LUMBAR BACK PAIN: ICD-10-CM

## 2025-06-12 DIAGNOSIS — R25.2 SPASTICITY: ICD-10-CM

## 2025-06-12 RX ORDER — CYCLOBENZAPRINE HCL 10 MG
10 TABLET ORAL
Qty: 30 TABLET | Refills: 1 | Status: SHIPPED | OUTPATIENT
Start: 2025-06-12

## 2025-06-16 ENCOUNTER — OFFICE VISIT (OUTPATIENT)
Facility: CLINIC | Age: 65
End: 2025-06-16
Attending: FAMILY MEDICINE
Payer: COMMERCIAL

## 2025-06-16 ENCOUNTER — APPOINTMENT (OUTPATIENT)
Facility: CLINIC | Age: 65
End: 2025-06-16
Attending: FAMILY MEDICINE
Payer: COMMERCIAL

## 2025-06-16 DIAGNOSIS — R26.89 OTHER ABNORMALITIES OF GAIT AND MOBILITY: Primary | ICD-10-CM

## 2025-06-16 DIAGNOSIS — Z86.73 CHRONIC CEREBROVASCULAR ACCIDENT (CVA): ICD-10-CM

## 2025-06-16 PROCEDURE — 97112 NEUROMUSCULAR REEDUCATION: CPT | Performed by: PHYSICAL THERAPIST

## 2025-06-16 NOTE — PROGRESS NOTES
"Daily Note     Today's date: 2025  Patient name: Demetrio Villalobos  : 1960  MRN: [id number]  Referring provider: César Malone DO  Dx:   Encounter Diagnosis     ICD-10-CM    1. Other abnormalities of gait and mobility  R26.89       2. Chronic cerebrovascular accident (CVA)  Z86.73             GOES BY \"WILL\"     Subjective: Patient reports to PT session with no new issues or complaints.     Objective: See treatment diary below      NMR:   - STS w/ 10# TT to fatigue + medium RR under RLE:   1st set: 25 reps   2nd set: 25 reps  - FWD hurdles (6-9\"): 10 laps   - LAT hurdles (6-9\"): 10 laps   - Sidestepping on foam: 10 laps  - Ambulation w/ 10# TT: 200 ft      Assessment: Patient tolerated treatment well. When navigating hurdles patient utilizes posterior trunk lean to assist with foot clearance over 9\" mel without LOB. Occasional 1 UE assist used when on unstable foam beam to maintain balance and prevent fall. Patient will continue to benefit from skilled OPPT services in order to maximize safe functional mobility and reduce risk for falls post-CVA.      Plan: Continue per plan of care.  Progress treatment as tolerated.      *PATIENT ON BETA BLOCKER, USE RPE*     POC expires Unit limit Auth Expiration date PT/OT + Visit Limit? Co-Insurance   25 12 visits  BOMN No                                 Visit/Unit Tracking  AUTH Status:  Date                Used 1               Remaining  11                  Outcome Measures Initial Eval  10/30/2024 & DN 24 RE  24 PN  2024 PN  2025 PN  2025 PN  2025 PN  2025   5xSTS 17.36 sec, no UE 14.25 sec, 0 UE 11.40 sec, 0 UE 11.65 sec, 0 UE 9.26 sec, 0 UE 9.21 sec, 0 UE 9.66 sec, 0 UE   TUG  Regular  Cognitive   22.75 sec   15.91 sec   13.70 sec   12.33 sec no AD  10.88 sec  18.40 sec no AD  10.13 sec  11.64 sec no AD  11.73 sec  12.14 sec   10 meter 0.49 m/s TBA- time constraints 0.68 m/s 0.63 m/s 0.81 m/s 0.83 m/s 0.76 m/s "   LACEY 38/56 43/56 45/56 47/56 NA NA NA   FGA 9/30 15/30 16/30 17/30 20/30 21/30 23/30   DGI 8/24 14/24 14/24 15/24 18/24 18/24 21/24   MiniBEST 16/28 TBA- time constraints  19/28 21/28 21/28 22/28   6MWT   515 ft 600 ft 715 ft 680 ft 645 ft     Access Code: [email or web address]  URL: [email or web address]  Date: 11/06/2024  Prepared by: Celestina Valderrama    Exercises  - Sit to Stand  - 1 x daily - 7 x weekly - 2 sets - 10 reps  - Side Stepping with Counter Support  - 1 x daily - 7 x weekly - 3 sets - 10 reps  - Standing March with Counter Support  - 1 x daily - 7 x weekly - 2 sets - 10 reps - 3 sec hold    Updated HEP  Access Code: [email or web address]  URL: [email or web address]  Date: 03/19/2025  Prepared by: Celestina Valderrama    Exercises  - Prone Knee Flexion  - 1 x daily - 7 x weekly - 3 sets - 10 reps - 3 sec hold  - Supine Bridge  - 1 x daily - 7 x weekly - 3 sets - 10 reps - 3 sec hold  - Single Leg Bridge  - 1 x daily - 7 x weekly - 3 sets - 10 reps - 3 sec hold  - Supine Active Straight Leg Raise  - 1 x daily - 7 x weekly - 3 sets - 10 reps - 3 sec hold

## 2025-06-17 ENCOUNTER — OFFICE VISIT (OUTPATIENT)
Dept: INTERNAL MEDICINE CLINIC | Facility: CLINIC | Age: 65
End: 2025-06-17
Payer: COMMERCIAL

## 2025-06-17 VITALS
SYSTOLIC BLOOD PRESSURE: 128 MMHG | WEIGHT: 213.4 LBS | HEIGHT: 67 IN | OXYGEN SATURATION: 97 % | DIASTOLIC BLOOD PRESSURE: 76 MMHG | BODY MASS INDEX: 33.49 KG/M2 | HEART RATE: 93 BPM

## 2025-06-17 DIAGNOSIS — R73.03 PREDIABETES: Primary | ICD-10-CM

## 2025-06-17 DIAGNOSIS — M99.03 SOMATIC DYSFUNCTION OF LUMBAR REGION: ICD-10-CM

## 2025-06-17 DIAGNOSIS — M54.50 LUMBAR BACK PAIN: ICD-10-CM

## 2025-06-17 DIAGNOSIS — I73.9 PERIPHERAL ARTERIAL DISEASE (HCC): ICD-10-CM

## 2025-06-17 PROCEDURE — 99213 OFFICE O/P EST LOW 20 MIN: CPT

## 2025-06-17 RX ORDER — ELECTROLYTES/DEXTROSE
1 SOLUTION, ORAL ORAL DAILY
Qty: 30 TABLET | Refills: 2 | Status: SHIPPED | OUTPATIENT
Start: 2025-06-17

## 2025-06-17 NOTE — ASSESSMENT & PLAN NOTE
Refer to [facility] for OMT, if possible  Consider referral to chiropractor provider  Orders:  •  OMT; Future

## 2025-06-17 NOTE — PROGRESS NOTES
Name: Demetrio Villalobos      : 1960      MRN: [id number]  Encounter Provider: Daysi Richard MD  Encounter Date: 2025   Encounter department: [facility] INTERNAL MEDICINE  :  Assessment & Plan  Prediabetes  Lab Results   Component Value Date    HGBA1C 5.9 2025     Repeat A1c  Recommend lifestyle modifications including low carb/low sugar diet, increased physical activity/exercise and weight loss  Will continue to monitor  Orders:  •  Hemoglobin A1C; Future    Peripheral arterial disease (HCC)  Continues with multivitamins  Continues with low-dose ASA  Continues with statin therapy  Orders:  •  Multiple Vitamin (Multivitamin Adult) TABS; Take 1 tablet by mouth daily    Lumbar back pain  Refer to [facility] for OMT, if possible  Consider referral to chiropractor provider  Orders:  •  OMT; Future    Somatic dysfunction of lumbar region  Refer to [facility] for OMT, if possible  Consider referral to chiropractor provider  Orders:  •  OMT; Future      Return in about 3 months (around 2025) for Recheck chronic conditions.       History of Present Illness   HPI  Patient returns for a follow-up visit for continued management of chronic medical conditions.    Continues to have intermittent episodes of lower back pain, tightness/spasm and sciatica.    Denies any new concerns at this time.      Review of Systems   Constitutional:  Negative for chills and fever.   HENT:  Negative for congestion.    Eyes:  Negative for visual disturbance.   Respiratory:  Negative for shortness of breath.    Cardiovascular:  Negative for chest pain, palpitations and leg swelling.   Gastrointestinal:  Negative for abdominal pain.   Genitourinary:  Negative for difficulty urinating and dysuria.   Musculoskeletal:  Positive for arthralgias, back pain and gait problem. Negative for neck pain and neck stiffness.   Neurological:  Positive for weakness. Negative for dizziness, light-headedness and headaches.  "  Psychiatric/Behavioral:  Negative for confusion and sleep disturbance.        Objective   /76 (BP Location: Right arm, Patient Position: Sitting, Cuff Size: Large)   Pulse 93   Ht 5' 7\" (1.702 m)   Wt 96.8 kg (213 lb 6.4 oz)   SpO2 97%   BMI 33.42 kg/m²      Physical Exam  Vitals and nursing note reviewed.   Constitutional:       General: He is not in acute distress.     Appearance: Normal appearance. He is not ill-appearing.   HENT:      Head: Normocephalic.     Eyes:      General:         Right eye: No discharge.         Left eye: No discharge.      Extraocular Movements: Extraocular movements intact.      Conjunctiva/sclera: Conjunctivae normal.       Cardiovascular:      Rate and Rhythm: Normal rate.      Pulses: Normal pulses.   Pulmonary:      Effort: Pulmonary effort is normal. No respiratory distress.     Musculoskeletal:         General: Normal range of motion.      Cervical back: Normal range of motion.     Skin:     General: Skin is warm and dry.     Neurological:      Mental Status: He is alert and oriented to person, place, and time.     Psychiatric:         Behavior: Behavior normal.         "

## 2025-06-17 NOTE — ASSESSMENT & PLAN NOTE
Continues with multivitamins  Continues with low-dose ASA  Continues with statin therapy  Orders:  •  Multiple Vitamin (Multivitamin Adult) TABS; Take 1 tablet by mouth daily

## 2025-06-18 ENCOUNTER — OFFICE VISIT (OUTPATIENT)
Facility: CLINIC | Age: 65
End: 2025-06-18
Attending: FAMILY MEDICINE
Payer: COMMERCIAL

## 2025-06-18 DIAGNOSIS — Z86.73 CHRONIC CEREBROVASCULAR ACCIDENT (CVA): ICD-10-CM

## 2025-06-18 DIAGNOSIS — I63.9 CEREBROVASCULAR ACCIDENT (CVA), UNSPECIFIED MECHANISM (HCC): Primary | ICD-10-CM

## 2025-06-18 DIAGNOSIS — G81.10 SPASTIC HEMIPARESIS AFFECTING NONDOMINANT SIDE (HCC): ICD-10-CM

## 2025-06-18 DIAGNOSIS — R26.89 OTHER ABNORMALITIES OF GAIT AND MOBILITY: Primary | ICD-10-CM

## 2025-06-18 PROCEDURE — 97110 THERAPEUTIC EXERCISES: CPT

## 2025-06-18 PROCEDURE — 97112 NEUROMUSCULAR REEDUCATION: CPT

## 2025-06-18 NOTE — PROGRESS NOTES
"Daily Note     Today's date: 2025  Patient name: Demetrio Villalobos  : 1960  MRN: [id number]  Referring provider: César Malone DO  Dx:   Encounter Diagnosis     ICD-10-CM    1. Other abnormalities of gait and mobility  R26.89       2. Chronic cerebrovascular accident (CVA)  Z86.73         POC expires Unit limit Auth Expiration date PT/OT + Visit Limit? Co-Insurance   25 12 visits  BOMN No                                 Visit/Unit Tracking  AUTH Status:  Date              Used 1 1 1             Remaining  11 10 9                  GOES BY \"WILL\"     Subjective: Patient reports to PT session with no new issues or complaints.     Objective: See treatment diary below  BP: 137/95 mmHg    NMR:   - STS w/ 10# TT to fatigue + small RR under RLE:   1st set: 25 reps   2nd set: 25 reps  - Stepping over 6 in mel with red resistance: 10x   - Lateral stepping over 6 in mel with red resistance: 10x each   - Backwards 6 in mel negotiation with red ant resistance: 10x   - High knee drive at steps, affected limb on ground: 2 sets, 10 x      Assessment: Patient tolerated treatment well. Patient illustrated compensations by circumduction with mel negotiation likely due to weakness in hip flexor and gluteus medius musculature strength. M/L instability noted with all SLS activities, however this may be due to muscular fatigue as it was towards the end of the session. Patient will continue to benefit from skilled OPPT services in order to maximize safe functional mobility and reduce risk for falls post-CVA.      Plan: Continue per plan of care.  Progress treatment as tolerated.      *PATIENT ON BETA BLOCKER, USE RPE*       Outcome Measures Initial Eval  10/30/2024 & DN 24 RE  24 PN  2024 PN  2025 PN  2025 PN  2025 PN  2025   5xSTS 17.36 sec, no UE 14.25 sec, 0 UE 11.40 sec, 0 UE 11.65 sec, 0 UE 9.26 sec, 0 UE 9.21 sec, 0 UE 9.66 sec, 0 UE "   TUG  Regular  Cognitive   22.75 sec   15.91 sec   13.70 sec   12.33 sec no AD  10.88 sec  18.40 sec no AD  10.13 sec  11.64 sec no AD  11.73 sec  12.14 sec   10 meter 0.49 m/s TBA- time constraints 0.68 m/s 0.63 m/s 0.81 m/s 0.83 m/s 0.76 m/s   LACEY 38/56 43/56 45/56 47/56 NA NA NA   FGA 9/30 15/30 16/30 17/30 20/30 21/30 23/30   DGI 8/24 14/24 14/24 15/24 18/24 18/24 21/24   MiniBEST 16/28 TBA- time constraints  19/28 21/28 21/28 22/28   6MWT   515 ft 600 ft 715 ft 680 ft 645 ft     Access Code: [email or web address]  URL: [email or web address]  Date: 11/06/2024  Prepared by: Celestina Valderrama    Exercises  - Sit to Stand  - 1 x daily - 7 x weekly - 2 sets - 10 reps  - Side Stepping with Counter Support  - 1 x daily - 7 x weekly - 3 sets - 10 reps  - Standing March with Counter Support  - 1 x daily - 7 x weekly - 2 sets - 10 reps - 3 sec hold    Updated HEP  Access Code: [email or web address]  URL: [email or web address]  Date: 03/19/2025  Prepared by: Celestina Valderrama    Exercises  - Prone Knee Flexion  - 1 x daily - 7 x weekly - 3 sets - 10 reps - 3 sec hold  - Supine Bridge  - 1 x daily - 7 x weekly - 3 sets - 10 reps - 3 sec hold  - Single Leg Bridge  - 1 x daily - 7 x weekly - 3 sets - 10 reps - 3 sec hold  - Supine Active Straight Leg Raise  - 1 x daily - 7 x weekly - 3 sets - 10 reps - 3 sec hold

## 2025-06-18 NOTE — PROGRESS NOTES
Daily Note     Today's date: 2025  Patient name: Demetrio Villalobos  : 1960  MRN: [id number]  Referring provider: César Malone DO  Dx:   Encounter Diagnoses   Name Primary?    Cerebrovascular accident (CVA), unspecified mechanism (HCC) Yes    Spastic hemiparesis affecting nondominant side (HCC)              Start Time: 1532  Stop Time: 1630  Total time in clinic (min): 58 minutes    PLAN OF CARE START: 25  PLAN OF CARE END: 25  FREQUENCY: 2x/wk week  PRECAUTIONS: HTN    POC expires Auth Status Total   Visits  Start date  Expiration date PT/OT + Visit Limit? Co-Pay    approved   $0    approved                                       Visit/Unit Tracking  AUTH Status:  Date     Visits  Authed: 12 Used 1 1 1 1 1 1 1 1 1 1 1 1 1 1 1    Remaining  11 10 9 8 7 6 5 4 2 1 11 10 9 8 7        Subjective: Pt reports he is feeling good today.    Objective: See treatment below.    TE/NMR:  -Primer: NuStep level 5 for 5 minutes with L UE only in order to increase HR to drive neuroplastic changes following CVA.   -Completed push ups on parallel bars focused on UE strengthening and WB 30x and then 35x.  Focus on Wbing for proprioceptive input for spasticity management and massed practice elbow flexion/extension.   - Needle threads on parallel bars with 2lb b/l wrist weights donned, 10x each UE, 2x. Focus on UE strengthening, proximal stability for distal mobility, and WB. Required [place] on L UE for correct form ~50% of the time.   -Paint roller in stance at table top with L UE, 2x20 for lat strength, and R shoulder flexion and elbow flexion/extension. Required tactile cues x2 to eliminate compensatory shoulder hiking throughout.   - Massed practice supination/pronation with pt in stance at table top and scooping beans into bin. FES applied to supinators and biceps on L UE. Completed x40 scoops with breaks PRN. Compensation of  shoulder hiking and IR observed- trialed tactile cues to assist in elimination, but persisted. Adapted activity to then have pt pour beans from pitcher into cup on anterior table top. Completed x50 with breaks PRN.     Assessment: Tolerated treatment well.  Pt demonstrated difficulty with supination movement today and benefited from use of FES and [place] assistance to facilitate movement. Spilled ~10% of bills, demonstrating increased control today as well. Continues to make progress with functional use of L UE. Pt would benefit from continued OT services to address NMR L UE s/p chronic CVA.    Plan: Continued skilled OT per POC.        no syncope

## 2025-06-25 ENCOUNTER — EVALUATION (OUTPATIENT)
Facility: CLINIC | Age: 65
End: 2025-06-25
Attending: FAMILY MEDICINE
Payer: COMMERCIAL

## 2025-06-25 ENCOUNTER — OFFICE VISIT (OUTPATIENT)
Facility: CLINIC | Age: 65
End: 2025-06-25
Attending: FAMILY MEDICINE
Payer: COMMERCIAL

## 2025-06-25 DIAGNOSIS — Z86.73 CHRONIC CEREBROVASCULAR ACCIDENT (CVA): ICD-10-CM

## 2025-06-25 DIAGNOSIS — Z86.73 H/O: CVA (CEREBROVASCULAR ACCIDENT): ICD-10-CM

## 2025-06-25 DIAGNOSIS — G81.10 SPASTIC HEMIPARESIS AFFECTING NONDOMINANT SIDE (HCC): ICD-10-CM

## 2025-06-25 DIAGNOSIS — R26.89 OTHER ABNORMALITIES OF GAIT AND MOBILITY: Primary | ICD-10-CM

## 2025-06-25 DIAGNOSIS — I63.9 CEREBROVASCULAR ACCIDENT (CVA), UNSPECIFIED MECHANISM (HCC): Primary | ICD-10-CM

## 2025-06-25 PROCEDURE — 97530 THERAPEUTIC ACTIVITIES: CPT

## 2025-06-25 NOTE — PROGRESS NOTES
OCCUPATIONAL THERAPY RE-EVALUATION    Today's Date: 2025  Patient Name: Demetrio Villalobos  : 1960  MRN: [id number]  Referring Provider: César Malone DO  Dx: Cerebrovascular accident (CVA), unspecified mechanism (HCC) [I63.9]     Date 25             Units:  Used 2 1             Authed:  Remaining  10 3                 SKILLED ANALYSIS:  Pt presents to re-evaluation on 25 after ~8 months of OP OT s/p CVA in .  Pt reports continued improvements with functional use of LE, especially with scapular exercises, inc ROM during functional reaching, and reports increased fluidity and feeling more natural using L UE.  Based on assessments, pt demonstrating significant improvements with lateral pinch strength, FMC, overall functional use of L UE, improved UE strength.  Added additional goal today for Box and Blocks Assessment. Continue to recommend pt be evaluated by PMR/neuro for botox injections for spasticity mgmt.  Considering pt's significant progress, recommend continued OP OT 2x/wk for an additional 6 weeks with continued focus on NMR L UE for increased independence and QOL s/p CVA.  Discussed progress and recommendations with pt, and he is in agreement.      PLAN OF CARE START: 25  PLAN OF CARE END: 25  FREQUENCY: 2x/wk week  PRECAUTIONS: HTN    Subjective  25: Pt reports he sees a little improvement since last RE. He can walk further. He states sometimes he can move his hand certain ways and sometimes he can't. Since SOC he has starting using his toothbrush with his L hand. He holds dishes that he's washing in his L hand, and can self feed with a spoon using his L hand. He's turning on light switches with his L hand. He has started using estim on his LUE at home, every morning. He rates his LUE function at 60-70% and would like it to get to 80%.     3/26: Pt reports he feels he has 40% UE recovery s/p CVA.  He is able to stabilize the tooth paste with L UE, but unable to  "squeeze paste out.  Now able to open door handles with L UE.    5/14/25: Able to use doorknobs with L UE, feed himself with L UE, squeeze toothpaste hard enough that paste comes out but often very messy, rotating caps of med containers to open with L UE, using zippers with L UE    6/25/25: Reports continued functional improvements with use of L UE, improved fluidity and feeling more natural using L UE.    Mechanism of Injury  CVA approximately 10 years ago, went to [facility] for outpatient rehabilitation; pt denies seizures and is not on anti-seizure medication    Occupational Profile  Pt lives alone in an apartment with no stairs to get in. Pt reports difficulty with ADLs (UBD, shaving head, LBD ) however was able to put on with independence. Pt reports he has an aide who assists 5 hours a day and does cleaning, cooking, and bathing pt. Pt manages own medication. Pt reports he has not been driving. Pt reports no pain in LUE. Pt is a retired . Pt report no diplopia and no issues with cognition. Pt enjoys reading bible, watching TV.     PATIENT GOAL: \"as much use of my L hand and walking as possible.\"    HISTORY OF PRESENT ILLNESS:     PMH:   Past Medical History:   Diagnosis Date    Chronic kidney disease     Edema     GERD (gastroesophageal reflux disease)     Gout     hospitalized    High cholesterol     Hypertension     PVD (peripheral vascular disease) ([facility])     Renal disorder     Rotator cuff tear     right    Shortness of breath     Stroke ([facility]) 2014    Ventral hernia without obstruction or gangrene     Walker as ambulation aid        Past Surgical Hx:   Past Surgical History:   Procedure Laterality Date    APPENDECTOMY      NE ARTHROPLASTY GLENOHUMERAL JOINT TOTAL SHOULDER Right 8/26/2024    Procedure: ARTHROPLASTY SHOULDER REVERSE;  Surgeon: Sina Randle MD;  Location: [facility];  Service: Orthopedics        Pain: none    Objective    Upper Extremities:  Pt is R hand dominant  Did not assess RUE " strength due to rotator cuff sx.                  Strength:     Date 11/13/24 3/26/25 5/14/25 6/25/25    Right UE        Left UE 19 21 30lb 25lb      The age norm is approximately 76.8 lbs, indicating decreased  strength.    Lateral pinch:    Date 3/26/25 5/14/25 6/25/25    Right UE       Left UE 4.5 6.5 10.5                    Range of Motion:    Left UE:  Date: 3/26/25 5/14/25 6/25/25   Shoulder flexion 100* with compensatory shoulder abduction and elbow flexion 80* 88*   Shoulder abduction 80* 71* 88*   Shoulder IR   WNL   Shoulder ER  Able to achieve -50* from neutral -50* from neutral   Elbow flexion 100% 100% 100%   Elbow extension 100% 100% 158*   Forearm supination 75% 75% 80%   Forearm pronation 100% 100% 100%   Wrist flexion 50* 66* 38*   Wrist extension 40* 23* 32*   Radial deviation      Thumb extension 25% 15%    Digit flexion 100%  100%   Digit extension 20% 20% 40%          Manual Muscle Testing L UE:  Date: 3/26/25 5/14/25 6/25/25   Shoulder extension  4/5 4+/5   Shoulder flexion 3-/5 3/5 3/5   Shoulder abduction 3-/5 3/5 3-/5   Shoulder IR 4-/5 4-/5 4/5   Shoulder ER 2-/5 2-/5 2-/5   Elbow flexion 4+/5 5/5 5/5   Elbow extension 4+/5 5/5 5/5   Wrist flexion 4-/5 4-/5 4-/5   Wrist extension 2+/5 3-/5 4-/5     FUGL LOVE ASSESSMENT OF MOTOR RECOVERY AFTER STROKE:    L UE:  Date: 3/26/25 5/14/25 6/25/25   Upper Extremity Seated 23/36 24 23   Wrist 7/10 7/10 9   Hand 7/14 8/14 8   Coordination/Speed 3/6 3/6 5   Overall score 40/66 42/66 45/66     (Clinical change= 5 points, severe impairment <19, and mild impairment is >50)     NINE-HOLE PEG TEST: assesses dexterity/fine motor coordination. Alternative scoring: the number of pegs placed in 50 or 100 seconds can be recorded.    COMPLETED MODIFIED VERSION.  PT TRANSFERS 4 FDT PEGS FROM LID (SPREAD EVERY OTHER HOLE) TO FDT BOARD     Date: 5/14/25 6/25/25   Right UE 4 seconds NA   Left UE 34 seconds 34     Norms for age/sex: Pt demonstrates decreased  FMC compared to norms for age/sex     Norms:   Sex Age Average R Average L   Male 21-25 16.41 17.53   Male 26-30 16.88 17.84   Male  31-35 17.54 18.47   Male 36-40 17.71 18.62   Male 41-45 18.54 18.49   Male 46-50 18.35 19.57   Male 51-55 18.93 19.84   Male 56-60 20.90 21.64   Male 61-65 20.87 21.60   Male 66-70 21.23 22.29   Male 71+ 25.79 25.95   Female 21-25 16.04 17.21   Female 26-30 15.90 16.97   Female 31-35 16.69 17.47   Female 36-40 16.74 18.16   Female 41-45 16.54 17.64   Female 46-50 17.36 17.96   Female 51-55 17.38 18.92   Female 56-60 17.86 19.48   Female 61-65 18.99 20.33   Female 66-70 19.90 21.44   Female 71+ 22.49 24.11       Functional Dexterity Test: trialed with L UE, able to rotate 1 peg in 54 seconds with 1 drop, compensatory use of R UE and placement into top of FDT lid instead of board.  Performance demonstrating non-functional L UE.    Box and Blocks: 8 blocks in 1 min    Modified Rosetta Scale: measures spasticity in patients with lesions in the Central Nervous System  L UE:      Shoulder external rotators: 0/4  Shoulder internal rotators: 3/4  Elbow flexors: 2/4  Elbow extensors: 1+/4  Wrist flexors: 1+/4  Wrist extensors 1+/4  Pronators 2/4  Finger flexors: 2/4  Finger extensors: 0/4    0: No increase in muscle tone  1: Slight increase in muscle tone, manifested by a catch and release or by minimal resistance at the end of the range of motion when the affected part(s) is moved in flexion or extension  1+: Slight increase in muscle tone, manifested by a catch, followed by minimal resistance throughout the remainder (less than half) of the ROM  2: More marked increase in muscle tone through most of the ROM, but affected part(s) easily moved  3: Considerable increase in muscle tone, passive movement difficult  4: Affected part(s) rigid in flexion or extension    Subluxation: none    Scapular winging: slight    Spasticity: slight     Finger to Nose Testing with Visual Occlusion  (proprioception):  mild proprioception deficits    Finger to Nose Testing without Visual Occlusion: mild dysmetria    Monofilaments/sensory testing: NOT tested    Functional Cognition:  Highest level of education: 9th grade    [place] Cognitive Assessment Version 8.1 (MoCA V8.1) NOT ASSESSED  Visuospatial/executive functioning:  3/5  Naming:  3/3  Memory: 1st trial:  , 2nd trial:    Attention/concentration:   List of letters:   Serial Seven Subtraction:  2/3 w/  errors  Language/sentence repetition:    Language Fluency:     Abstract/Correlational Thinkin/2  Delayed Recall:    Orientation:     Memory Index Score: 13/15   Raw Score:  25/30, MIS:  13/15, indicative of mild neurocognitive impairments.    MoCA Scoring        Normal: 26+         Mild Cognitive Impairment: 18-25          Moderate Cognitive Impairment: 10-17         Severe Cognitive Impairment: <10    Trail making Part A and Part B: NOT ASSESSED  Part A: 31.28 with errors however able to self correct   Part B: 1:45 with 1 cue (previously 1 minute 49 with 1 cue  Indicating deficits: Part A > 33.12 seconds and Part B > 74.55 seconds            GOALS:   Short Term Goals:  Pt will increase UE  strength by 2-3 lbs to complete gardening and IADLs (19) ACHIEVED   Pt will increase divided attention by completing trail making part B in 1 minute 20 seconds to complete IADLs AHCIEVED  Pt will demo with G understanding and carryover of tone reduction strategies for improved AROM ACHIEVED   Pt will demo with decreased L  UE hypertonicity to 1+/4 on modified jonna scale for improved grasp release, termination of flexors on command of time  Pt will increase  L UE cooordination to complete 19/36 of upper extremity section of fugyl abraham for tabletop tasks ACHIEVED  Pt will increase  L UE coordination to complete 5/10 of wrist section of fugyl abraham for tabletop tasks ACHIEVED             Long Term Goals: 8-12 weeks  Pt will increase UE   strength by 4-5 lbs to complete gardening and IADLs (19) ACHIEVED 1/30  Pt will increase divided attention by completing trail making part B in 1 minute 10 seconds to complete IADLs  Pt will demo with G understanding and carryover of tone reduction strategies for improved AROM ACHIEVED 1/30  Pt will demo with decreased L  UE hypertonicity to 1/4 on modified jonna scale for improved grasp release, termination of flexors on command of time  Pt will increase  L UE coordination to complete 21/36 of upper extremity section of fugyl abraham for tabletop tasks ACHIEVED  Pt will increase  L UE coordination to complete fugyl abraham in 32 for tabletop tasks ACHIEVED 1/30    New Goals Established 1/30/25:  Pt will demonstrate improved L gross grasp strength to 29lbs in order to improve ADL/IADL performance in 12 weeks. ACHIEVED  Pt will demonstrate improved LUE function based on Fugl Abraham score of 35/66 in 12 weeks. ACHIEVED      New goals 5/14/25:  Pt will demonstrate improved L UE function by scoring at least 48 on FMA.  Pt will inc L grasp strength to at least 35lb for carry over with IADLs.    New goal 6/25/25:  Pt will demonstrate improved grasp/release and functional reaching by completing at least 10 blocks on the Box and Blocks Assessment.    OTHER PLANNED THERAPY INTERVENTIONS:   Supine, seated, and in stance neuro re-ed  Tricep AG  NMES/FES  FMC/prehension  Timed Trials  Manual tx  Hand to target  Sensory re-ed  Seated functional reach: crossing midline  Supine place and hold  WBearing strategies   Closed chain activities  Open chain activities  Internal and external memory aides  Multimatrix for saccades/ visual clutter/attention  Hypersensitivity strategies education  Multi-modal environment  Sustained/alternating/divided attention  Work stations with timed transitions  Temporal Awareness  Memory and mental manipulation  Auditory processing with immediate recall  Memory retention with immediate and delayed  recall  Edu on cog/vision apps

## 2025-06-25 NOTE — PROGRESS NOTES
PT Discharge         POC expires Unit limit Auth Expiration date PT/OT + Visit Limit? Co-Insurance   25 12 - BOMN No                                 Visit/Unit Tracking  AUTH Status:  Date            Used 1 1 1 1 1           Remaining  11 10 9 8 7                   Today's date: 2025  Patient name: Demetrio Villalobos  : 1960  MRN: [id number]  Referring provider: Lisa Blount DO   Dx:   Encounter Diagnosis     ICD-10-CM    1. Other abnormalities of gait and mobility  R26.89       2. Chronic cerebrovascular accident (CVA)  Z86.73             Assessment  Assessment details: Patient is a 64 year old presenting to skilled OPPT for reassessment with complaints of gait, balance, and strength deficits s/p CVA that in turn has limited safe functional mobility at home and in the community. PMH significant for: HTN, CKD, GERD, and CVA. Patient suffered CVA approximately 10 years ago and reports residual weakness on left side. Patient has demonstrated overall improvements in the following outcome measures since last reassessment: 5 x STS, TUG (cognitive), 6 MWT, and 10 MWT, likely indicating overall gains in functional LE strength, dual tasking ability, cardiovascular endurance, and ambulation speed, respectively. Per cutoff scores for the FGA, DGI, and miniBEST he is classified as low risk for falls. He has met all established short and long term goals at this time. Patient to be discharged from PT services.       Impairments: Abnormal coordination, Abnormal gait, Abnormal muscle tone, Abnormal or restricted ROM, Activity intolerance, Impaired balance, Impaired physical strength, Lacks appropriate HEP, Poor posture, Poor body mechanics, Pain with function, Safety issue, Weight-bearing intolerance, Abnormal movement, Difficulty understanding, Abnormal muscle firing  Understanding of Dx/Px/POC:  Excellent  Prognosis: Excellent      Patient verbalized understanding of POC.         Please contact me if you have any questions or recommendations. Thank you for the referral and the opportunity to share in Demetrio Villalobos's care.        Plan  Plan details: FGA, DGI, miniBEST, 6 MWT; HIGT and HIIT to tolerance  Patient would benefit from: PT Eval, Skilled PT, and OT Eval  Planned modality interventions: Biofeedback, Cryotherapy, TENS, Thermotherapy  Planned therapy interventions: Abdominal trunk stabilization, ADL training, Balance, Balance/WB training, Breathing training, Body mechanics training, Coordination, Functional ROM exercises, Gait training, HEP, Joint Mobilization, Manual Therapy, White taping, Motor coordination training, Neuromuscular re-education, Patient education, Postural training, Strengthening, Stretching, Therapeutic activities, Therapeutic exercises, Therapeutic training, Transfer training, Activity modification, Work reintegration  Frequency: 1-2x/wk  Duration in weeks: 12 weeks  Plan of Care beginning date: 4/2/2025  Plan of Care expiration date: 12 weeks - 6/25/25  Treatment plan discussed with: Patient         Goals  Short Term Goals (4 weeks):    - Patient will improve time on TUG by 2.9 seconds to facilitate improved safety in all ambulation- MET  - Patient will improve scoring on DGI by 2.6 points to progress safety- MET  - Patient will be independent in basic HEP 2-3 weeks - MET  - Patient will improve 5xSTS score by 2.3 seconds to promote improved LE functional strength needed for ADLs- MET    Long Term Goals (12 weeks):  - Patient will be independent in a comprehensive home exercise program - NOT MET  - Patient will improve gait speed by 0.18 m/s to improve safety with community ambulation - MET  - Patient will improve LACEY by 6 points to facilitate return to safe independent ambulation - MET  - Patient will improve scoring on FGA by 4 points to progress safety with dynamic  tasks- MET  - Patient will improve FGA score to >/= 22/30 to classify as decreased risk for falls - MET  - Patient will be able to demonstrate HT in gait without veering - MET  - Patient will improve 6 Minute Walk Test score by 190 feet to promote improved cardiovascular endurance - MET  - Patient will report 50% reduction in near falls in order to improve safety with functional tasks and reduce his risk for falls - MET  - Patient will report going on walks at least 3 days per week to promote independence and improved cardiovascular endurance - MET  - Patient will be able to ascend/descend stairs reciprocally without UE assist to promote independence and safety with ADLs - MET  - Patient will report 50% reduction in near falls when ambulating on uneven terrain - MET      Cut off score    All date taken from [facility]TA Neuro Section or Rehab Measures      Meredith:  Álvaro et al., 2018  MDC: 2.7 pts    Amanda et al., 2011  Cut-off score: 45/56    Chronic CVA  < 44/56 high risk for falls (Álvaro et al., 2018)  < 47.5/56 slow walker status (Amanda et al., 2011) 5xSTS: Rupal 2010  MDC: 2.3 sec  Age Norms:  60-69: 11.1 sec  70-79: 12.6 sec  80-89: 14.8 sec    Andrea, 2010, Chronic Stroke  Chronic CVA: 12 sec   TUG  Addison enrique al., 2005  MDC: 2.9 sec    Cut off score:  >13.5 sec community dwelling adults  >32.2 frail elderly  <20 I for basic transfers  >30 dependent on transfers 10 Meter Walk Test:   Shawanda et al., 2006  Small meaningful change: 0.06 m/s  Substantial meaningful change: 0.14 m/s  MCID: 0.16 m/s    < 0.4 m/s household ambulators  0.4 - 0.8 m/s limited community ambulators  > 0.8 m/s community ambulators   FGA: Rossi et al., 2010  MCID: 4.2 pts  Geriatrics/community < 22/30 fall risk  Geriatrics/community < 20/30 unexplained falls DGI  MDC: vestibular - 4 pts  MDC: geriatric/community - 3 pts  Falls risk <19/24   6 Minute Walk Test  Shawanda et al., 2006  MDC: 60.98 m (200.01 ft)    Farhat Desai, & Ruthie,  2012  MCID: 34.4 m    Age Norms  60-69: M - 1876 ft   F - 1765 ft  70-79: M - 1729 ft   F - 1545 ft  80-89: M - 1368 ft   F - 1286 ft Modified Rosetta  0: No increase in tone  1: Catch and release or min resistance at end range  1+: Catch f/b min resistance throughout remainder (< half ROM)  2: Easily moved, but more marked tone throughout most ROM  3: Significant tone, PROM difficult  4: Rigid   MiniBest: Juanjose et al., 2013  CVA < 17.5 fall risk Pass (Acute CVA)  MDC: 1.8 points (acute), 3.2 points (chronic)         Subjective    History of Present Illness  Mechanism of injury: Patient is a 64 year old presenting to skilled OPPT for reassessment with complaints of gait, balance, and strength deficits s/p CVA. PMH significant for: HTN, CKD, GERD, and CVA. Patient suffered CVA approximately 10 years ago and reports residual weakness on left side (UE and LE), but cannot recall exact location of stroke. He reports he participated in PT/OT/ST services immediately following the stroke, but has not participated in much rehab since. Patient recently underwent (R) reverse total shoulder arthroplasty on 8/26 and is currently following shoulder precautions (no flexion/abduction > 90 degrees). He has HHA that come 25 hours/week (ever day in the AM) that assist with ADL/IADL's.     Update 11/26/24: Patient participated in 1xs/week neuro PT for ~ 1 month and reports noting small improvements in balance and endurance; no falls since SOC. Reports back pain is primary barrier to intensity/duration of gait training (still sees ortho PT for back/shoulder)    Updated (12/11/2024): Patient reports for reassessment stating he has not yet noted significant improvements in his walking. He is interested in increasing frequency of neuro PT services to further drive improvements. His primary goal is to improve his walking speed and standing tolerance.    Updated (1/13/2025): Patient reports for reassessment with no new issues or complaints,  "states he had to take some time off from PT/OT services over the holidays. Has an upcoming neurology appointment on .    Updated (2025): Patient reports to PT for reassessment with overall satisfaction in PT services thus far. He notes improvements in his control of the left leg. Continues to be limited functional by low back pain. He has not noticed any positive benefits since starting the Baclofen.    Updated (2025): Patient reports for reassessment stating he continues to be happy with the progress he has made thus far. He notes that he feels the ease of his walking has improved and feels less limited by his LBP, although he has good and bad days. He is also looking to do dry needling for his shoulder.    Updated (2025): Patient reports for reassessment stating he continues to notice small improvements in his walking and overall tolerance to activity. He is agreeable with plan to discharge to home program in approximately 1 month.    Updated (2025): Patient reports for discharge today. States he feels good about the exercises he has been doing at home, including STS, stair negotiation, single leg step ups, and lunges with glider.    Primary AD: none  Assist level at home: assist for ADL's/IADL's  WC usage: NA    Patient goal: \"I want to get better with walking\"    Pain  Current pain ratin-3/10  Location: R shoulder  Aggravating factors: movement    Social Support  Steps to enter house: none  Stairs in house: none   Lives in: 4 story, 1 floor set up  Lives with: alone    Employment status: retired  Hand dominance: R    Treatments  Previous treatment: PT/OT/ST immediately following stroke  Current treatment: ortho PT, balance eval  Diagnostic Testing: see chart for details      Objective     Vitals  - HR: 74 bpm  - BP: 158/86  - SPO2: 97%    HR Max  - 207 - (0.7x62)  = 164 bpm    LE MMT  - R Hip Flexion: 3/5  - L Hip Flexion: 3-/5  - R Hip Extension: 3/5  - L Hip Extension: 3-/5  - R Hip " Abduction: 4+/5 - L Hip abduction: 4-/5  - R Hip adduction: 4+/5 - L Hip adduction: 4-/5  - R Knee Extension: 3/5 - L Knee Extension: 2+/5  - R Knee Flexion: 3/5  - L Knee Flexion: 2+/5  - R Ankle DF: 4/5  - L Ankle DF: 1/5  - R Ankle PF: 3/5  - L Ankle PF: 1/5    Sensation  - Light touch: intact  - Deep pressure: intact    Coordination  - Dysmetria: unable 2/2 LUE hemiplegia  - Dysdiadochokinesia: unable 2/2 LUE hemiplegia  - Alternating Toe Taps: unable 2/2 LLE hemiplegia    Modified Rosetta  - R knee extension: 0 - no increase in tone  - L knee extension: 1+ - catch followed by minimum resistance throughout remainder (< half ROM)  - R knee flexion: 0 - no increase in tone  - L knee flexion: 1-   - R ankle DF: 0 - no increase in tone   - L ankle DF: 3 - significant tone, PROM difficulty  - R ankle inversion: 0 - no increase in tone  - L ankle inversion: 3 - significant tone, PROM difficulty  - R ankle eversion: 0 - no increase in tone  - L ankle eversion: 3 - significant tone, PROM difficulty    Clonus  - L: No  - R: No  - Fatiguing: NA  - Number of beats: NA    Vision  - Glasses: No  - Abnormalities prior to CVA: No, NA  - Abnormalities post CVA: No, NA    Neglect  - R sided: No  - L sided: No    Pusher's Syndrome  - R sided: No  - L sided: No        Outcome Measures Initial Eval  10/30/2024 & DN 11/6/24 RE  11/26/24 PN  12/11/2024 PN  1/13/2025 PN  2/24/2025 PN  4/2/2025 PN  5/28/2025 Discharge  6/25/2025   5xSTS 17.36 sec, no UE 14.25 sec, 0 UE 11.40 sec, 0 UE 11.65 sec, 0 UE 9.26 sec, 0 UE 9.21 sec, 0 UE 9.66 sec, 0 UE 8.35 sec, 0 UE   TUG  Regular  Cognitive   22.75 sec   15.91 sec   13.70 sec   12.33 sec no AD  10.88 sec  18.40 sec no AD  10.13 sec  11.64 sec no AD  11.73 sec  12.14 sec no AD  11.07 sec  10.97 sec   10 meter 0.49 m/s TBA- time constraints 0.68 m/s 0.63 m/s 0.81 m/s 0.83 m/s 0.76 m/s 0.82 m/s   LACEY 38/56 43/56 45/56 47/56 NA NA NA NA   FGA 9/30 15/30 16/30 17/30 20/30 21/30 23/30 23/30    DGI 8/24 14/24 14/24 15/24 18/24 18/24 21/24 21/25   MiniBEST 16/28 TBA- time constraints  19/28 21/28 21/28 22/28 22/28   6MWT   515 ft 600 ft 715 ft 680 ft 645 ft  700 ft       Precautions: CKD, GERD, HTN, PVD, history CVA 10 years ago with L sided hemiplegia  Past Medical History:   Diagnosis Date    Chronic kidney disease     Edema     GERD (gastroesophageal reflux disease)     Gout     hospitalized    High cholesterol     Hypertension     PVD (peripheral vascular disease) ([facility])     Renal disorder     Rotator cuff tear     right    Shortness of breath     Stroke ([facility]) 2014    Ventral hernia without obstruction or gangrene     Walker as ambulation aid

## 2025-06-30 ENCOUNTER — OFFICE VISIT (OUTPATIENT)
Facility: CLINIC | Age: 65
End: 2025-06-30
Attending: FAMILY MEDICINE
Payer: COMMERCIAL

## 2025-06-30 DIAGNOSIS — G81.10 SPASTIC HEMIPARESIS AFFECTING NONDOMINANT SIDE (HCC): Primary | ICD-10-CM

## 2025-06-30 DIAGNOSIS — Z86.73 H/O: CVA (CEREBROVASCULAR ACCIDENT): ICD-10-CM

## 2025-06-30 DIAGNOSIS — I63.9 CEREBROVASCULAR ACCIDENT (CVA), UNSPECIFIED MECHANISM (HCC): ICD-10-CM

## 2025-06-30 PROCEDURE — 97112 NEUROMUSCULAR REEDUCATION: CPT

## 2025-06-30 NOTE — PROGRESS NOTES
Daily Note     Today's date: 2025  Patient name: Demetrio Villalobos  : 1960  MRN: [id number]  Referring provider: César Malone DO  Dx:   Encounter Diagnoses   Name Primary?    Spastic hemiparesis affecting nondominant side (HCC) Yes    Cerebrovascular accident (CVA), unspecified mechanism (HCC)     H/O: CVA (cerebrovascular accident)      Start Time: 1515  Stop Time: 1600  Total time in clinic (min): 45 minutes    PLAN OF CARE START: 25  PLAN OF CARE END: 25  FREQUENCY: 2x/wk week  PRECAUTIONS: HTN    POC expires Auth Status Total   Visits  Start date  Expiration date PT/OT + Visit Limit? Co-Pay    approved   $0    approved                                       Visit/Unit Tracking  AUTH Status:  Date     Visits  Authed: 12 Used 1 1 1 1 1 1 1 1 1 1 1 1 1 1 1 1    Remaining  11 10 9 8 7 6 5 4 2 1 11 10 9 8 7 5        Subjective: Pt reports he is feeling good today.    Objective: See treatment below.    TE/NMR:  -Primer: Boxing in stance with punching bag with alternating UE 50x3 at high intensity in order to increase heart rate to drive neuroplastic changes following CVA.  Additional focus on L UE coordination, motor planning and massed practice elbow flexion/extension  -Chest press with sustained shoulder flexion with 2lb therabar 30x2 with use of visual feedback from mirror for motor planning and coordination.  Elbow extension AROM limited by flexor synergy and spasticity  -Alternating shoulder taps attempted, however pt unable to release bar consistently so completed 20x each side without alternating.  -Cup stacking performed with retrieval 90* to L, rotation of cup 180* and stacking on tabletop anteriorly.  Focus on dynamic reaching, shoulder rotation, cylindrical grasp/release, rotation of items in hand.  Completes 2 stacks of 8, with inc fatigue and tone noted as activity progresses    Assessment:  Tolerated treatment well.  Demonstrating improved digit extension and cylindrical grasp.  Continues to make progress with functional use of L UE. Pt would benefit from continued OT services to address NMR L UE s/p chronic CVA.    Plan: Continued skilled OT per POC.

## 2025-07-02 ENCOUNTER — OFFICE VISIT (OUTPATIENT)
Facility: CLINIC | Age: 65
End: 2025-07-02
Attending: FAMILY MEDICINE
Payer: COMMERCIAL

## 2025-07-02 DIAGNOSIS — G81.10 SPASTIC HEMIPARESIS AFFECTING NONDOMINANT SIDE (HCC): Primary | ICD-10-CM

## 2025-07-02 PROCEDURE — 97112 NEUROMUSCULAR REEDUCATION: CPT

## 2025-07-02 NOTE — PROGRESS NOTES
"Daily Note     Today's date: 2025  Patient name: Demetrio Villalobos  : 1960  MRN: [id number]  Referring provider: César Malone DO  Dx:   Encounter Diagnosis   Name Primary?    Spastic hemiparesis affecting nondominant side (HCC) Yes     Start Time: 1440  Stop Time: 1520  Total time in clinic (min): 40 minutes    PLAN OF CARE START: 25  PLAN OF CARE END: 25  FREQUENCY: 2x/wk week  PRECAUTIONS: HTN    POC expires Auth Status Total   Visits  Start date  Expiration date PT/OT + Visit Limit? Co-Pay    approved   $0    approved                                       Visit/Unit Tracking  AUTH Status:  Date                   Visits  Authed: 12 Used 1                   Remaining  4                       Subjective: \"I surpise myself\"     Therapist was late for session and apologized to pt with pt did not get upset. Held pt over a few minutes.     Objective: See treatment below.    TE/NMR:  Completed 2 x 20 reps with FES donned on triceps for pushups standing at mat table  Completed paint brush pushouts of 2lb weight with FES on triceps focusing on scapular re/protraction, UE shoulder strength/coordination- completed at mat table top   Completed  shoulder flexion task of ring to/from cone with cone on 18\" mat box focusing on shoulder flexion, FMC, dexterity;  Performed in PNF D2 patterns then PNF D1 patterns x 15 reps of ring tocone transfer focusing on UE coordiantion, FMC, shoulder strength. Cone placed on mat on 18\" box  Completed with [place] placement board on 18\" box with pt having to place  pieces focusing on FMC, UE shoulder strength, grasp/release, UE coordination   Completed [place] flipping task with use of table focusing on manipulation and dexterity for 4-5 minutes     Assessment: Tolerated treatment well.  Demonstrating improved shoulder strength. Continues to make progress with functional use of L UE. Pt would benefit from continued OT services to address NMR " L UE s/p chronic CVA.    Plan: Continued skilled OT per POC.

## 2025-07-07 ENCOUNTER — OFFICE VISIT (OUTPATIENT)
Facility: CLINIC | Age: 65
End: 2025-07-07
Attending: FAMILY MEDICINE
Payer: COMMERCIAL

## 2025-07-07 DIAGNOSIS — Z86.73 H/O: CVA (CEREBROVASCULAR ACCIDENT): ICD-10-CM

## 2025-07-07 DIAGNOSIS — I63.9 CEREBROVASCULAR ACCIDENT (CVA), UNSPECIFIED MECHANISM (HCC): ICD-10-CM

## 2025-07-07 DIAGNOSIS — G81.10 SPASTIC HEMIPARESIS AFFECTING NONDOMINANT SIDE (HCC): Primary | ICD-10-CM

## 2025-07-07 PROCEDURE — 97110 THERAPEUTIC EXERCISES: CPT

## 2025-07-07 PROCEDURE — 97112 NEUROMUSCULAR REEDUCATION: CPT

## 2025-07-07 NOTE — PROGRESS NOTES
"Daily Note     Today's date: 2025  Patient name: Demetrio Villalobos  : 1960  MRN: [id number]  Referring provider: César Malone DO  Dx:   Encounter Diagnoses   Name Primary?    Spastic hemiparesis affecting nondominant side (HCC) Yes    Cerebrovascular accident (CVA), unspecified mechanism (HCC)     H/O: CVA (cerebrovascular accident)        Start Time: 1430  Stop Time: 1515  Total time in clinic (min): 45 minutes    PLAN OF CARE START: 25  PLAN OF CARE END: 25  FREQUENCY: 2x/wk week  PRECAUTIONS: HTN    POC expires Auth Status Total   Visits  Start date  Expiration date PT/OT + Visit Limit? Co-Pay    approved   $0    approved                                       Visit/Unit Tracking  AUTH Status:  Date                  Visits  Authed: 12 Used 1 1                  Remaining  4 3                      Subjective:     Objective: See treatment below.    TE/NMR:  Standing parallel bar push ups for 30 reps  Parallel bars standing bent over rows with 5 lb dumb bell for 3x15 reps   PNF patterns with red theraband for 3x10 with D1 pattern and 3x10 D2 pattern. UE strength, motor control.   Peg brite activity completed while seated at table. Pt places in 5 pegs using l hand to grab pegs, the R hand to help position them in the L hand prior to placing them into the board.    5 1\" foam blocks using yellow clothes pin to work on L hand strength, sustained grasp.     Assessment: Tolerated treatment well.  L UE would fatigue quickly with exercises, pt benefited from rest periods. Continues to make progress with functional use of L UE. Pt would benefit from continued OT services to address NMR L UE s/p chronic CVA.    Plan: Continued skilled OT per POC.       "

## 2025-07-09 ENCOUNTER — OFFICE VISIT (OUTPATIENT)
Facility: CLINIC | Age: 65
End: 2025-07-09
Attending: FAMILY MEDICINE
Payer: COMMERCIAL

## 2025-07-09 DIAGNOSIS — G81.10 SPASTIC HEMIPARESIS AFFECTING NONDOMINANT SIDE (HCC): Primary | ICD-10-CM

## 2025-07-09 DIAGNOSIS — Z86.73 H/O: CVA (CEREBROVASCULAR ACCIDENT): ICD-10-CM

## 2025-07-09 DIAGNOSIS — I63.9 CEREBROVASCULAR ACCIDENT (CVA), UNSPECIFIED MECHANISM (HCC): ICD-10-CM

## 2025-07-09 PROCEDURE — 97112 NEUROMUSCULAR REEDUCATION: CPT

## 2025-07-09 NOTE — PROGRESS NOTES
"Daily Note     Today's date: 2025  Patient name: Demetrio Villalobos  : 1960  MRN: [id number]  Referring provider: César Malone DO  Dx:   Encounter Diagnoses   Name Primary?    Spastic hemiparesis affecting nondominant side (HCC) Yes    Cerebrovascular accident (CVA), unspecified mechanism (HCC)     H/O: CVA (cerebrovascular accident)        Start Time: 1600  Stop Time: 1645  Total time in clinic (min): 45 minutes    PLAN OF CARE START: 25  PLAN OF CARE END: 25  FREQUENCY: 2x/wk week  PRECAUTIONS: HTN    POC expires Auth Status Total   Visits  Start date  Expiration date PT/OT + Visit Limit? Co-Pay    approved   $0    approved                                       Visit/Unit Tracking  AUTH Status:  Date                 Visits  Authed: 12 Used 1 1 1                 Remaining  4 3 2                     Subjective: \"I'm amazing myself\"    Objective: See treatment below.    TE/NMR:  STS with elbow flexion with use of 3lb dumbbells 1 min x3 at high intensity to inc HR and drive neuroplastic changes.  PNF D2 with red theraband with ~50% AROM 2* proximal weakness 15x2 in stance.  Trunk rotation with 10lb med ball with focus on maintaining grasp with use of b/l UE, trunk/postural control  Rotation of large pegs and insertion into board with 2lb wrist weight donned to provide proprioceptive input and resistance to facilitate anterior deltoid strengthening.  Requires inc time, able to consistently reach ~80% full elbow extension  Placement of marbles on top of pegs with use of L UE with focus on pincer grasp, L UE coordination and motor planning.  20x with 2 dropped and inc time.  Compensatory shoulder abduction noted    Assessment: Tolerated treatment well.  L UE would fatigue quickly with exercises, pt benefited from rest periods. Continues to make progress with functional use of L UE, especially with more distal refined movements. Pt would benefit from continued OT " services to address NMR L UE s/p chronic CVA.    Plan: Continued skilled OT per POC.

## 2025-07-14 ENCOUNTER — APPOINTMENT (OUTPATIENT)
Dept: LAB | Facility: CLINIC | Age: 65
End: 2025-07-14
Attending: PHYSICIAN ASSISTANT
Payer: COMMERCIAL

## 2025-07-14 ENCOUNTER — EVALUATION (OUTPATIENT)
Facility: CLINIC | Age: 65
End: 2025-07-14
Attending: FAMILY MEDICINE
Payer: COMMERCIAL

## 2025-07-14 DIAGNOSIS — N18.30 BENIGN HYPERTENSION WITH CHRONIC KIDNEY DISEASE, STAGE III (HCC): ICD-10-CM

## 2025-07-14 DIAGNOSIS — M89.9 CHRONIC KIDNEY DISEASE-MINERAL AND BONE DISORDER: ICD-10-CM

## 2025-07-14 DIAGNOSIS — R60.0 BILATERAL LOWER EXTREMITY EDEMA: ICD-10-CM

## 2025-07-14 DIAGNOSIS — E78.2 MODERATE MIXED HYPERLIPIDEMIA NOT REQUIRING STATIN THERAPY: ICD-10-CM

## 2025-07-14 DIAGNOSIS — E66.09 CLASS 1 OBESITY DUE TO EXCESS CALORIES WITH SERIOUS COMORBIDITY AND BODY MASS INDEX (BMI) OF 34.0 TO 34.9 IN ADULT: ICD-10-CM

## 2025-07-14 DIAGNOSIS — G81.10 SPASTIC HEMIPARESIS AFFECTING NONDOMINANT SIDE (HCC): Primary | ICD-10-CM

## 2025-07-14 DIAGNOSIS — E66.811 CLASS 1 OBESITY DUE TO EXCESS CALORIES WITH SERIOUS COMORBIDITY AND BODY MASS INDEX (BMI) OF 34.0 TO 34.9 IN ADULT: ICD-10-CM

## 2025-07-14 DIAGNOSIS — R73.03 PREDIABETES: ICD-10-CM

## 2025-07-14 DIAGNOSIS — E83.9 CHRONIC KIDNEY DISEASE-MINERAL AND BONE DISORDER: ICD-10-CM

## 2025-07-14 DIAGNOSIS — I12.9 BENIGN HYPERTENSION WITH CHRONIC KIDNEY DISEASE, STAGE III (HCC): ICD-10-CM

## 2025-07-14 DIAGNOSIS — Z12.5 SCREENING FOR PROSTATE CANCER: ICD-10-CM

## 2025-07-14 DIAGNOSIS — N18.9 CHRONIC KIDNEY DISEASE-MINERAL AND BONE DISORDER: ICD-10-CM

## 2025-07-14 DIAGNOSIS — N18.32 STAGE 3B CHRONIC KIDNEY DISEASE (HCC): ICD-10-CM

## 2025-07-14 LAB
25(OH)D3 SERPL-MCNC: 34.4 NG/ML (ref 30–100)
ALBUMIN SERPL BCG-MCNC: 4.2 G/DL (ref 3.5–5)
ANION GAP SERPL CALCULATED.3IONS-SCNC: 10 MMOL/L (ref 4–13)
BUN SERPL-MCNC: 36 MG/DL (ref 5–25)
CALCIUM SERPL-MCNC: 9.1 MG/DL (ref 8.4–10.2)
CHLORIDE SERPL-SCNC: 102 MMOL/L (ref 96–108)
CO2 SERPL-SCNC: 25 MMOL/L (ref 21–32)
CREAT SERPL-MCNC: 2.5 MG/DL (ref 0.6–1.3)
ERYTHROCYTE [DISTWIDTH] IN BLOOD BY AUTOMATED COUNT: 14.6 % (ref 11.6–15.1)
EST. AVERAGE GLUCOSE BLD GHB EST-MCNC: 131 MG/DL
GFR SERPL CREATININE-BSD FRML MDRD: 26 ML/MIN/1.73SQ M
GLUCOSE P FAST SERPL-MCNC: 91 MG/DL (ref 65–99)
HBA1C MFR BLD: 6.2 %
HCT VFR BLD AUTO: 45.8 % (ref 36.5–49.3)
HGB BLD-MCNC: 14.9 G/DL (ref 12–17)
MAGNESIUM SERPL-MCNC: 2.1 MG/DL (ref 1.9–2.7)
MCH RBC QN AUTO: 28.1 PG (ref 26.8–34.3)
MCHC RBC AUTO-ENTMCNC: 32.5 G/DL (ref 31.4–37.4)
MCV RBC AUTO: 86 FL (ref 82–98)
PHOSPHATE SERPL-MCNC: 2.9 MG/DL (ref 2.3–4.1)
PLATELET # BLD AUTO: 267 THOUSANDS/UL (ref 149–390)
PMV BLD AUTO: 10.8 FL (ref 8.9–12.7)
POTASSIUM SERPL-SCNC: 4.4 MMOL/L (ref 3.5–5.3)
PSA SERPL-MCNC: 0.74 NG/ML (ref 0–4)
PTH-INTACT SERPL-MCNC: 72.9 PG/ML (ref 12–88)
RBC # BLD AUTO: 5.31 MILLION/UL (ref 3.88–5.62)
SODIUM SERPL-SCNC: 137 MMOL/L (ref 135–147)
WBC # BLD AUTO: 5.75 THOUSAND/UL (ref 4.31–10.16)

## 2025-07-14 PROCEDURE — 83036 HEMOGLOBIN GLYCOSYLATED A1C: CPT

## 2025-07-14 PROCEDURE — 97530 THERAPEUTIC ACTIVITIES: CPT

## 2025-07-14 PROCEDURE — G0103 PSA SCREENING: HCPCS

## 2025-07-14 PROCEDURE — 82306 VITAMIN D 25 HYDROXY: CPT

## 2025-07-14 PROCEDURE — 83735 ASSAY OF MAGNESIUM: CPT

## 2025-07-14 PROCEDURE — 85027 COMPLETE CBC AUTOMATED: CPT

## 2025-07-14 PROCEDURE — 83970 ASSAY OF PARATHORMONE: CPT

## 2025-07-14 PROCEDURE — 80069 RENAL FUNCTION PANEL: CPT

## 2025-07-14 PROCEDURE — 36415 COLL VENOUS BLD VENIPUNCTURE: CPT

## 2025-07-14 PROCEDURE — 97112 NEUROMUSCULAR REEDUCATION: CPT

## 2025-07-14 NOTE — PROGRESS NOTES
OCCUPATIONAL THERAPY RE-EVALUATION    Today's Date: 2025  Patient Name: Demetrio Villalobos  : 1960  MRN: [id number]  Referring Provider: César Malone DO  Dx: Spastic hemiparesis affecting nondominant side (HCC) [G81.10]     Date 25             Units:  Used 2 1             Authed:  Remaining  10 3                 SKILLED ANALYSIS:  Pt presents to re-evaluation on 25 after ~9 months of OP OT s/p CVA in .  Pt reports continued improvements with functional use of LUE with pt able to remove caps from bottles and tie shoes with more ease. Pt reports he has been more mindful to incorporate LUE into function. Discussed botox with pt reports he has called MD regarding botox who is in [place] and is awaiting a call back. Pt reports grasp and release has improved. Pt demonstrating increase ROM in shoulder for flexion and abduction indicating slight improvements in strength. Pt also demonstrating improvements on modified jonna scale with increased spasticity. Pt partially achieved 1 STG for spasticity management. Pt continues to make progress and recommending continued OP OT 2x/wk for an additional 6 weeks with continued focus on NMR L UE for increased independence and QOL s/p CVA.  Discussed progress and recommendations with pt, and he is in agreement.      PLAN OF CARE START: 25  PLAN OF CARE END: 25  FREQUENCY: 2x/wk week  PRECAUTIONS: HTN    Subjective  25: Pt reports he sees a little improvement since last RE. He can walk further. He states sometimes he can move his hand certain ways and sometimes he can't. Since SOC he has starting using his toothbrush with his L hand. He holds dishes that he's washing in his L hand, and can self feed with a spoon using his L hand. He's turning on light switches with his L hand. He has started using estim on his LUE at home, every morning. He rates his LUE function at 60-70% and would like it to get to 80%.     3/26: Pt reports he  "feels he has 40% UE recovery s/p CVA.  He is able to stabilize the tooth paste with L UE, but unable to squeeze paste out.  Now able to open door handles with L UE.    5/14/25: Able to use doorknobs with L UE, feed himself with L UE, squeeze toothpaste hard enough that paste comes out but often very messy, rotating caps of med containers to open with L UE, using zippers with L UE    6/25/25: Reports continued functional improvements with use of L UE, improved fluidity and feeling more natural using L UE.    Mechanism of Injury  CVA approximately 10 years ago, went to [facility] for outpatient rehabilitation; pt denies seizures and is not on anti-seizure medication    Occupational Profile  Pt lives alone in an apartment with no stairs to get in. Pt reports difficulty with ADLs (UBD, shaving head, LBD ) however was able to put on with independence. Pt reports he has an aide who assists 5 hours a day and does cleaning, cooking, and bathing pt. Pt manages own medication. Pt reports he has not been driving. Pt reports no pain in LUE. Pt is a retired . Pt report no diplopia and no issues with cognition. Pt enjoys reading bible, watching TV.     PATIENT GOAL: \"as much use of my L hand and walking as possible.\"    HISTORY OF PRESENT ILLNESS:     PMH:   Past Medical History:   Diagnosis Date    Chronic kidney disease     Edema     GERD (gastroesophageal reflux disease)     Gout     hospitalized    High cholesterol     Hypertension     PVD (peripheral vascular disease) ([facility])     Renal disorder     Rotator cuff tear     right    Shortness of breath     Stroke ([facility]) 2014    Ventral hernia without obstruction or gangrene     Walker as ambulation aid        Past Surgical Hx:   Past Surgical History:   Procedure Laterality Date    APPENDECTOMY      [place] ARTHROPLASTY GLENOHUMERAL JOINT TOTAL SHOULDER Right 8/26/2024    Procedure: ARTHROPLASTY SHOULDER REVERSE;  Surgeon: Sina Randle MD;  Location: [facility] OR;  Service: " Orthopedics        Pain: none    Objective    Upper Extremities:  Pt is R hand dominant  Did not assess RUE strength due to rotator cuff sx.                  Strength:     Date 11/13/24 3/26/25 5/14/25 6/25/25 7/14   Right UE        Left UE 19 21 30lb 25lb 23lb      The age norm is approximately 76.8 lbs, indicating decreased  strength.    Lateral pinch:    Date 3/26/25 5/14/25 6/25/25 7/14   Right UE       Left UE 4.5 6.5 10.5 9                   Range of Motion:    Left UE:  Date: 7/14/25 5/14/25 6/25/25   Shoulder flexion 94* 80* 88*   Shoulder abduction 90* 71* 88*   Shoulder IR WNL  WNL   Shoulder ER - 50* Able to achieve -50* from neutral -50* from neutral   Elbow flexion 100% 100% 100%   Elbow extension 100% 100% 158*   Forearm supination 75% 75% 80%   Forearm pronation 100% 100% 100%   Wrist flexion 40* 66* 38*   Wrist extension 30** 23* 32*   Radial deviation      Thumb extension 25% 15%    Digit flexion 100%  100%   Digit extension 40% 20% 40%          Manual Muscle Testing L UE:  Date: 7/14/25 5/14/25 6/25/25   Shoulder extension 4+/5 4/5 4+/5   Shoulder flexion 3-/5 3/5 3/5   Shoulder abduction 3-/5 3/5 3-/5   Shoulder IR 4/5 4-/5 4/5   Shoulder ER 2-/5 2-/5 2-/5   Elbow flexion 4+/5 5/5 5/5   Elbow extension 5/5 5/5 5/5   Wrist flexion 4-/5 4-/5 4-/5   Wrist extension 2+/5 3-/5 4-/5     FUGL LOVE ASSESSMENT OF MOTOR RECOVERY AFTER STROKE:    L UE:  Date: 7/14/25 5/14/25 6/25/25   Upper Extremity Seated 23/36 24 23   Wrist 8/10 7/10 9   Hand 8/14 8/14 8   Coordination/Speed 3/6 3/6 5   Overall score 45/66 42/66 45/66     (Clinical change= 5 points, severe impairment <19, and mild impairment is >50)     NINE-HOLE PEG TEST: assesses dexterity/fine motor coordination. Alternative scoring: the number of pegs placed in 50 or 100 seconds can be recorded.    NOT ASSESSED 7/14/25  COMPLETED MODIFIED VERSION.  PT TRANSFERS 4 FDT PEGS FROM LID (SPREAD EVERY OTHER HOLE) TO FDT BOARD     Date: 5/14/25  6/25/25   Right UE 4 seconds NA   Left UE 34 seconds 34     Norms for age/sex: Pt demonstrates decreased FMC compared to norms for age/sex     Norms:   Sex Age Average R Average L   Male 21-25 16.41 17.53   Male 26-30 16.88 17.84   Male  31-35 17.54 18.47   Male 36-40 17.71 18.62   Male 41-45 18.54 18.49   Male 46-50 18.35 19.57   Male 51-55 18.93 19.84   Male 56-60 20.90 21.64   Male 61-65 20.87 21.60   Male 66-70 21.23 22.29   Male 71+ 25.79 25.95   Female 21-25 16.04 17.21   Female 26-30 15.90 16.97   Female 31-35 16.69 17.47   Female 36-40 16.74 18.16   Female 41-45 16.54 17.64   Female 46-50 17.36 17.96   Female 51-55 17.38 18.92   Female 56-60 17.86 19.48   Female 61-65 18.99 20.33   Female 66-70 19.90 21.44   Female 71+ 22.49 24.11       Functional Dexterity Test: trialed with L UE, able to rotate 1 peg in 54 seconds with 1 drop, compensatory use of R UE and placement into top of FDT lid instead of board.  Performance demonstrating non-functional L UE.    Box and Blocks: 8 blocks in 1 min    Modified Rosetta Scale: measures spasticity in patients with lesions in the Central Nervous System  L UE:      Shoulder external rotators: 0/4  Shoulder internal rotators: 3/4  Elbow flexors: 2/4  Elbow extensors: 1+/4  Wrist flexors: 1/4  Wrist extensors 0/4  Pronators 2/4  Finger flexors: 1/4  Finger extensors: 0/4    0: No increase in muscle tone  1: Slight increase in muscle tone, manifested by a catch and release or by minimal resistance at the end of the range of motion when the affected part(s) is moved in flexion or extension  1+: Slight increase in muscle tone, manifested by a catch, followed by minimal resistance throughout the remainder (less than half) of the ROM  2: More marked increase in muscle tone through most of the ROM, but affected part(s) easily moved  3: Considerable increase in muscle tone, passive movement difficult  4: Affected part(s) rigid in flexion or extension    Subluxation:  none    Scapular winging: slight    Spasticity: slight     Finger to Nose Testing with Visual Occlusion (proprioception):  mild proprioception deficits    Finger to Nose Testing without Visual Occlusion: mild dysmetria    Monofilaments/sensory testing: NOT tested    Functional Cognition:  Highest level of education: 9th grade    Tulio Cognitive Assessment Version 8.1 (MoCA V8.1) NOT ASSESSED  Visuospatial/executive functioning:  3/5  Naming:  3/3  Memory: 1st trial:  , 2nd trial:    Attention/concentration:   List of letters:   Serial Seven Subtraction:  2/3 w/  errors  Language/sentence repetition:    Language Fluency:     Abstract/Correlational Thinkin/2  Delayed Recall:    Orientation:     Memory Index Score: 13/15   Raw Score:  30, MIS:  13/15, indicative of mild neurocognitive impairments.    MoCA Scoring        Normal: 26+         Mild Cognitive Impairment: 18-25          Moderate Cognitive Impairment: 10-17         Severe Cognitive Impairment: <10    Trail making Part A and Part B: NOT ASSESSED  Part A: 31.28 with errors however able to self correct   Part B: 1:45 with 1 cue (previously 1 minute 49 with 1 cue  Indicating deficits: Part A > 33.12 seconds and Part B > 74.55 seconds            GOALS:   Short Term Goals:  Pt will increase UE  strength by 2-3 lbs to complete gardening and IADLs (19) ACHIEVED   Pt will increase divided attention by completing trail making part B in 1 minute 20 seconds to complete IADLs AHCIEVED  Pt will demo with G understanding and carryover of tone reduction strategies for improved AROM ACHIEVED   Pt will demo with decreased L  UE hypertonicity to 1+/4 on modified jonna scale for improved grasp release, termination of flexors on command of time  Pt will increase  L UE cooordination to complete  of upper extremity section of fugyl abraham for tabletop tasks ACHIEVED  Pt will increase  L UE coordination to complete /10 of wrist section  of fugyl abraham for tabletop tasks ACHIEVED             Long Term Goals: 8-12 weeks  Pt will increase UE  strength by 4-5 lbs to complete gardening and IADLs (19) ACHIEVED 1/30  Pt will increase divided attention by completing trail making part B in 1 minute 10 seconds to complete IADLs  Pt will demo with G understanding and carryover of tone reduction strategies for improved AROM ACHIEVED 1/30  Pt will demo with decreased L  UE hypertonicity to 1/4 on modified jonna scale for improved grasp release, termination of flexors on command of time PARTIALLY ACHIEVED  Pt will increase  L UE coordination to complete 21/36 of upper extremity section of fugyl abraham for tabletop tasks ACHIEVED  Pt will increase  L UE coordination to complete fugyl abraham in 32 for tabletop tasks ACHIEVED 1/30    New Goals Established 1/30/25:  Pt will demonstrate improved L gross grasp strength to 29lbs in order to improve ADL/IADL performance in 12 weeks. ACHIEVED  Pt will demonstrate improved LUE function based on Fugl Abraham score of 35/66 in 12 weeks. ACHIEVED      New goals 5/14/25:  Pt will demonstrate improved L UE function by scoring at least 48 on FMA.  Pt will inc L grasp strength to at least 35lb for carry over with IADLs.    New goal 6/25/25:  Pt will demonstrate improved grasp/release and functional reaching by completing at least 10 blocks on the Box and Blocks Assessment.    OTHER PLANNED THERAPY INTERVENTIONS:   Supine, seated, and in stance neuro re-ed  Tricep AG  NMES/FES  FMC/prehension  Timed Trials  Manual tx  Hand to target  Sensory re-ed  Seated functional reach: crossing midline  Supine place and hold  WBearing strategies   Closed chain activities  Open chain activities  Internal and external memory aides  Multimatrix for saccades/ visual clutter/attention  Hypersensitivity strategies education  Multi-modal environment  Sustained/alternating/divided attention  Work stations with timed transitions  Temporal  Awareness  Memory and mental manipulation  Auditory processing with immediate recall  Memory retention with immediate and delayed recall  Edu on cog/vision apps

## 2025-07-16 ENCOUNTER — OFFICE VISIT (OUTPATIENT)
Facility: CLINIC | Age: 65
End: 2025-07-16
Attending: FAMILY MEDICINE
Payer: COMMERCIAL

## 2025-07-16 ENCOUNTER — APPOINTMENT (OUTPATIENT)
Dept: LAB | Facility: CLINIC | Age: 65
End: 2025-07-16
Attending: PHYSICIAN ASSISTANT
Payer: COMMERCIAL

## 2025-07-16 DIAGNOSIS — N18.32 STAGE 3B CHRONIC KIDNEY DISEASE (HCC): ICD-10-CM

## 2025-07-16 DIAGNOSIS — Z86.73 H/O: CVA (CEREBROVASCULAR ACCIDENT): ICD-10-CM

## 2025-07-16 DIAGNOSIS — I63.9 CEREBROVASCULAR ACCIDENT (CVA), UNSPECIFIED MECHANISM (HCC): ICD-10-CM

## 2025-07-16 DIAGNOSIS — N18.30 BENIGN HYPERTENSION WITH CHRONIC KIDNEY DISEASE, STAGE III (HCC): ICD-10-CM

## 2025-07-16 DIAGNOSIS — G81.10 SPASTIC HEMIPARESIS AFFECTING NONDOMINANT SIDE (HCC): Primary | ICD-10-CM

## 2025-07-16 DIAGNOSIS — I12.9 BENIGN HYPERTENSION WITH CHRONIC KIDNEY DISEASE, STAGE III (HCC): ICD-10-CM

## 2025-07-16 DIAGNOSIS — I10 PRIMARY HYPERTENSION: ICD-10-CM

## 2025-07-16 LAB
CHOLEST SERPL-MCNC: 141 MG/DL (ref ?–200)
CREAT UR-MCNC: 55.5 MG/DL
HDLC SERPL-MCNC: 35 MG/DL
LDLC SERPL CALC-MCNC: 77 MG/DL (ref 0–100)
MICROALBUMIN UR-MCNC: 16.2 MG/L
MICROALBUMIN/CREAT 24H UR: 29 MG/G CREATININE (ref 0–30)
NONHDLC SERPL-MCNC: 106 MG/DL
TRIGL SERPL-MCNC: 143 MG/DL (ref ?–150)

## 2025-07-16 PROCEDURE — 82043 UR ALBUMIN QUANTITATIVE: CPT

## 2025-07-16 PROCEDURE — 97112 NEUROMUSCULAR REEDUCATION: CPT

## 2025-07-16 PROCEDURE — 36415 COLL VENOUS BLD VENIPUNCTURE: CPT

## 2025-07-16 PROCEDURE — 80061 LIPID PANEL: CPT

## 2025-07-16 PROCEDURE — 82570 ASSAY OF URINE CREATININE: CPT

## 2025-07-16 RX ORDER — LISINOPRIL 10 MG/1
10 TABLET ORAL DAILY
Qty: 30 TABLET | Refills: 5 | Status: SHIPPED | OUTPATIENT
Start: 2025-07-16

## 2025-07-16 RX ORDER — NIFEDIPINE 60 MG/1
60 TABLET, EXTENDED RELEASE ORAL DAILY
Qty: 30 TABLET | Refills: 5 | Status: SHIPPED | OUTPATIENT
Start: 2025-07-16

## 2025-07-16 NOTE — PROGRESS NOTES
"Daily Note     Today's date: 2025  Patient name: Demetrio Villalobos  : 1960  MRN: [id number]  Referring provider: César Malone DO  Dx:   Encounter Diagnoses   Name Primary?    Spastic hemiparesis affecting nondominant side (HCC) Yes    Cerebrovascular accident (CVA), unspecified mechanism (HCC)     H/O: CVA (cerebrovascular accident)                   PLAN OF CARE START: 25  PLAN OF CARE END: 25  FREQUENCY: 2x/wk week  PRECAUTIONS: HTN    POC expires Auth Status Total   Visits  Start date  Expiration date PT/OT + Visit Limit? Co-Pay    approved   $0    approved                                       Visit/Unit Tracking  AUTH Status:  Date                Visits  Authed: 12 Used 1 1 1 1                Remaining  4 3 2 0                    Subjective: \"I'm impressed.\"    Objective: See treatment below.    TE/NMR:  STS with 3x chest press with 5lb med ball with use of b/l UE.  1 min 3x at high intensity to raise HR to drive neuroplastic changes.  Additional focus on sustained shoulder flexion, elbow flexion/extension  Resistive pinch pin retrieval from tabletop anteriorly and placement onto horizontal rods 45* to L.  Focus on functional reaching,   Large bead threading performed with retrieval and threading with use of L UE, stabilization of lace in R UE.  Focus on bimanual coordination, pincer grasp L UE.  Requires inc time  Washers and bolt application performed with use of L UE with focus on pincer grasp, rotation of items in finger tips, reaching. Requires moving washers to edge of table to attain pincer grasp.  Demonstrates initiation of rotation of bolts in hand, but unable to complete rotation  Assessment: Tolerated treatment well. Demonstrating emerging dexterity skills. Continues to make progress with functional use of L UE, especially with more distal refined movements. Pt would benefit from continued OT services to address NMR L UE s/p chronic " CVA.    Plan: Continued skilled OT per POC.

## 2025-07-21 ENCOUNTER — APPOINTMENT (OUTPATIENT)
Facility: CLINIC | Age: 65
End: 2025-07-21
Attending: FAMILY MEDICINE
Payer: COMMERCIAL

## 2025-07-22 ENCOUNTER — TELEPHONE (OUTPATIENT)
Age: 65
End: 2025-07-22

## 2025-07-22 NOTE — TELEPHONE ENCOUNTER
Left message with patient explaining only needs to pay $30 as part of the self pay estimate that was generated for appointment 07/25 with Dr Reyes. The patient can pay $111 if chooses to do so.

## 2025-07-22 NOTE — TELEPHONE ENCOUNTER
Patient called asking if he has a copay of $111 for his next appt. I clicked the appt link and did confirm this with patient. No further questions at this time. JANNETH

## 2025-07-23 ENCOUNTER — APPOINTMENT (OUTPATIENT)
Facility: CLINIC | Age: 65
End: 2025-07-23
Attending: FAMILY MEDICINE
Payer: COMMERCIAL

## 2025-07-28 ENCOUNTER — OFFICE VISIT (OUTPATIENT)
Dept: NEPHROLOGY | Facility: CLINIC | Age: 65
End: 2025-07-28
Payer: COMMERCIAL

## 2025-07-28 ENCOUNTER — APPOINTMENT (OUTPATIENT)
Facility: CLINIC | Age: 65
End: 2025-07-28
Attending: FAMILY MEDICINE
Payer: COMMERCIAL

## 2025-07-28 VITALS
HEART RATE: 80 BPM | WEIGHT: 210 LBS | DIASTOLIC BLOOD PRESSURE: 84 MMHG | OXYGEN SATURATION: 96 % | SYSTOLIC BLOOD PRESSURE: 128 MMHG | BODY MASS INDEX: 32.96 KG/M2 | HEIGHT: 67 IN

## 2025-07-28 DIAGNOSIS — Q61.2 POLYCYSTIC KIDNEY DISEASE, AUTOSOMAL DOMINANT: ICD-10-CM

## 2025-07-28 DIAGNOSIS — M89.9 CHRONIC KIDNEY DISEASE-MINERAL AND BONE DISORDER: ICD-10-CM

## 2025-07-28 DIAGNOSIS — E66.09 CLASS 1 OBESITY DUE TO EXCESS CALORIES WITH SERIOUS COMORBIDITY AND BODY MASS INDEX (BMI) OF 34.0 TO 34.9 IN ADULT: ICD-10-CM

## 2025-07-28 DIAGNOSIS — N18.30 BENIGN HYPERTENSION WITH CHRONIC KIDNEY DISEASE, STAGE III (HCC): Primary | ICD-10-CM

## 2025-07-28 DIAGNOSIS — E87.3 METABOLIC ALKALOSIS: ICD-10-CM

## 2025-07-28 DIAGNOSIS — N18.9 CHRONIC KIDNEY DISEASE-MINERAL AND BONE DISORDER: ICD-10-CM

## 2025-07-28 DIAGNOSIS — E87.6 HYPOKALEMIA: ICD-10-CM

## 2025-07-28 DIAGNOSIS — I12.9 BENIGN HYPERTENSION WITH CHRONIC KIDNEY DISEASE, STAGE III (HCC): Primary | ICD-10-CM

## 2025-07-28 DIAGNOSIS — R79.89 ELEVATED SERUM CREATININE: ICD-10-CM

## 2025-07-28 DIAGNOSIS — I73.9 PERIPHERAL ARTERIAL DISEASE (HCC): ICD-10-CM

## 2025-07-28 DIAGNOSIS — E66.811 CLASS 1 OBESITY DUE TO EXCESS CALORIES WITH SERIOUS COMORBIDITY AND BODY MASS INDEX (BMI) OF 34.0 TO 34.9 IN ADULT: ICD-10-CM

## 2025-07-28 DIAGNOSIS — E83.9 CHRONIC KIDNEY DISEASE-MINERAL AND BONE DISORDER: ICD-10-CM

## 2025-07-28 PROCEDURE — 99214 OFFICE O/P EST MOD 30 MIN: CPT | Performed by: INTERNAL MEDICINE

## 2025-07-30 ENCOUNTER — APPOINTMENT (OUTPATIENT)
Facility: CLINIC | Age: 65
End: 2025-07-30
Attending: FAMILY MEDICINE
Payer: COMMERCIAL

## 2025-07-30 DIAGNOSIS — E78.49 OTHER HYPERLIPIDEMIA: ICD-10-CM

## 2025-07-30 DIAGNOSIS — K21.00 GASTROESOPHAGEAL REFLUX DISEASE WITH ESOPHAGITIS, UNSPECIFIED WHETHER HEMORRHAGE: ICD-10-CM

## 2025-07-30 RX ORDER — ESOMEPRAZOLE MAGNESIUM 40 MG/1
40 CAPSULE, DELAYED RELEASE ORAL EVERY MORNING
Qty: 30 CAPSULE | Refills: 2 | Status: SHIPPED | OUTPATIENT
Start: 2025-07-30

## 2025-07-30 RX ORDER — ATORVASTATIN CALCIUM 40 MG/1
TABLET, FILM COATED ORAL
Qty: 30 TABLET | Refills: 2 | Status: SHIPPED | OUTPATIENT
Start: 2025-07-30

## 2025-08-04 ENCOUNTER — OFFICE VISIT (OUTPATIENT)
Facility: CLINIC | Age: 65
End: 2025-08-04
Attending: FAMILY MEDICINE
Payer: COMMERCIAL

## 2025-08-04 ENCOUNTER — TELEPHONE (OUTPATIENT)
Dept: VASCULAR SURGERY | Facility: CLINIC | Age: 65
End: 2025-08-04

## 2025-08-04 DIAGNOSIS — Z86.73 H/O: CVA (CEREBROVASCULAR ACCIDENT): ICD-10-CM

## 2025-08-04 DIAGNOSIS — I63.9 CEREBROVASCULAR ACCIDENT (CVA), UNSPECIFIED MECHANISM (HCC): ICD-10-CM

## 2025-08-04 DIAGNOSIS — G81.10 SPASTIC HEMIPARESIS AFFECTING NONDOMINANT SIDE (HCC): Primary | ICD-10-CM

## 2025-08-04 PROCEDURE — 97112 NEUROMUSCULAR REEDUCATION: CPT

## 2025-08-06 ENCOUNTER — OFFICE VISIT (OUTPATIENT)
Facility: CLINIC | Age: 65
End: 2025-08-06
Attending: FAMILY MEDICINE
Payer: COMMERCIAL

## 2025-08-06 DIAGNOSIS — I63.9 CEREBROVASCULAR ACCIDENT (CVA), UNSPECIFIED MECHANISM (HCC): ICD-10-CM

## 2025-08-06 DIAGNOSIS — Z86.73 H/O: CVA (CEREBROVASCULAR ACCIDENT): ICD-10-CM

## 2025-08-06 DIAGNOSIS — G81.10 SPASTIC HEMIPARESIS AFFECTING NONDOMINANT SIDE (HCC): Primary | ICD-10-CM

## 2025-08-06 PROCEDURE — 97110 THERAPEUTIC EXERCISES: CPT

## 2025-08-06 PROCEDURE — 97112 NEUROMUSCULAR REEDUCATION: CPT

## 2025-08-11 ENCOUNTER — OFFICE VISIT (OUTPATIENT)
Facility: CLINIC | Age: 65
End: 2025-08-11
Attending: FAMILY MEDICINE
Payer: COMMERCIAL

## 2025-08-11 ENCOUNTER — APPOINTMENT (OUTPATIENT)
Dept: LAB | Facility: CLINIC | Age: 65
End: 2025-08-11
Attending: INTERNAL MEDICINE
Payer: COMMERCIAL

## 2025-08-13 ENCOUNTER — EVALUATION (OUTPATIENT)
Facility: CLINIC | Age: 65
End: 2025-08-13
Attending: FAMILY MEDICINE
Payer: COMMERCIAL

## 2025-08-18 ENCOUNTER — OFFICE VISIT (OUTPATIENT)
Facility: CLINIC | Age: 65
End: 2025-08-18
Attending: FAMILY MEDICINE
Payer: COMMERCIAL

## 2025-08-18 DIAGNOSIS — Z86.73 H/O: CVA (CEREBROVASCULAR ACCIDENT): Primary | ICD-10-CM

## 2025-08-18 DIAGNOSIS — G81.10 SPASTIC HEMIPARESIS AFFECTING NONDOMINANT SIDE (HCC): ICD-10-CM

## 2025-08-18 DIAGNOSIS — I63.9 CEREBROVASCULAR ACCIDENT (CVA), UNSPECIFIED MECHANISM (HCC): ICD-10-CM

## 2025-08-18 PROCEDURE — 97112 NEUROMUSCULAR REEDUCATION: CPT

## 2025-08-20 ENCOUNTER — OFFICE VISIT (OUTPATIENT)
Facility: CLINIC | Age: 65
End: 2025-08-20
Attending: FAMILY MEDICINE
Payer: COMMERCIAL

## 2025-08-20 DIAGNOSIS — Z86.73 H/O: CVA (CEREBROVASCULAR ACCIDENT): Primary | ICD-10-CM

## 2025-08-20 DIAGNOSIS — I63.9 CEREBROVASCULAR ACCIDENT (CVA), UNSPECIFIED MECHANISM (HCC): ICD-10-CM

## 2025-08-20 DIAGNOSIS — G81.10 SPASTIC HEMIPARESIS AFFECTING NONDOMINANT SIDE (HCC): ICD-10-CM

## 2025-08-20 PROCEDURE — 97112 NEUROMUSCULAR REEDUCATION: CPT

## (undated) DEVICE — SUT FIBERWIRE #2 1/2 CIRCLE T-5 38IN AR-7200

## (undated) DEVICE — U-DRAPE: Brand: CONVERTORS

## (undated) DEVICE — ADHESIVE SKIN HIGH VISCOSITY EXOFIN 1ML

## (undated) DEVICE — 3M™ IOBAN™ 2 ANTIMICROBIAL INCISE DRAPE 6651EZ: Brand: IOBAN™ 2

## (undated) DEVICE — DRESSING MEPILEX AG BORDER POST-OP 4 X 10 IN

## (undated) DEVICE — HYDROGEN PEROXIDE 3 PCT 16 OZ

## (undated) DEVICE — ANTIBACTERIAL UNDYED BRAIDED (POLYGLACTIN 910), SYNTHETIC ABSORBABLE SUTURE: Brand: COATED VICRYL

## (undated) DEVICE — POSITIONER TRIMANO LIMB BEACH CHAIR

## (undated) DEVICE — DRESSING MEPILEX AG BORDER POST-OP 4 X 8 IN

## (undated) DEVICE — SCREW CENTRAL 6.5 X 25MM: Type: IMPLANTABLE DEVICE | Site: SHOULDER | Status: NON-FUNCTIONAL

## (undated) DEVICE — SPONGE SCRUB 4 PCT CHLORHEXIDINE

## (undated) DEVICE — GUIDE PIN 3 X 75MM STRL

## (undated) DEVICE — DRAPE SHEET X-LG

## (undated) DEVICE — DRILL BIT 3.2MM

## (undated) DEVICE — SUT MONOCRYL 3-0 PS-2 27 IN Y427H

## (undated) DEVICE — SCREW PERIPHERAL 5 X 14MM: Type: IMPLANTABLE DEVICE | Site: SHOULDER | Status: NON-FUNCTIONAL

## (undated) DEVICE — GLOVE INDICATOR PI UNDERGLOVE SZ 8 BLUE

## (undated) DEVICE — STERILE DOUBLE BASIN SET PACK: Brand: CARDINAL HEALTH

## (undated) DEVICE — GUIDE PIN 3 X 100MM PERFORM

## (undated) DEVICE — 3M™ STERI-STRIP™ REINFORCED ADHESIVE SKIN CLOSURES, R1547, 1/2 IN X 4 IN (12 MM X 100 MM), 6 STRIPS/ENVELOPE: Brand: 3M™ STERI-STRIP™

## (undated) DEVICE — TIBURON BEACH CHAIR SHOULDER DRAPE: Brand: CONVERTORS

## (undated) DEVICE — INSTRUMENT POUCH: Brand: CONVERTORS

## (undated) DEVICE — RESTRAINT HEAD UNIVERSAL

## (undated) DEVICE — 450 ML BOTTLE OF 0.05% CHLORHEXIDINE GLUCONATE IN 99.95% STERILE WATER FOR IRRIGATION, USP AND APPLICATOR.: Brand: IRRISEPT ANTIMICROBIAL WOUND LAVAGE

## (undated) DEVICE — ASTOUND SURGICAL GOWN, XXX LARGE, X-LONG: Brand: CONVERTORS

## (undated) DEVICE — HANDPIECE SET WITH HIGH FLOW TIP AND SUCTION TUBE: Brand: INTERPULSE

## (undated) DEVICE — TOWEL SURG XR DETECT GREEN STRL RFD

## (undated) DEVICE — EXOFIN PRECISION PEN HIGH VISCOSITY TOPICAL SKIN ADHESIVE: Brand: EXOFIN PRECISION PEN, 1G

## (undated) DEVICE — INTENDED FOR TISSUE SEPARATION, AND OTHER PROCEDURES THAT REQUIRE A SHARP SURGICAL BLADE TO PUNCTURE OR CUT.: Brand: BARD-PARKER ® CARBON RIB-BACK BLADES

## (undated) DEVICE — SUT ETHIBOND 5 V-37 30 IN MB66G

## (undated) DEVICE — GUIDE PIN 2.5 X 220MM AEQUALIS PERFORM PLUS

## (undated) DEVICE — SUTURETAPE 1.3MM W/NEEDLE WHITE/BLUE

## (undated) DEVICE — CHLORAPREP HI-LITE 26ML ORANGE

## (undated) DEVICE — GLOVE SRG BIOGEL ORTHOPEDIC 8

## (undated) DEVICE — ANTIBACTERIAL VIOLET BRAIDED (POLYGLACTIN 910), SYNTHETIC ABSORBABLE SUTURE: Brand: COATED VICRYL

## (undated) DEVICE — GLOVE INDICATOR PI UNDERGLOVE SZ 6.5 BLUE

## (undated) DEVICE — DUAL CUT SAGITTAL BLADE

## (undated) DEVICE — GLOVE SRG BIOGEL 6.5

## (undated) DEVICE — ORTHOPEDIC PACK: Brand: CARDINAL HEALTH